# Patient Record
Sex: FEMALE | Race: WHITE | NOT HISPANIC OR LATINO | Employment: OTHER | URBAN - METROPOLITAN AREA
[De-identification: names, ages, dates, MRNs, and addresses within clinical notes are randomized per-mention and may not be internally consistent; named-entity substitution may affect disease eponyms.]

---

## 2017-01-01 ENCOUNTER — APPOINTMENT (OUTPATIENT)
Dept: OCCUPATIONAL THERAPY | Facility: CLINIC | Age: 77
DRG: 205 | End: 2017-01-01
Payer: MEDICARE

## 2017-01-01 ENCOUNTER — APPOINTMENT (OUTPATIENT)
Dept: GENERAL RADIOLOGY | Facility: CLINIC | Age: 77
DRG: 205 | End: 2017-01-01
Attending: NURSE PRACTITIONER
Payer: MEDICARE

## 2017-01-01 ENCOUNTER — ONCOLOGY VISIT (OUTPATIENT)
Dept: ONCOLOGY | Facility: CLINIC | Age: 77
End: 2017-01-01
Attending: NURSE PRACTITIONER
Payer: MEDICARE

## 2017-01-01 ENCOUNTER — TELEPHONE (OUTPATIENT)
Dept: TRANSPLANT | Facility: CLINIC | Age: 77
End: 2017-01-01

## 2017-01-01 ENCOUNTER — ONCOLOGY VISIT (OUTPATIENT)
Dept: ONCOLOGY | Facility: CLINIC | Age: 77
DRG: 205 | End: 2017-01-01
Attending: PHYSICIAN ASSISTANT
Payer: MEDICARE

## 2017-01-01 ENCOUNTER — APPOINTMENT (OUTPATIENT)
Dept: LAB | Facility: CLINIC | Age: 77
End: 2017-01-01
Attending: INTERNAL MEDICINE
Payer: MEDICARE

## 2017-01-01 ENCOUNTER — APPOINTMENT (OUTPATIENT)
Dept: ONCOLOGY | Facility: CLINIC | Age: 77
End: 2017-01-01
Attending: INTERNAL MEDICINE
Payer: MEDICARE

## 2017-01-01 ENCOUNTER — APPOINTMENT (OUTPATIENT)
Dept: GENERAL RADIOLOGY | Facility: CLINIC | Age: 77
DRG: 205 | End: 2017-01-01
Attending: INTERNAL MEDICINE
Payer: MEDICARE

## 2017-01-01 ENCOUNTER — TELEPHONE (OUTPATIENT)
Dept: ONCOLOGY | Facility: CLINIC | Age: 77
End: 2017-01-01

## 2017-01-01 ENCOUNTER — APPOINTMENT (OUTPATIENT)
Dept: LAB | Facility: CLINIC | Age: 77
End: 2017-01-01
Attending: NURSE PRACTITIONER
Payer: MEDICARE

## 2017-01-01 ENCOUNTER — HOSPITAL ENCOUNTER (INPATIENT)
Facility: CLINIC | Age: 77
LOS: 6 days | Discharge: HOME | DRG: 205 | End: 2017-01-27
Attending: EMERGENCY MEDICINE | Admitting: INTERNAL MEDICINE
Payer: MEDICARE

## 2017-01-01 ENCOUNTER — TRANSFERRED RECORDS (OUTPATIENT)
Dept: HEALTH INFORMATION MANAGEMENT | Facility: CLINIC | Age: 77
End: 2017-01-01

## 2017-01-01 ENCOUNTER — APPOINTMENT (OUTPATIENT)
Dept: LAB | Facility: CLINIC | Age: 77
DRG: 205 | End: 2017-01-01
Attending: INTERNAL MEDICINE
Payer: MEDICARE

## 2017-01-01 ENCOUNTER — APPOINTMENT (OUTPATIENT)
Dept: OCCUPATIONAL THERAPY | Facility: CLINIC | Age: 77
DRG: 205 | End: 2017-01-01
Attending: PHYSICIAN ASSISTANT
Payer: MEDICARE

## 2017-01-01 ENCOUNTER — APPOINTMENT (OUTPATIENT)
Dept: PHYSICAL THERAPY | Facility: CLINIC | Age: 77
DRG: 205 | End: 2017-01-01
Attending: PHYSICIAN ASSISTANT
Payer: MEDICARE

## 2017-01-01 ENCOUNTER — INFUSION THERAPY VISIT (OUTPATIENT)
Dept: ONCOLOGY | Facility: CLINIC | Age: 77
DRG: 205 | End: 2017-01-01
Attending: INTERNAL MEDICINE
Payer: MEDICARE

## 2017-01-01 ENCOUNTER — APPOINTMENT (OUTPATIENT)
Dept: PHYSICAL THERAPY | Facility: CLINIC | Age: 77
DRG: 205 | End: 2017-01-01
Payer: MEDICARE

## 2017-01-01 ENCOUNTER — APPOINTMENT (OUTPATIENT)
Dept: CT IMAGING | Facility: CLINIC | Age: 77
DRG: 205 | End: 2017-01-01
Attending: EMERGENCY MEDICINE
Payer: MEDICARE

## 2017-01-01 ENCOUNTER — APPOINTMENT (OUTPATIENT)
Dept: INTERVENTIONAL RADIOLOGY/VASCULAR | Facility: CLINIC | Age: 77
DRG: 205 | End: 2017-01-01
Attending: NURSE PRACTITIONER
Payer: MEDICARE

## 2017-01-01 ENCOUNTER — CARE COORDINATION (OUTPATIENT)
Dept: ONCOLOGY | Facility: CLINIC | Age: 77
End: 2017-01-01

## 2017-01-01 ENCOUNTER — HOSPITAL ENCOUNTER (INPATIENT)
Facility: CLINIC | Age: 77
LOS: 6 days | DRG: 205 | End: 2017-02-19
Attending: EMERGENCY MEDICINE | Admitting: INTERNAL MEDICINE
Payer: MEDICARE

## 2017-01-01 ENCOUNTER — APPOINTMENT (OUTPATIENT)
Dept: ULTRASOUND IMAGING | Facility: CLINIC | Age: 77
DRG: 205 | End: 2017-01-01
Attending: NURSE PRACTITIONER
Payer: MEDICARE

## 2017-01-01 ENCOUNTER — APPOINTMENT (OUTPATIENT)
Dept: GENERAL RADIOLOGY | Facility: CLINIC | Age: 77
DRG: 205 | End: 2017-01-01
Attending: EMERGENCY MEDICINE
Payer: MEDICARE

## 2017-01-01 ENCOUNTER — CARE COORDINATION (OUTPATIENT)
Dept: TRANSPLANT | Facility: CLINIC | Age: 77
End: 2017-01-01

## 2017-01-01 ENCOUNTER — APPOINTMENT (OUTPATIENT)
Dept: OCCUPATIONAL THERAPY | Facility: CLINIC | Age: 77
DRG: 205 | End: 2017-01-01
Attending: NURSE PRACTITIONER
Payer: MEDICARE

## 2017-01-01 ENCOUNTER — APPOINTMENT (OUTPATIENT)
Dept: CARDIOLOGY | Facility: CLINIC | Age: 77
DRG: 205 | End: 2017-01-01
Payer: MEDICARE

## 2017-01-01 ENCOUNTER — APPOINTMENT (OUTPATIENT)
Dept: PHYSICAL THERAPY | Facility: CLINIC | Age: 77
DRG: 205 | End: 2017-01-01
Attending: NURSE PRACTITIONER
Payer: MEDICARE

## 2017-01-01 ENCOUNTER — CARE COORDINATION (OUTPATIENT)
Dept: CARDIOLOGY | Facility: CLINIC | Age: 77
End: 2017-01-01

## 2017-01-01 ENCOUNTER — APPOINTMENT (OUTPATIENT)
Dept: ULTRASOUND IMAGING | Facility: CLINIC | Age: 77
DRG: 205 | End: 2017-01-01
Attending: INTERNAL MEDICINE
Payer: MEDICARE

## 2017-01-01 ENCOUNTER — APPOINTMENT (OUTPATIENT)
Dept: GENERAL RADIOLOGY | Facility: CLINIC | Age: 77
DRG: 205 | End: 2017-01-01
Payer: MEDICARE

## 2017-01-01 VITALS
OXYGEN SATURATION: 94 % | DIASTOLIC BLOOD PRESSURE: 58 MMHG | RESPIRATION RATE: 18 BRPM | SYSTOLIC BLOOD PRESSURE: 115 MMHG | WEIGHT: 113.54 LBS | BODY MASS INDEX: 23.32 KG/M2 | TEMPERATURE: 97.1 F | HEART RATE: 109 BPM

## 2017-01-01 VITALS
RESPIRATION RATE: 18 BRPM | SYSTOLIC BLOOD PRESSURE: 97 MMHG | TEMPERATURE: 98.9 F | HEART RATE: 110 BPM | WEIGHT: 113.9 LBS | DIASTOLIC BLOOD PRESSURE: 44 MMHG | BODY MASS INDEX: 22.96 KG/M2 | OXYGEN SATURATION: 92 % | HEIGHT: 59 IN

## 2017-01-01 VITALS
BODY MASS INDEX: 23.47 KG/M2 | HEIGHT: 59 IN | TEMPERATURE: 98.1 F | DIASTOLIC BLOOD PRESSURE: 67 MMHG | RESPIRATION RATE: 20 BRPM | WEIGHT: 116.4 LBS | HEART RATE: 108 BPM | OXYGEN SATURATION: 91 % | SYSTOLIC BLOOD PRESSURE: 142 MMHG

## 2017-01-01 VITALS
BODY MASS INDEX: 22.81 KG/M2 | OXYGEN SATURATION: 93 % | TEMPERATURE: 98.2 F | HEART RATE: 103 BPM | RESPIRATION RATE: 16 BRPM | SYSTOLIC BLOOD PRESSURE: 103 MMHG | DIASTOLIC BLOOD PRESSURE: 53 MMHG | WEIGHT: 113 LBS

## 2017-01-01 VITALS
TEMPERATURE: 98.1 F | SYSTOLIC BLOOD PRESSURE: 104 MMHG | HEART RATE: 104 BPM | BODY MASS INDEX: 27.56 KG/M2 | HEIGHT: 59 IN | OXYGEN SATURATION: 79 % | RESPIRATION RATE: 16 BRPM | WEIGHT: 136.69 LBS | DIASTOLIC BLOOD PRESSURE: 48 MMHG

## 2017-01-01 VITALS
HEART RATE: 115 BPM | SYSTOLIC BLOOD PRESSURE: 132 MMHG | RESPIRATION RATE: 16 BRPM | DIASTOLIC BLOOD PRESSURE: 62 MMHG | BODY MASS INDEX: 23.91 KG/M2 | WEIGHT: 116.4 LBS | OXYGEN SATURATION: 94 % | TEMPERATURE: 98.4 F

## 2017-01-01 VITALS
HEART RATE: 105 BPM | SYSTOLIC BLOOD PRESSURE: 114 MMHG | WEIGHT: 115 LBS | DIASTOLIC BLOOD PRESSURE: 58 MMHG | TEMPERATURE: 98.1 F | OXYGEN SATURATION: 91 % | BODY MASS INDEX: 23.18 KG/M2 | HEIGHT: 59 IN

## 2017-01-01 VITALS
WEIGHT: 117.5 LBS | OXYGEN SATURATION: 92 % | RESPIRATION RATE: 24 BRPM | BODY MASS INDEX: 24.14 KG/M2 | TEMPERATURE: 98 F | SYSTOLIC BLOOD PRESSURE: 126 MMHG | HEART RATE: 107 BPM | DIASTOLIC BLOOD PRESSURE: 58 MMHG

## 2017-01-01 DIAGNOSIS — R11.10 VOMITING AND DIARRHEA: ICD-10-CM

## 2017-01-01 DIAGNOSIS — L03.119 CELLULITIS OF WRIST: ICD-10-CM

## 2017-01-01 DIAGNOSIS — Z94.2 LUNG REPLACED BY TRANSPLANT (H): ICD-10-CM

## 2017-01-01 DIAGNOSIS — Z94.2 LUNG REPLACED BY TRANSPLANT (H): Primary | ICD-10-CM

## 2017-01-01 DIAGNOSIS — C78.7 SECONDARY MALIGNANT NEOPLASM OF LIVER (H): ICD-10-CM

## 2017-01-01 DIAGNOSIS — L03.113 CELLULITIS OF RIGHT UPPER EXTREMITY: Primary | ICD-10-CM

## 2017-01-01 DIAGNOSIS — T45.1X5A CHEMOTHERAPY-INDUCED NEUTROPENIA (H): Primary | ICD-10-CM

## 2017-01-01 DIAGNOSIS — C78.7 SECONDARY MALIGNANT NEOPLASM OF LIVER (H): Primary | ICD-10-CM

## 2017-01-01 DIAGNOSIS — N18.30 CKD (CHRONIC KIDNEY DISEASE) STAGE 3, GFR 30-59 ML/MIN (H): ICD-10-CM

## 2017-01-01 DIAGNOSIS — R52 UNCONTROLLED PAIN: ICD-10-CM

## 2017-01-01 DIAGNOSIS — J18.9 HCAP (HEALTHCARE-ASSOCIATED PNEUMONIA): ICD-10-CM

## 2017-01-01 DIAGNOSIS — C78.7 METASTATIC COLON CANCER TO LIVER (H): ICD-10-CM

## 2017-01-01 DIAGNOSIS — E87.5 HYPERKALEMIA: ICD-10-CM

## 2017-01-01 DIAGNOSIS — C18.2 CANCER OF RIGHT COLON (H): ICD-10-CM

## 2017-01-01 DIAGNOSIS — B02.8 HERPES ZOSTER WITH OTHER COMPLICATION: ICD-10-CM

## 2017-01-01 DIAGNOSIS — R19.7 VOMITING AND DIARRHEA: ICD-10-CM

## 2017-01-01 DIAGNOSIS — C18.2 CANCER OF RIGHT COLON (H): Primary | ICD-10-CM

## 2017-01-01 DIAGNOSIS — J40 BRONCHITIS: ICD-10-CM

## 2017-01-01 DIAGNOSIS — D64.81 ANTINEOPLASTIC CHEMOTHERAPY INDUCED ANEMIA: Primary | ICD-10-CM

## 2017-01-01 DIAGNOSIS — N18.9 ANEMIA IN CHRONIC RENAL DISEASE: ICD-10-CM

## 2017-01-01 DIAGNOSIS — N18.30 CKD (CHRONIC KIDNEY DISEASE) STAGE 3, GFR 30-59 ML/MIN (H): Primary | ICD-10-CM

## 2017-01-01 DIAGNOSIS — C18.9 METASTATIC COLON CANCER TO LIVER (H): ICD-10-CM

## 2017-01-01 DIAGNOSIS — C18.9 COLON CARCINOMA METASTATIC TO MULTIPLE SITES (H): ICD-10-CM

## 2017-01-01 DIAGNOSIS — Z79.899 ENCOUNTER FOR LONG-TERM (CURRENT) USE OF HIGH-RISK MEDICATION: ICD-10-CM

## 2017-01-01 DIAGNOSIS — D72.829 LEUKOCYTOSIS, UNSPECIFIED TYPE: ICD-10-CM

## 2017-01-01 DIAGNOSIS — Z79.899 ENCOUNTER FOR LONG-TERM (CURRENT) USE OF OTHER MEDICATIONS: ICD-10-CM

## 2017-01-01 DIAGNOSIS — R11.2 NAUSEA WITH VOMITING: ICD-10-CM

## 2017-01-01 DIAGNOSIS — N18.4 CKD (CHRONIC KIDNEY DISEASE) STAGE 4, GFR 15-29 ML/MIN (H): Primary | ICD-10-CM

## 2017-01-01 DIAGNOSIS — I10 ESSENTIAL HYPERTENSION WITH GOAL BLOOD PRESSURE LESS THAN 140/90: Primary | ICD-10-CM

## 2017-01-01 DIAGNOSIS — D70.1 CHEMOTHERAPY-INDUCED NEUTROPENIA (H): Primary | ICD-10-CM

## 2017-01-01 DIAGNOSIS — T45.1X5A ANTINEOPLASTIC CHEMOTHERAPY INDUCED ANEMIA: Primary | ICD-10-CM

## 2017-01-01 DIAGNOSIS — J18.9 PNEUMONIA DUE TO INFECTIOUS ORGANISM, UNSPECIFIED LATERALITY, UNSPECIFIED PART OF LUNG: ICD-10-CM

## 2017-01-01 DIAGNOSIS — D63.1 ANEMIA IN CHRONIC RENAL DISEASE: ICD-10-CM

## 2017-01-01 LAB
1,3 BETA GLUCAN SER-MCNC: NORMAL NG/ML
ABO + RH BLD: NORMAL
ABO + RH BLD: NORMAL
ACANTHOCYTES BLD QL SMEAR: ABNORMAL
ALBUMIN SERPL-MCNC: 2.2 G/DL (ref 3.4–5)
ALBUMIN SERPL-MCNC: 2.2 G/DL (ref 3.4–5)
ALBUMIN SERPL-MCNC: 2.5 G/DL (ref 3.4–5)
ALBUMIN SERPL-MCNC: 2.6 G/DL (ref 3.4–5)
ALBUMIN SERPL-MCNC: 2.7 G/DL (ref 3.4–5)
ALBUMIN SERPL-MCNC: 3 G/DL (ref 3.4–5)
ALBUMIN UR-MCNC: 10 MG/DL
ALBUMIN UR-MCNC: 10 MG/DL
ALBUMIN UR-MCNC: NEGATIVE MG/DL
ALP SERPL-CCNC: 234 U/L (ref 40–150)
ALP SERPL-CCNC: 239 U/L (ref 40–150)
ALP SERPL-CCNC: 314 U/L (ref 40–150)
ALP SERPL-CCNC: 360 U/L (ref 40–150)
ALP SERPL-CCNC: 362 U/L (ref 40–150)
ALP SERPL-CCNC: 373 U/L (ref 40–150)
ALP SERPL-CCNC: 397 U/L (ref 40–150)
ALP SERPL-CCNC: 415 U/L (ref 40–150)
ALP SERPL-CCNC: 435 U/L (ref 40–150)
ALP SERPL-CCNC: 436 U/L (ref 40–150)
ALP SERPL-CCNC: 445 U/L (ref 40–150)
ALP SERPL-CCNC: 492 U/L (ref 40–150)
ALP SERPL-CCNC: 558 U/L (ref 40–150)
ALP SERPL-CCNC: 592 U/L (ref 40–150)
ALP SERPL-CCNC: 595 U/L (ref 40–150)
ALT SERPL W P-5'-P-CCNC: 102 U/L (ref 0–50)
ALT SERPL W P-5'-P-CCNC: 22 U/L (ref 0–50)
ALT SERPL W P-5'-P-CCNC: 24 U/L (ref 0–50)
ALT SERPL W P-5'-P-CCNC: 26 U/L (ref 0–50)
ALT SERPL W P-5'-P-CCNC: 27 U/L (ref 0–50)
ALT SERPL W P-5'-P-CCNC: 28 U/L (ref 0–50)
ALT SERPL W P-5'-P-CCNC: 29 U/L (ref 0–50)
ALT SERPL W P-5'-P-CCNC: 32 U/L (ref 0–50)
ALT SERPL W P-5'-P-CCNC: 36 U/L (ref 0–50)
ALT SERPL W P-5'-P-CCNC: 36 U/L (ref 0–50)
ALT SERPL W P-5'-P-CCNC: 38 U/L (ref 0–50)
ALT SERPL W P-5'-P-CCNC: 81 U/L (ref 0–50)
ALT SERPL W P-5'-P-CCNC: 83 U/L (ref 0–50)
AMORPH CRY #/AREA URNS HPF: ABNORMAL /HPF
AMYLASE FLD-CCNC: 10 U/L
ANION GAP SERPL CALCULATED.3IONS-SCNC: 10 MMOL/L (ref 3–14)
ANION GAP SERPL CALCULATED.3IONS-SCNC: 11 MMOL/L (ref 3–14)
ANION GAP SERPL CALCULATED.3IONS-SCNC: 12 MMOL/L (ref 3–14)
ANION GAP SERPL CALCULATED.3IONS-SCNC: 12 MMOL/L (ref 3–14)
ANION GAP SERPL CALCULATED.3IONS-SCNC: 13 MMOL/L (ref 3–14)
ANION GAP SERPL CALCULATED.3IONS-SCNC: 7 MMOL/L (ref 3–14)
ANION GAP SERPL CALCULATED.3IONS-SCNC: 7 MMOL/L (ref 3–14)
ANION GAP SERPL CALCULATED.3IONS-SCNC: 8 MMOL/L (ref 3–14)
ANION GAP SERPL CALCULATED.3IONS-SCNC: 9 MMOL/L (ref 3–14)
ANISOCYTOSIS BLD QL SMEAR: ABNORMAL
ANISOCYTOSIS BLD QL SMEAR: ABNORMAL
APPEARANCE FLD: NORMAL
APPEARANCE UR: ABNORMAL
APPEARANCE UR: ABNORMAL
APPEARANCE UR: CLEAR
AST SERPL W P-5'-P-CCNC: 15 U/L (ref 0–45)
AST SERPL W P-5'-P-CCNC: 18 U/L (ref 0–45)
AST SERPL W P-5'-P-CCNC: 20 U/L (ref 0–45)
AST SERPL W P-5'-P-CCNC: 20 U/L (ref 0–45)
AST SERPL W P-5'-P-CCNC: 21 U/L (ref 0–45)
AST SERPL W P-5'-P-CCNC: 22 U/L (ref 0–45)
AST SERPL W P-5'-P-CCNC: 224 U/L (ref 0–45)
AST SERPL W P-5'-P-CCNC: 24 U/L (ref 0–45)
AST SERPL W P-5'-P-CCNC: 27 U/L (ref 0–45)
AST SERPL W P-5'-P-CCNC: 294 U/L (ref 0–45)
AST SERPL W P-5'-P-CCNC: 435 U/L (ref 0–45)
AST SERPL W P-5'-P-CCNC: 60 U/L (ref 0–45)
AST SERPL W P-5'-P-CCNC: 64 U/L (ref 0–45)
AST SERPL W P-5'-P-CCNC: 65 U/L (ref 0–45)
AST SERPL W P-5'-P-CCNC: 72 U/L (ref 0–45)
B-D GLUCAN INTERPRETATION (1,3): NORMAL
BACTERIA #/AREA URNS HPF: ABNORMAL /HPF
BACTERIA SPEC CULT: ABNORMAL
BACTERIA SPEC CULT: ABNORMAL
BACTERIA SPEC CULT: NO GROWTH
BASE DEFICIT BLDA-SCNC: 10.1 MMOL/L
BASOPHILS # BLD AUTO: 0 10E9/L (ref 0–0.2)
BASOPHILS NFR BLD AUTO: 0 %
BASOPHILS NFR BLD AUTO: 0.1 %
BILIRUB DIRECT SERPL-MCNC: 0.2 MG/DL (ref 0–0.2)
BILIRUB DIRECT SERPL-MCNC: 0.4 MG/DL (ref 0–0.2)
BILIRUB DIRECT SERPL-MCNC: 0.5 MG/DL (ref 0–0.2)
BILIRUB SERPL-MCNC: 0.2 MG/DL (ref 0.2–1.3)
BILIRUB SERPL-MCNC: 0.4 MG/DL (ref 0.2–1.3)
BILIRUB SERPL-MCNC: 0.5 MG/DL (ref 0.2–1.3)
BILIRUB SERPL-MCNC: 0.6 MG/DL (ref 0.2–1.3)
BILIRUB SERPL-MCNC: 0.7 MG/DL (ref 0.2–1.3)
BILIRUB SERPL-MCNC: 0.8 MG/DL (ref 0.2–1.3)
BILIRUB SERPL-MCNC: 1 MG/DL (ref 0.2–1.3)
BILIRUB UR QL STRIP: NEGATIVE
BLD GP AB SCN SERPL QL: NORMAL
BLD PROD TYP BPU: NORMAL
BLD PROD TYP BPU: NORMAL
BLD UNIT ID BPU: 0
BLOOD BANK CMNT PATIENT-IMP: NORMAL
BLOOD PRODUCT CODE: NORMAL
BPU ID: NORMAL
BUN SERPL-MCNC: 13 MG/DL (ref 7–30)
BUN SERPL-MCNC: 14 MG/DL (ref 7–30)
BUN SERPL-MCNC: 14 MG/DL (ref 7–30)
BUN SERPL-MCNC: 16 MG/DL (ref 7–30)
BUN SERPL-MCNC: 17 MG/DL (ref 7–30)
BUN SERPL-MCNC: 19 MG/DL (ref 7–30)
BUN SERPL-MCNC: 20 MG/DL (ref 7–30)
BUN SERPL-MCNC: 20 MG/DL (ref 7–30)
BUN SERPL-MCNC: 21 MG/DL (ref 7–30)
BUN SERPL-MCNC: 22 MG/DL (ref 7–30)
BUN SERPL-MCNC: 25 MG/DL (ref 7–30)
BUN SERPL-MCNC: 30 MG/DL (ref 7–30)
BUN SERPL-MCNC: 31 MG/DL (ref 7–30)
BUN SERPL-MCNC: 32 MG/DL (ref 7–30)
BUN SERPL-MCNC: 34 MG/DL (ref 7–30)
BUN SERPL-MCNC: 35 MG/DL (ref 7–30)
BUN SERPL-MCNC: 35 MG/DL (ref 7–30)
BUN SERPL-MCNC: 36 MG/DL (ref 7–30)
BUN SERPL-MCNC: 37 MG/DL (ref 7–30)
BUN SERPL-MCNC: 39 MG/DL (ref 7–30)
BUN SERPL-MCNC: 40 MG/DL (ref 7–30)
BUN SERPL-MCNC: 45 MG/DL (ref 7–30)
C DIFF TOX B STL QL: NORMAL
CA-I BLD-SCNC: 4.4 MG/DL (ref 4.4–5.2)
CA-I SERPL ISE-MCNC: 4.2 MG/DL (ref 4.4–5.2)
CALCIUM SERPL-MCNC: 6.6 MG/DL (ref 8.5–10.1)
CALCIUM SERPL-MCNC: 6.8 MG/DL (ref 8.5–10.1)
CALCIUM SERPL-MCNC: 7 MG/DL (ref 8.5–10.1)
CALCIUM SERPL-MCNC: 7.1 MG/DL (ref 8.5–10.1)
CALCIUM SERPL-MCNC: 7.1 MG/DL (ref 8.5–10.1)
CALCIUM SERPL-MCNC: 7.2 MG/DL (ref 8.5–10.1)
CALCIUM SERPL-MCNC: 7.2 MG/DL (ref 8.5–10.1)
CALCIUM SERPL-MCNC: 7.4 MG/DL (ref 8.5–10.1)
CALCIUM SERPL-MCNC: 7.6 MG/DL (ref 8.5–10.1)
CALCIUM SERPL-MCNC: 7.7 MG/DL (ref 8.5–10.1)
CALCIUM SERPL-MCNC: 7.8 MG/DL (ref 8.5–10.1)
CALCIUM SERPL-MCNC: 7.9 MG/DL (ref 8.5–10.1)
CALCIUM SERPL-MCNC: 8 MG/DL (ref 8.5–10.1)
CALCIUM SERPL-MCNC: 8.1 MG/DL (ref 8.5–10.1)
CALCIUM SERPL-MCNC: 8.1 MG/DL (ref 8.5–10.1)
CALCIUM SERPL-MCNC: 8.2 MG/DL (ref 8.5–10.1)
CAMPYLOBACTER GROUP BY NAT: NOT DETECTED
CEA SERPL-MCNC: 34.6 UG/L (ref 0–2.5)
CHLORIDE SERPL-SCNC: 103 MMOL/L (ref 94–109)
CHLORIDE SERPL-SCNC: 104 MMOL/L (ref 94–109)
CHLORIDE SERPL-SCNC: 105 MMOL/L (ref 94–109)
CHLORIDE SERPL-SCNC: 106 MMOL/L (ref 94–109)
CHLORIDE SERPL-SCNC: 107 MMOL/L (ref 94–109)
CHLORIDE SERPL-SCNC: 107 MMOL/L (ref 94–109)
CHLORIDE SERPL-SCNC: 108 MMOL/L (ref 94–109)
CHLORIDE SERPL-SCNC: 109 MMOL/L (ref 94–109)
CHLORIDE SERPL-SCNC: 110 MMOL/L (ref 94–109)
CHLORIDE SERPL-SCNC: 111 MMOL/L (ref 94–109)
CO2 BLDCOV-SCNC: 17 MMOL/L (ref 21–28)
CO2 BLDCOV-SCNC: 18 MMOL/L (ref 21–28)
CO2 SERPL-SCNC: 16 MMOL/L (ref 20–32)
CO2 SERPL-SCNC: 17 MMOL/L (ref 20–32)
CO2 SERPL-SCNC: 18 MMOL/L (ref 20–32)
CO2 SERPL-SCNC: 19 MMOL/L (ref 20–32)
CO2 SERPL-SCNC: 20 MMOL/L (ref 20–32)
CO2 SERPL-SCNC: 21 MMOL/L (ref 20–32)
CO2 SERPL-SCNC: 22 MMOL/L (ref 20–32)
CO2 SERPL-SCNC: 23 MMOL/L (ref 20–32)
COLOR FLD: NORMAL
COLOR UR AUTO: YELLOW
COPATH REPORT: NORMAL
CREAT SERPL-MCNC: 1.36 MG/DL (ref 0.52–1.04)
CREAT SERPL-MCNC: 1.41 MG/DL (ref 0.52–1.04)
CREAT SERPL-MCNC: 1.41 MG/DL (ref 0.52–1.04)
CREAT SERPL-MCNC: 1.46 MG/DL (ref 0.52–1.04)
CREAT SERPL-MCNC: 1.51 MG/DL (ref 0.52–1.04)
CREAT SERPL-MCNC: 1.57 MG/DL (ref 0.52–1.04)
CREAT SERPL-MCNC: 1.63 MG/DL (ref 0.52–1.04)
CREAT SERPL-MCNC: 1.65 MG/DL (ref 0.52–1.04)
CREAT SERPL-MCNC: 1.75 MG/DL (ref 0.52–1.04)
CREAT SERPL-MCNC: 1.78 MG/DL (ref 0.52–1.04)
CREAT SERPL-MCNC: 1.82 MG/DL (ref 0.52–1.04)
CREAT SERPL-MCNC: 1.83 MG/DL (ref 0.52–1.04)
CREAT SERPL-MCNC: 1.87 MG/DL (ref 0.52–1.04)
CREAT SERPL-MCNC: 1.88 MG/DL (ref 0.52–1.04)
CREAT SERPL-MCNC: 1.94 MG/DL (ref 0.52–1.04)
CREAT SERPL-MCNC: 1.97 MG/DL (ref 0.52–1.04)
CREAT SERPL-MCNC: 2.04 MG/DL (ref 0.52–1.04)
CREAT SERPL-MCNC: 2.53 MG/DL (ref 0.52–1.04)
CREAT SERPL-MCNC: 2.98 MG/DL (ref 0.52–1.04)
CREAT SERPL-MCNC: 3.18 MG/DL (ref 0.52–1.04)
CREAT SERPL-MCNC: 3.21 MG/DL (ref 0.52–1.04)
CREAT SERPL-MCNC: 3.66 MG/DL (ref 0.52–1.04)
CREAT UR-MCNC: 91 MG/DL
DACRYOCYTES BLD QL SMEAR: ABNORMAL
DIFFERENTIAL METHOD BLD: ABNORMAL
EBV DNA # SPEC NAA+PROBE: 4253 {COPIES}/ML
EBV DNA SPEC NAA+PROBE-LOG#: 3.6 {LOG_COPIES}/ML
ELLIPTOCYTES BLD QL SMEAR: SLIGHT
ENTERIC PATHOGEN COMMENT: NORMAL
EOSINOPHIL # BLD AUTO: 0 10E9/L (ref 0–0.7)
EOSINOPHIL # BLD AUTO: 0.1 10E9/L (ref 0–0.7)
EOSINOPHIL # BLD AUTO: 0.2 10E9/L (ref 0–0.7)
EOSINOPHIL NFR BLD AUTO: 0 %
EOSINOPHIL NFR BLD AUTO: 0.1 %
EOSINOPHIL NFR BLD AUTO: 0.1 %
EOSINOPHIL NFR BLD AUTO: 0.2 %
EOSINOPHIL NFR BLD AUTO: 1 %
EOSINOPHIL NFR BLD AUTO: 1.3 %
ERYTHROCYTE [DISTWIDTH] IN BLOOD BY AUTOMATED COUNT: 21.8 % (ref 10–15)
ERYTHROCYTE [DISTWIDTH] IN BLOOD BY AUTOMATED COUNT: 21.9 % (ref 10–15)
ERYTHROCYTE [DISTWIDTH] IN BLOOD BY AUTOMATED COUNT: 22 % (ref 10–15)
ERYTHROCYTE [DISTWIDTH] IN BLOOD BY AUTOMATED COUNT: 22.2 % (ref 10–15)
ERYTHROCYTE [DISTWIDTH] IN BLOOD BY AUTOMATED COUNT: 22.4 % (ref 10–15)
ERYTHROCYTE [DISTWIDTH] IN BLOOD BY AUTOMATED COUNT: 22.5 % (ref 10–15)
ERYTHROCYTE [DISTWIDTH] IN BLOOD BY AUTOMATED COUNT: 23.4 % (ref 10–15)
ERYTHROCYTE [DISTWIDTH] IN BLOOD BY AUTOMATED COUNT: 23.8 % (ref 10–15)
ERYTHROCYTE [DISTWIDTH] IN BLOOD BY AUTOMATED COUNT: 23.9 % (ref 10–15)
ERYTHROCYTE [DISTWIDTH] IN BLOOD BY AUTOMATED COUNT: 24.1 % (ref 10–15)
ERYTHROCYTE [DISTWIDTH] IN BLOOD BY AUTOMATED COUNT: 24.4 % (ref 10–15)
ERYTHROCYTE [DISTWIDTH] IN BLOOD BY AUTOMATED COUNT: 24.6 % (ref 10–15)
ERYTHROCYTE [DISTWIDTH] IN BLOOD BY AUTOMATED COUNT: 24.7 % (ref 10–15)
ERYTHROCYTE [DISTWIDTH] IN BLOOD BY AUTOMATED COUNT: 25.1 % (ref 10–15)
ERYTHROCYTE [DISTWIDTH] IN BLOOD BY AUTOMATED COUNT: 25.2 % (ref 10–15)
ERYTHROCYTE [DISTWIDTH] IN BLOOD BY AUTOMATED COUNT: 25.2 % (ref 10–15)
ERYTHROCYTE [DISTWIDTH] IN BLOOD BY AUTOMATED COUNT: 25.5 % (ref 10–15)
ERYTHROCYTE [DISTWIDTH] IN BLOOD BY AUTOMATED COUNT: 25.7 % (ref 10–15)
ERYTHROCYTE [DISTWIDTH] IN BLOOD BY AUTOMATED COUNT: 25.7 % (ref 10–15)
ERYTHROCYTE [DISTWIDTH] IN BLOOD BY AUTOMATED COUNT: 25.8 % (ref 10–15)
FIBRINOGEN PPP-MCNC: 320 MG/DL (ref 200–420)
FIBRINOGEN PPP-MCNC: 344 MG/DL (ref 200–420)
FIBRINOGEN PPP-MCNC: 353 MG/DL (ref 200–420)
FLUAV H1 2009 PAND RNA SPEC QL NAA+PROBE: NEGATIVE
FLUAV H1 2009 PAND RNA SPEC QL NAA+PROBE: NEGATIVE
FLUAV H1 RNA SPEC QL NAA+PROBE: NEGATIVE
FLUAV H1 RNA SPEC QL NAA+PROBE: NEGATIVE
FLUAV H3 RNA SPEC QL NAA+PROBE: NEGATIVE
FLUAV H3 RNA SPEC QL NAA+PROBE: NEGATIVE
FLUAV RNA SPEC QL NAA+PROBE: NEGATIVE
FLUAV RNA SPEC QL NAA+PROBE: NEGATIVE
FLUAV+FLUBV AG SPEC QL: NEGATIVE
FLUAV+FLUBV AG SPEC QL: NORMAL
FLUAV+FLUBV RNA SPEC QL NAA+PROBE: NORMAL
FLUBV RNA SPEC QL NAA+PROBE: NEGATIVE
FLUBV RNA SPEC QL NAA+PROBE: NEGATIVE
FOLATE RBC-MCNC: 551 NG/ML
FRACT EXCRET NA UR+SERPL-RTO: 0.5 %
FUNGUS SPEC CULT: NORMAL
GALACTOMANNAN AG SERPL QL IA: NORMAL
GALACTOMANNAN AG SERPL-ACNC: 0.08
GFR SERPL CREATININE-BSD FRML MDRD: 12 ML/MIN/1.7M2
GFR SERPL CREATININE-BSD FRML MDRD: 14 ML/MIN/1.7M2
GFR SERPL CREATININE-BSD FRML MDRD: 14 ML/MIN/1.7M2
GFR SERPL CREATININE-BSD FRML MDRD: 15 ML/MIN/1.7M2
GFR SERPL CREATININE-BSD FRML MDRD: 18 ML/MIN/1.7M2
GFR SERPL CREATININE-BSD FRML MDRD: 24 ML/MIN/1.7M2
GFR SERPL CREATININE-BSD FRML MDRD: 25 ML/MIN/1.7M2
GFR SERPL CREATININE-BSD FRML MDRD: 25 ML/MIN/1.7M2
GFR SERPL CREATININE-BSD FRML MDRD: 26 ML/MIN/1.7M2
GFR SERPL CREATININE-BSD FRML MDRD: 26 ML/MIN/1.7M2
GFR SERPL CREATININE-BSD FRML MDRD: 27 ML/MIN/1.7M2
GFR SERPL CREATININE-BSD FRML MDRD: 27 ML/MIN/1.7M2
GFR SERPL CREATININE-BSD FRML MDRD: 28 ML/MIN/1.7M2
GFR SERPL CREATININE-BSD FRML MDRD: 28 ML/MIN/1.7M2
GFR SERPL CREATININE-BSD FRML MDRD: 30 ML/MIN/1.7M2
GFR SERPL CREATININE-BSD FRML MDRD: 31 ML/MIN/1.7M2
GFR SERPL CREATININE-BSD FRML MDRD: 32 ML/MIN/1.7M2
GFR SERPL CREATININE-BSD FRML MDRD: 33 ML/MIN/1.7M2
GFR SERPL CREATININE-BSD FRML MDRD: 35 ML/MIN/1.7M2
GFR SERPL CREATININE-BSD FRML MDRD: 36 ML/MIN/1.7M2
GFR SERPL CREATININE-BSD FRML MDRD: 36 ML/MIN/1.7M2
GFR SERPL CREATININE-BSD FRML MDRD: 38 ML/MIN/1.7M2
GGT SERPL-CCNC: 418 U/L (ref 0–40)
GLUCOSE BLD-MCNC: 110 MG/DL (ref 70–99)
GLUCOSE BLDC GLUCOMTR-MCNC: 111 MG/DL (ref 70–99)
GLUCOSE BLDC GLUCOMTR-MCNC: 115 MG/DL (ref 70–99)
GLUCOSE BLDC GLUCOMTR-MCNC: 119 MG/DL (ref 70–99)
GLUCOSE BLDC GLUCOMTR-MCNC: 124 MG/DL (ref 70–99)
GLUCOSE BLDC GLUCOMTR-MCNC: 126 MG/DL (ref 70–99)
GLUCOSE BLDC GLUCOMTR-MCNC: 136 MG/DL (ref 70–99)
GLUCOSE BLDC GLUCOMTR-MCNC: 156 MG/DL (ref 70–99)
GLUCOSE BLDC GLUCOMTR-MCNC: 166 MG/DL (ref 70–99)
GLUCOSE BLDC GLUCOMTR-MCNC: 169 MG/DL (ref 70–99)
GLUCOSE BLDC GLUCOMTR-MCNC: 177 MG/DL (ref 70–99)
GLUCOSE BLDC GLUCOMTR-MCNC: 182 MG/DL (ref 70–99)
GLUCOSE BLDC GLUCOMTR-MCNC: 195 MG/DL (ref 70–99)
GLUCOSE BLDC GLUCOMTR-MCNC: 240 MG/DL (ref 70–99)
GLUCOSE BLDC GLUCOMTR-MCNC: 74 MG/DL (ref 70–99)
GLUCOSE BLDC GLUCOMTR-MCNC: 77 MG/DL (ref 70–99)
GLUCOSE BLDC GLUCOMTR-MCNC: 84 MG/DL (ref 70–99)
GLUCOSE BLDC GLUCOMTR-MCNC: 90 MG/DL (ref 70–99)
GLUCOSE BLDC GLUCOMTR-MCNC: 97 MG/DL (ref 70–99)
GLUCOSE BLDC GLUCOMTR-MCNC: 99 MG/DL (ref 70–99)
GLUCOSE FLD-MCNC: 72 MG/DL
GLUCOSE SERPL-MCNC: 105 MG/DL (ref 70–99)
GLUCOSE SERPL-MCNC: 106 MG/DL (ref 70–99)
GLUCOSE SERPL-MCNC: 109 MG/DL (ref 70–99)
GLUCOSE SERPL-MCNC: 122 MG/DL (ref 70–99)
GLUCOSE SERPL-MCNC: 137 MG/DL (ref 70–99)
GLUCOSE SERPL-MCNC: 143 MG/DL (ref 70–99)
GLUCOSE SERPL-MCNC: 172 MG/DL (ref 70–99)
GLUCOSE SERPL-MCNC: 179 MG/DL (ref 70–99)
GLUCOSE SERPL-MCNC: 202 MG/DL (ref 70–99)
GLUCOSE SERPL-MCNC: 223 MG/DL (ref 70–99)
GLUCOSE SERPL-MCNC: 44 MG/DL (ref 70–99)
GLUCOSE SERPL-MCNC: 56 MG/DL (ref 70–99)
GLUCOSE SERPL-MCNC: 60 MG/DL (ref 70–99)
GLUCOSE SERPL-MCNC: 68 MG/DL (ref 70–99)
GLUCOSE SERPL-MCNC: 74 MG/DL (ref 70–99)
GLUCOSE SERPL-MCNC: 77 MG/DL (ref 70–99)
GLUCOSE SERPL-MCNC: 80 MG/DL (ref 70–99)
GLUCOSE SERPL-MCNC: 84 MG/DL (ref 70–99)
GLUCOSE SERPL-MCNC: 84 MG/DL (ref 70–99)
GLUCOSE SERPL-MCNC: 87 MG/DL (ref 70–99)
GLUCOSE SERPL-MCNC: 87 MG/DL (ref 70–99)
GLUCOSE SERPL-MCNC: 88 MG/DL (ref 70–99)
GLUCOSE UR STRIP-MCNC: NEGATIVE MG/DL
GRAM STN SPEC: ABNORMAL
GRAM STN SPEC: NORMAL
GRAM STN SPEC: NORMAL
GRAN CASTS #/AREA URNS LPF: 2 /LPF
HADV DNA SPEC QL NAA+PROBE: NEGATIVE
HAPTOGLOB SERPL-MCNC: 206 MG/DL (ref 35–175)
HBA1C MFR BLD: 7.8 % (ref 4.3–6)
HCO3 BLD-SCNC: 17 MMOL/L (ref 21–28)
HCT VFR BLD AUTO: 22.2 % (ref 35–47)
HCT VFR BLD AUTO: 23.5 % (ref 35–47)
HCT VFR BLD AUTO: 23.6 % (ref 35–47)
HCT VFR BLD AUTO: 24.3 % (ref 35–47)
HCT VFR BLD AUTO: 24.4 % (ref 35–47)
HCT VFR BLD AUTO: 24.7 % (ref 35–47)
HCT VFR BLD AUTO: 24.8 % (ref 35–47)
HCT VFR BLD AUTO: 24.9 % (ref 35–47)
HCT VFR BLD AUTO: 25.2 % (ref 35–47)
HCT VFR BLD AUTO: 25.3 % (ref 35–47)
HCT VFR BLD AUTO: 25.5 % (ref 35–47)
HCT VFR BLD AUTO: 25.6 % (ref 35–47)
HCT VFR BLD AUTO: 25.8 % (ref 35–47)
HCT VFR BLD AUTO: 25.9 % (ref 35–47)
HCT VFR BLD AUTO: 26.6 % (ref 35–47)
HCT VFR BLD AUTO: 26.6 % (ref 35–47)
HCT VFR BLD AUTO: 27 % (ref 35–47)
HCT VFR BLD AUTO: 27.6 % (ref 35–47)
HCT VFR BLD AUTO: 28 % (ref 35–47)
HCT VFR BLD AUTO: 28 % (ref 35–47)
HCT VFR BLD CALC: 23 %PCV (ref 35–47)
HCT VFR BLD CALC: NORMAL %
HGB BLD CALC-MCNC: 7.8 G/DL (ref 11.7–15.7)
HGB BLD-MCNC: 6.6 G/DL (ref 11.7–15.7)
HGB BLD-MCNC: 6.7 G/DL (ref 11.7–15.7)
HGB BLD-MCNC: 6.8 G/DL (ref 11.7–15.7)
HGB BLD-MCNC: 7 G/DL (ref 11.7–15.7)
HGB BLD-MCNC: 7 G/DL (ref 11.7–15.7)
HGB BLD-MCNC: 7.1 G/DL (ref 11.7–15.7)
HGB BLD-MCNC: 7.2 G/DL (ref 11.7–15.7)
HGB BLD-MCNC: 7.2 G/DL (ref 11.7–15.7)
HGB BLD-MCNC: 7.3 G/DL (ref 11.7–15.7)
HGB BLD-MCNC: 7.3 G/DL (ref 11.7–15.7)
HGB BLD-MCNC: 7.5 G/DL (ref 11.7–15.7)
HGB BLD-MCNC: 7.6 G/DL (ref 11.7–15.7)
HGB BLD-MCNC: 7.9 G/DL (ref 11.7–15.7)
HGB BLD-MCNC: 8.2 G/DL (ref 11.7–15.7)
HGB BLD-MCNC: 8.3 G/DL (ref 11.7–15.7)
HGB BLD-MCNC: 8.4 G/DL (ref 11.7–15.7)
HGB UR QL STRIP: NEGATIVE
HMPV RNA SPEC QL NAA+PROBE: NEGATIVE
HMPV RNA SPEC QL NAA+PROBE: NEGATIVE
HPIV1 RNA SPEC QL NAA+PROBE: NEGATIVE
HPIV1 RNA SPEC QL NAA+PROBE: NEGATIVE
HPIV2 RNA SPEC QL NAA+PROBE: NEGATIVE
HPIV2 RNA SPEC QL NAA+PROBE: NEGATIVE
HPIV3 RNA SPEC QL NAA+PROBE: NEGATIVE
HPIV3 RNA SPEC QL NAA+PROBE: NEGATIVE
HYALINE CASTS #/AREA URNS LPF: 1 /LPF (ref 0–2)
HYALINE CASTS #/AREA URNS LPF: 11 /LPF (ref 0–2)
HYALINE CASTS #/AREA URNS LPF: 18 /LPF (ref 0–2)
IMM GRANULOCYTES # BLD: 0.1 10E9/L (ref 0–0.4)
IMM GRANULOCYTES # BLD: 0.2 10E9/L (ref 0–0.4)
IMM GRANULOCYTES # BLD: 0.2 10E9/L (ref 0–0.4)
IMM GRANULOCYTES # BLD: 0.3 10E9/L (ref 0–0.4)
IMM GRANULOCYTES # BLD: 0.4 10E9/L (ref 0–0.4)
IMM GRANULOCYTES NFR BLD: 0.4 %
IMM GRANULOCYTES NFR BLD: 0.6 %
IMM GRANULOCYTES NFR BLD: 0.7 %
IMM GRANULOCYTES NFR BLD: 0.8 %
IMM GRANULOCYTES NFR BLD: 0.8 %
IMM GRANULOCYTES NFR BLD: 2.2 %
INR PPP: 1.23 (ref 0.86–1.14)
INR PPP: 1.23 (ref 0.86–1.14)
INR PPP: 1.31 (ref 0.86–1.14)
INR PPP: 1.48 (ref 0.86–1.14)
INTERPRETATION ECG - MUSE: NORMAL
IRON SATN MFR SERPL: 35 % (ref 15–46)
IRON SERPL-MCNC: 73 UG/DL (ref 35–180)
KETONES UR STRIP-MCNC: NEGATIVE MG/DL
LACTATE BLD-SCNC: 0.8 MMOL/L (ref 0.7–2.1)
LACTATE BLD-SCNC: 1 MMOL/L (ref 0.7–2.1)
LACTATE BLD-SCNC: 1 MMOL/L (ref 0.7–2.1)
LACTATE BLD-SCNC: 1.1 MMOL/L (ref 0.7–2.1)
LACTATE BLD-SCNC: 1.5 MMOL/L (ref 0.7–2.1)
LACTATE BLD-SCNC: 1.6 MMOL/L (ref 0.7–2.1)
LACTATE BLD-SCNC: 1.8 MMOL/L (ref 0.7–2.1)
LACTATE BLD-SCNC: 1.8 MMOL/L (ref 0.7–2.1)
LACTATE BLD-SCNC: 2.1 MMOL/L (ref 0.7–2.1)
LACTATE BLD-SCNC: 2.2 MMOL/L (ref 0.7–2.1)
LACTATE BLD-SCNC: 2.3 MMOL/L (ref 0.7–2.1)
LACTATE BLD-SCNC: 2.6 MMOL/L (ref 0.7–2.1)
LDH FLD L TO P-CCNC: 366 U/L
LDH SERPL L TO P-CCNC: 2025 U/L (ref 81–234)
LDH SERPL L TO P-CCNC: 2129 U/L (ref 81–234)
LDH SERPL L TO P-CCNC: 234 U/L (ref 81–234)
LEUKOCYTE ESTERASE UR QL STRIP: NEGATIVE
LIPASE SERPL-CCNC: 67 U/L (ref 73–393)
LYMPHOCYTES # BLD AUTO: 0.2 10E9/L (ref 0.8–5.3)
LYMPHOCYTES # BLD AUTO: 0.2 10E9/L (ref 0.8–5.3)
LYMPHOCYTES # BLD AUTO: 0.3 10E9/L (ref 0.8–5.3)
LYMPHOCYTES # BLD AUTO: 0.4 10E9/L (ref 0.8–5.3)
LYMPHOCYTES # BLD AUTO: 0.4 10E9/L (ref 0.8–5.3)
LYMPHOCYTES # BLD AUTO: 0.5 10E9/L (ref 0.8–5.3)
LYMPHOCYTES # BLD AUTO: 0.7 10E9/L (ref 0.8–5.3)
LYMPHOCYTES # BLD AUTO: 0.8 10E9/L (ref 0.8–5.3)
LYMPHOCYTES # BLD AUTO: 0.9 10E9/L (ref 0.8–5.3)
LYMPHOCYTES NFR BLD AUTO: 0.8 %
LYMPHOCYTES NFR BLD AUTO: 0.8 %
LYMPHOCYTES NFR BLD AUTO: 1 %
LYMPHOCYTES NFR BLD AUTO: 1.2 %
LYMPHOCYTES NFR BLD AUTO: 1.2 %
LYMPHOCYTES NFR BLD AUTO: 1.3 %
LYMPHOCYTES NFR BLD AUTO: 1.6 %
LYMPHOCYTES NFR BLD AUTO: 1.9 %
LYMPHOCYTES NFR BLD AUTO: 2.9 %
LYMPHOCYTES NFR BLD AUTO: 3.3 %
LYMPHOCYTES NFR BLD AUTO: 3.4 %
LYMPHOCYTES NFR BLD AUTO: 5.3 %
LYMPHOCYTES NFR BLD AUTO: 5.4 %
LYMPHOCYTES NFR FLD MANUAL: 7 %
Lab: ABNORMAL
Lab: ABNORMAL
Lab: NORMAL
Lab: NORMAL
MAGNESIUM SERPL-MCNC: 1.3 MG/DL (ref 1.6–2.3)
MAGNESIUM SERPL-MCNC: 1.5 MG/DL (ref 1.6–2.3)
MAGNESIUM SERPL-MCNC: 1.6 MG/DL (ref 1.6–2.3)
MAGNESIUM SERPL-MCNC: 1.8 MG/DL (ref 1.6–2.3)
MAGNESIUM SERPL-MCNC: 1.9 MG/DL (ref 1.6–2.3)
MAGNESIUM SERPL-MCNC: 2 MG/DL (ref 1.6–2.3)
MAGNESIUM SERPL-MCNC: 2.1 MG/DL (ref 1.6–2.3)
MAGNESIUM SERPL-MCNC: 2.1 MG/DL (ref 1.6–2.3)
MAGNESIUM SERPL-MCNC: 2.5 MG/DL (ref 1.6–2.3)
MAGNESIUM SERPL-MCNC: 2.6 MG/DL (ref 1.6–2.3)
MCH RBC QN AUTO: 24.4 PG (ref 26.5–33)
MCH RBC QN AUTO: 24.5 PG (ref 26.5–33)
MCH RBC QN AUTO: 24.5 PG (ref 26.5–33)
MCH RBC QN AUTO: 24.6 PG (ref 26.5–33)
MCH RBC QN AUTO: 24.7 PG (ref 26.5–33)
MCH RBC QN AUTO: 24.7 PG (ref 26.5–33)
MCH RBC QN AUTO: 25.1 PG (ref 26.5–33)
MCH RBC QN AUTO: 25.2 PG (ref 26.5–33)
MCH RBC QN AUTO: 25.3 PG (ref 26.5–33)
MCH RBC QN AUTO: 25.3 PG (ref 26.5–33)
MCH RBC QN AUTO: 25.4 PG (ref 26.5–33)
MCH RBC QN AUTO: 25.8 PG (ref 26.5–33)
MCH RBC QN AUTO: 25.9 PG (ref 26.5–33)
MCH RBC QN AUTO: 25.9 PG (ref 26.5–33)
MCH RBC QN AUTO: 26 PG (ref 26.5–33)
MCH RBC QN AUTO: 26 PG (ref 26.5–33)
MCH RBC QN AUTO: 26.1 PG (ref 26.5–33)
MCH RBC QN AUTO: 26.2 PG (ref 26.5–33)
MCH RBC QN AUTO: 26.3 PG (ref 26.5–33)
MCH RBC QN AUTO: 26.4 PG (ref 26.5–33)
MCH RBC QN AUTO: 26.5 PG (ref 26.5–33)
MCH RBC QN AUTO: 27 PG (ref 26.5–33)
MCHC RBC AUTO-ENTMCNC: 28.1 G/DL (ref 31.5–36.5)
MCHC RBC AUTO-ENTMCNC: 28.2 G/DL (ref 31.5–36.5)
MCHC RBC AUTO-ENTMCNC: 28.3 G/DL (ref 31.5–36.5)
MCHC RBC AUTO-ENTMCNC: 28.4 G/DL (ref 31.5–36.5)
MCHC RBC AUTO-ENTMCNC: 28.5 G/DL (ref 31.5–36.5)
MCHC RBC AUTO-ENTMCNC: 28.6 G/DL (ref 31.5–36.5)
MCHC RBC AUTO-ENTMCNC: 28.6 G/DL (ref 31.5–36.5)
MCHC RBC AUTO-ENTMCNC: 28.9 G/DL (ref 31.5–36.5)
MCHC RBC AUTO-ENTMCNC: 28.9 G/DL (ref 31.5–36.5)
MCHC RBC AUTO-ENTMCNC: 29 G/DL (ref 31.5–36.5)
MCHC RBC AUTO-ENTMCNC: 29.1 G/DL (ref 31.5–36.5)
MCHC RBC AUTO-ENTMCNC: 29.2 G/DL (ref 31.5–36.5)
MCHC RBC AUTO-ENTMCNC: 29.3 G/DL (ref 31.5–36.5)
MCHC RBC AUTO-ENTMCNC: 29.4 G/DL (ref 31.5–36.5)
MCHC RBC AUTO-ENTMCNC: 29.6 G/DL (ref 31.5–36.5)
MCHC RBC AUTO-ENTMCNC: 29.7 G/DL (ref 31.5–36.5)
MCHC RBC AUTO-ENTMCNC: 29.9 G/DL (ref 31.5–36.5)
MCHC RBC AUTO-ENTMCNC: 30 G/DL (ref 31.5–36.5)
MCHC RBC AUTO-ENTMCNC: 30.5 G/DL (ref 31.5–36.5)
MCV RBC AUTO: 84 FL (ref 78–100)
MCV RBC AUTO: 85 FL (ref 78–100)
MCV RBC AUTO: 85 FL (ref 78–100)
MCV RBC AUTO: 86 FL (ref 78–100)
MCV RBC AUTO: 86 FL (ref 78–100)
MCV RBC AUTO: 87 FL (ref 78–100)
MCV RBC AUTO: 88 FL (ref 78–100)
MCV RBC AUTO: 89 FL (ref 78–100)
MCV RBC AUTO: 90 FL (ref 78–100)
MCV RBC AUTO: 91 FL (ref 78–100)
METAMYELOCYTES # BLD: 0.2 10E9/L
METAMYELOCYTES NFR BLD MANUAL: 0.9 %
MICRO REPORT STATUS: ABNORMAL
MICRO REPORT STATUS: NORMAL
MICROBIOLOGIST REVIEW: NORMAL
MICROBIOLOGIST REVIEW: NORMAL
MICROCYTES BLD QL SMEAR: PRESENT
MONOCYTES # BLD AUTO: 0.3 10E9/L (ref 0–1.3)
MONOCYTES # BLD AUTO: 0.3 10E9/L (ref 0–1.3)
MONOCYTES # BLD AUTO: 0.5 10E9/L (ref 0–1.3)
MONOCYTES # BLD AUTO: 0.5 10E9/L (ref 0–1.3)
MONOCYTES # BLD AUTO: 0.6 10E9/L (ref 0–1.3)
MONOCYTES # BLD AUTO: 0.6 10E9/L (ref 0–1.3)
MONOCYTES # BLD AUTO: 0.7 10E9/L (ref 0–1.3)
MONOCYTES # BLD AUTO: 0.8 10E9/L (ref 0–1.3)
MONOCYTES # BLD AUTO: 1.6 10E9/L (ref 0–1.3)
MONOCYTES NFR BLD AUTO: 1.7 %
MONOCYTES NFR BLD AUTO: 1.8 %
MONOCYTES NFR BLD AUTO: 1.9 %
MONOCYTES NFR BLD AUTO: 2.2 %
MONOCYTES NFR BLD AUTO: 2.3 %
MONOCYTES NFR BLD AUTO: 3.3 %
MONOCYTES NFR BLD AUTO: 3.9 %
MONOCYTES NFR BLD AUTO: 4 %
MONOCYTES NFR BLD AUTO: 9.1 %
MONOS+MACROS NFR FLD MANUAL: 2 %
MUCOUS THREADS #/AREA URNS LPF: PRESENT /LPF
MYELOCYTES # BLD: 0.2 10E9/L
MYELOCYTES NFR BLD MANUAL: 0.9 %
NEUTROPHILS # BLD AUTO: 13.6 10E9/L (ref 1.6–8.3)
NEUTROPHILS # BLD AUTO: 15 10E9/L (ref 1.6–8.3)
NEUTROPHILS # BLD AUTO: 15.3 10E9/L (ref 1.6–8.3)
NEUTROPHILS # BLD AUTO: 18.6 10E9/L (ref 1.6–8.3)
NEUTROPHILS # BLD AUTO: 24.3 10E9/L (ref 1.6–8.3)
NEUTROPHILS # BLD AUTO: 26 10E9/L (ref 1.6–8.3)
NEUTROPHILS # BLD AUTO: 26.7 10E9/L (ref 1.6–8.3)
NEUTROPHILS # BLD AUTO: 32.6 10E9/L (ref 1.6–8.3)
NEUTROPHILS # BLD AUTO: 33.1 10E9/L (ref 1.6–8.3)
NEUTROPHILS # BLD AUTO: 35.5 10E9/L (ref 1.6–8.3)
NEUTROPHILS # BLD AUTO: 36.7 10E9/L (ref 1.6–8.3)
NEUTROPHILS # BLD AUTO: 36.9 10E9/L (ref 1.6–8.3)
NEUTROPHILS # BLD AUTO: 6 10E9/L (ref 1.6–8.3)
NEUTROPHILS NFR BLD AUTO: 85.5 %
NEUTROPHILS NFR BLD AUTO: 89.3 %
NEUTROPHILS NFR BLD AUTO: 90.5 %
NEUTROPHILS NFR BLD AUTO: 92.8 %
NEUTROPHILS NFR BLD AUTO: 92.9 %
NEUTROPHILS NFR BLD AUTO: 94.8 %
NEUTROPHILS NFR BLD AUTO: 95.5 %
NEUTROPHILS NFR BLD AUTO: 96 %
NEUTROPHILS NFR BLD AUTO: 96.1 %
NEUTROPHILS NFR BLD AUTO: 96.2 %
NEUTROPHILS NFR BLD AUTO: 96.4 %
NEUTS BAND NFR FLD MANUAL: 91 %
NITRATE UR QL: NEGATIVE
NOROVIRUS I AND II BY NAT: NOT DETECTED
NRBC # BLD AUTO: 0 10*3/UL
NRBC # BLD AUTO: 0.1 10*3/UL
NRBC BLD AUTO-RTO: 0 /100
NT-PROBNP SERPL-MCNC: 1988 PG/ML (ref 0–1800)
NUM BPU REQUESTED: 1
O2/TOTAL GAS SETTING VFR VENT: ABNORMAL %
OVALOCYTES BLD QL SMEAR: ABNORMAL
OVALOCYTES BLD QL SMEAR: SLIGHT
PCO2 BLD: 43 MM HG (ref 35–45)
PCO2 BLDV: 36 MM HG (ref 40–50)
PCO2 BLDV: 36 MM HG (ref 40–50)
PH BLD: 7.2 PH (ref 7.35–7.45)
PH BLDV: 7.29 PH (ref 7.32–7.43)
PH BLDV: 7.3 PH (ref 7.32–7.43)
PH FLD: 7.5 PH
PH UR STRIP: 5 PH (ref 5–7)
PH UR STRIP: 5 PH (ref 5–7)
PH UR STRIP: 5.5 PH (ref 5–7)
PHOSPHATE SERPL-MCNC: 1.7 MG/DL (ref 2.5–4.5)
PHOSPHATE SERPL-MCNC: 2.1 MG/DL (ref 2.5–4.5)
PHOSPHATE SERPL-MCNC: 2.3 MG/DL (ref 2.5–4.5)
PHOSPHATE SERPL-MCNC: 2.6 MG/DL (ref 2.5–4.5)
PHOSPHATE SERPL-MCNC: 2.8 MG/DL (ref 2.5–4.5)
PHOSPHATE SERPL-MCNC: 2.9 MG/DL (ref 2.5–4.5)
PHOSPHATE SERPL-MCNC: 3.6 MG/DL (ref 2.5–4.5)
PHOSPHATE SERPL-MCNC: 4 MG/DL (ref 2.5–4.5)
PHOSPHATE SERPL-MCNC: 4 MG/DL (ref 2.5–4.5)
PHOSPHATE SERPL-MCNC: 4.7 MG/DL (ref 2.5–4.5)
PHOSPHATE SERPL-MCNC: 5.5 MG/DL (ref 2.5–4.5)
PHOSPHATE SERPL-MCNC: 6.2 MG/DL (ref 2.5–4.5)
PHOSPHATE SERPL-MCNC: 7.2 MG/DL (ref 2.5–4.5)
PLATELET # BLD AUTO: 148 10E9/L (ref 150–450)
PLATELET # BLD AUTO: 154 10E9/L (ref 150–450)
PLATELET # BLD AUTO: 176 10E9/L (ref 150–450)
PLATELET # BLD AUTO: 189 10E9/L (ref 150–450)
PLATELET # BLD AUTO: 197 10E9/L (ref 150–450)
PLATELET # BLD AUTO: 212 10E9/L (ref 150–450)
PLATELET # BLD AUTO: 215 10E9/L (ref 150–450)
PLATELET # BLD AUTO: 233 10E9/L (ref 150–450)
PLATELET # BLD AUTO: 257 10E9/L (ref 150–450)
PLATELET # BLD AUTO: 261 10E9/L (ref 150–450)
PLATELET # BLD AUTO: 266 10E9/L (ref 150–450)
PLATELET # BLD AUTO: 274 10E9/L (ref 150–450)
PLATELET # BLD AUTO: 276 10E9/L (ref 150–450)
PLATELET # BLD AUTO: 313 10E9/L (ref 150–450)
PLATELET # BLD AUTO: 321 10E9/L (ref 150–450)
PLATELET # BLD AUTO: 332 10E9/L (ref 150–450)
PLATELET # BLD AUTO: 342 10E9/L (ref 150–450)
PLATELET # BLD AUTO: 383 10E9/L (ref 150–450)
PLATELET # BLD AUTO: 410 10E9/L (ref 150–450)
PLATELET # BLD AUTO: 411 10E9/L (ref 150–450)
PLATELET # BLD AUTO: 443 10E9/L (ref 150–450)
PLATELET # BLD AUTO: 548 10E9/L (ref 150–450)
PLATELET # BLD AUTO: 644 10E9/L (ref 150–450)
PLATELET # BLD EST: ABNORMAL 10*3/UL
PO2 BLD: 53 MM HG (ref 80–105)
PO2 BLDV: 30 MM HG (ref 25–47)
PO2 BLDV: 30 MM HG (ref 25–47)
POIKILOCYTOSIS BLD QL SMEAR: ABNORMAL
POIKILOCYTOSIS BLD QL SMEAR: ABNORMAL
POTASSIUM BLD-SCNC: 4.9 MMOL/L (ref 3.4–5.3)
POTASSIUM SERPL-SCNC: 4.9 MMOL/L (ref 3.4–5.3)
POTASSIUM SERPL-SCNC: 5 MMOL/L (ref 3.4–5.3)
POTASSIUM SERPL-SCNC: 5.1 MMOL/L (ref 3.4–5.3)
POTASSIUM SERPL-SCNC: 5.2 MMOL/L (ref 3.4–5.3)
POTASSIUM SERPL-SCNC: 5.2 MMOL/L (ref 3.4–5.3)
POTASSIUM SERPL-SCNC: 5.3 MMOL/L (ref 3.4–5.3)
POTASSIUM SERPL-SCNC: 5.4 MMOL/L (ref 3.4–5.3)
POTASSIUM SERPL-SCNC: 5.4 MMOL/L (ref 3.4–5.3)
POTASSIUM SERPL-SCNC: 5.5 MMOL/L (ref 3.4–5.3)
POTASSIUM SERPL-SCNC: 5.5 MMOL/L (ref 3.4–5.3)
POTASSIUM SERPL-SCNC: 5.6 MMOL/L (ref 3.4–5.3)
POTASSIUM SERPL-SCNC: 5.8 MMOL/L (ref 3.4–5.3)
POTASSIUM SERPL-SCNC: 5.9 MMOL/L (ref 3.4–5.3)
POTASSIUM SERPL-SCNC: 5.9 MMOL/L (ref 3.4–5.3)
POTASSIUM SERPL-SCNC: 6.2 MMOL/L (ref 3.4–5.3)
POTASSIUM SERPL-SCNC: 7.4 MMOL/L (ref 3.4–5.3)
PROCALCITONIN SERPL-MCNC: 0.99 NG/ML
PROCALCITONIN SERPL-MCNC: 1.05 NG/ML
PROMYELOCYTES # BLD MANUAL: 0.2 10E9/L
PROMYELOCYTES NFR BLD MANUAL: 0.9 %
PROT FLD-MCNC: 2 G/DL
PROT FLD-MCNC: NORMAL G/DL
PROT SERPL-MCNC: 5 G/DL (ref 6.8–8.8)
PROT SERPL-MCNC: 5.1 G/DL (ref 6.8–8.8)
PROT SERPL-MCNC: 5.2 G/DL (ref 6.8–8.8)
PROT SERPL-MCNC: 5.3 G/DL (ref 6.8–8.8)
PROT SERPL-MCNC: 5.4 G/DL (ref 6.8–8.8)
PROT SERPL-MCNC: 5.6 G/DL (ref 6.8–8.8)
PROT SERPL-MCNC: 5.8 G/DL (ref 6.8–8.8)
PROT SERPL-MCNC: 5.9 G/DL (ref 6.8–8.8)
PROT SERPL-MCNC: 6 G/DL (ref 6.8–8.8)
RBC # BLD AUTO: 2.62 10E12/L (ref 3.8–5.2)
RBC # BLD AUTO: 2.67 10E12/L (ref 3.8–5.2)
RBC # BLD AUTO: 2.75 10E12/L (ref 3.8–5.2)
RBC # BLD AUTO: 2.77 10E12/L (ref 3.8–5.2)
RBC # BLD AUTO: 2.78 10E12/L (ref 3.8–5.2)
RBC # BLD AUTO: 2.8 10E12/L (ref 3.8–5.2)
RBC # BLD AUTO: 2.81 10E12/L (ref 3.8–5.2)
RBC # BLD AUTO: 2.84 10E12/L (ref 3.8–5.2)
RBC # BLD AUTO: 2.85 10E12/L (ref 3.8–5.2)
RBC # BLD AUTO: 2.87 10E12/L (ref 3.8–5.2)
RBC # BLD AUTO: 2.89 10E12/L (ref 3.8–5.2)
RBC # BLD AUTO: 2.89 10E12/L (ref 3.8–5.2)
RBC # BLD AUTO: 2.92 10E12/L (ref 3.8–5.2)
RBC # BLD AUTO: 2.96 10E12/L (ref 3.8–5.2)
RBC # BLD AUTO: 3.05 10E12/L (ref 3.8–5.2)
RBC # BLD AUTO: 3.06 10E12/L (ref 3.8–5.2)
RBC # BLD AUTO: 3.11 10E12/L (ref 3.8–5.2)
RBC # BLD AUTO: 3.18 10E12/L (ref 3.8–5.2)
RBC # BLD AUTO: 3.21 10E12/L (ref 3.8–5.2)
RBC # BLD AUTO: 3.23 10E12/L (ref 3.8–5.2)
RBC # FLD: NORMAL /UL
RBC #/AREA URNS AUTO: 0 /HPF (ref 0–2)
RBC #/AREA URNS AUTO: 1 /HPF (ref 0–2)
RBC #/AREA URNS AUTO: 3 /HPF (ref 0–2)
RBC INCLUSIONS BLD: ABNORMAL
RETICS # AUTO: 67.3 10E9/L (ref 25–95)
RETICS/RBC NFR AUTO: 2.3 % (ref 0.5–2)
RHINOVIRUS RNA SPEC QL NAA+PROBE: NEGATIVE
RHINOVIRUS RNA SPEC QL NAA+PROBE: NEGATIVE
ROTAVIRUS A BY NAT: NOT DETECTED
RSV RNA SPEC NAA+PROBE: NORMAL
RSV RNA SPEC NAA+PROBE: NORMAL
RSV RNA SPEC QL NAA+PROBE: NEGATIVE
SALMONELLA SPECIES BY NAT: NOT DETECTED
SAO2 % BLDV FROM PO2: 51 %
SAO2 % BLDV FROM PO2: 52 %
SHIGA TOXIN 1 GENE BY NAT: NOT DETECTED
SHIGA TOXIN 2 GENE BY NAT: NOT DETECTED
SHIGELLA SP+EIEC IPAH STL QL NAA+PROBE: NOT DETECTED
SODIUM BLD-SCNC: 136 MMOL/L (ref 133–144)
SODIUM SERPL-SCNC: 133 MMOL/L (ref 133–144)
SODIUM SERPL-SCNC: 133 MMOL/L (ref 133–144)
SODIUM SERPL-SCNC: 134 MMOL/L (ref 133–144)
SODIUM SERPL-SCNC: 135 MMOL/L (ref 133–144)
SODIUM SERPL-SCNC: 136 MMOL/L (ref 133–144)
SODIUM SERPL-SCNC: 137 MMOL/L (ref 133–144)
SODIUM SERPL-SCNC: 138 MMOL/L (ref 133–144)
SODIUM SERPL-SCNC: 139 MMOL/L (ref 133–144)
SODIUM UR-SCNC: 23 MMOL/L
SP GR UR STRIP: 1.01 (ref 1–1.03)
SPECIMEN EXP DATE BLD: NORMAL
SPECIMEN SOURCE FLD: NORMAL
SPECIMEN SOURCE: ABNORMAL
SPECIMEN SOURCE: NORMAL
SQUAMOUS #/AREA URNS AUTO: 1 /HPF (ref 0–1)
TACROLIMUS BLD-MCNC: 11.2 UG/L (ref 5–15)
TACROLIMUS BLD-MCNC: 14.5 UG/L (ref 5–15)
TACROLIMUS BLD-MCNC: 21.1 UG/L (ref 5–15)
TACROLIMUS BLD-MCNC: 21.2 UG/L (ref 5–15)
TACROLIMUS BLD-MCNC: 27 UG/L (ref 5–15)
TACROLIMUS BLD-MCNC: 29 UG/L (ref 5–15)
TACROLIMUS BLD-MCNC: 6.9 UG/L (ref 5–15)
TACROLIMUS BLD-MCNC: 7 UG/L (ref 5–15)
TIBC SERPL-MCNC: 211 UG/DL (ref 240–430)
TME LAST DOSE: ABNORMAL H
TME LAST DOSE: NORMAL H
TRANS CELLS #/AREA URNS HPF: 1 /HPF (ref 0–1)
TRANS CELLS #/AREA URNS HPF: <1 /HPF (ref 0–1)
TRANSFUSION STATUS PATIENT QL: NORMAL
TRANSFUSION STATUS PATIENT QL: NORMAL
TROPONIN I SERPL-MCNC: NORMAL UG/L (ref 0–0.04)
TSH SERPL DL<=0.005 MIU/L-ACNC: 2.25 MU/L (ref 0.4–4)
URN SPEC COLLECT METH UR: ABNORMAL
UROBILINOGEN UR STRIP-MCNC: 0 MG/DL (ref 0–2)
UROBILINOGEN UR STRIP-MCNC: NORMAL MG/DL (ref 0–2)
UROBILINOGEN UR STRIP-MCNC: NORMAL MG/DL (ref 0–2)
VANCOMYCIN SERPL-MCNC: 11.1 MG/L
VANCOMYCIN SERPL-MCNC: 17.6 MG/L
VANCOMYCIN SERPL-MCNC: 22 MG/L
VANCOMYCIN SERPL-MCNC: 22.2 MG/L
VANCOMYCIN SERPL-MCNC: 27.4 MG/L
VIBRIO GROUP BY NAT: NOT DETECTED
VIT B12 SERPL-MCNC: >6000 PG/ML (ref 193–986)
WBC # BLD AUTO: 14.4 10E9/L (ref 4–11)
WBC # BLD AUTO: 14.6 10E9/L (ref 4–11)
WBC # BLD AUTO: 17.1 10E9/L (ref 4–11)
WBC # BLD AUTO: 17.5 10E9/L (ref 4–11)
WBC # BLD AUTO: 17.8 10E9/L (ref 4–11)
WBC # BLD AUTO: 20 10E9/L (ref 4–11)
WBC # BLD AUTO: 22 10E9/L (ref 4–11)
WBC # BLD AUTO: 22.9 10E9/L (ref 4–11)
WBC # BLD AUTO: 24.1 10E9/L (ref 4–11)
WBC # BLD AUTO: 24.5 10E9/L (ref 4–11)
WBC # BLD AUTO: 25.4 10E9/L (ref 4–11)
WBC # BLD AUTO: 27.4 10E9/L (ref 4–11)
WBC # BLD AUTO: 27.8 10E9/L (ref 4–11)
WBC # BLD AUTO: 29.5 10E9/L (ref 4–11)
WBC # BLD AUTO: 33.9 10E9/L (ref 4–11)
WBC # BLD AUTO: 34.4 10E9/L (ref 4–11)
WBC # BLD AUTO: 35.9 10E9/L (ref 4–11)
WBC # BLD AUTO: 36.8 10E9/L (ref 4–11)
WBC # BLD AUTO: 38.2 10E9/L (ref 4–11)
WBC # BLD AUTO: 38.4 10E9/L (ref 4–11)
WBC # BLD AUTO: 38.6 10E9/L (ref 4–11)
WBC # BLD AUTO: 6.7 10E9/L (ref 4–11)
WBC # FLD AUTO: 140 /UL
WBC #/AREA URNS AUTO: 1 /HPF (ref 0–2)
WBC #/AREA URNS AUTO: 3 /HPF (ref 0–2)
WBC #/AREA URNS AUTO: 4 /HPF (ref 0–2)
YERSINIA ENTEROCOLITICA BY NAT: NOT DETECTED

## 2017-01-01 PROCEDURE — 99212 OFFICE O/P EST SF 10 MIN: CPT | Mod: 25

## 2017-01-01 PROCEDURE — 25000125 ZZHC RX 250: Performed by: STUDENT IN AN ORGANIZED HEALTH CARE EDUCATION/TRAINING PROGRAM

## 2017-01-01 PROCEDURE — 25000128 H RX IP 250 OP 636

## 2017-01-01 PROCEDURE — 25000132 ZZH RX MED GY IP 250 OP 250 PS 637: Mod: GY | Performed by: INTERNAL MEDICINE

## 2017-01-01 PROCEDURE — A9270 NON-COVERED ITEM OR SERVICE: HCPCS | Mod: GY | Performed by: INTERNAL MEDICINE

## 2017-01-01 PROCEDURE — 97110 THERAPEUTIC EXERCISES: CPT | Mod: GO

## 2017-01-01 PROCEDURE — 12000001 ZZH R&B MED SURG/OB UMMC

## 2017-01-01 PROCEDURE — 25000125 ZZHC RX 250: Performed by: PHYSICIAN ASSISTANT

## 2017-01-01 PROCEDURE — 40000275 ZZH STATISTIC RCP TIME EA 10 MIN

## 2017-01-01 PROCEDURE — 97110 THERAPEUTIC EXERCISES: CPT | Mod: GP | Performed by: PHYSICAL THERAPIST

## 2017-01-01 PROCEDURE — 83605 ASSAY OF LACTIC ACID: CPT | Performed by: INTERNAL MEDICINE

## 2017-01-01 PROCEDURE — 25000125 ZZHC RX 250: Performed by: INTERNAL MEDICINE

## 2017-01-01 PROCEDURE — 85027 COMPLETE CBC AUTOMATED: CPT | Performed by: MARRIAGE & FAMILY THERAPIST

## 2017-01-01 PROCEDURE — 25000125 ZZHC RX 250

## 2017-01-01 PROCEDURE — 00000102 ZZHCL STATISTIC CYTO WRIGHT STAIN TC: Performed by: NURSE PRACTITIONER

## 2017-01-01 PROCEDURE — 83690 ASSAY OF LIPASE: CPT | Performed by: EMERGENCY MEDICINE

## 2017-01-01 PROCEDURE — 83735 ASSAY OF MAGNESIUM: CPT | Performed by: NURSE PRACTITIONER

## 2017-01-01 PROCEDURE — 85384 FIBRINOGEN ACTIVITY: CPT | Performed by: MARRIAGE & FAMILY THERAPIST

## 2017-01-01 PROCEDURE — 82803 BLOOD GASES ANY COMBINATION: CPT | Performed by: INTERNAL MEDICINE

## 2017-01-01 PROCEDURE — 40000141 ZZH STATISTIC PERIPHERAL IV START W/O US GUIDANCE

## 2017-01-01 PROCEDURE — P9041 ALBUMIN (HUMAN),5%, 50ML: HCPCS | Performed by: INTERNAL MEDICINE

## 2017-01-01 PROCEDURE — 36592 COLLECT BLOOD FROM PICC: CPT | Performed by: PHYSICIAN ASSISTANT

## 2017-01-01 PROCEDURE — 25000132 ZZH RX MED GY IP 250 OP 250 PS 637: Mod: GY | Performed by: NURSE PRACTITIONER

## 2017-01-01 PROCEDURE — 97530 THERAPEUTIC ACTIVITIES: CPT | Mod: GO

## 2017-01-01 PROCEDURE — 85610 PROTHROMBIN TIME: CPT | Performed by: EMERGENCY MEDICINE

## 2017-01-01 PROCEDURE — 80053 COMPREHEN METABOLIC PANEL: CPT | Performed by: NURSE PRACTITIONER

## 2017-01-01 PROCEDURE — 83605 ASSAY OF LACTIC ACID: CPT | Performed by: STUDENT IN AN ORGANIZED HEALTH CARE EDUCATION/TRAINING PROGRAM

## 2017-01-01 PROCEDURE — 84100 ASSAY OF PHOSPHORUS: CPT | Performed by: NURSE PRACTITIONER

## 2017-01-01 PROCEDURE — 25000128 H RX IP 250 OP 636: Mod: ZF | Performed by: INTERNAL MEDICINE

## 2017-01-01 PROCEDURE — 83735 ASSAY OF MAGNESIUM: CPT | Performed by: PHYSICIAN ASSISTANT

## 2017-01-01 PROCEDURE — 80197 ASSAY OF TACROLIMUS: CPT | Performed by: INTERNAL MEDICINE

## 2017-01-01 PROCEDURE — 82607 VITAMIN B-12: CPT | Performed by: PHYSICIAN ASSISTANT

## 2017-01-01 PROCEDURE — 85025 COMPLETE CBC W/AUTO DIFF WBC: CPT | Performed by: INTERNAL MEDICINE

## 2017-01-01 PROCEDURE — 25000132 ZZH RX MED GY IP 250 OP 250 PS 637: Mod: GY | Performed by: PHYSICIAN ASSISTANT

## 2017-01-01 PROCEDURE — 80053 COMPREHEN METABOLIC PANEL: CPT | Performed by: EMERGENCY MEDICINE

## 2017-01-01 PROCEDURE — 25000128 H RX IP 250 OP 636: Performed by: NURSE PRACTITIONER

## 2017-01-01 PROCEDURE — 25000125 ZZHC RX 250: Mod: ZF | Performed by: NURSE PRACTITIONER

## 2017-01-01 PROCEDURE — 99214 OFFICE O/P EST MOD 30 MIN: CPT | Mod: ZP | Performed by: PHYSICIAN ASSISTANT

## 2017-01-01 PROCEDURE — A9270 NON-COVERED ITEM OR SERVICE: HCPCS | Mod: GY | Performed by: STUDENT IN AN ORGANIZED HEALTH CARE EDUCATION/TRAINING PROGRAM

## 2017-01-01 PROCEDURE — 84100 ASSAY OF PHOSPHORUS: CPT | Performed by: INTERNAL MEDICINE

## 2017-01-01 PROCEDURE — A9270 NON-COVERED ITEM OR SERVICE: HCPCS | Mod: GY | Performed by: PHYSICIAN ASSISTANT

## 2017-01-01 PROCEDURE — 97168 OT RE-EVAL EST PLAN CARE: CPT | Mod: GO | Performed by: OCCUPATIONAL THERAPIST

## 2017-01-01 PROCEDURE — 40000133 ZZH STATISTIC OT WARD VISIT

## 2017-01-01 PROCEDURE — 97110 THERAPEUTIC EXERCISES: CPT | Mod: GO | Performed by: OCCUPATIONAL THERAPIST

## 2017-01-01 PROCEDURE — A9270 NON-COVERED ITEM OR SERVICE: HCPCS | Mod: GY | Performed by: NURSE PRACTITIONER

## 2017-01-01 PROCEDURE — 36592 COLLECT BLOOD FROM PICC: CPT | Performed by: NURSE PRACTITIONER

## 2017-01-01 PROCEDURE — 25000131 ZZH RX MED GY IP 250 OP 636 PS 637: Mod: GY | Performed by: INTERNAL MEDICINE

## 2017-01-01 PROCEDURE — 83036 HEMOGLOBIN GLYCOSYLATED A1C: CPT | Performed by: INTERNAL MEDICINE

## 2017-01-01 PROCEDURE — 80053 COMPREHEN METABOLIC PANEL: CPT | Performed by: PHYSICIAN ASSISTANT

## 2017-01-01 PROCEDURE — 87040 BLOOD CULTURE FOR BACTERIA: CPT | Performed by: EMERGENCY MEDICINE

## 2017-01-01 PROCEDURE — 84300 ASSAY OF URINE SODIUM: CPT | Performed by: NURSE PRACTITIONER

## 2017-01-01 PROCEDURE — 85027 COMPLETE CBC AUTOMATED: CPT | Performed by: NURSE PRACTITIONER

## 2017-01-01 PROCEDURE — 40000193 ZZH STATISTIC PT WARD VISIT: Performed by: PHYSICAL THERAPIST

## 2017-01-01 PROCEDURE — 80048 BASIC METABOLIC PNL TOTAL CA: CPT | Performed by: NURSE PRACTITIONER

## 2017-01-01 PROCEDURE — 85027 COMPLETE CBC AUTOMATED: CPT | Performed by: INTERNAL MEDICINE

## 2017-01-01 PROCEDURE — 25000128 H RX IP 250 OP 636: Mod: ZF | Performed by: NURSE PRACTITIONER

## 2017-01-01 PROCEDURE — 87106 FUNGI IDENTIFICATION YEAST: CPT | Performed by: INTERNAL MEDICINE

## 2017-01-01 PROCEDURE — 36592 COLLECT BLOOD FROM PICC: CPT | Performed by: INTERNAL MEDICINE

## 2017-01-01 PROCEDURE — 25000125 ZZHC RX 250: Performed by: NURSE PRACTITIONER

## 2017-01-01 PROCEDURE — 25000131 ZZH RX MED GY IP 250 OP 636 PS 637: Mod: GY | Performed by: NURSE PRACTITIONER

## 2017-01-01 PROCEDURE — 25000128 H RX IP 250 OP 636: Performed by: INTERNAL MEDICINE

## 2017-01-01 PROCEDURE — 12000008 ZZH R&B INTERMEDIATE UMMC

## 2017-01-01 PROCEDURE — 87305 ASPERGILLUS AG IA: CPT | Performed by: NURSE PRACTITIONER

## 2017-01-01 PROCEDURE — 99232 SBSQ HOSP IP/OBS MODERATE 35: CPT | Mod: 25 | Performed by: INTERNAL MEDICINE

## 2017-01-01 PROCEDURE — 89051 BODY FLUID CELL COUNT: CPT | Performed by: NURSE PRACTITIONER

## 2017-01-01 PROCEDURE — 97162 PT EVAL MOD COMPLEX 30 MIN: CPT | Mod: GP | Performed by: PHYSICAL THERAPIST

## 2017-01-01 PROCEDURE — 40000809 ZZH STATISTIC NO DOCUMENTATION TO SUPPORT CHARGE

## 2017-01-01 PROCEDURE — 87070 CULTURE OTHR SPECIMN AEROBIC: CPT | Performed by: NURSE PRACTITIONER

## 2017-01-01 PROCEDURE — 25800025 ZZH RX 258: Performed by: NURSE PRACTITIONER

## 2017-01-01 PROCEDURE — 00000146 ZZHCL STATISTIC GLUCOSE BY METER IP

## 2017-01-01 PROCEDURE — 96365 THER/PROPH/DIAG IV INF INIT: CPT | Performed by: EMERGENCY MEDICINE

## 2017-01-01 PROCEDURE — 93005 ELECTROCARDIOGRAM TRACING: CPT

## 2017-01-01 PROCEDURE — 97116 GAIT TRAINING THERAPY: CPT | Mod: GP

## 2017-01-01 PROCEDURE — 25000128 H RX IP 250 OP 636: Performed by: EMERGENCY MEDICINE

## 2017-01-01 PROCEDURE — 25000131 ZZH RX MED GY IP 250 OP 636 PS 637: Mod: GY | Performed by: MARRIAGE & FAMILY THERAPIST

## 2017-01-01 PROCEDURE — 76700 US EXAM ABDOM COMPLETE: CPT

## 2017-01-01 PROCEDURE — 85025 COMPLETE CBC W/AUTO DIFF WBC: CPT | Performed by: PHYSICIAN ASSISTANT

## 2017-01-01 PROCEDURE — 83880 ASSAY OF NATRIURETIC PEPTIDE: CPT | Performed by: PHYSICIAN ASSISTANT

## 2017-01-01 PROCEDURE — 87205 SMEAR GRAM STAIN: CPT | Performed by: NURSE PRACTITIONER

## 2017-01-01 PROCEDURE — 94640 AIRWAY INHALATION TREATMENT: CPT

## 2017-01-01 PROCEDURE — 88305 TISSUE EXAM BY PATHOLOGIST: CPT | Performed by: NURSE PRACTITIONER

## 2017-01-01 PROCEDURE — 80202 ASSAY OF VANCOMYCIN: CPT | Performed by: INTERNAL MEDICINE

## 2017-01-01 PROCEDURE — 93306 TTE W/DOPPLER COMPLETE: CPT

## 2017-01-01 PROCEDURE — 25800025 ZZH RX 258: Performed by: INTERNAL MEDICINE

## 2017-01-01 PROCEDURE — 87804 INFLUENZA ASSAY W/OPTIC: CPT | Performed by: EMERGENCY MEDICINE

## 2017-01-01 PROCEDURE — 71020 XR CHEST 2 VW: CPT

## 2017-01-01 PROCEDURE — 97530 THERAPEUTIC ACTIVITIES: CPT | Mod: GP | Performed by: PHYSICAL THERAPIST

## 2017-01-01 PROCEDURE — 97166 OT EVAL MOD COMPLEX 45 MIN: CPT | Mod: GO | Performed by: OCCUPATIONAL THERAPIST

## 2017-01-01 PROCEDURE — 83986 ASSAY PH BODY FLUID NOS: CPT | Performed by: NURSE PRACTITIONER

## 2017-01-01 PROCEDURE — 83540 ASSAY OF IRON: CPT | Performed by: MARRIAGE & FAMILY THERAPIST

## 2017-01-01 PROCEDURE — 40000193 ZZH STATISTIC PT WARD VISIT

## 2017-01-01 PROCEDURE — 87040 BLOOD CULTURE FOR BACTERIA: CPT | Performed by: NURSE PRACTITIONER

## 2017-01-01 PROCEDURE — 96361 HYDRATE IV INFUSION ADD-ON: CPT

## 2017-01-01 PROCEDURE — A9270 NON-COVERED ITEM OR SERVICE: HCPCS | Mod: GY | Performed by: MARRIAGE & FAMILY THERAPIST

## 2017-01-01 PROCEDURE — 87106 FUNGI IDENTIFICATION YEAST: CPT | Performed by: NURSE PRACTITIONER

## 2017-01-01 PROCEDURE — 93971 EXTREMITY STUDY: CPT | Mod: RT

## 2017-01-01 PROCEDURE — 40000133 ZZH STATISTIC OT WARD VISIT: Performed by: OCCUPATIONAL THERAPIST

## 2017-01-01 PROCEDURE — 99285 EMERGENCY DEPT VISIT HI MDM: CPT | Mod: 25 | Performed by: EMERGENCY MEDICINE

## 2017-01-01 PROCEDURE — 82803 BLOOD GASES ANY COMBINATION: CPT

## 2017-01-01 PROCEDURE — 86850 RBC ANTIBODY SCREEN: CPT | Performed by: MARRIAGE & FAMILY THERAPIST

## 2017-01-01 PROCEDURE — 99222 1ST HOSP IP/OBS MODERATE 55: CPT | Mod: 25 | Performed by: INTERNAL MEDICINE

## 2017-01-01 PROCEDURE — 80048 BASIC METABOLIC PNL TOTAL CA: CPT | Performed by: INTERNAL MEDICINE

## 2017-01-01 PROCEDURE — 81001 URINALYSIS AUTO W/SCOPE: CPT | Performed by: EMERGENCY MEDICINE

## 2017-01-01 PROCEDURE — 40000497 ZZHCL STATISTIC SODIUM ED POCT

## 2017-01-01 PROCEDURE — 27210732 ZZH ACCESSORY CR1

## 2017-01-01 PROCEDURE — 96368 THER/DIAG CONCURRENT INF: CPT | Performed by: EMERGENCY MEDICINE

## 2017-01-01 PROCEDURE — 40000501 ZZHCL STATISTIC HEMATOCRIT ED POCT

## 2017-01-01 PROCEDURE — 85610 PROTHROMBIN TIME: CPT | Performed by: MARRIAGE & FAMILY THERAPIST

## 2017-01-01 PROCEDURE — 97530 THERAPEUTIC ACTIVITIES: CPT | Mod: GO | Performed by: OCCUPATIONAL THERAPIST

## 2017-01-01 PROCEDURE — 99212 OFFICE O/P EST SF 10 MIN: CPT | Mod: ZF

## 2017-01-01 PROCEDURE — 82570 ASSAY OF URINE CREATININE: CPT | Performed by: NURSE PRACTITIONER

## 2017-01-01 PROCEDURE — 84443 ASSAY THYROID STIM HORMONE: CPT | Performed by: PHYSICIAN ASSISTANT

## 2017-01-01 PROCEDURE — 84100 ASSAY OF PHOSPHORUS: CPT | Performed by: PHYSICIAN ASSISTANT

## 2017-01-01 PROCEDURE — 94640 AIRWAY INHALATION TREATMENT: CPT | Mod: 76

## 2017-01-01 PROCEDURE — 85025 COMPLETE CBC W/AUTO DIFF WBC: CPT | Performed by: NURSE PRACTITIONER

## 2017-01-01 PROCEDURE — 82330 ASSAY OF CALCIUM: CPT

## 2017-01-01 PROCEDURE — 71010 XR CHEST PORT 1 VW: CPT

## 2017-01-01 PROCEDURE — 82962 GLUCOSE BLOOD TEST: CPT

## 2017-01-01 PROCEDURE — 85025 COMPLETE CBC W/AUTO DIFF WBC: CPT | Performed by: EMERGENCY MEDICINE

## 2017-01-01 PROCEDURE — 80076 HEPATIC FUNCTION PANEL: CPT | Performed by: NURSE PRACTITIONER

## 2017-01-01 PROCEDURE — 87449 NOS EACH ORGANISM AG IA: CPT | Performed by: PHYSICIAN ASSISTANT

## 2017-01-01 PROCEDURE — 87633 RESP VIRUS 12-25 TARGETS: CPT | Performed by: NURSE PRACTITIONER

## 2017-01-01 PROCEDURE — 81001 URINALYSIS AUTO W/SCOPE: CPT | Performed by: NURSE PRACTITIONER

## 2017-01-01 PROCEDURE — 36592 COLLECT BLOOD FROM PICC: CPT | Performed by: MARRIAGE & FAMILY THERAPIST

## 2017-01-01 PROCEDURE — 83615 LACTATE (LD) (LDH) ENZYME: CPT | Performed by: NURSE PRACTITIONER

## 2017-01-01 PROCEDURE — 97140 MANUAL THERAPY 1/> REGIONS: CPT | Mod: GO | Performed by: OCCUPATIONAL THERAPIST

## 2017-01-01 PROCEDURE — 82977 ASSAY OF GGT: CPT | Performed by: NURSE PRACTITIONER

## 2017-01-01 PROCEDURE — 84155 ASSAY OF PROTEIN SERUM: CPT | Performed by: NURSE PRACTITIONER

## 2017-01-01 PROCEDURE — 83540 ASSAY OF IRON: CPT | Performed by: PHYSICIAN ASSISTANT

## 2017-01-01 PROCEDURE — 84145 PROCALCITONIN (PCT): CPT | Performed by: NURSE PRACTITIONER

## 2017-01-01 PROCEDURE — 87631 RESP VIRUS 3-5 TARGETS: CPT | Performed by: NURSE PRACTITIONER

## 2017-01-01 PROCEDURE — 93306 TTE W/DOPPLER COMPLETE: CPT | Mod: 26 | Performed by: INTERNAL MEDICINE

## 2017-01-01 PROCEDURE — 20000004 ZZH R&B ICU UMMC

## 2017-01-01 PROCEDURE — 97161 PT EVAL LOW COMPLEX 20 MIN: CPT | Mod: GP

## 2017-01-01 PROCEDURE — 97116 GAIT TRAINING THERAPY: CPT | Mod: GP | Performed by: PHYSICAL THERAPIST

## 2017-01-01 PROCEDURE — 96372 THER/PROPH/DIAG INJ SC/IM: CPT | Mod: ZF

## 2017-01-01 PROCEDURE — 97535 SELF CARE MNGMENT TRAINING: CPT | Mod: GO

## 2017-01-01 PROCEDURE — 25000132 ZZH RX MED GY IP 250 OP 250 PS 637: Mod: GY | Performed by: STUDENT IN AN ORGANIZED HEALTH CARE EDUCATION/TRAINING PROGRAM

## 2017-01-01 PROCEDURE — 63400005 ZZH RX 634: Mod: EA

## 2017-01-01 PROCEDURE — 27210995 ZZH RX 272: Performed by: RADIOLOGY

## 2017-01-01 PROCEDURE — 85045 AUTOMATED RETICULOCYTE COUNT: CPT | Performed by: PHYSICIAN ASSISTANT

## 2017-01-01 PROCEDURE — 88112 CYTOPATH CELL ENHANCE TECH: CPT | Performed by: NURSE PRACTITIONER

## 2017-01-01 PROCEDURE — 93010 ELECTROCARDIOGRAM REPORT: CPT | Mod: 76 | Performed by: INTERNAL MEDICINE

## 2017-01-01 PROCEDURE — 99215 OFFICE O/P EST HI 40 MIN: CPT | Mod: ZP | Performed by: NURSE PRACTITIONER

## 2017-01-01 PROCEDURE — 87493 C DIFF AMPLIFIED PROBE: CPT | Performed by: INTERNAL MEDICINE

## 2017-01-01 PROCEDURE — 99214 OFFICE O/P EST MOD 30 MIN: CPT | Mod: ZP | Performed by: NURSE PRACTITIONER

## 2017-01-01 PROCEDURE — 97530 THERAPEUTIC ACTIVITIES: CPT | Mod: GP

## 2017-01-01 PROCEDURE — 40000498 ZZHCL STATISTIC POTASSIUM ED POCT

## 2017-01-01 PROCEDURE — 87070 CULTURE OTHR SPECIMN AEROBIC: CPT | Performed by: INTERNAL MEDICINE

## 2017-01-01 PROCEDURE — 82378 CARCINOEMBRYONIC ANTIGEN: CPT | Performed by: INTERNAL MEDICINE

## 2017-01-01 PROCEDURE — P9041 ALBUMIN (HUMAN),5%, 50ML: HCPCS | Performed by: NURSE PRACTITIONER

## 2017-01-01 PROCEDURE — 40000502 ZZHCL STATISTIC GLUCOSE ED POCT

## 2017-01-01 PROCEDURE — 99238 HOSP IP/OBS DSCHRG MGMT 30/<: CPT | Performed by: INTERNAL MEDICINE

## 2017-01-01 PROCEDURE — 82330 ASSAY OF CALCIUM: CPT | Performed by: PHYSICIAN ASSISTANT

## 2017-01-01 PROCEDURE — 25000125 ZZHC RX 250: Performed by: EMERGENCY MEDICINE

## 2017-01-01 PROCEDURE — 40000611 ZZHCL STATISTIC MORPHOLOGY W/INTERP HEMEPATH TC 85060: Performed by: PHYSICIAN ASSISTANT

## 2017-01-01 PROCEDURE — 83615 LACTATE (LD) (LDH) ENZYME: CPT | Performed by: PHYSICIAN ASSISTANT

## 2017-01-01 PROCEDURE — 71250 CT THORAX DX C-: CPT

## 2017-01-01 PROCEDURE — 25000132 ZZH RX MED GY IP 250 OP 250 PS 637: Mod: GY | Performed by: MARRIAGE & FAMILY THERAPIST

## 2017-01-01 PROCEDURE — 87493 C DIFF AMPLIFIED PROBE: CPT | Performed by: EMERGENCY MEDICINE

## 2017-01-01 PROCEDURE — 93010 ELECTROCARDIOGRAM REPORT: CPT | Mod: Z6 | Performed by: EMERGENCY MEDICINE

## 2017-01-01 PROCEDURE — C1769 GUIDE WIRE: HCPCS

## 2017-01-01 PROCEDURE — 87633 RESP VIRUS 12-25 TARGETS: CPT | Performed by: EMERGENCY MEDICINE

## 2017-01-01 PROCEDURE — 99285 EMERGENCY DEPT VISIT HI MDM: CPT | Mod: 25

## 2017-01-01 PROCEDURE — 97110 THERAPEUTIC EXERCISES: CPT | Mod: GP

## 2017-01-01 PROCEDURE — 99211 OFF/OP EST MAY X REQ PHY/QHP: CPT

## 2017-01-01 PROCEDURE — 25000128 H RX IP 250 OP 636: Performed by: PHYSICIAN ASSISTANT

## 2017-01-01 PROCEDURE — 83605 ASSAY OF LACTIC ACID: CPT

## 2017-01-01 PROCEDURE — 85384 FIBRINOGEN ACTIVITY: CPT | Performed by: PHYSICIAN ASSISTANT

## 2017-01-01 PROCEDURE — 87506 IADNA-DNA/RNA PROBE TQ 6-11: CPT | Performed by: EMERGENCY MEDICINE

## 2017-01-01 PROCEDURE — 87799 DETECT AGENT NOS DNA QUANT: CPT | Performed by: PHYSICIAN ASSISTANT

## 2017-01-01 PROCEDURE — 83550 IRON BINDING TEST: CPT | Performed by: PHYSICIAN ASSISTANT

## 2017-01-01 PROCEDURE — 82945 GLUCOSE OTHER FLUID: CPT | Performed by: NURSE PRACTITIONER

## 2017-01-01 PROCEDURE — 87075 CULTR BACTERIA EXCEPT BLOOD: CPT | Performed by: NURSE PRACTITIONER

## 2017-01-01 PROCEDURE — 96372 THER/PROPH/DIAG INJ SC/IM: CPT

## 2017-01-01 PROCEDURE — 99232 SBSQ HOSP IP/OBS MODERATE 35: CPT | Performed by: INTERNAL MEDICINE

## 2017-01-01 PROCEDURE — 36591 DRAW BLOOD OFF VENOUS DEVICE: CPT

## 2017-01-01 PROCEDURE — P9040 RBC LEUKOREDUCED IRRADIATED: HCPCS | Performed by: MARRIAGE & FAMILY THERAPIST

## 2017-01-01 PROCEDURE — 0W9930Z DRAINAGE OF RIGHT PLEURAL CAVITY WITH DRAINAGE DEVICE, PERCUTANEOUS APPROACH: ICD-10-PCS | Performed by: RADIOLOGY

## 2017-01-01 PROCEDURE — 36592 COLLECT BLOOD FROM PICC: CPT | Performed by: STUDENT IN AN ORGANIZED HEALTH CARE EDUCATION/TRAINING PROGRAM

## 2017-01-01 PROCEDURE — 97165 OT EVAL LOW COMPLEX 30 MIN: CPT | Mod: GO

## 2017-01-01 PROCEDURE — 00000155 ZZHCL STATISTIC H-CELL BLOCK W/STAIN: Performed by: NURSE PRACTITIONER

## 2017-01-01 PROCEDURE — 82747 ASSAY OF FOLIC ACID RBC: CPT | Performed by: MARRIAGE & FAMILY THERAPIST

## 2017-01-01 PROCEDURE — 80202 ASSAY OF VANCOMYCIN: CPT | Performed by: NURSE PRACTITIONER

## 2017-01-01 PROCEDURE — 96366 THER/PROPH/DIAG IV INF ADDON: CPT | Performed by: EMERGENCY MEDICINE

## 2017-01-01 PROCEDURE — 96360 HYDRATION IV INFUSION INIT: CPT

## 2017-01-01 PROCEDURE — 82150 ASSAY OF AMYLASE: CPT | Performed by: NURSE PRACTITIONER

## 2017-01-01 PROCEDURE — 83735 ASSAY OF MAGNESIUM: CPT | Performed by: INTERNAL MEDICINE

## 2017-01-01 PROCEDURE — 84484 ASSAY OF TROPONIN QUANT: CPT | Performed by: EMERGENCY MEDICINE

## 2017-01-01 PROCEDURE — 87205 SMEAR GRAM STAIN: CPT | Performed by: INTERNAL MEDICINE

## 2017-01-01 PROCEDURE — 27210903 ZZH KIT CR5

## 2017-01-01 PROCEDURE — 85049 AUTOMATED PLATELET COUNT: CPT | Performed by: NURSE PRACTITIONER

## 2017-01-01 PROCEDURE — 83010 ASSAY OF HAPTOGLOBIN QUANT: CPT | Performed by: PHYSICIAN ASSISTANT

## 2017-01-01 PROCEDURE — 80197 ASSAY OF TACROLIMUS: CPT | Performed by: MARRIAGE & FAMILY THERAPIST

## 2017-01-01 PROCEDURE — 99285 EMERGENCY DEPT VISIT HI MDM: CPT | Performed by: EMERGENCY MEDICINE

## 2017-01-01 PROCEDURE — 86923 COMPATIBILITY TEST ELECTRIC: CPT | Performed by: MARRIAGE & FAMILY THERAPIST

## 2017-01-01 PROCEDURE — 86901 BLOOD TYPING SEROLOGIC RH(D): CPT | Performed by: MARRIAGE & FAMILY THERAPIST

## 2017-01-01 PROCEDURE — 84157 ASSAY OF PROTEIN OTHER: CPT | Performed by: NURSE PRACTITIONER

## 2017-01-01 PROCEDURE — C1729 CATH, DRAINAGE: HCPCS

## 2017-01-01 PROCEDURE — 97161 PT EVAL LOW COMPLEX 20 MIN: CPT | Mod: GP | Performed by: PHYSICAL THERAPIST

## 2017-01-01 PROCEDURE — 94799 UNLISTED PULMONARY SVC/PX: CPT

## 2017-01-01 PROCEDURE — 80197 ASSAY OF TACROLIMUS: CPT | Performed by: NURSE PRACTITIONER

## 2017-01-01 PROCEDURE — 32557 INSERT CATH PLEURA W/ IMAGE: CPT | Mod: RT

## 2017-01-01 PROCEDURE — 86900 BLOOD TYPING SEROLOGIC ABO: CPT | Performed by: MARRIAGE & FAMILY THERAPIST

## 2017-01-01 PROCEDURE — 85610 PROTHROMBIN TIME: CPT | Performed by: PHYSICIAN ASSISTANT

## 2017-01-01 PROCEDURE — 99212 OFFICE O/P EST SF 10 MIN: CPT

## 2017-01-01 PROCEDURE — 80053 COMPREHEN METABOLIC PANEL: CPT | Performed by: INTERNAL MEDICINE

## 2017-01-01 PROCEDURE — 96374 THER/PROPH/DIAG INJ IV PUSH: CPT

## 2017-01-01 PROCEDURE — 25800025 ZZH RX 258

## 2017-01-01 PROCEDURE — 84132 ASSAY OF SERUM POTASSIUM: CPT | Performed by: INTERNAL MEDICINE

## 2017-01-01 PROCEDURE — 36600 WITHDRAWAL OF ARTERIAL BLOOD: CPT

## 2017-01-01 PROCEDURE — 74176 CT ABD & PELVIS W/O CONTRAST: CPT

## 2017-01-01 PROCEDURE — 87102 FUNGUS ISOLATION CULTURE: CPT | Performed by: NURSE PRACTITIONER

## 2017-01-01 PROCEDURE — 93005 ELECTROCARDIOGRAM TRACING: CPT | Performed by: EMERGENCY MEDICINE

## 2017-01-01 PROCEDURE — 87493 C DIFF AMPLIFIED PROBE: CPT | Performed by: NURSE PRACTITIONER

## 2017-01-01 PROCEDURE — 83605 ASSAY OF LACTIC ACID: CPT | Performed by: EMERGENCY MEDICINE

## 2017-01-01 PROCEDURE — 82248 BILIRUBIN DIRECT: CPT | Performed by: INTERNAL MEDICINE

## 2017-01-01 RX ORDER — FUROSEMIDE 10 MG/ML
40 INJECTION INTRAMUSCULAR; INTRAVENOUS ONCE
Status: COMPLETED | OUTPATIENT
Start: 2017-01-01 | End: 2017-01-01

## 2017-01-01 RX ORDER — ALBUMIN, HUMAN INJ 5% 5 %
500 SOLUTION INTRAVENOUS ONCE
Status: COMPLETED | OUTPATIENT
Start: 2017-01-01 | End: 2017-01-01

## 2017-01-01 RX ORDER — HEPARIN SODIUM,PORCINE 10 UNIT/ML
5-10 VIAL (ML) INTRAVENOUS
Status: DISCONTINUED | OUTPATIENT
Start: 2017-01-01 | End: 2017-01-01 | Stop reason: HOSPADM

## 2017-01-01 RX ORDER — DOXAZOSIN 2 MG/1
4 TABLET ORAL AT BEDTIME
Qty: 60 TABLET | Refills: 11 | Status: ON HOLD
Start: 2017-01-01 | End: 2017-01-01

## 2017-01-01 RX ORDER — SODIUM BICARBONATE 650 MG/1
1950 TABLET ORAL 2 TIMES DAILY
Status: DISCONTINUED | OUTPATIENT
Start: 2017-01-01 | End: 2017-01-01

## 2017-01-01 RX ORDER — AMLODIPINE BESYLATE 5 MG/1
5 TABLET ORAL AT BEDTIME
Status: DISCONTINUED | OUTPATIENT
Start: 2017-01-01 | End: 2017-01-01

## 2017-01-01 RX ORDER — SODIUM CHLORIDE, SODIUM LACTATE, POTASSIUM CHLORIDE, CALCIUM CHLORIDE 600; 310; 30; 20 MG/100ML; MG/100ML; MG/100ML; MG/100ML
INJECTION, SOLUTION INTRAVENOUS
Status: COMPLETED
Start: 2017-01-01 | End: 2017-01-01

## 2017-01-01 RX ORDER — LIDOCAINE 40 MG/G
CREAM TOPICAL
Status: DISCONTINUED | OUTPATIENT
Start: 2017-01-01 | End: 2017-01-01 | Stop reason: HOSPADM

## 2017-01-01 RX ORDER — LIDOCAINE 50 MG/G
1 PATCH TOPICAL EVERY 24 HOURS
Status: DISCONTINUED | OUTPATIENT
Start: 2017-01-01 | End: 2017-01-01

## 2017-01-01 RX ORDER — ATORVASTATIN CALCIUM 10 MG/1
20 TABLET, FILM COATED ORAL AT BEDTIME
Status: DISCONTINUED | OUTPATIENT
Start: 2017-01-01 | End: 2017-01-01 | Stop reason: HOSPADM

## 2017-01-01 RX ORDER — SODIUM BICARBONATE 650 MG/1
1300 TABLET ORAL 2 TIMES DAILY
Status: DISCONTINUED | OUTPATIENT
Start: 2017-01-01 | End: 2017-01-01 | Stop reason: HOSPADM

## 2017-01-01 RX ORDER — TACROLIMUS 1 MG/1
2 CAPSULE ORAL
Status: DISCONTINUED | OUTPATIENT
Start: 2017-01-01 | End: 2017-01-01

## 2017-01-01 RX ORDER — LEVOFLOXACIN 750 MG/1
750 TABLET, FILM COATED ORAL
Qty: 2 TABLET | Refills: 0 | Status: SHIPPED | OUTPATIENT
Start: 2017-01-01 | End: 2017-01-01

## 2017-01-01 RX ORDER — CALCIUM CARBONATE 500 MG/1
500 TABLET, CHEWABLE ORAL 3 TIMES DAILY PRN
Status: DISCONTINUED | OUTPATIENT
Start: 2017-01-01 | End: 2017-01-01 | Stop reason: HOSPADM

## 2017-01-01 RX ORDER — DEXTROSE MONOHYDRATE 25 G/50ML
50 INJECTION, SOLUTION INTRAVENOUS ONCE
Status: COMPLETED | OUTPATIENT
Start: 2017-01-01 | End: 2017-01-01

## 2017-01-01 RX ORDER — METHYLPREDNISOLONE SODIUM SUCCINATE 40 MG/ML
10 INJECTION, POWDER, LYOPHILIZED, FOR SOLUTION INTRAMUSCULAR; INTRAVENOUS EVERY 24 HOURS
Status: DISCONTINUED | OUTPATIENT
Start: 2017-01-01 | End: 2017-02-20 | Stop reason: HOSPADM

## 2017-01-01 RX ORDER — POLYETHYLENE GLYCOL 3350 17 G/17G
17 POWDER, FOR SOLUTION ORAL DAILY PRN
Status: DISCONTINUED | OUTPATIENT
Start: 2017-01-01 | End: 2017-01-01

## 2017-01-01 RX ORDER — PROCHLORPERAZINE MALEATE 5 MG
5 TABLET ORAL EVERY 6 HOURS PRN
Status: DISCONTINUED | OUTPATIENT
Start: 2017-01-01 | End: 2017-01-01

## 2017-01-01 RX ORDER — LEVOFLOXACIN 5 MG/ML
750 INJECTION, SOLUTION INTRAVENOUS
Status: DISCONTINUED | OUTPATIENT
Start: 2017-01-01 | End: 2017-01-01

## 2017-01-01 RX ORDER — NICOTINE POLACRILEX 4 MG
15-30 LOZENGE BUCCAL
Status: DISCONTINUED | OUTPATIENT
Start: 2017-01-01 | End: 2017-01-01 | Stop reason: HOSPADM

## 2017-01-01 RX ORDER — VALACYCLOVIR HYDROCHLORIDE 1 G/1
1000 TABLET, FILM COATED ORAL DAILY
Qty: 10 TABLET | Refills: 0 | Status: ON HOLD | OUTPATIENT
Start: 2017-01-01 | End: 2017-01-01

## 2017-01-01 RX ORDER — ONDANSETRON 2 MG/ML
4 INJECTION INTRAMUSCULAR; INTRAVENOUS EVERY 6 HOURS
Status: DISCONTINUED | OUTPATIENT
Start: 2017-01-01 | End: 2017-01-01

## 2017-01-01 RX ORDER — ALBUTEROL SULFATE 90 UG/1
2 AEROSOL, METERED RESPIRATORY (INHALATION) EVERY 6 HOURS PRN
Status: DISCONTINUED | OUTPATIENT
Start: 2017-01-01 | End: 2017-02-20 | Stop reason: HOSPADM

## 2017-01-01 RX ORDER — ASPIRIN 81 MG/1
81 TABLET ORAL DAILY
Status: DISCONTINUED | OUTPATIENT
Start: 2017-01-01 | End: 2017-01-01

## 2017-01-01 RX ORDER — ONDANSETRON 8 MG/1
8 TABLET, ORALLY DISINTEGRATING ORAL EVERY 8 HOURS PRN
Status: DISCONTINUED | OUTPATIENT
Start: 2017-01-01 | End: 2017-01-01

## 2017-01-01 RX ORDER — SENNOSIDES 8.6 MG
2 TABLET ORAL 2 TIMES DAILY
Status: DISCONTINUED | OUTPATIENT
Start: 2017-01-01 | End: 2017-01-01

## 2017-01-01 RX ORDER — DAPSONE 25 MG/1
50 TABLET ORAL
Status: DISCONTINUED | OUTPATIENT
Start: 2017-01-01 | End: 2017-01-01 | Stop reason: HOSPADM

## 2017-01-01 RX ORDER — AMLODIPINE BESYLATE 10 MG/1
10 TABLET ORAL AT BEDTIME
Status: DISCONTINUED | OUTPATIENT
Start: 2017-01-01 | End: 2017-01-01

## 2017-01-01 RX ORDER — AMOXICILLIN 500 MG/1
500 TABLET, FILM COATED ORAL 2 TIMES DAILY
Qty: 20 TABLET | Refills: 0 | Status: CANCELLED | OUTPATIENT
Start: 2017-01-01

## 2017-01-01 RX ORDER — VANCOMYCIN HYDROCHLORIDE 1 G/200ML
1000 INJECTION, SOLUTION INTRAVENOUS ONCE
Status: COMPLETED | OUTPATIENT
Start: 2017-01-01 | End: 2017-01-01

## 2017-01-01 RX ORDER — LIDOCAINE 50 MG/G
1 PATCH TOPICAL EVERY 24 HOURS
Status: DISCONTINUED | OUTPATIENT
Start: 2017-01-01 | End: 2017-01-01 | Stop reason: HOSPADM

## 2017-01-01 RX ORDER — HEPARIN SODIUM (PORCINE) LOCK FLUSH IV SOLN 100 UNIT/ML 100 UNIT/ML
5 SOLUTION INTRAVENOUS EVERY 8 HOURS
Status: DISCONTINUED | OUTPATIENT
Start: 2017-01-01 | End: 2017-01-01 | Stop reason: HOSPADM

## 2017-01-01 RX ORDER — VANCOMYCIN HYDROCHLORIDE 1 G/200ML
19 INJECTION, SOLUTION INTRAVENOUS ONCE
Status: COMPLETED | OUTPATIENT
Start: 2017-01-01 | End: 2017-01-01

## 2017-01-01 RX ORDER — PROCHLORPERAZINE MALEATE 5 MG
5 TABLET ORAL EVERY 6 HOURS PRN
Status: CANCELLED | OUTPATIENT
Start: 2017-01-01

## 2017-01-01 RX ORDER — MULTIPLE VITAMINS W/ MINERALS TAB 9MG-400MCG
1 TAB ORAL DAILY
Status: DISCONTINUED | OUTPATIENT
Start: 2017-01-01 | End: 2017-01-01 | Stop reason: HOSPADM

## 2017-01-01 RX ORDER — IPRATROPIUM BROMIDE AND ALBUTEROL SULFATE 2.5; .5 MG/3ML; MG/3ML
3 SOLUTION RESPIRATORY (INHALATION)
Status: DISCONTINUED | OUTPATIENT
Start: 2017-01-01 | End: 2017-01-01

## 2017-01-01 RX ORDER — ALBUMIN, HUMAN INJ 5% 5 %
25 SOLUTION INTRAVENOUS ONCE
Status: COMPLETED | OUTPATIENT
Start: 2017-01-01 | End: 2017-01-01

## 2017-01-01 RX ORDER — HEPARIN SODIUM (PORCINE) LOCK FLUSH IV SOLN 100 UNIT/ML 100 UNIT/ML
5 SOLUTION INTRAVENOUS
Status: DISCONTINUED | OUTPATIENT
Start: 2017-01-01 | End: 2017-01-01 | Stop reason: HOSPADM

## 2017-01-01 RX ORDER — HEPARIN SODIUM (PORCINE) LOCK FLUSH IV SOLN 100 UNIT/ML 100 UNIT/ML
5 SOLUTION INTRAVENOUS ONCE
Status: COMPLETED | OUTPATIENT
Start: 2017-01-01 | End: 2017-01-01

## 2017-01-01 RX ORDER — PIPERACILLIN SODIUM, TAZOBACTAM SODIUM 3; .375 G/15ML; G/15ML
3.38 INJECTION, POWDER, LYOPHILIZED, FOR SOLUTION INTRAVENOUS EVERY 6 HOURS
Status: DISCONTINUED | OUTPATIENT
Start: 2017-01-01 | End: 2017-01-01

## 2017-01-01 RX ORDER — POTASSIUM CHLORIDE 1.5 G/1.58G
20-40 POWDER, FOR SOLUTION ORAL
Status: DISCONTINUED | OUTPATIENT
Start: 2017-01-01 | End: 2017-01-01 | Stop reason: HOSPADM

## 2017-01-01 RX ORDER — TACROLIMUS 0.5 MG/1
3.5 CAPSULE ORAL 2 TIMES DAILY
Qty: 140 CAPSULE | Refills: 2 | Status: ON HOLD | OUTPATIENT
Start: 2017-01-01 | End: 2017-01-01

## 2017-01-01 RX ORDER — NYSTATIN 100000/ML
500000 SUSPENSION, ORAL (FINAL DOSE FORM) ORAL 4 TIMES DAILY
Status: DISCONTINUED | OUTPATIENT
Start: 2017-01-01 | End: 2017-01-01 | Stop reason: HOSPADM

## 2017-01-01 RX ORDER — IPRATROPIUM BROMIDE AND ALBUTEROL SULFATE 2.5; .5 MG/3ML; MG/3ML
3 SOLUTION RESPIRATORY (INHALATION) 4 TIMES DAILY PRN
Status: DISCONTINUED | OUTPATIENT
Start: 2017-01-01 | End: 2017-01-01 | Stop reason: HOSPADM

## 2017-01-01 RX ORDER — PREDNISONE 5 MG/1
5 TABLET ORAL EVERY MORNING
Status: DISCONTINUED | OUTPATIENT
Start: 2017-01-01 | End: 2017-01-01

## 2017-01-01 RX ORDER — FUROSEMIDE 10 MG/ML
20 INJECTION INTRAMUSCULAR; INTRAVENOUS ONCE
Status: DISCONTINUED | OUTPATIENT
Start: 2017-01-01 | End: 2017-01-01

## 2017-01-01 RX ORDER — CALCIUM CARBONATE 500 MG/1
500 TABLET, CHEWABLE ORAL ONCE
Status: COMPLETED | OUTPATIENT
Start: 2017-01-01 | End: 2017-01-01

## 2017-01-01 RX ORDER — LEVOTHYROXINE SODIUM 50 UG/1
50 TABLET ORAL DAILY
Status: DISCONTINUED | OUTPATIENT
Start: 2017-01-01 | End: 2017-01-01 | Stop reason: HOSPADM

## 2017-01-01 RX ORDER — HEPARIN SODIUM 5000 [USP'U]/.5ML
5000 INJECTION, SOLUTION INTRAVENOUS; SUBCUTANEOUS EVERY 12 HOURS
Status: DISCONTINUED | OUTPATIENT
Start: 2017-01-01 | End: 2017-01-01

## 2017-01-01 RX ORDER — CALCIUM GLUCONATE 94 MG/ML
2 INJECTION, SOLUTION INTRAVENOUS ONCE
Status: DISCONTINUED | OUTPATIENT
Start: 2017-01-01 | End: 2017-01-01

## 2017-01-01 RX ORDER — ZINC SULFATE 50(220)MG
220 CAPSULE ORAL DAILY
Status: DISCONTINUED | OUTPATIENT
Start: 2017-01-01 | End: 2017-01-01 | Stop reason: HOSPADM

## 2017-01-01 RX ORDER — BUTALBITAL, ACETAMINOPHEN AND CAFFEINE 50; 325; 40 MG/1; MG/1; MG/1
1 TABLET ORAL EVERY 4 HOURS PRN
Status: DISCONTINUED | OUTPATIENT
Start: 2017-01-01 | End: 2017-01-01

## 2017-01-01 RX ORDER — PIPERACILLIN SODIUM, TAZOBACTAM SODIUM 2; .25 G/10ML; G/10ML
2.25 INJECTION, POWDER, LYOPHILIZED, FOR SOLUTION INTRAVENOUS EVERY 6 HOURS
Status: DISCONTINUED | OUTPATIENT
Start: 2017-01-01 | End: 2017-01-01

## 2017-01-01 RX ORDER — HYDROMORPHONE HYDROCHLORIDE 2 MG/1
2-4 TABLET ORAL
Status: DISCONTINUED | OUTPATIENT
Start: 2017-01-01 | End: 2017-01-01 | Stop reason: HOSPADM

## 2017-01-01 RX ORDER — PROMETHAZINE HYDROCHLORIDE 25 MG/ML
12.5-25 INJECTION, SOLUTION INTRAMUSCULAR; INTRAVENOUS EVERY 6 HOURS PRN
Status: DISCONTINUED | OUTPATIENT
Start: 2017-01-01 | End: 2017-02-20 | Stop reason: HOSPADM

## 2017-01-01 RX ORDER — ATORVASTATIN CALCIUM 20 MG/1
20 TABLET, FILM COATED ORAL DAILY
Status: DISCONTINUED | OUTPATIENT
Start: 2017-01-01 | End: 2017-01-01

## 2017-01-01 RX ORDER — BUTALBITAL, ACETAMINOPHEN AND CAFFEINE 50; 325; 40 MG/1; MG/1; MG/1
1 TABLET ORAL EVERY 4 HOURS PRN
Status: DISCONTINUED | OUTPATIENT
Start: 2017-01-01 | End: 2017-01-01 | Stop reason: HOSPADM

## 2017-01-01 RX ORDER — VALACYCLOVIR HYDROCHLORIDE 500 MG/1
1000 TABLET, FILM COATED ORAL DAILY
Status: DISCONTINUED | OUTPATIENT
Start: 2017-01-01 | End: 2017-01-01

## 2017-01-01 RX ORDER — MULTIVIT WITH MINERALS/LUTEIN
500 TABLET ORAL DAILY
Status: DISCONTINUED | OUTPATIENT
Start: 2017-01-01 | End: 2017-01-01

## 2017-01-01 RX ORDER — TACROLIMUS 1 MG/1
4 CAPSULE ORAL EVERY MORNING
Status: DISCONTINUED | OUTPATIENT
Start: 2017-01-01 | End: 2017-01-01

## 2017-01-01 RX ORDER — SODIUM CHLORIDE 9 MG/ML
1000 INJECTION, SOLUTION INTRAVENOUS CONTINUOUS
Status: DISCONTINUED | OUTPATIENT
Start: 2017-01-01 | End: 2017-01-01

## 2017-01-01 RX ORDER — PROCHLORPERAZINE MALEATE 5 MG
5-10 TABLET ORAL EVERY 6 HOURS PRN
Status: DISCONTINUED | OUTPATIENT
Start: 2017-01-01 | End: 2017-01-01 | Stop reason: HOSPADM

## 2017-01-01 RX ORDER — NALOXONE HYDROCHLORIDE 0.4 MG/ML
.1-.4 INJECTION, SOLUTION INTRAMUSCULAR; INTRAVENOUS; SUBCUTANEOUS
Status: DISCONTINUED | OUTPATIENT
Start: 2017-01-01 | End: 2017-02-20 | Stop reason: HOSPADM

## 2017-01-01 RX ORDER — LINEZOLID 600 MG/1
600 TABLET, FILM COATED ORAL 2 TIMES DAILY
Qty: 20 TABLET | Refills: 0 | Status: ON HOLD | OUTPATIENT
Start: 2017-01-01 | End: 2017-01-01

## 2017-01-01 RX ORDER — CARBOXYMETHYLCELLULOSE SODIUM 5 MG/ML
1 SOLUTION/ DROPS OPHTHALMIC 3 TIMES DAILY PRN
Status: DISCONTINUED | OUTPATIENT
Start: 2017-01-01 | End: 2017-01-01 | Stop reason: HOSPADM

## 2017-01-01 RX ORDER — ONDANSETRON 2 MG/ML
8 INJECTION INTRAMUSCULAR; INTRAVENOUS EVERY 8 HOURS PRN
Status: DISCONTINUED | OUTPATIENT
Start: 2017-01-01 | End: 2017-01-01

## 2017-01-01 RX ORDER — HEPARIN SODIUM,PORCINE 10 UNIT/ML
5-10 VIAL (ML) INTRAVENOUS EVERY 24 HOURS
Status: DISCONTINUED | OUTPATIENT
Start: 2017-01-01 | End: 2017-01-01 | Stop reason: HOSPADM

## 2017-01-01 RX ORDER — FAMOTIDINE 10 MG
10 TABLET ORAL ONCE
Status: DISCONTINUED | OUTPATIENT
Start: 2017-01-01 | End: 2017-01-01

## 2017-01-01 RX ORDER — DAPSONE 25 MG/1
50 TABLET ORAL
Status: DISCONTINUED | OUTPATIENT
Start: 2017-01-01 | End: 2017-01-01

## 2017-01-01 RX ORDER — LINEZOLID 600 MG/1
600 TABLET, FILM COATED ORAL 2 TIMES DAILY
Status: DISCONTINUED | OUTPATIENT
Start: 2017-01-01 | End: 2017-01-01

## 2017-01-01 RX ORDER — ONDANSETRON 2 MG/ML
4 INJECTION INTRAMUSCULAR; INTRAVENOUS EVERY 6 HOURS PRN
Status: DISCONTINUED | OUTPATIENT
Start: 2017-01-01 | End: 2017-01-01

## 2017-01-01 RX ORDER — PROCHLORPERAZINE MALEATE 5 MG
5 TABLET ORAL EVERY 6 HOURS PRN
Status: DISCONTINUED | OUTPATIENT
Start: 2017-01-01 | End: 2017-02-20 | Stop reason: HOSPADM

## 2017-01-01 RX ORDER — FENTANYL CITRATE 50 UG/ML
100-200 INJECTION, SOLUTION INTRAMUSCULAR; INTRAVENOUS
Status: DISCONTINUED | OUTPATIENT
Start: 2017-01-01 | End: 2017-02-20 | Stop reason: HOSPADM

## 2017-01-01 RX ORDER — MULTIPLE VITAMINS W/ MINERALS TAB 9MG-400MCG
1 TAB ORAL DAILY
Status: DISCONTINUED | OUTPATIENT
Start: 2017-01-01 | End: 2017-01-01

## 2017-01-01 RX ORDER — AMLODIPINE BESYLATE 10 MG/1
10 TABLET ORAL AT BEDTIME
Status: DISCONTINUED | OUTPATIENT
Start: 2017-01-01 | End: 2017-01-01 | Stop reason: HOSPADM

## 2017-01-01 RX ORDER — HEPARIN SODIUM (PORCINE) LOCK FLUSH IV SOLN 100 UNIT/ML 100 UNIT/ML
500 SOLUTION INTRAVENOUS ONCE
Status: COMPLETED | OUTPATIENT
Start: 2017-01-01 | End: 2017-01-01

## 2017-01-01 RX ORDER — MONTELUKAST SODIUM 10 MG/1
10 TABLET ORAL EVERY EVENING
Status: DISCONTINUED | OUTPATIENT
Start: 2017-01-01 | End: 2017-01-01 | Stop reason: HOSPADM

## 2017-01-01 RX ORDER — AZITHROMYCIN 250 MG/1
125 TABLET, FILM COATED ORAL DAILY
Qty: 15 TABLET | Refills: 11 | Status: ON HOLD | COMMUNITY
Start: 2017-01-01 | End: 2017-01-01

## 2017-01-01 RX ORDER — FUROSEMIDE 10 MG/ML
20 INJECTION INTRAMUSCULAR; INTRAVENOUS
Status: COMPLETED | OUTPATIENT
Start: 2017-01-01 | End: 2017-01-01

## 2017-01-01 RX ORDER — ZINC SULFATE 50(220)MG
220 CAPSULE ORAL DAILY
Status: DISCONTINUED | OUTPATIENT
Start: 2017-01-01 | End: 2017-01-01

## 2017-01-01 RX ORDER — PIPERACILLIN SODIUM, TAZOBACTAM SODIUM 3; .375 G/15ML; G/15ML
3.38 INJECTION, POWDER, LYOPHILIZED, FOR SOLUTION INTRAVENOUS ONCE
Status: COMPLETED | OUTPATIENT
Start: 2017-01-01 | End: 2017-01-01

## 2017-01-01 RX ORDER — ATROPINE SULFATE 10 MG/ML
1-2 SOLUTION/ DROPS OPHTHALMIC
Status: DISCONTINUED | OUTPATIENT
Start: 2017-01-01 | End: 2017-02-20 | Stop reason: HOSPADM

## 2017-01-01 RX ORDER — SODIUM CHLORIDE 9 MG/ML
INJECTION, SOLUTION INTRAVENOUS CONTINUOUS
Status: DISCONTINUED | OUTPATIENT
Start: 2017-01-01 | End: 2017-01-01

## 2017-01-01 RX ORDER — HYDROMORPHONE HYDROCHLORIDE 2 MG/1
2 TABLET ORAL EVERY 6 HOURS PRN
Status: DISCONTINUED | OUTPATIENT
Start: 2017-01-01 | End: 2017-02-20 | Stop reason: HOSPADM

## 2017-01-01 RX ORDER — ASCORBIC ACID 500 MG
500 TABLET ORAL DAILY
Status: DISCONTINUED | OUTPATIENT
Start: 2017-01-01 | End: 2017-01-01 | Stop reason: HOSPADM

## 2017-01-01 RX ORDER — PREDNISONE 2.5 MG/1
2.5 TABLET ORAL EVERY MORNING
Status: DISCONTINUED | OUTPATIENT
Start: 2017-01-01 | End: 2017-01-01

## 2017-01-01 RX ORDER — ONDANSETRON 8 MG/1
8 TABLET, ORALLY DISINTEGRATING ORAL
Status: DISCONTINUED | OUTPATIENT
Start: 2017-01-01 | End: 2017-01-01 | Stop reason: HOSPADM

## 2017-01-01 RX ORDER — LOPERAMIDE HCL 2 MG
2 CAPSULE ORAL 3 TIMES DAILY PRN
Status: DISCONTINUED | OUTPATIENT
Start: 2017-01-01 | End: 2017-02-20 | Stop reason: HOSPADM

## 2017-01-01 RX ORDER — SODIUM BICARBONATE 650 MG/1
1300 TABLET ORAL 2 TIMES DAILY
Status: DISCONTINUED | OUTPATIENT
Start: 2017-01-01 | End: 2017-01-01

## 2017-01-01 RX ORDER — PROCHLORPERAZINE 25 MG
12.5 SUPPOSITORY, RECTAL RECTAL EVERY 12 HOURS PRN
Status: DISCONTINUED | OUTPATIENT
Start: 2017-01-01 | End: 2017-02-20 | Stop reason: HOSPADM

## 2017-01-01 RX ORDER — FUROSEMIDE 10 MG/ML
40 INJECTION INTRAMUSCULAR; INTRAVENOUS EVERY 8 HOURS
Status: DISCONTINUED | OUTPATIENT
Start: 2017-01-01 | End: 2017-01-01

## 2017-01-01 RX ORDER — POTASSIUM CHLORIDE 7.45 MG/ML
10 INJECTION INTRAVENOUS
Status: DISCONTINUED | OUTPATIENT
Start: 2017-01-01 | End: 2017-01-01 | Stop reason: HOSPADM

## 2017-01-01 RX ORDER — SODIUM CHLORIDE 9 MG/ML
INJECTION, SOLUTION INTRAVENOUS CONTINUOUS
Status: DISCONTINUED | OUTPATIENT
Start: 2017-01-01 | End: 2017-02-20 | Stop reason: HOSPADM

## 2017-01-01 RX ORDER — DEXTROSE MONOHYDRATE 25 G/50ML
25-50 INJECTION, SOLUTION INTRAVENOUS
Status: DISCONTINUED | OUTPATIENT
Start: 2017-01-01 | End: 2017-01-01 | Stop reason: HOSPADM

## 2017-01-01 RX ORDER — FUROSEMIDE 10 MG/ML
20 INJECTION INTRAMUSCULAR; INTRAVENOUS ONCE
Status: COMPLETED | OUTPATIENT
Start: 2017-01-01 | End: 2017-01-01

## 2017-01-01 RX ORDER — SODIUM CHLORIDE 9 MG/ML
INJECTION, SOLUTION INTRAVENOUS
Status: COMPLETED
Start: 2017-01-01 | End: 2017-01-01

## 2017-01-01 RX ORDER — HYDROMORPHONE HYDROCHLORIDE 2 MG/1
2 TABLET ORAL EVERY 6 HOURS PRN
Status: DISCONTINUED | OUTPATIENT
Start: 2017-01-01 | End: 2017-01-01

## 2017-01-01 RX ORDER — LOPERAMIDE HCL 2 MG
2 CAPSULE ORAL 4 TIMES DAILY PRN
Status: DISCONTINUED | OUTPATIENT
Start: 2017-01-01 | End: 2017-01-01 | Stop reason: HOSPADM

## 2017-01-01 RX ORDER — OLANZAPINE 5 MG/1
5 TABLET ORAL
Status: DISCONTINUED | OUTPATIENT
Start: 2017-01-01 | End: 2017-01-01 | Stop reason: HOSPADM

## 2017-01-01 RX ORDER — HEPARIN SODIUM 5000 [USP'U]/.5ML
5000 INJECTION, SOLUTION INTRAVENOUS; SUBCUTANEOUS EVERY 8 HOURS
Status: DISCONTINUED | OUTPATIENT
Start: 2017-01-01 | End: 2017-01-01 | Stop reason: HOSPADM

## 2017-01-01 RX ORDER — HEPARIN SODIUM,PORCINE 10 UNIT/ML
5 VIAL (ML) INTRAVENOUS
Status: DISCONTINUED | OUTPATIENT
Start: 2017-01-01 | End: 2017-01-01 | Stop reason: CLARIF

## 2017-01-01 RX ORDER — MINERAL OIL/HYDROPHIL PETROLAT
OINTMENT (GRAM) TOPICAL EVERY 8 HOURS PRN
Status: DISCONTINUED | OUTPATIENT
Start: 2017-01-01 | End: 2017-02-20 | Stop reason: HOSPADM

## 2017-01-01 RX ORDER — ONDANSETRON 4 MG/1
4 TABLET, ORALLY DISINTEGRATING ORAL EVERY 6 HOURS
Status: DISCONTINUED | OUTPATIENT
Start: 2017-01-01 | End: 2017-01-01

## 2017-01-01 RX ORDER — HYOSCYAMINE SULFATE 0.125 MG
0.125-0.25 TABLET ORAL EVERY 4 HOURS PRN
Qty: 10 TABLET | Refills: 3 | Status: SHIPPED | OUTPATIENT
Start: 2017-01-01

## 2017-01-01 RX ORDER — TACROLIMUS 0.5 MG/1
0.5 CAPSULE ORAL EVERY EVENING
Qty: 30 CAPSULE | Refills: 11 | Status: ON HOLD | OUTPATIENT
Start: 2017-01-01 | End: 2017-01-01

## 2017-01-01 RX ORDER — PREDNISONE 2.5 MG/1
2.5 TABLET ORAL EVERY EVENING
Status: DISCONTINUED | OUTPATIENT
Start: 2017-01-01 | End: 2017-01-01

## 2017-01-01 RX ORDER — ASPIRIN 81 MG/1
81 TABLET ORAL DAILY
Status: DISCONTINUED | OUTPATIENT
Start: 2017-01-01 | End: 2017-01-01 | Stop reason: HOSPADM

## 2017-01-01 RX ORDER — ACETAMINOPHEN 325 MG/1
650 TABLET ORAL EVERY 4 HOURS PRN
Status: DISCONTINUED | OUTPATIENT
Start: 2017-01-01 | End: 2017-01-01 | Stop reason: HOSPADM

## 2017-01-01 RX ORDER — DOXYCYCLINE HYCLATE 100 MG
100 TABLET ORAL 2 TIMES DAILY
Qty: 20 TABLET | Refills: 0 | Status: CANCELLED | OUTPATIENT
Start: 2017-01-01

## 2017-01-01 RX ORDER — LEVOFLOXACIN 5 MG/ML
500 INJECTION, SOLUTION INTRAVENOUS ONCE
Status: COMPLETED | OUTPATIENT
Start: 2017-01-01 | End: 2017-01-01

## 2017-01-01 RX ORDER — CEFTAZIDIME 1 G/1
1 INJECTION, POWDER, FOR SOLUTION INTRAMUSCULAR; INTRAVENOUS EVERY 24 HOURS
Status: DISCONTINUED | OUTPATIENT
Start: 2017-01-01 | End: 2017-01-01

## 2017-01-01 RX ORDER — DOXAZOSIN 2 MG/1
4 TABLET ORAL AT BEDTIME
Status: DISCONTINUED | OUTPATIENT
Start: 2017-01-01 | End: 2017-01-01 | Stop reason: HOSPADM

## 2017-01-01 RX ORDER — ATORVASTATIN CALCIUM 20 MG/1
20 TABLET, FILM COATED ORAL AT BEDTIME
Status: DISCONTINUED | OUTPATIENT
Start: 2017-01-01 | End: 2017-01-01

## 2017-01-01 RX ORDER — AZITHROMYCIN 250 MG/1
TABLET, FILM COATED ORAL
Qty: 6 TABLET | Refills: 0 | Status: SHIPPED | OUTPATIENT
Start: 2017-01-01 | End: 2017-01-01

## 2017-01-01 RX ORDER — MAGNESIUM SULFATE HEPTAHYDRATE 40 MG/ML
4 INJECTION, SOLUTION INTRAVENOUS EVERY 4 HOURS PRN
Status: DISCONTINUED | OUTPATIENT
Start: 2017-01-01 | End: 2017-01-01

## 2017-01-01 RX ORDER — LEVOTHYROXINE SODIUM 50 UG/1
50 TABLET ORAL
Status: DISCONTINUED | OUTPATIENT
Start: 2017-01-01 | End: 2017-01-01

## 2017-01-01 RX ORDER — GLYCOPYRROLATE 1 MG/1
2-4 TABLET ORAL EVERY 4 HOURS PRN
Status: DISCONTINUED | OUTPATIENT
Start: 2017-01-01 | End: 2017-02-20 | Stop reason: HOSPADM

## 2017-01-01 RX ORDER — PIPERACILLIN SODIUM, TAZOBACTAM SODIUM 3; .375 G/15ML; G/15ML
3.38 INJECTION, POWDER, LYOPHILIZED, FOR SOLUTION INTRAVENOUS EVERY 8 HOURS SCHEDULED
Status: DISCONTINUED | OUTPATIENT
Start: 2017-01-01 | End: 2017-01-01

## 2017-01-01 RX ORDER — NICOTINE POLACRILEX 4 MG
15-30 LOZENGE BUCCAL
Status: DISCONTINUED | OUTPATIENT
Start: 2017-01-01 | End: 2017-02-20 | Stop reason: HOSPADM

## 2017-01-01 RX ORDER — AZITHROMYCIN 250 MG/1
125 TABLET, FILM COATED ORAL DAILY
Status: DISCONTINUED | OUTPATIENT
Start: 2017-01-01 | End: 2017-01-01

## 2017-01-01 RX ORDER — DEXTROSE MONOHYDRATE 25 G/50ML
25-50 INJECTION, SOLUTION INTRAVENOUS
Status: DISCONTINUED | OUTPATIENT
Start: 2017-01-01 | End: 2017-02-20 | Stop reason: HOSPADM

## 2017-01-01 RX ORDER — NALOXONE HYDROCHLORIDE 0.4 MG/ML
.1-.4 INJECTION, SOLUTION INTRAMUSCULAR; INTRAVENOUS; SUBCUTANEOUS
Status: DISCONTINUED | OUTPATIENT
Start: 2017-01-01 | End: 2017-01-01 | Stop reason: HOSPADM

## 2017-01-01 RX ORDER — ONDANSETRON 4 MG/1
4 TABLET, FILM COATED ORAL EVERY 6 HOURS PRN
Status: DISCONTINUED | OUTPATIENT
Start: 2017-01-01 | End: 2017-01-01

## 2017-01-01 RX ORDER — DOXAZOSIN 4 MG/1
4 TABLET ORAL AT BEDTIME
Status: DISCONTINUED | OUTPATIENT
Start: 2017-01-01 | End: 2017-01-01

## 2017-01-01 RX ORDER — HYDROMORPHONE HYDROCHLORIDE 2 MG/1
2-4 TABLET ORAL
Status: DISCONTINUED | OUTPATIENT
Start: 2017-01-01 | End: 2017-01-01

## 2017-01-01 RX ORDER — FUROSEMIDE 10 MG/ML
20 INJECTION INTRAMUSCULAR; INTRAVENOUS EVERY 8 HOURS
Status: DISCONTINUED | OUTPATIENT
Start: 2017-01-01 | End: 2017-01-01

## 2017-01-01 RX ORDER — LANOLIN ALCOHOL/MO/W.PET/CERES
1000 CREAM (GRAM) TOPICAL
Status: DISCONTINUED | OUTPATIENT
Start: 2017-01-01 | End: 2017-01-01

## 2017-01-01 RX ORDER — MONTELUKAST SODIUM 10 MG/1
10 TABLET ORAL EVERY EVENING
Status: DISCONTINUED | OUTPATIENT
Start: 2017-01-01 | End: 2017-01-01

## 2017-01-01 RX ORDER — FENTANYL CITRATE 50 UG/ML
50-100 INJECTION, SOLUTION INTRAMUSCULAR; INTRAVENOUS
Status: DISCONTINUED | OUTPATIENT
Start: 2017-01-01 | End: 2017-01-01

## 2017-01-01 RX ORDER — MAGNESIUM SULFATE HEPTAHYDRATE 40 MG/ML
4 INJECTION, SOLUTION INTRAVENOUS EVERY 4 HOURS PRN
Status: DISCONTINUED | OUTPATIENT
Start: 2017-01-01 | End: 2017-01-01 | Stop reason: HOSPADM

## 2017-01-01 RX ORDER — ONDANSETRON 8 MG/1
8 TABLET, FILM COATED ORAL EVERY 8 HOURS PRN
Status: DISCONTINUED | OUTPATIENT
Start: 2017-01-01 | End: 2017-01-01

## 2017-01-01 RX ORDER — POTASSIUM CHLORIDE 750 MG/1
20-40 TABLET, EXTENDED RELEASE ORAL
Status: DISCONTINUED | OUTPATIENT
Start: 2017-01-01 | End: 2017-01-01 | Stop reason: HOSPADM

## 2017-01-01 RX ORDER — POTASSIUM CHLORIDE 29.8 MG/ML
20 INJECTION INTRAVENOUS
Status: DISCONTINUED | OUTPATIENT
Start: 2017-01-01 | End: 2017-01-01 | Stop reason: HOSPADM

## 2017-01-01 RX ORDER — HYDROMORPHONE HYDROCHLORIDE 2 MG/1
2-4 TABLET ORAL
Qty: 100 TABLET | Refills: 0 | Status: ON HOLD | OUTPATIENT
Start: 2017-01-01 | End: 2017-01-01

## 2017-01-01 RX ORDER — AZITHROMYCIN 250 MG/1
125 TABLET, FILM COATED ORAL DAILY
Qty: 15 TABLET | Refills: 11 | COMMUNITY
Start: 2017-01-01 | End: 2017-01-01

## 2017-01-01 RX ORDER — PREDNISONE 5 MG/1
5 TABLET ORAL EVERY MORNING
Status: DISCONTINUED | OUTPATIENT
Start: 2017-01-01 | End: 2017-01-01 | Stop reason: HOSPADM

## 2017-01-01 RX ORDER — TACROLIMUS 1 MG/1
CAPSULE ORAL
Qty: 210 CAPSULE | Refills: 11 | Status: ON HOLD | OUTPATIENT
Start: 2017-01-01 | End: 2017-01-01

## 2017-01-01 RX ORDER — LANOLIN ALCOHOL/MO/W.PET/CERES
1000 CREAM (GRAM) TOPICAL
Status: DISCONTINUED | OUTPATIENT
Start: 2017-01-01 | End: 2017-01-01 | Stop reason: HOSPADM

## 2017-01-01 RX ORDER — DRONABINOL 5 MG/1
5 CAPSULE ORAL
Status: DISCONTINUED | OUTPATIENT
Start: 2017-01-01 | End: 2017-01-01

## 2017-01-01 RX ORDER — PREDNISONE 2.5 MG/1
2.5 TABLET ORAL EVERY EVENING
Status: DISCONTINUED | OUTPATIENT
Start: 2017-01-01 | End: 2017-01-01 | Stop reason: HOSPADM

## 2017-01-01 RX ORDER — CALCIUM CARBONATE 500 MG/1
500 TABLET, CHEWABLE ORAL DAILY PRN
Status: DISCONTINUED | OUTPATIENT
Start: 2017-01-01 | End: 2017-02-20 | Stop reason: HOSPADM

## 2017-01-01 RX ORDER — ONDANSETRON 4 MG/1
4 TABLET, ORALLY DISINTEGRATING ORAL EVERY 6 HOURS PRN
Status: DISCONTINUED | OUTPATIENT
Start: 2017-01-01 | End: 2017-01-01

## 2017-01-01 RX ORDER — LEVOFLOXACIN 750 MG/1
750 TABLET, FILM COATED ORAL
Status: DISCONTINUED | OUTPATIENT
Start: 2017-01-01 | End: 2017-01-01 | Stop reason: HOSPADM

## 2017-01-01 RX ORDER — ALBUTEROL SULFATE 90 UG/1
2 AEROSOL, METERED RESPIRATORY (INHALATION) EVERY 4 HOURS PRN
Status: DISCONTINUED | OUTPATIENT
Start: 2017-01-01 | End: 2017-01-01 | Stop reason: HOSPADM

## 2017-01-01 RX ORDER — ALBUTEROL SULFATE 90 UG/1
2 AEROSOL, METERED RESPIRATORY (INHALATION) EVERY 6 HOURS PRN
Status: DISCONTINUED | OUTPATIENT
Start: 2017-01-01 | End: 2017-01-01

## 2017-01-01 RX ORDER — GLYCOPYRROLATE 0.2 MG/ML
.2-.4 INJECTION, SOLUTION INTRAMUSCULAR; INTRAVENOUS EVERY 4 HOURS PRN
Status: DISCONTINUED | OUTPATIENT
Start: 2017-01-01 | End: 2017-02-20 | Stop reason: HOSPADM

## 2017-01-01 RX ORDER — ONDANSETRON 4 MG/1
4 TABLET, ORALLY DISINTEGRATING ORAL
Status: DISCONTINUED | OUTPATIENT
Start: 2017-01-01 | End: 2017-01-01

## 2017-01-01 RX ORDER — SODIUM POLYSTYRENE SULFONATE 15 G/60ML
30 SUSPENSION ORAL; RECTAL ONCE
Status: COMPLETED | OUTPATIENT
Start: 2017-01-01 | End: 2017-01-01

## 2017-01-01 RX ORDER — ONDANSETRON 2 MG/ML
4 INJECTION INTRAMUSCULAR; INTRAVENOUS
Status: COMPLETED | OUTPATIENT
Start: 2017-01-01 | End: 2017-01-01

## 2017-01-01 RX ORDER — LIDOCAINE 50 MG/G
1-3 PATCH TOPICAL
Status: DISCONTINUED | OUTPATIENT
Start: 2017-01-01 | End: 2017-01-01

## 2017-01-01 RX ORDER — MAGNESIUM OXIDE 400 MG/1
400 TABLET ORAL AT BEDTIME
Status: DISCONTINUED | OUTPATIENT
Start: 2017-01-01 | End: 2017-01-01 | Stop reason: HOSPADM

## 2017-01-01 RX ADMIN — ONDANSETRON 4 MG: 2 INJECTION INTRAMUSCULAR; INTRAVENOUS at 06:38

## 2017-01-01 RX ADMIN — DEXTROSE MONOHYDRATE 50 ML: 500 INJECTION PARENTERAL at 06:36

## 2017-01-01 RX ADMIN — PEGFILGRASTIM 6 MG: 6 INJECTION SUBCUTANEOUS at 09:30

## 2017-01-01 RX ADMIN — SODIUM CHLORIDE, PRESERVATIVE FREE 5 ML: 5 INJECTION INTRAVENOUS at 12:00

## 2017-01-01 RX ADMIN — SODIUM CHLORIDE: 9 INJECTION, SOLUTION INTRAVENOUS at 00:15

## 2017-01-01 RX ADMIN — PIPERACILLIN AND TAZOBACTAM 3.38 G: 3; .375 INJECTION, POWDER, FOR SOLUTION INTRAVENOUS at 17:07

## 2017-01-01 RX ADMIN — AMLODIPINE BESYLATE 10 MG: 10 TABLET ORAL at 22:17

## 2017-01-01 RX ADMIN — IPRATROPIUM BROMIDE AND ALBUTEROL SULFATE 3 ML: .5; 3 SOLUTION RESPIRATORY (INHALATION) at 11:45

## 2017-01-01 RX ADMIN — TACROLIMUS 3.5 MG: 0.5 CAPSULE ORAL at 20:27

## 2017-01-01 RX ADMIN — OMEPRAZOLE 40 MG: 20 CAPSULE, DELAYED RELEASE ORAL at 09:46

## 2017-01-01 RX ADMIN — MULTIPLE VITAMINS W/ MINERALS TAB 1 TABLET: TAB at 12:19

## 2017-01-01 RX ADMIN — SODIUM CHLORIDE, PRESERVATIVE FREE 5 ML: 5 INJECTION INTRAVENOUS at 15:54

## 2017-01-01 RX ADMIN — ONDANSETRON 8 MG: 8 TABLET, ORALLY DISINTEGRATING ORAL at 18:41

## 2017-01-01 RX ADMIN — SODIUM CHLORIDE, PRESERVATIVE FREE 5 ML: 5 INJECTION INTRAVENOUS at 15:27

## 2017-01-01 RX ADMIN — HYDROMORPHONE HYDROCHLORIDE 2 MG: 2 TABLET ORAL at 07:50

## 2017-01-01 RX ADMIN — HEPARIN SODIUM 5000 UNITS: 5000 INJECTION, SOLUTION INTRAVENOUS; SUBCUTANEOUS at 21:09

## 2017-01-01 RX ADMIN — SODIUM BICARBONATE 650 MG TABLET 1300 MG: at 08:14

## 2017-01-01 RX ADMIN — ONDANSETRON 8 MG: 8 TABLET, ORALLY DISINTEGRATING ORAL at 05:44

## 2017-01-01 RX ADMIN — HEPARIN SODIUM 5000 UNITS: 5000 INJECTION, SOLUTION INTRAVENOUS; SUBCUTANEOUS at 20:36

## 2017-01-01 RX ADMIN — PIPERACILLIN AND TAZOBACTAM 2.25 G: 2; .25 INJECTION, POWDER, LYOPHILIZED, FOR SOLUTION INTRAVENOUS; PARENTERAL at 18:29

## 2017-01-01 RX ADMIN — MONTELUKAST 10 MG: 10 TABLET, FILM COATED ORAL at 20:04

## 2017-01-01 RX ADMIN — ATROPINE SULFATE 2 DROP: 10 SOLUTION/ DROPS OPHTHALMIC at 08:07

## 2017-01-01 RX ADMIN — DRONABINOL 5 MG: 5 CAPSULE ORAL at 09:53

## 2017-01-01 RX ADMIN — ZINC SULFATE CAP 220 MG (50 MG ELEMENTAL ZN) 220 MG: 220 (50 ZN) CAP at 09:35

## 2017-01-01 RX ADMIN — FENTANYL CITRATE 100 MCG: 50 INJECTION, SOLUTION INTRAMUSCULAR; INTRAVENOUS at 15:05

## 2017-01-01 RX ADMIN — LIDOCAINE 1 PATCH: 50 PATCH TOPICAL at 08:14

## 2017-01-01 RX ADMIN — SODIUM PHOSPHATE, MONOBASIC, MONOHYDRATE AND SODIUM PHOSPHATE, DIBASIC, ANHYDROUS 20 MMOL: 276; 142 INJECTION, SOLUTION INTRAVENOUS at 15:40

## 2017-01-01 RX ADMIN — DOXAZOSIN MESYLATE 4 MG: 2 TABLET ORAL at 22:37

## 2017-01-01 RX ADMIN — LINEZOLID 600 MG: 600 TABLET, FILM COATED ORAL at 20:37

## 2017-01-01 RX ADMIN — ONDANSETRON 8 MG: 8 TABLET, ORALLY DISINTEGRATING ORAL at 09:08

## 2017-01-01 RX ADMIN — SODIUM BICARBONATE 650 MG TABLET 1300 MG: at 18:41

## 2017-01-01 RX ADMIN — TACROLIMUS 2 MG: 1 CAPSULE ORAL at 08:14

## 2017-01-01 RX ADMIN — ASPIRIN 81 MG: 81 TABLET, COATED ORAL at 09:46

## 2017-01-01 RX ADMIN — HEPARIN SODIUM 5000 UNITS: 5000 INJECTION, SOLUTION INTRAVENOUS; SUBCUTANEOUS at 20:28

## 2017-01-01 RX ADMIN — HEPARIN SODIUM 5000 UNITS: 5000 INJECTION, SOLUTION INTRAVENOUS; SUBCUTANEOUS at 09:42

## 2017-01-01 RX ADMIN — PREDNISONE 5 MG: 5 TABLET ORAL at 08:29

## 2017-01-01 RX ADMIN — Medication 1 TABLET: at 09:47

## 2017-01-01 RX ADMIN — MONTELUKAST 10 MG: 10 TABLET, FILM COATED ORAL at 20:37

## 2017-01-01 RX ADMIN — SODIUM CHLORIDE, PRESERVATIVE FREE 5 ML: 5 INJECTION INTRAVENOUS at 05:50

## 2017-01-01 RX ADMIN — DAPSONE 50 MG: 25 TABLET ORAL at 08:23

## 2017-01-01 RX ADMIN — CALCIUM CARBONATE (ANTACID) CHEW TAB 500 MG 500 MG: 500 CHEW TAB at 22:40

## 2017-01-01 RX ADMIN — ATORVASTATIN CALCIUM 20 MG: 20 TABLET, FILM COATED ORAL at 21:40

## 2017-01-01 RX ADMIN — MULTIPLE VITAMINS W/ MINERALS TAB 1 TABLET: TAB at 12:42

## 2017-01-01 RX ADMIN — HEPARIN SODIUM 5000 UNITS: 5000 INJECTION, SOLUTION INTRAVENOUS; SUBCUTANEOUS at 15:52

## 2017-01-01 RX ADMIN — DEXTROSE MONOHYDRATE 25 ML: 500 INJECTION PARENTERAL at 21:33

## 2017-01-01 RX ADMIN — ALBUMIN HUMAN 500 ML: 0.05 INJECTION, SOLUTION INTRAVENOUS at 23:59

## 2017-01-01 RX ADMIN — NYSTATIN 500000 UNITS: 100000 SUSPENSION ORAL at 19:23

## 2017-01-01 RX ADMIN — PREDNISONE 5 MG: 5 TABLET ORAL at 08:23

## 2017-01-01 RX ADMIN — FENTANYL CITRATE 100 MCG: 50 INJECTION, SOLUTION INTRAMUSCULAR; INTRAVENOUS at 20:35

## 2017-01-01 RX ADMIN — HYDROMORPHONE HYDROCHLORIDE 2 MG: 2 TABLET ORAL at 15:03

## 2017-01-01 RX ADMIN — PIPERACILLIN SODIUM AND TAZOBACTAM SODIUM 3.38 G: 3; .375 INJECTION, POWDER, LYOPHILIZED, FOR SOLUTION INTRAVENOUS at 20:43

## 2017-01-01 RX ADMIN — MONTELUKAST 10 MG: 10 TABLET, FILM COATED ORAL at 19:30

## 2017-01-01 RX ADMIN — ZINC SULFATE CAP 220 MG (50 MG ELEMENTAL ZN) 220 MG: 220 (50 ZN) CAP at 12:59

## 2017-01-01 RX ADMIN — LEVOTHYROXINE SODIUM 50 MCG: 50 TABLET ORAL at 07:53

## 2017-01-01 RX ADMIN — FENTANYL CITRATE 100 MCG: 50 INJECTION, SOLUTION INTRAMUSCULAR; INTRAVENOUS at 19:34

## 2017-01-01 RX ADMIN — NYSTATIN 500000 UNITS: 100000 SUSPENSION ORAL at 16:56

## 2017-01-01 RX ADMIN — FENTANYL CITRATE 100 MCG: 50 INJECTION, SOLUTION INTRAMUSCULAR; INTRAVENOUS at 11:34

## 2017-01-01 RX ADMIN — NYSTATIN 500000 UNITS: 100000 SUSPENSION ORAL at 08:34

## 2017-01-01 RX ADMIN — PREDNISONE 2.5 MG: 2.5 TABLET ORAL at 19:30

## 2017-01-01 RX ADMIN — TACROLIMUS 3.5 MG: 0.5 CAPSULE ORAL at 19:22

## 2017-01-01 RX ADMIN — MULTIPLE VITAMINS W/ MINERALS TAB 1 TABLET: TAB at 09:34

## 2017-01-01 RX ADMIN — SODIUM BICARBONATE 650 MG TABLET 1300 MG: at 20:34

## 2017-01-01 RX ADMIN — FLUTICASONE FUROATE AND VILANTEROL TRIFENATATE 1 PUFF: 200; 25 POWDER RESPIRATORY (INHALATION) at 07:58

## 2017-01-01 RX ADMIN — PREDNISONE 5 MG: 5 TABLET ORAL at 09:09

## 2017-01-01 RX ADMIN — HYDROMORPHONE HYDROCHLORIDE 2 MG: 2 TABLET ORAL at 08:21

## 2017-01-01 RX ADMIN — PIPERACILLIN AND TAZOBACTAM 3.38 G: 3; .375 INJECTION, POWDER, FOR SOLUTION INTRAVENOUS at 04:29

## 2017-01-01 RX ADMIN — DEXTROSE MONOHYDRATE 1000 MG: 50 INJECTION, SOLUTION INTRAVENOUS at 11:31

## 2017-01-01 RX ADMIN — SODIUM CHLORIDE, PRESERVATIVE FREE 5 ML: 5 INJECTION INTRAVENOUS at 15:12

## 2017-01-01 RX ADMIN — SODIUM BICARBONATE 650 MG TABLET 1300 MG: at 09:48

## 2017-01-01 RX ADMIN — CYANOCOBALAMIN TAB 1000 MCG 1000 MCG: 1000 TAB at 17:45

## 2017-01-01 RX ADMIN — PIPERACILLIN SODIUM AND TAZOBACTAM SODIUM 3.38 G: 3; .375 INJECTION, POWDER, LYOPHILIZED, FOR SOLUTION INTRAVENOUS at 01:08

## 2017-01-01 RX ADMIN — OYSTER SHELL CALCIUM WITH VITAMIN D 1 TABLET: 500; 200 TABLET, FILM COATED ORAL at 11:59

## 2017-01-01 RX ADMIN — SODIUM CHLORIDE: 9 INJECTION, SOLUTION INTRAVENOUS at 05:33

## 2017-01-01 RX ADMIN — VANCOMYCIN HYDROCHLORIDE 1000 MG: 1 INJECTION, SOLUTION INTRAVENOUS at 12:38

## 2017-01-01 RX ADMIN — TACROLIMUS 2 MG: 1 CAPSULE ORAL at 07:53

## 2017-01-01 RX ADMIN — TACROLIMUS 3.5 MG: 1 CAPSULE ORAL at 20:04

## 2017-01-01 RX ADMIN — OMEPRAZOLE 40 MG: 20 CAPSULE, DELAYED RELEASE ORAL at 08:14

## 2017-01-01 RX ADMIN — SODIUM CHLORIDE, PRESERVATIVE FREE 5 ML: 5 INJECTION INTRAVENOUS at 18:03

## 2017-01-01 RX ADMIN — ATORVASTATIN CALCIUM 20 MG: 20 TABLET, FILM COATED ORAL at 21:24

## 2017-01-01 RX ADMIN — PREDNISONE 5 MG: 5 TABLET ORAL at 09:47

## 2017-01-01 RX ADMIN — MONTELUKAST SODIUM 10 MG: 10 TABLET, FILM COATED ORAL at 19:48

## 2017-01-01 RX ADMIN — CALCIUM CARBONATE (ANTACID) CHEW TAB 500 MG 500 MG: 500 CHEW TAB at 15:39

## 2017-01-01 RX ADMIN — Medication 1 TABLET: at 07:49

## 2017-01-01 RX ADMIN — Medication 125 MG: at 07:50

## 2017-01-01 RX ADMIN — SODIUM CHLORIDE, PRESERVATIVE FREE 5 ML: 5 INJECTION INTRAVENOUS at 12:29

## 2017-01-01 RX ADMIN — LEVOTHYROXINE SODIUM 50 MCG: 50 TABLET ORAL at 09:46

## 2017-01-01 RX ADMIN — MULTIPLE VITAMINS W/ MINERALS TAB 1 TABLET: TAB at 13:31

## 2017-01-01 RX ADMIN — HEPARIN SODIUM 5000 UNITS: 5000 INJECTION, SOLUTION INTRAVENOUS; SUBCUTANEOUS at 15:47

## 2017-01-01 RX ADMIN — SODIUM BICARBONATE 650 MG TABLET 1300 MG: at 09:09

## 2017-01-01 RX ADMIN — IPRATROPIUM BROMIDE AND ALBUTEROL SULFATE 3 ML: .5; 3 SOLUTION RESPIRATORY (INHALATION) at 16:18

## 2017-01-01 RX ADMIN — HYDROMORPHONE HYDROCHLORIDE 2 MG: 2 TABLET ORAL at 05:50

## 2017-01-01 RX ADMIN — ONDANSETRON 4 MG: 4 TABLET, ORALLY DISINTEGRATING ORAL at 11:01

## 2017-01-01 RX ADMIN — MULTIPLE VITAMINS W/ MINERALS TAB 1 TABLET: TAB at 13:00

## 2017-01-01 RX ADMIN — HEPARIN SODIUM 5000 UNITS: 5000 INJECTION, SOLUTION INTRAVENOUS; SUBCUTANEOUS at 21:45

## 2017-01-01 RX ADMIN — FENTANYL CITRATE 100 MCG: 50 INJECTION, SOLUTION INTRAMUSCULAR; INTRAVENOUS at 01:00

## 2017-01-01 RX ADMIN — TACROLIMUS 3.5 MG: 0.5 CAPSULE ORAL at 20:36

## 2017-01-01 RX ADMIN — SODIUM CHLORIDE, PRESERVATIVE FREE 5 ML: 5 INJECTION INTRAVENOUS at 05:47

## 2017-01-01 RX ADMIN — DOXAZOSIN MESYLATE 4 MG: 4 TABLET ORAL at 22:20

## 2017-01-01 RX ADMIN — MONTELUKAST SODIUM 10 MG: 10 TABLET, FILM COATED ORAL at 20:31

## 2017-01-01 RX ADMIN — TACROLIMUS 1.5 MG: 0.5 CAPSULE ORAL at 18:30

## 2017-01-01 RX ADMIN — AMLODIPINE BESYLATE 10 MG: 10 TABLET ORAL at 22:23

## 2017-01-01 RX ADMIN — SODIUM CHLORIDE 1000 ML: 9 INJECTION, SOLUTION INTRAVENOUS at 14:05

## 2017-01-01 RX ADMIN — MAGNESIUM SULFATE IN WATER 4 G: 40 INJECTION, SOLUTION INTRAVENOUS at 08:55

## 2017-01-01 RX ADMIN — INSULIN GLARGINE 4 UNITS: 100 INJECTION, SOLUTION SUBCUTANEOUS at 22:40

## 2017-01-01 RX ADMIN — SODIUM PHOSPHATE, MONOBASIC, MONOHYDRATE AND SODIUM PHOSPHATE, DIBASIC, ANHYDROUS 15 MMOL: 276; 142 INJECTION, SOLUTION INTRAVENOUS at 10:58

## 2017-01-01 RX ADMIN — LIDOCAINE 1 PATCH: 50 PATCH CUTANEOUS at 08:29

## 2017-01-01 RX ADMIN — INSULIN GLARGINE 4 UNITS: 100 INJECTION, SOLUTION SUBCUTANEOUS at 22:24

## 2017-01-01 RX ADMIN — AMLODIPINE BESYLATE 10 MG: 10 TABLET ORAL at 22:37

## 2017-01-01 RX ADMIN — FENTANYL CITRATE 100 MCG: 50 INJECTION, SOLUTION INTRAMUSCULAR; INTRAVENOUS at 23:53

## 2017-01-01 RX ADMIN — ASPIRIN 81 MG: 81 TABLET, COATED ORAL at 09:10

## 2017-01-01 RX ADMIN — ONDANSETRON 8 MG: 8 TABLET, ORALLY DISINTEGRATING ORAL at 11:59

## 2017-01-01 RX ADMIN — ALBUMIN (HUMAN) 25 G: 12.5 SOLUTION INTRAVENOUS at 14:29

## 2017-01-01 RX ADMIN — ATORVASTATIN CALCIUM 20 MG: 10 TABLET, FILM COATED ORAL at 22:03

## 2017-01-01 RX ADMIN — SODIUM BICARBONATE 650 MG TABLET 1300 MG: at 20:04

## 2017-01-01 RX ADMIN — SODIUM CHLORIDE: 9 INJECTION, SOLUTION INTRAVENOUS at 20:44

## 2017-01-01 RX ADMIN — SODIUM BICARBONATE 650 MG TABLET 1300 MG: at 08:33

## 2017-01-01 RX ADMIN — DRONABINOL 5 MG: 5 CAPSULE ORAL at 09:34

## 2017-01-01 RX ADMIN — LEVOFLOXACIN 750 MG: 750 TABLET, FILM COATED ORAL at 11:00

## 2017-01-01 RX ADMIN — OMEPRAZOLE 40 MG: 20 CAPSULE, DELAYED RELEASE ORAL at 07:00

## 2017-01-01 RX ADMIN — MULTIPLE VITAMINS W/ MINERALS TAB 1 TABLET: TAB at 11:59

## 2017-01-01 RX ADMIN — MONTELUKAST SODIUM 10 MG: 10 TABLET, FILM COATED ORAL at 21:58

## 2017-01-01 RX ADMIN — LEVOTHYROXINE SODIUM 50 MCG: 50 TABLET ORAL at 08:28

## 2017-01-01 RX ADMIN — ALBUTEROL SULFATE 2 PUFF: 90 AEROSOL, METERED RESPIRATORY (INHALATION) at 20:23

## 2017-01-01 RX ADMIN — TACROLIMUS 4 MG: 1 CAPSULE ORAL at 08:11

## 2017-01-01 RX ADMIN — SODIUM CHLORIDE: 9 INJECTION, SOLUTION INTRAVENOUS at 04:28

## 2017-01-01 RX ADMIN — ALBUTEROL SULFATE 2 PUFF: 90 AEROSOL, METERED RESPIRATORY (INHALATION) at 07:58

## 2017-01-01 RX ADMIN — PREDNISONE 5 MG: 5 TABLET ORAL at 08:15

## 2017-01-01 RX ADMIN — HYDROMORPHONE HYDROCHLORIDE 2 MG: 2 TABLET ORAL at 14:29

## 2017-01-01 RX ADMIN — PREDNISONE 5 MG: 5 TABLET ORAL at 21:41

## 2017-01-01 RX ADMIN — AMLODIPINE BESYLATE 10 MG: 10 TABLET ORAL at 22:03

## 2017-01-01 RX ADMIN — ATORVASTATIN CALCIUM 20 MG: 10 TABLET, FILM COATED ORAL at 22:23

## 2017-01-01 RX ADMIN — SODIUM CHLORIDE: 9 INJECTION, SOLUTION INTRAVENOUS at 10:52

## 2017-01-01 RX ADMIN — LOPERAMIDE HYDROCHLORIDE 2 MG: 2 CAPSULE ORAL at 18:18

## 2017-01-01 RX ADMIN — Medication 125 MG: at 21:42

## 2017-01-01 RX ADMIN — TACROLIMUS 4 MG: 1 CAPSULE ORAL at 08:23

## 2017-01-01 RX ADMIN — OYSTER SHELL CALCIUM WITH VITAMIN D 1 TABLET: 500; 200 TABLET, FILM COATED ORAL at 17:46

## 2017-01-01 RX ADMIN — ATORVASTATIN CALCIUM 20 MG: 20 TABLET, FILM COATED ORAL at 22:38

## 2017-01-01 RX ADMIN — PREDNISONE 2.5 MG: 2.5 TABLET ORAL at 20:05

## 2017-01-01 RX ADMIN — MAGNESIUM SULFATE IN WATER 4 G: 40 INJECTION, SOLUTION INTRAVENOUS at 12:19

## 2017-01-01 RX ADMIN — DOXAZOSIN MESYLATE 4 MG: 2 TABLET ORAL at 22:23

## 2017-01-01 RX ADMIN — IPRATROPIUM BROMIDE AND ALBUTEROL SULFATE 3 ML: .5; 3 SOLUTION RESPIRATORY (INHALATION) at 21:22

## 2017-01-01 RX ADMIN — CARBOXYMETHYLCELLULOSE SODIUM 1 DROP: 5 SOLUTION/ DROPS OPHTHALMIC at 01:51

## 2017-01-01 RX ADMIN — OXYCODONE HYDROCHLORIDE AND ACETAMINOPHEN 500 MG: 500 TABLET ORAL at 11:59

## 2017-01-01 RX ADMIN — ASPIRIN 81 MG: 81 TABLET, COATED ORAL at 08:15

## 2017-01-01 RX ADMIN — HEPARIN SODIUM 5000 UNITS: 5000 INJECTION, SOLUTION INTRAVENOUS; SUBCUTANEOUS at 09:53

## 2017-01-01 RX ADMIN — CYANOCOBALAMIN TAB 1000 MCG 1000 MCG: 1000 TAB at 07:49

## 2017-01-01 RX ADMIN — FENTANYL CITRATE 100 MCG: 50 INJECTION, SOLUTION INTRAMUSCULAR; INTRAVENOUS at 02:07

## 2017-01-01 RX ADMIN — LINEZOLID 600 MG: 600 TABLET, FILM COATED ORAL at 09:09

## 2017-01-01 RX ADMIN — HEPARIN SODIUM 5000 UNITS: 5000 INJECTION, SOLUTION INTRAVENOUS; SUBCUTANEOUS at 15:56

## 2017-01-01 RX ADMIN — DRONABINOL 5 MG: 5 CAPSULE ORAL at 15:32

## 2017-01-01 RX ADMIN — VALACYCLOVIR HYDROCHLORIDE 1000 MG: 500 TABLET, FILM COATED ORAL at 08:11

## 2017-01-01 RX ADMIN — FLUTICASONE FUROATE AND VILANTEROL TRIFENATATE 1 PUFF: 200; 25 POWDER RESPIRATORY (INHALATION) at 09:38

## 2017-01-01 RX ADMIN — PIPERACILLIN AND TAZOBACTAM 2.25 G: 2; .25 INJECTION, POWDER, LYOPHILIZED, FOR SOLUTION INTRAVENOUS; PARENTERAL at 08:11

## 2017-01-01 RX ADMIN — LIDOCAINE HYDROCHLORIDE 10 ML: 10 INJECTION, SOLUTION EPIDURAL; INFILTRATION; INTRACAUDAL; PERINEURAL at 17:28

## 2017-01-01 RX ADMIN — TACROLIMUS 3.5 MG: 1 CAPSULE ORAL at 20:37

## 2017-01-01 RX ADMIN — IPRATROPIUM BROMIDE AND ALBUTEROL SULFATE 3 ML: .5; 3 SOLUTION RESPIRATORY (INHALATION) at 20:22

## 2017-01-01 RX ADMIN — DOXAZOSIN MESYLATE 4 MG: 2 TABLET ORAL at 22:05

## 2017-01-01 RX ADMIN — CALCIUM CARBONATE (ANTACID) CHEW TAB 500 MG 500 MG: 500 CHEW TAB at 22:09

## 2017-01-01 RX ADMIN — PIPERACILLIN AND TAZOBACTAM 2.25 G: 2; .25 INJECTION, POWDER, LYOPHILIZED, FOR SOLUTION INTRAVENOUS; PARENTERAL at 20:44

## 2017-01-01 RX ADMIN — SODIUM CHLORIDE, POTASSIUM CHLORIDE, SODIUM LACTATE AND CALCIUM CHLORIDE 250 ML: 600; 310; 30; 20 INJECTION, SOLUTION INTRAVENOUS at 00:14

## 2017-01-01 RX ADMIN — AMLODIPINE BESYLATE 10 MG: 10 TABLET ORAL at 21:51

## 2017-01-01 RX ADMIN — OYSTER SHELL CALCIUM WITH VITAMIN D 1 TABLET: 500; 200 TABLET, FILM COATED ORAL at 18:41

## 2017-01-01 RX ADMIN — HYDROMORPHONE HYDROCHLORIDE 1 MG: 2 TABLET ORAL at 15:35

## 2017-01-01 RX ADMIN — LOPERAMIDE HYDROCHLORIDE 2 MG: 2 CAPSULE ORAL at 14:27

## 2017-01-01 RX ADMIN — PIPERACILLIN AND TAZOBACTAM 2.25 G: 2; .25 INJECTION, POWDER, LYOPHILIZED, FOR SOLUTION INTRAVENOUS; PARENTERAL at 11:44

## 2017-01-01 RX ADMIN — SODIUM BICARBONATE 650 MG TABLET 1950 MG: at 21:09

## 2017-01-01 RX ADMIN — PROCHLORPERAZINE EDISYLATE 5 MG: 5 INJECTION INTRAMUSCULAR; INTRAVENOUS at 09:48

## 2017-01-01 RX ADMIN — VALACYCLOVIR HYDROCHLORIDE 1000 MG: 500 TABLET, FILM COATED ORAL at 09:10

## 2017-01-01 RX ADMIN — MULTIPLE VITAMINS W/ MINERALS TAB 1 TABLET: TAB at 09:47

## 2017-01-01 RX ADMIN — FUROSEMIDE 40 MG: 10 INJECTION, SOLUTION INTRAVENOUS at 22:54

## 2017-01-01 RX ADMIN — SODIUM CHLORIDE, PRESERVATIVE FREE 5 ML: 5 INJECTION INTRAVENOUS at 21:13

## 2017-01-01 RX ADMIN — MONTELUKAST SODIUM 10 MG: 10 TABLET, FILM COATED ORAL at 20:27

## 2017-01-01 RX ADMIN — ASPIRIN 81 MG: 81 TABLET, COATED ORAL at 09:34

## 2017-01-01 RX ADMIN — ZINC SULFATE CAP 220 MG (50 MG ELEMENTAL ZN) 220 MG: 220 (50 ZN) CAP at 12:42

## 2017-01-01 RX ADMIN — SODIUM BICARBONATE 650 MG TABLET 1300 MG: at 18:31

## 2017-01-01 RX ADMIN — ONDANSETRON 8 MG: 8 TABLET, ORALLY DISINTEGRATING ORAL at 11:08

## 2017-01-01 RX ADMIN — INSULIN GLARGINE 4 UNITS: 100 INJECTION, SOLUTION SUBCUTANEOUS at 22:55

## 2017-01-01 RX ADMIN — PREDNISONE 2.5 MG: 2.5 TABLET ORAL at 19:23

## 2017-01-01 RX ADMIN — PREDNISONE 5 MG: 5 TABLET ORAL at 07:58

## 2017-01-01 RX ADMIN — ONDANSETRON 8 MG: 8 TABLET, ORALLY DISINTEGRATING ORAL at 11:57

## 2017-01-01 RX ADMIN — Medication 125 MG: at 09:34

## 2017-01-01 RX ADMIN — TACROLIMUS 3.5 MG: 0.5 CAPSULE ORAL at 08:33

## 2017-01-01 RX ADMIN — SODIUM BICARBONATE 650 MG TABLET 1300 MG: at 19:48

## 2017-01-01 RX ADMIN — OMEPRAZOLE 40 MG: 20 CAPSULE, DELAYED RELEASE ORAL at 08:29

## 2017-01-01 RX ADMIN — SODIUM CHLORIDE 500 ML: 9 INJECTION, SOLUTION INTRAVENOUS at 12:44

## 2017-01-01 RX ADMIN — LEVOTHYROXINE SODIUM 50 MCG: 50 TABLET ORAL at 08:33

## 2017-01-01 RX ADMIN — LEVOTHYROXINE SODIUM 50 MCG: 50 TABLET ORAL at 08:14

## 2017-01-01 RX ADMIN — LIDOCAINE 1 PATCH: 50 PATCH TOPICAL at 09:41

## 2017-01-01 RX ADMIN — OMEPRAZOLE 40 MG: 20 CAPSULE, DELAYED RELEASE ORAL at 09:35

## 2017-01-01 RX ADMIN — FUROSEMIDE 20 MG: 10 INJECTION, SOLUTION INTRAVENOUS at 21:08

## 2017-01-01 RX ADMIN — SODIUM POLYSTYRENE SULFONATE 30 G: 15 SUSPENSION ORAL; RECTAL at 03:55

## 2017-01-01 RX ADMIN — ASPIRIN 81 MG: 81 TABLET, COATED ORAL at 07:51

## 2017-01-01 RX ADMIN — PREDNISONE 5 MG: 5 TABLET ORAL at 08:11

## 2017-01-01 RX ADMIN — ASPIRIN 81 MG: 81 TABLET, COATED ORAL at 07:00

## 2017-01-01 RX ADMIN — VALACYCLOVIR HYDROCHLORIDE 1000 MG: 500 TABLET, FILM COATED ORAL at 08:23

## 2017-01-01 RX ADMIN — LINEZOLID 600 MG: 600 TABLET, FILM COATED ORAL at 20:09

## 2017-01-01 RX ADMIN — DRONABINOL 5 MG: 5 CAPSULE ORAL at 17:08

## 2017-01-01 RX ADMIN — ALBUTEROL SULFATE 2 PUFF: 90 AEROSOL, METERED RESPIRATORY (INHALATION) at 12:00

## 2017-01-01 RX ADMIN — LEVOTHYROXINE SODIUM 50 MCG: 50 TABLET ORAL at 07:00

## 2017-01-01 RX ADMIN — HEPARIN SODIUM 5000 UNITS: 5000 INJECTION, SOLUTION INTRAVENOUS; SUBCUTANEOUS at 00:16

## 2017-01-01 RX ADMIN — SODIUM CHLORIDE: 900 INJECTION, SOLUTION INTRAVENOUS at 10:52

## 2017-01-01 RX ADMIN — HEPARIN SODIUM 5000 UNITS: 5000 INJECTION, SOLUTION INTRAVENOUS; SUBCUTANEOUS at 09:11

## 2017-01-01 RX ADMIN — PIPERACILLIN SODIUM,TAZOBACTAM SODIUM 3.38 G: 3; .375 INJECTION, POWDER, FOR SOLUTION INTRAVENOUS at 14:46

## 2017-01-01 RX ADMIN — PIPERACILLIN AND TAZOBACTAM 2.25 G: 2; .25 INJECTION, POWDER, LYOPHILIZED, FOR SOLUTION INTRAVENOUS; PARENTERAL at 02:52

## 2017-01-01 RX ADMIN — OYSTER SHELL CALCIUM WITH VITAMIN D 1 TABLET: 500; 200 TABLET, FILM COATED ORAL at 18:14

## 2017-01-01 RX ADMIN — MAGNESIUM OXIDE TAB 400 MG (241.3 MG ELEMENTAL MG) 400 MG: 400 (241.3 MG) TAB at 22:05

## 2017-01-01 RX ADMIN — PIPERACILLIN AND TAZOBACTAM 3.38 G: 3; .375 INJECTION, POWDER, FOR SOLUTION INTRAVENOUS at 22:39

## 2017-01-01 RX ADMIN — FUROSEMIDE 20 MG: 10 INJECTION, SOLUTION INTRAVENOUS at 10:08

## 2017-01-01 RX ADMIN — HEPARIN SODIUM 5000 UNITS: 5000 INJECTION, SOLUTION INTRAVENOUS; SUBCUTANEOUS at 00:15

## 2017-01-01 RX ADMIN — FENTANYL CITRATE 50 MCG: 50 INJECTION, SOLUTION INTRAMUSCULAR; INTRAVENOUS at 06:38

## 2017-01-01 RX ADMIN — HYDROMORPHONE HYDROCHLORIDE 2 MG: 2 TABLET ORAL at 09:48

## 2017-01-01 RX ADMIN — SODIUM BICARBONATE 650 MG TABLET 1300 MG: at 09:34

## 2017-01-01 RX ADMIN — HEPARIN SODIUM 5000 UNITS: 5000 INJECTION, SOLUTION INTRAVENOUS; SUBCUTANEOUS at 08:29

## 2017-01-01 RX ADMIN — IPRATROPIUM BROMIDE AND ALBUTEROL SULFATE 3 ML: .5; 3 SOLUTION RESPIRATORY (INHALATION) at 16:29

## 2017-01-01 RX ADMIN — PREDNISONE 2.5 MG: 2.5 TABLET ORAL at 20:35

## 2017-01-01 RX ADMIN — PREDNISONE 2.5 MG: 2.5 TABLET ORAL at 20:31

## 2017-01-01 RX ADMIN — SODIUM CHLORIDE, PRESERVATIVE FREE 500 UNITS: 5 INJECTION INTRAVENOUS at 18:46

## 2017-01-01 RX ADMIN — LINEZOLID 600 MG: 600 TABLET, FILM COATED ORAL at 20:04

## 2017-01-01 RX ADMIN — MICAFUNGIN SODIUM 100 MG: 10 INJECTION, POWDER, LYOPHILIZED, FOR SOLUTION INTRAVENOUS at 13:22

## 2017-01-01 RX ADMIN — TACROLIMUS 3.5 MG: 0.5 CAPSULE ORAL at 17:32

## 2017-01-01 RX ADMIN — ALBUMIN (HUMAN) 25 G: 12.5 SOLUTION INTRAVENOUS at 14:51

## 2017-01-01 RX ADMIN — ONDANSETRON 4 MG: 4 TABLET, ORALLY DISINTEGRATING ORAL at 12:38

## 2017-01-01 RX ADMIN — ONDANSETRON 8 MG: 8 TABLET, ORALLY DISINTEGRATING ORAL at 12:04

## 2017-01-01 RX ADMIN — LIDOCAINE 1 PATCH: 50 PATCH TOPICAL at 08:17

## 2017-01-01 RX ADMIN — HEPARIN SODIUM 5000 UNITS: 5000 INJECTION, SOLUTION INTRAVENOUS; SUBCUTANEOUS at 01:08

## 2017-01-01 RX ADMIN — SODIUM CHLORIDE, PRESERVATIVE FREE 5 ML: 5 INJECTION INTRAVENOUS at 16:56

## 2017-01-01 RX ADMIN — HEPARIN SODIUM 5000 UNITS: 5000 INJECTION, SOLUTION INTRAVENOUS; SUBCUTANEOUS at 23:52

## 2017-01-01 RX ADMIN — TACROLIMUS 3.5 MG: 0.5 CAPSULE ORAL at 20:09

## 2017-01-01 RX ADMIN — DAPSONE 50 MG: 25 TABLET ORAL at 08:33

## 2017-01-01 RX ADMIN — LIDOCAINE 1 PATCH: 50 PATCH TOPICAL at 08:02

## 2017-01-01 RX ADMIN — OXYCODONE HYDROCHLORIDE AND ACETAMINOPHEN 500 MG: 500 TABLET ORAL at 07:51

## 2017-01-01 RX ADMIN — TACROLIMUS 3.5 MG: 0.5 CAPSULE ORAL at 09:47

## 2017-01-01 RX ADMIN — NYSTATIN 500000 UNITS: 100000 SUSPENSION ORAL at 13:49

## 2017-01-01 RX ADMIN — HEPARIN SODIUM 5000 UNITS: 5000 INJECTION, SOLUTION INTRAVENOUS; SUBCUTANEOUS at 07:53

## 2017-01-01 RX ADMIN — DAPSONE 50 MG: 25 TABLET ORAL at 08:00

## 2017-01-01 RX ADMIN — NYSTATIN 500000 UNITS: 100000 SUSPENSION ORAL at 19:30

## 2017-01-01 RX ADMIN — ONDANSETRON 4 MG: 4 TABLET, ORALLY DISINTEGRATING ORAL at 08:14

## 2017-01-01 RX ADMIN — VALACYCLOVIR HYDROCHLORIDE 1000 MG: 500 TABLET, FILM COATED ORAL at 08:15

## 2017-01-01 RX ADMIN — PIPERACILLIN AND TAZOBACTAM 3.38 G: 3; .375 INJECTION, POWDER, FOR SOLUTION INTRAVENOUS at 09:40

## 2017-01-01 RX ADMIN — INSULIN GLARGINE 4 UNITS: 100 INJECTION, SOLUTION SUBCUTANEOUS at 22:19

## 2017-01-01 RX ADMIN — HYDROMORPHONE HYDROCHLORIDE 2 MG: 2 TABLET ORAL at 08:11

## 2017-01-01 RX ADMIN — IPRATROPIUM BROMIDE AND ALBUTEROL SULFATE 3 ML: .5; 3 SOLUTION RESPIRATORY (INHALATION) at 09:00

## 2017-01-01 RX ADMIN — HYDROMORPHONE HYDROCHLORIDE 2 MG: 2 TABLET ORAL at 05:35

## 2017-01-01 RX ADMIN — SODIUM CHLORIDE 500 ML: 9 INJECTION, SOLUTION INTRAVENOUS at 20:09

## 2017-01-01 RX ADMIN — PROCHLORPERAZINE MALEATE 10 MG: 5 TABLET, FILM COATED ORAL at 20:23

## 2017-01-01 RX ADMIN — FLUTICASONE FUROATE AND VILANTEROL TRIFENATATE 1 PUFF: 200; 25 POWDER RESPIRATORY (INHALATION) at 20:06

## 2017-01-01 RX ADMIN — METHYLPREDNISOLONE SODIUM SUCCINATE 10 MG: 40 INJECTION, POWDER, LYOPHILIZED, FOR SOLUTION INTRAMUSCULAR; INTRAVENOUS at 09:54

## 2017-01-01 RX ADMIN — ONDANSETRON 4 MG: 4 TABLET, ORALLY DISINTEGRATING ORAL at 18:30

## 2017-01-01 RX ADMIN — HYDROMORPHONE HYDROCHLORIDE 2 MG: 2 TABLET ORAL at 14:11

## 2017-01-01 RX ADMIN — SODIUM CHLORIDE, PRESERVATIVE FREE 5 ML: 5 INJECTION INTRAVENOUS at 13:07

## 2017-01-01 RX ADMIN — FENTANYL CITRATE 100 MCG/HR: 50 INJECTION, SOLUTION INTRAMUSCULAR; INTRAVENOUS at 16:04

## 2017-01-01 RX ADMIN — SODIUM PHOSPHATE, MONOBASIC, MONOHYDRATE AND SODIUM PHOSPHATE, DIBASIC, ANHYDROUS 15 MMOL: 276; 142 INJECTION, SOLUTION INTRAVENOUS at 09:42

## 2017-01-01 RX ADMIN — MULTIPLE VITAMINS W/ MINERALS TAB 1 TABLET: TAB at 13:06

## 2017-01-01 RX ADMIN — SODIUM BICARBONATE 650 MG TABLET 1300 MG: at 17:46

## 2017-01-01 RX ADMIN — ZINC SULFATE CAP 220 MG (50 MG ELEMENTAL ZN) 220 MG: 220 (50 ZN) CAP at 11:59

## 2017-01-01 RX ADMIN — ASPIRIN 81 MG: 81 TABLET, COATED ORAL at 08:33

## 2017-01-01 RX ADMIN — ALBUTEROL SULFATE 2 PUFF: 90 AEROSOL, METERED RESPIRATORY (INHALATION) at 14:11

## 2017-01-01 RX ADMIN — GLYCOPYRROLATE 0.4 MG: 0.2 INJECTION, SOLUTION INTRAMUSCULAR; INTRAVENOUS at 05:59

## 2017-01-01 RX ADMIN — OYSTER SHELL CALCIUM WITH VITAMIN D 1 TABLET: 500; 200 TABLET, FILM COATED ORAL at 12:19

## 2017-01-01 RX ADMIN — FENTANYL CITRATE 50 MCG: 50 INJECTION, SOLUTION INTRAMUSCULAR; INTRAVENOUS at 12:00

## 2017-01-01 RX ADMIN — PREDNISONE 2.5 MG: 2.5 TABLET ORAL at 19:48

## 2017-01-01 RX ADMIN — OYSTER SHELL CALCIUM WITH VITAMIN D 1 TABLET: 500; 200 TABLET, FILM COATED ORAL at 18:31

## 2017-01-01 RX ADMIN — FENTANYL CITRATE 100 MCG: 50 INJECTION, SOLUTION INTRAMUSCULAR; INTRAVENOUS at 20:06

## 2017-01-01 RX ADMIN — LINEZOLID 600 MG: 600 TABLET, FILM COATED ORAL at 20:35

## 2017-01-01 RX ADMIN — TACROLIMUS 3.5 MG: 0.5 CAPSULE ORAL at 09:33

## 2017-01-01 RX ADMIN — ALBUTEROL SULFATE 2 PUFF: 90 INHALANT RESPIRATORY (INHALATION) at 20:42

## 2017-01-01 RX ADMIN — DOXAZOSIN MESYLATE 4 MG: 2 TABLET ORAL at 21:51

## 2017-01-01 RX ADMIN — FENTANYL CITRATE 100 MCG: 50 INJECTION, SOLUTION INTRAMUSCULAR; INTRAVENOUS at 12:51

## 2017-01-01 RX ADMIN — INSULIN ASPART 1 UNITS: 100 INJECTION, SOLUTION INTRAVENOUS; SUBCUTANEOUS at 18:14

## 2017-01-01 RX ADMIN — CALCIUM GLUCONATE 2 G: 94 INJECTION, SOLUTION INTRAVENOUS at 14:50

## 2017-01-01 RX ADMIN — DEXTROSE MONOHYDRATE 1000 MG: 50 INJECTION, SOLUTION INTRAVENOUS at 18:02

## 2017-01-01 RX ADMIN — ATORVASTATIN CALCIUM 20 MG: 10 TABLET, FILM COATED ORAL at 22:04

## 2017-01-01 RX ADMIN — ATORVASTATIN CALCIUM 20 MG: 10 TABLET, FILM COATED ORAL at 22:16

## 2017-01-01 RX ADMIN — LEVOTHYROXINE SODIUM 50 MCG: 50 TABLET ORAL at 09:34

## 2017-01-01 RX ADMIN — CALCIUM CARBONATE (ANTACID) CHEW TAB 500 MG 500 MG: 500 CHEW TAB at 23:15

## 2017-01-01 RX ADMIN — DEXTROSE MONOHYDRATE 50 ML: 500 INJECTION PARENTERAL at 04:12

## 2017-01-01 RX ADMIN — NYSTATIN 500000 UNITS: 100000 SUSPENSION ORAL at 12:55

## 2017-01-01 RX ADMIN — SODIUM BICARBONATE 650 MG TABLET 1300 MG: at 21:39

## 2017-01-01 RX ADMIN — SODIUM BICARBONATE 650 MG TABLET 1300 MG: at 07:49

## 2017-01-01 RX ADMIN — FLUTICASONE FUROATE AND VILANTEROL TRIFENATATE 1 PUFF: 200; 25 POWDER RESPIRATORY (INHALATION) at 09:42

## 2017-01-01 RX ADMIN — SODIUM BICARBONATE 650 MG TABLET 1300 MG: at 18:19

## 2017-01-01 RX ADMIN — PREDNISONE 5 MG: 5 TABLET ORAL at 08:33

## 2017-01-01 RX ADMIN — OXYCODONE HYDROCHLORIDE AND ACETAMINOPHEN 500 MG: 500 TABLET ORAL at 12:19

## 2017-01-01 RX ADMIN — FENTANYL CITRATE 100 MCG: 50 INJECTION, SOLUTION INTRAMUSCULAR; INTRAVENOUS at 22:25

## 2017-01-01 RX ADMIN — NYSTATIN 500000 UNITS: 100000 SUSPENSION ORAL at 11:59

## 2017-01-01 RX ADMIN — Medication 1 TABLET: at 17:32

## 2017-01-01 RX ADMIN — ONDANSETRON 4 MG: 2 INJECTION INTRAMUSCULAR; INTRAVENOUS at 02:07

## 2017-01-01 RX ADMIN — SODIUM CHLORIDE, PRESERVATIVE FREE 5 ML: 5 INJECTION INTRAVENOUS at 08:55

## 2017-01-01 RX ADMIN — LINEZOLID 600 MG: 600 TABLET, FILM COATED ORAL at 08:14

## 2017-01-01 RX ADMIN — SODIUM BICARBONATE 650 MG TABLET 1300 MG: at 08:11

## 2017-01-01 RX ADMIN — PIPERACILLIN AND TAZOBACTAM 2.25 G: 2; .25 INJECTION, POWDER, LYOPHILIZED, FOR SOLUTION INTRAVENOUS; PARENTERAL at 05:18

## 2017-01-01 RX ADMIN — CALCIUM GLUCONATE 1 G: 94 INJECTION, SOLUTION INTRAVENOUS at 03:52

## 2017-01-01 RX ADMIN — LIDOCAINE 1 PATCH: 50 PATCH CUTANEOUS at 09:51

## 2017-01-01 RX ADMIN — INSULIN GLARGINE 4 UNITS: 100 INJECTION, SOLUTION SUBCUTANEOUS at 21:11

## 2017-01-01 RX ADMIN — HEPARIN SODIUM 5000 UNITS: 5000 INJECTION, SOLUTION INTRAVENOUS; SUBCUTANEOUS at 08:26

## 2017-01-01 RX ADMIN — TACROLIMUS 3.5 MG: 0.5 CAPSULE ORAL at 19:30

## 2017-01-01 RX ADMIN — SODIUM CHLORIDE, PRESERVATIVE FREE 5 ML: 5 INJECTION INTRAVENOUS at 16:29

## 2017-01-01 RX ADMIN — FUROSEMIDE 20 MG: 10 INJECTION, SOLUTION INTRAVENOUS at 14:41

## 2017-01-01 RX ADMIN — NYSTATIN 500000 UNITS: 100000 SUSPENSION ORAL at 15:56

## 2017-01-01 RX ADMIN — OXYCODONE HYDROCHLORIDE AND ACETAMINOPHEN 500 MG: 500 TABLET ORAL at 13:00

## 2017-01-01 RX ADMIN — OYSTER SHELL CALCIUM WITH VITAMIN D 1 TABLET: 500; 200 TABLET, FILM COATED ORAL at 12:55

## 2017-01-01 RX ADMIN — FUROSEMIDE 20 MG: 10 INJECTION, SOLUTION INTRAVENOUS at 16:58

## 2017-01-01 RX ADMIN — ZINC SULFATE CAP 220 MG (50 MG ELEMENTAL ZN) 220 MG: 220 (50 ZN) CAP at 12:19

## 2017-01-01 RX ADMIN — FENTANYL CITRATE 100 MCG: 50 INJECTION, SOLUTION INTRAMUSCULAR; INTRAVENOUS at 13:56

## 2017-01-01 RX ADMIN — HYDROMORPHONE HYDROCHLORIDE 4 MG: 2 TABLET ORAL at 22:21

## 2017-01-01 RX ADMIN — CALCIUM CARBONATE (ANTACID) CHEW TAB 500 MG 500 MG: 500 CHEW TAB at 00:32

## 2017-01-01 RX ADMIN — MONTELUKAST 10 MG: 10 TABLET, FILM COATED ORAL at 20:09

## 2017-01-01 RX ADMIN — SODIUM CHLORIDE, PRESERVATIVE FREE 5 ML: 5 INJECTION INTRAVENOUS at 06:09

## 2017-01-01 RX ADMIN — SODIUM CHLORIDE: 9 INJECTION, SOLUTION INTRAVENOUS at 09:32

## 2017-01-01 RX ADMIN — HEPARIN SODIUM 5000 UNITS: 5000 INJECTION, SOLUTION INTRAVENOUS; SUBCUTANEOUS at 15:39

## 2017-01-01 RX ADMIN — ZINC SULFATE CAP 220 MG (50 MG ELEMENTAL ZN) 220 MG: 220 (50 ZN) CAP at 09:47

## 2017-01-01 RX ADMIN — SODIUM CHLORIDE 1000 ML: 9 INJECTION, SOLUTION INTRAVENOUS at 17:09

## 2017-01-01 RX ADMIN — SODIUM CHLORIDE, PRESERVATIVE FREE 5 ML: 5 INJECTION INTRAVENOUS at 19:12

## 2017-01-01 RX ADMIN — NYSTATIN 500000 UNITS: 100000 SUSPENSION ORAL at 15:52

## 2017-01-01 RX ADMIN — ALBUTEROL SULFATE 2 PUFF: 90 INHALANT RESPIRATORY (INHALATION) at 23:59

## 2017-01-01 RX ADMIN — CALCIUM CARBONATE (ANTACID) CHEW TAB 500 MG 500 MG: 500 CHEW TAB at 15:02

## 2017-01-01 RX ADMIN — ATORVASTATIN CALCIUM 20 MG: 20 TABLET, FILM COATED ORAL at 22:46

## 2017-01-01 RX ADMIN — OYSTER SHELL CALCIUM WITH VITAMIN D 1 TABLET: 500; 200 TABLET, FILM COATED ORAL at 13:06

## 2017-01-01 RX ADMIN — ONDANSETRON 4 MG: 4 TABLET, ORALLY DISINTEGRATING ORAL at 17:08

## 2017-01-01 RX ADMIN — MAGNESIUM OXIDE TAB 400 MG (241.3 MG ELEMENTAL MG) 400 MG: 400 (241.3 MG) TAB at 22:17

## 2017-01-01 RX ADMIN — HYDROMORPHONE HYDROCHLORIDE 4 MG: 2 TABLET ORAL at 21:51

## 2017-01-01 RX ADMIN — HEPARIN SODIUM 5000 UNITS: 5000 INJECTION, SOLUTION INTRAVENOUS; SUBCUTANEOUS at 08:11

## 2017-01-01 RX ADMIN — SODIUM CHLORIDE: 9 INJECTION, SOLUTION INTRAVENOUS at 00:43

## 2017-01-01 RX ADMIN — FENTANYL CITRATE 100 MCG: 50 INJECTION, SOLUTION INTRAMUSCULAR; INTRAVENOUS at 21:31

## 2017-01-01 RX ADMIN — PIPERACILLIN SODIUM AND TAZOBACTAM SODIUM 3.38 G: 3; .375 INJECTION, POWDER, LYOPHILIZED, FOR SOLUTION INTRAVENOUS at 08:22

## 2017-01-01 RX ADMIN — OXYCODONE HYDROCHLORIDE AND ACETAMINOPHEN 500 MG: 500 TABLET ORAL at 13:06

## 2017-01-01 RX ADMIN — ONDANSETRON 4 MG: 2 INJECTION INTRAMUSCULAR; INTRAVENOUS at 08:01

## 2017-01-01 RX ADMIN — CALCIUM GLUCONATE 1 G: 94 INJECTION, SOLUTION INTRAVENOUS at 09:59

## 2017-01-01 RX ADMIN — FUROSEMIDE 20 MG: 10 INJECTION, SOLUTION INTRAVENOUS at 18:30

## 2017-01-01 RX ADMIN — TACROLIMUS 1.5 MG: 0.5 CAPSULE ORAL at 17:52

## 2017-01-01 RX ADMIN — ONDANSETRON 8 MG: 8 TABLET, ORALLY DISINTEGRATING ORAL at 13:00

## 2017-01-01 RX ADMIN — SODIUM BICARBONATE 650 MG TABLET 1300 MG: at 08:29

## 2017-01-01 RX ADMIN — LIDOCAINE 1 PATCH: 50 PATCH CUTANEOUS at 08:15

## 2017-01-01 RX ADMIN — ZINC SULFATE CAP 220 MG (50 MG ELEMENTAL ZN) 220 MG: 220 (50 ZN) CAP at 13:30

## 2017-01-01 RX ADMIN — OXYCODONE HYDROCHLORIDE AND ACETAMINOPHEN 500 MG: 500 TABLET ORAL at 12:43

## 2017-01-01 RX ADMIN — FENTANYL CITRATE 100 MCG/HR: 50 INJECTION, SOLUTION INTRAMUSCULAR; INTRAVENOUS at 02:45

## 2017-01-01 RX ADMIN — ASPIRIN 81 MG: 81 TABLET, COATED ORAL at 08:28

## 2017-01-01 RX ADMIN — OMEPRAZOLE 40 MG: 20 CAPSULE, DELAYED RELEASE ORAL at 09:09

## 2017-01-01 RX ADMIN — FUROSEMIDE 20 MG: 10 INJECTION, SOLUTION INTRAVENOUS at 23:54

## 2017-01-01 RX ADMIN — ONDANSETRON 8 MG: 8 TABLET, ORALLY DISINTEGRATING ORAL at 17:24

## 2017-01-01 RX ADMIN — DOXAZOSIN MESYLATE 4 MG: 4 TABLET ORAL at 21:25

## 2017-01-01 RX ADMIN — SODIUM BICARBONATE 650 MG TABLET 1300 MG: at 08:23

## 2017-01-01 RX ADMIN — PROCHLORPERAZINE MALEATE 10 MG: 5 TABLET, FILM COATED ORAL at 15:50

## 2017-01-01 RX ADMIN — HEPARIN SODIUM 5000 UNITS: 5000 INJECTION, SOLUTION INTRAVENOUS; SUBCUTANEOUS at 08:16

## 2017-01-01 RX ADMIN — FENTANYL CITRATE 50 MCG: 50 INJECTION, SOLUTION INTRAMUSCULAR; INTRAVENOUS at 08:36

## 2017-01-01 RX ADMIN — ONDANSETRON 8 MG: 2 INJECTION INTRAMUSCULAR; INTRAVENOUS at 17:08

## 2017-01-01 RX ADMIN — ONDANSETRON 8 MG: 8 TABLET, ORALLY DISINTEGRATING ORAL at 17:46

## 2017-01-01 RX ADMIN — PHYTONADIONE 5 MG: 10 INJECTION, EMULSION INTRAMUSCULAR; INTRAVENOUS; SUBCUTANEOUS at 14:03

## 2017-01-01 RX ADMIN — DOXAZOSIN MESYLATE 4 MG: 2 TABLET ORAL at 22:17

## 2017-01-01 RX ADMIN — DRONABINOL 5 MG: 5 CAPSULE ORAL at 08:14

## 2017-01-01 RX ADMIN — IPRATROPIUM BROMIDE AND ALBUTEROL SULFATE 3 ML: .5; 3 SOLUTION RESPIRATORY (INHALATION) at 12:38

## 2017-01-01 RX ADMIN — LINEZOLID 600 MG: 600 TABLET, FILM COATED ORAL at 08:23

## 2017-01-01 RX ADMIN — PIPERACILLIN AND TAZOBACTAM 3.38 G: 3; .375 INJECTION, POWDER, FOR SOLUTION INTRAVENOUS at 21:25

## 2017-01-01 RX ADMIN — FENTANYL CITRATE 100 MCG: 50 INJECTION, SOLUTION INTRAMUSCULAR; INTRAVENOUS at 10:44

## 2017-01-01 RX ADMIN — AMLODIPINE BESYLATE 10 MG: 10 TABLET ORAL at 21:24

## 2017-01-01 RX ADMIN — LINEZOLID 600 MG: 600 TABLET, FILM COATED ORAL at 08:11

## 2017-01-01 RX ADMIN — PIPERACILLIN AND TAZOBACTAM 3.38 G: 3; .375 INJECTION, POWDER, FOR SOLUTION INTRAVENOUS at 15:32

## 2017-01-01 RX ADMIN — VANCOMYCIN HYDROCHLORIDE 1000 MG: 1 INJECTION, SOLUTION INTRAVENOUS at 12:51

## 2017-01-01 RX ADMIN — DEXTROSE MONOHYDRATE 1000 MG: 50 INJECTION, SOLUTION INTRAVENOUS at 18:40

## 2017-01-01 RX ADMIN — MAGNESIUM OXIDE TAB 400 MG (241.3 MG ELEMENTAL MG) 400 MG: 400 (241.3 MG) TAB at 22:37

## 2017-01-01 RX ADMIN — PIPERACILLIN SODIUM AND TAZOBACTAM SODIUM 3.38 G: 3; .375 INJECTION, POWDER, LYOPHILIZED, FOR SOLUTION INTRAVENOUS at 17:28

## 2017-01-01 RX ADMIN — ALBUTEROL SULFATE 2 PUFF: 90 INHALANT RESPIRATORY (INHALATION) at 00:16

## 2017-01-01 RX ADMIN — ONDANSETRON 4 MG: 2 INJECTION INTRAMUSCULAR; INTRAVENOUS at 11:04

## 2017-01-01 RX ADMIN — MAGNESIUM OXIDE TAB 400 MG (241.3 MG ELEMENTAL MG) 400 MG: 400 (241.3 MG) TAB at 22:23

## 2017-01-01 RX ADMIN — HEPARIN SODIUM 5000 UNITS: 5000 INJECTION, SOLUTION INTRAVENOUS; SUBCUTANEOUS at 08:14

## 2017-01-01 RX ADMIN — MONTELUKAST SODIUM 10 MG: 10 TABLET, FILM COATED ORAL at 20:35

## 2017-01-01 RX ADMIN — HYDROMORPHONE HYDROCHLORIDE 2 MG: 2 TABLET ORAL at 18:42

## 2017-01-01 RX ADMIN — DAPSONE 50 MG: 25 TABLET ORAL at 09:46

## 2017-01-01 RX ADMIN — Medication 1 TABLET: at 17:08

## 2017-01-01 RX ADMIN — HEPARIN SODIUM 5000 UNITS: 5000 INJECTION, SOLUTION INTRAVENOUS; SUBCUTANEOUS at 01:10

## 2017-01-01 RX ADMIN — PREDNISONE 2.5 MG: 2.5 TABLET ORAL at 21:40

## 2017-01-01 RX ADMIN — OYSTER SHELL CALCIUM WITH VITAMIN D 1 TABLET: 500; 200 TABLET, FILM COATED ORAL at 13:31

## 2017-01-01 RX ADMIN — SODIUM CHLORIDE 1000 ML: 9 INJECTION, SOLUTION INTRAVENOUS at 14:34

## 2017-01-01 RX ADMIN — PIPERACILLIN SODIUM AND TAZOBACTAM SODIUM 3.38 G: 3; .375 INJECTION, POWDER, LYOPHILIZED, FOR SOLUTION INTRAVENOUS at 02:05

## 2017-01-01 RX ADMIN — HEPARIN SODIUM 5000 UNITS: 5000 INJECTION, SOLUTION INTRAVENOUS; SUBCUTANEOUS at 19:48

## 2017-01-01 RX ADMIN — LIDOCAINE 1 PATCH: 50 PATCH TOPICAL at 20:31

## 2017-01-01 RX ADMIN — VANCOMYCIN HYDROCHLORIDE 1250 MG: 10 INJECTION, POWDER, LYOPHILIZED, FOR SOLUTION INTRAVENOUS at 15:58

## 2017-01-01 RX ADMIN — MAGNESIUM OXIDE TAB 400 MG (241.3 MG ELEMENTAL MG) 400 MG: 400 (241.3 MG) TAB at 21:50

## 2017-01-01 RX ADMIN — DAPSONE 50 MG: 25 TABLET ORAL at 08:14

## 2017-01-01 RX ADMIN — CALCIUM GLUCONATE 2 G: 94 INJECTION, SOLUTION INTRAVENOUS at 09:11

## 2017-01-01 RX ADMIN — FENTANYL CITRATE 100 MCG/HR: 50 INJECTION, SOLUTION INTRAMUSCULAR; INTRAVENOUS at 16:27

## 2017-01-01 RX ADMIN — TACROLIMUS 3.5 MG: 0.5 CAPSULE ORAL at 08:13

## 2017-01-01 RX ADMIN — ONDANSETRON 8 MG: 8 TABLET, ORALLY DISINTEGRATING ORAL at 06:15

## 2017-01-01 RX ADMIN — LIDOCAINE 1 PATCH: 50 PATCH CUTANEOUS at 08:24

## 2017-01-01 RX ADMIN — PROCHLORPERAZINE MALEATE 10 MG: 5 TABLET, FILM COATED ORAL at 08:42

## 2017-01-01 RX ADMIN — HEPARIN SODIUM 5000 UNITS: 5000 INJECTION, SOLUTION INTRAVENOUS; SUBCUTANEOUS at 17:13

## 2017-01-01 RX ADMIN — FUROSEMIDE 40 MG: 10 INJECTION, SOLUTION INTRAVENOUS at 11:57

## 2017-01-01 RX ADMIN — ATORVASTATIN CALCIUM 20 MG: 10 TABLET, FILM COATED ORAL at 22:37

## 2017-01-01 RX ADMIN — PIPERACILLIN SODIUM AND TAZOBACTAM SODIUM 3.38 G: 3; .375 INJECTION, POWDER, LYOPHILIZED, FOR SOLUTION INTRAVENOUS at 14:25

## 2017-01-01 RX ADMIN — DRONABINOL 5 MG: 5 CAPSULE ORAL at 07:49

## 2017-01-01 RX ADMIN — PROCHLORPERAZINE EDISYLATE 5 MG: 5 INJECTION INTRAMUSCULAR; INTRAVENOUS at 09:03

## 2017-01-01 RX ADMIN — FUROSEMIDE 20 MG: 10 INJECTION, SOLUTION INTRAVENOUS at 15:54

## 2017-01-01 RX ADMIN — ONDANSETRON 8 MG: 8 TABLET, ORALLY DISINTEGRATING ORAL at 16:58

## 2017-01-01 RX ADMIN — HYDROMORPHONE HYDROCHLORIDE 2 MG: 2 TABLET ORAL at 01:14

## 2017-01-01 RX ADMIN — OYSTER SHELL CALCIUM WITH VITAMIN D 1 TABLET: 500; 200 TABLET, FILM COATED ORAL at 18:19

## 2017-01-01 RX ADMIN — PIPERACILLIN AND TAZOBACTAM 2.25 G: 2; .25 INJECTION, POWDER, LYOPHILIZED, FOR SOLUTION INTRAVENOUS; PARENTERAL at 21:59

## 2017-01-01 RX ADMIN — PANTOPRAZOLE SODIUM 40 MG: 40 INJECTION, POWDER, FOR SOLUTION INTRAVENOUS at 08:03

## 2017-01-01 RX ADMIN — FENTANYL CITRATE 50 MCG: 50 INJECTION, SOLUTION INTRAMUSCULAR; INTRAVENOUS at 18:04

## 2017-01-01 RX ADMIN — OMEPRAZOLE 40 MG: 20 CAPSULE, DELAYED RELEASE ORAL at 07:51

## 2017-01-01 RX ADMIN — SODIUM BICARBONATE 650 MG TABLET 1300 MG: at 20:37

## 2017-01-01 RX ADMIN — Medication 125 MG: at 20:26

## 2017-01-01 RX ADMIN — LEVOFLOXACIN 500 MG: 5 INJECTION, SOLUTION INTRAVENOUS at 18:33

## 2017-01-01 RX ADMIN — OXYCODONE HYDROCHLORIDE AND ACETAMINOPHEN 500 MG: 500 TABLET ORAL at 13:31

## 2017-01-01 RX ADMIN — MAGNESIUM OXIDE TAB 400 MG (241.3 MG ELEMENTAL MG) 400 MG: 400 (241.3 MG) TAB at 22:03

## 2017-01-01 RX ADMIN — ASPIRIN 81 MG: 81 TABLET, COATED ORAL at 08:10

## 2017-01-01 RX ADMIN — CALCIUM CARBONATE (ANTACID) CHEW TAB 500 MG 500 MG: 500 CHEW TAB at 19:21

## 2017-01-01 RX ADMIN — ZINC SULFATE CAP 220 MG (50 MG ELEMENTAL ZN) 220 MG: 220 (50 ZN) CAP at 13:07

## 2017-01-01 RX ADMIN — FENTANYL CITRATE 50 MCG: 50 INJECTION, SOLUTION INTRAMUSCULAR; INTRAVENOUS at 07:59

## 2017-01-01 RX ADMIN — FUROSEMIDE 40 MG: 10 INJECTION, SOLUTION INTRAVENOUS at 06:38

## 2017-01-01 RX ADMIN — ONDANSETRON 4 MG: 2 INJECTION INTRAMUSCULAR; INTRAVENOUS at 21:31

## 2017-01-01 RX ADMIN — OMEPRAZOLE 40 MG: 20 CAPSULE, DELAYED RELEASE ORAL at 08:11

## 2017-01-01 RX ADMIN — PIPERACILLIN AND TAZOBACTAM 3.38 G: 3; .375 INJECTION, POWDER, FOR SOLUTION INTRAVENOUS at 04:27

## 2017-01-01 RX ADMIN — SODIUM BICARBONATE 650 MG TABLET 1300 MG: at 20:27

## 2017-01-01 RX ADMIN — ONDANSETRON 8 MG: 8 TABLET, ORALLY DISINTEGRATING ORAL at 06:36

## 2017-01-01 RX ADMIN — PREDNISONE 2.5 MG: 2.5 TABLET ORAL at 20:09

## 2017-01-01 RX ADMIN — LEVOTHYROXINE SODIUM 50 MCG: 50 TABLET ORAL at 09:09

## 2017-01-01 RX ADMIN — CEFTAZIDIME 1 G: 1 INJECTION, POWDER, FOR SOLUTION INTRAMUSCULAR; INTRAVENOUS at 13:22

## 2017-01-01 RX ADMIN — LEVOFLOXACIN 750 MG: 750 TABLET, FILM COATED ORAL at 12:42

## 2017-01-01 RX ADMIN — DRONABINOL 5 MG: 5 CAPSULE ORAL at 15:05

## 2017-01-01 RX ADMIN — METHYLPREDNISOLONE SODIUM SUCCINATE 10 MG: 40 INJECTION, POWDER, LYOPHILIZED, FOR SOLUTION INTRAMUSCULAR; INTRAVENOUS at 08:08

## 2017-01-01 RX ADMIN — MULTIPLE VITAMINS W/ MINERALS TAB 1 TABLET: TAB at 07:53

## 2017-01-01 RX ADMIN — AMLODIPINE BESYLATE 10 MG: 10 TABLET ORAL at 22:05

## 2017-01-01 RX ADMIN — IPRATROPIUM BROMIDE AND ALBUTEROL SULFATE 3 ML: .5; 3 SOLUTION RESPIRATORY (INHALATION) at 07:52

## 2017-01-01 RX ADMIN — LIDOCAINE 1 PATCH: 50 PATCH CUTANEOUS at 09:13

## 2017-01-01 RX ADMIN — LINEZOLID 600 MG: 600 TABLET, FILM COATED ORAL at 19:23

## 2017-01-01 RX ADMIN — SODIUM CHLORIDE 1000 ML: 9 INJECTION, SOLUTION INTRAVENOUS at 16:50

## 2017-01-01 RX ADMIN — ONDANSETRON 4 MG: 4 TABLET, ORALLY DISINTEGRATING ORAL at 07:49

## 2017-01-01 RX ADMIN — ZINC SULFATE CAP 220 MG (50 MG ELEMENTAL ZN) 220 MG: 220 (50 ZN) CAP at 07:51

## 2017-01-01 RX ADMIN — OXYCODONE HYDROCHLORIDE AND ACETAMINOPHEN 500 MG: 500 TABLET ORAL at 09:41

## 2017-01-01 RX ADMIN — PREDNISONE 5 MG: 5 TABLET ORAL at 09:33

## 2017-01-01 RX ADMIN — LINEZOLID 600 MG: 600 TABLET, FILM COATED ORAL at 08:29

## 2017-01-01 RX ADMIN — OYSTER SHELL CALCIUM WITH VITAMIN D 1 TABLET: 500; 200 TABLET, FILM COATED ORAL at 13:00

## 2017-01-01 RX ADMIN — PANTOPRAZOLE SODIUM 40 MG: 40 INJECTION, POWDER, FOR SOLUTION INTRAVENOUS at 11:34

## 2017-01-01 RX ADMIN — HYDROMORPHONE HYDROCHLORIDE 2 MG: 2 TABLET ORAL at 11:47

## 2017-01-01 RX ADMIN — Medication 125 MG: at 09:48

## 2017-01-01 RX ADMIN — ONDANSETRON 4 MG: 4 TABLET, ORALLY DISINTEGRATING ORAL at 06:16

## 2017-01-01 RX ADMIN — CALCIUM CARBONATE (ANTACID) CHEW TAB 500 MG 500 MG: 500 CHEW TAB at 13:49

## 2017-01-01 RX ADMIN — HYDROMORPHONE HYDROCHLORIDE 2 MG: 2 TABLET ORAL at 20:28

## 2017-01-01 RX ADMIN — HYDROMORPHONE HYDROCHLORIDE 4 MG: 2 TABLET ORAL at 02:50

## 2017-01-01 RX ADMIN — OMEPRAZOLE 40 MG: 20 CAPSULE, DELAYED RELEASE ORAL at 08:33

## 2017-01-01 RX ADMIN — TACROLIMUS 3.5 MG: 0.5 CAPSULE ORAL at 08:28

## 2017-01-01 RX ADMIN — SODIUM CHLORIDE, PRESERVATIVE FREE 5 ML: 5 INJECTION INTRAVENOUS at 05:44

## 2017-01-01 RX ADMIN — MONTELUKAST 10 MG: 10 TABLET, FILM COATED ORAL at 19:23

## 2017-01-01 RX ADMIN — HEPARIN SODIUM 5000 UNITS: 5000 INJECTION, SOLUTION INTRAVENOUS; SUBCUTANEOUS at 01:22

## 2017-01-01 RX ADMIN — FUROSEMIDE 20 MG: 10 INJECTION, SOLUTION INTRAVENOUS at 06:44

## 2017-01-01 RX ADMIN — FENTANYL CITRATE 50 MCG: 50 INJECTION, SOLUTION INTRAMUSCULAR; INTRAVENOUS at 15:05

## 2017-01-01 RX ADMIN — TACROLIMUS 3.5 MG: 0.5 CAPSULE ORAL at 09:09

## 2017-01-01 RX ADMIN — PIPERACILLIN SODIUM AND TAZOBACTAM SODIUM 3.38 G: 3; .375 INJECTION, POWDER, LYOPHILIZED, FOR SOLUTION INTRAVENOUS at 08:20

## 2017-01-01 RX ADMIN — DEXTROSE MONOHYDRATE 25 ML: 500 INJECTION PARENTERAL at 08:00

## 2017-01-01 RX ADMIN — PREDNISONE 2.5 MG: 2.5 TABLET ORAL at 20:37

## 2017-01-01 RX ADMIN — LEVOTHYROXINE SODIUM 50 MCG: 50 TABLET ORAL at 08:11

## 2017-01-01 RX ADMIN — SODIUM CHLORIDE: 9 INJECTION, SOLUTION INTRAVENOUS at 17:08

## 2017-01-01 RX ADMIN — DOXAZOSIN MESYLATE 4 MG: 2 TABLET ORAL at 22:03

## 2017-01-01 RX ADMIN — CARBOXYMETHYLCELLULOSE SODIUM 1 DROP: 5 SOLUTION/ DROPS OPHTHALMIC at 15:39

## 2017-01-01 RX ADMIN — IPRATROPIUM BROMIDE AND ALBUTEROL SULFATE 3 ML: .5; 3 SOLUTION RESPIRATORY (INHALATION) at 17:09

## 2017-01-01 RX ADMIN — LEVOFLOXACIN 750 MG: 750 TABLET, FILM COATED ORAL at 13:06

## 2017-01-01 RX ADMIN — FLUTICASONE FUROATE AND VILANTEROL TRIFENATATE 1 PUFF: 200; 25 POWDER RESPIRATORY (INHALATION) at 08:14

## 2017-01-01 RX ADMIN — ATORVASTATIN CALCIUM 20 MG: 10 TABLET, FILM COATED ORAL at 21:51

## 2017-01-01 RX ADMIN — AMLODIPINE BESYLATE 10 MG: 10 TABLET ORAL at 22:19

## 2017-01-01 RX ADMIN — DEXTROSE MONOHYDRATE 1000 MG: 50 INJECTION, SOLUTION INTRAVENOUS at 08:03

## 2017-01-01 ASSESSMENT — PAIN DESCRIPTION - DESCRIPTORS
DESCRIPTORS: ACHING
DESCRIPTORS: ACHING;BURNING
DESCRIPTORS: ACHING
DESCRIPTORS: ACHING
DESCRIPTORS: ACHING;BURNING
DESCRIPTORS: ACHING
DESCRIPTORS: ACHING
DESCRIPTORS: ACHING;BURNING
DESCRIPTORS: ACHING

## 2017-01-01 ASSESSMENT — PAIN SCALES - GENERAL
PAINLEVEL: MODERATE PAIN (4)
PAINLEVEL: NO PAIN (0)
PAINLEVEL: EXTREME PAIN (8)
PAINLEVEL: WORST PAIN (10)

## 2017-01-01 ASSESSMENT — ENCOUNTER SYMPTOMS
FEVER: 0
CONSTIPATION: 0
VOMITING: 1
DYSURIA: 0
BRUISES/BLEEDS EASILY: 0
NECK PAIN: 0
NUMBNESS: 0
SORE THROAT: 0
HEMATURIA: 0
FATIGUE: 1
LIGHT-HEADEDNESS: 0
COLOR CHANGE: 0
NAUSEA: 1
SHORTNESS OF BREATH: 0
BACK PAIN: 0
VOICE CHANGE: 0
EYE PAIN: 0
SHORTNESS OF BREATH: 1
COUGH: 1
VOMITING: 1
DIZZINESS: 0
FREQUENCY: 0
DYSPHORIC MOOD: 0
POLYDIPSIA: 0
MUSCULOSKELETAL NEGATIVE: 1
NAUSEA: 1
DIAPHORESIS: 0
ABDOMINAL PAIN: 1
TROUBLE SWALLOWING: 0
BLOOD IN STOOL: 0
PALPITATIONS: 0
COUGH: 1
WEAKNESS: 0
CHILLS: 0
DIARRHEA: 1
ABDOMINAL PAIN: 0
HEADACHES: 0
ADENOPATHY: 0
NERVOUS/ANXIOUS: 0

## 2017-01-01 ASSESSMENT — ACTIVITIES OF DAILY LIVING (ADL): PREVIOUS_RESPONSIBILITIES: MEAL PREP;HOUSEKEEPING;LAUNDRY;MEDICATION MANAGEMENT;FINANCES;DRIVING

## 2017-01-06 NOTE — TELEPHONE ENCOUNTER
Melissa calls with symptoms of chills, sore throat, Temp of 99.6, fatigue wanting us to call for a zpak.  She is currently on chemotherapy.    Because of multiple symptoms, I asked Melissa to call her PCP office to see if someone could see her today in clinic.  She will call and see if this is possible.

## 2017-01-10 NOTE — NURSING NOTE
"Melissa Collazo is a 76 year old female who presents for:  Chief Complaint   Patient presents with     Oncology Clinic Visit     Return for Colon Ca         Initial Vitals:  /58 mmHg  Pulse 109  Temp(Src) 97.1  F (36.2  C) (Oral)  Resp 18  Wt 51.5 kg (113 lb 8.6 oz)  SpO2 94% Estimated body mass index is 23.32 kg/(m^2) as calculated from the following:    Height as of 12/19/16: 1.486 m (4' 10.5\").    Weight as of this encounter: 51.5 kg (113 lb 8.6 oz).. There is no height on file to calculate BSA. BP completed using cuff size: regular  Moderate Pain (4) No LMP recorded. Patient is postmenopausal. Allergies and medications reviewed.     Medications: Medication refills not needed today.  Pharmacy name entered into EPIC:    [facility] - [place], [place] - [address] [place], [place] [id number] [id number] - [place], [place] - [address] [facility] - [place], [place] - [address] [place] [address] [phone]    Comments:     7  minutes for nursing intake (face to face time)   Susan Daniel MA          "

## 2017-01-10 NOTE — PROGRESS NOTES
Melissa Collazo is a 76 year old woman with metastatic colon cancer. She is here for evaluation after initiation of Lonsurf on 12/26/16.    Oncology HPI:  She has a history of right lung transplantation for chronic obstructive pulmonary disease, chronic kidney disease, osteoporosis, hypertension, and complex lung infections, including aspergillus and ZOILA. She has had prior treatment with Xeloda/Oxaliplatin, irinotecan and erbitux and avastin. She recently was found to have progressed on Irinotecan/Ertibux. She was initiated on Lonsurf on 12/26/16.    Interval history:  Melissa has noted very little side effect outside of fatigue with the Lonsurf. No N/V. No change to bowel movements. Eating and drinking OK. Is feeling ill in the past couple of days with an upper respiratory infection. Her granddaughter had similar illness. It started with a sore throat and has progressed with sinus drainage, chest congestion and shortenss of breath. No chest pain. Cough is productive of thin phlegm. Was seen by PCP last week and had a throat swab that was negative for strep.    Current Outpatient Prescriptions   Medication Sig Dispense Refill     Blood Glucose Calibration (CRIS DOC CONTROL) NORMAL SOLN daily as needed       Trifluridine-Tipiracil (LONSURF) 15-6.14 MG tablet Take 3 tablets by mouth 2 times daily Take within 1 hour after morning and evening meals on Days 1 thru 5 and 8 thru 12 of each 28 day cycle 60 tablet 0     lidocaine (LIDODERM) 5 % Patch Place 1 patch onto the skin every 24 hours 30 patch 3     tacrolimus (PROGRAF - GENERIC EQUIVALENT) 1 MG capsule Total dose of 3.5 mg twice daily 180 capsule 11     tacrolimus (PROGRAF - GENERIC EQUIVALENT) 0.5 MG capsule Take 1 capsule (0.5 mg) by mouth 2 times daily Total dose of 3.5 mg twice daily 60 capsule 11     iohexol (OMNIPAQUE) 140 MG/ML SOLN solution Mix entire bottle (50ml) of contast with 600ml (20 ounces) of water and drink half 2 hrs prior to CT scan and half 1 hr prior to  "scan 1 vial 0     lidocaine-prilocaine (EMLA) cream APPLY TO PORT SITE ABOUT 45 TO 60 MINUTES PRIOR TO ACCESSING AS NEEDED FOR MODERATE PAIN 30 g 3     order for DME Equipment being ordered:   Hernia Belt     Nu form, BG- Cool comfort- medium,  4\"  Belt ring  3 1/4  Catalog # :  [id number] 1 each 11     ondansetron (ZOFRAN) 8 MG tablet TAKE ONE TABLET BY MOUTH THREE TIMES A DAY BEFORE MEALS 90 tablet 3     dapsone 25 MG tablet TAKE TWO TABLETS BY MOUTH THREE TIMES WEEKLY (MONDAY/WEDNESDAY/FRIDAY) 24 tablet 11     predniSONE (DELTASONE) 5 MG tablet TAKE ONE TABLET BY MOUTH EVERY MORNING 30 tablet 11     zinc sulfate (ZINCATE) 220 MG capsule TAKE ONE CAPSULE BY MOUTH EVERY DAY 30 capsule 11     predniSONE (DELTASONE) 2.5 MG tablet TAKE ONE TABLET BY MOUTH EVERY EVENING 30 tablet 11     sodium bicarbonate 650 MG tablet Take 2 tablets (1,300 mg) by mouth 2 times daily 540 tablet 3     insulin glargine (LANTUS SOLOSTAR) 100 UNIT/ML PEN Inject 4 Units Subcutaneous At Bedtime       OLANZapine (ZYPREXA) 5 MG tablet Take 1 tablet (5 mg) by mouth nightly as needed for nausea 30 tablet 2     order for DME Equipment being ordered: TENS 1 Units 0     order for DME Equipment being ordered: TENS 1 Units 0     albuterol (PROAIR HFA, PROVENTIL HFA, VENTOLIN HFA) 108 (90 BASE) MCG/ACT inhaler Inhale 2 puffs into the lungs every 6 hours as needed for shortness of breath / dyspnea or wheezing 3 Inhaler 3     montelukast (SINGULAIR) 10 MG tablet Take 1 tablet (10 mg) by mouth every evening 90 tablet 3     fluticasone-salmeterol (ADVAIR) 500-50 MCG/DOSE diskus inhaler Inhale 1 puff into the lungs 2 times daily 3 Inhaler 3     doxazosin (CARDURA) 2 MG tablet Take 2 tablets (4 mg) by mouth At Bedtime 60 tablet 6     azithromycin (ZITHROMAX) 250 MG tablet TAKE ONE-HALF TABLET BY MOUTH EVERY DAY 15 tablet 11     omeprazole (PRILOSEC) 40 MG capsule Take 1 capsule (40 mg) by mouth daily 90 capsule 3     amLODIPine (NORVASC) 5 MG tablet Take " 2 tablets (10 mg) by mouth At Bedtime 180 tablet 3     HYDROmorphone (DILAUDID) 2 MG tablet Take 1-2 tablets (2-4 mg) by mouth every 3 hours as needed for moderate to severe pain 100 tablet 0     atorvastatin (LIPITOR) 20 MG tablet Take 1 tablet (20 mg) by mouth daily 30 tablet      blood glucose monitoring (NO BRAND SPECIFIED) test strip TEST 4 TIMES DAILY.       insulin aspart (NOVOLOG PEN) 100 UNIT/ML soln 4 units three times daily plus correction:140-214 give 1 unit; 215-289 give 2 units; 290-365 give 3 units and call MD   NOVOLOG       insulin pen needle 32G X 4 MM USE AS DIRECTED FOR ADMINISTERING INSULIN AT HOME.       aspirin EC 81 MG tablet Take 81 mg by mouth daily        cyanocobalamin (VITAMIN B-12 ER) 1000 MCG TBCR Take 1,000 mcg by mouth every 7 days        levothyroxine (SYNTHROID, LEVOTHROID) 50 MCG tablet Take 50 mcg by mouth daily        prochlorperazine (COMPAZINE) 5 MG tablet Take 1 tablet (5 mg) by mouth every 6 hours as needed for vomiting 120 tablet 1     order for DME Lightweight Wheelchair with leg rests. 1 Units 0     butalbital-acetaminophen-caffeine (FIORICET, ESGIC) -40 MG per tablet Take 1 tablet by mouth every 4 hours as needed for headaches       order for DME Please dispense 1 hair prosthesis. 1 Units 0     Magnesium Oxide 250 MG TABS Take 2 tablets (500 mg) by mouth At Bedtime 180 tablet 3     loperamide (IMODIUM) 2 MG capsule Take 1 capsule (2 mg) by mouth 3 times daily as needed for other (greater than 1200mL ileostomy output in a 24 hour period.  please call colon and rectal surgery clinic to notify if patient is requiring Imodium.) 20 capsule      ORDER FOR DME Equipment being ordered: ileostomy supplies 1 Month 11     ascorbic acid 500 MG TABS Take 1 tablet (500 mg) by mouth daily 30 tablet 1     Calcium Carbonate-Vitamin D (CALCIUM 500 + D PO) Take 1 tablet by mouth 2 times daily        Multiple Vitamin (MULTI-VITAMIN) per tablet Take 1 tablet by mouth daily.       Exam:  alert, appears fatigued. Blood pressure 115/58, pulse 109, temperature 97.1  F (36.2  C), temperature source Oral, resp. rate 18, weight 51.5 kg (113 lb 8.6 oz), SpO2 94 %, not currently breastfeeding.  Oropharynx is moist, no focal lesion. Bilateral ear exam show scarred TM bilaterally. No effusion or drainage. Neck supple and without adenopathy. Lungs: crackles RUL, clear RLL, decreased left diminished    Labs:Results for DOUG DE LOS SANTOS (MRN [id number]) as of 1/10/2017 12:34   Ref. Range 1/10/2017 09:44   Sodium Latest Ref Range: 133-144 mmol/L 138   Potassium Latest Ref Range: 3.4-5.3 mmol/L 4.9   Chloride Latest Ref Range:  mmol/L 108   Carbon Dioxide Latest Ref Range: 20-32 mmol/L 23   Urea Nitrogen Latest Ref Range: 7-30 mg/dL 30   Creatinine Latest Ref Range: 0.52-1.04 mg/dL 1.65 (H)   GFR Estimate Latest Ref Range: >60 mL/min/1.7m2 30 (L)   GFR Estimate If Black Latest Ref Range: >60 mL/min/1.7m2 37 (L)   Calcium Latest Ref Range: 8.5-10.1 mg/dL 7.6 (L)   Anion Gap Latest Ref Range: 3-14 mmol/L 7   Albumin Latest Ref Range: 3.4-5.0 g/dL 3.0 (L)   Protein Total Latest Ref Range: 6.8-8.8 g/dL 5.9 (L)   Bilirubin Total Latest Ref Range: 0.2-1.3 mg/dL 0.4   Alkaline Phosphatase Latest Ref Range:  U/L 234 (H)   ALT Latest Ref Range: 0-50 U/L 28   AST Latest Ref Range: 0-45 U/L 18   Glucose Latest Ref Range: 70-99 mg/dL 106 (H)   WBC Latest Ref Range: 4.0-11.0 10e9/L 6.7   Hemoglobin Latest Ref Range: 11.7-15.7 g/dL 6.6 (LL)   Hematocrit Latest Ref Range: 35.0-47.0 % 22.2 (L)   Platelet Count Latest Ref Range: 150-450 10e9/L 212   RBC Count Latest Ref Range: 3.8-5.2 10e12/L 2.62 (L)   MCV Latest Ref Range:  fl 85   MCH Latest Ref Range: 26.5-33.0 pg 25.2 (L)   MCHC Latest Ref Range: 31.5-36.5 g/dL 29.7 (L)   RDW Latest Ref Range: 10.0-15.0 % 21.9 (H)   Diff Method Unknown Automated Method   % Neutrophils Latest Units: % 90.5   % Lymphocytes Latest Units: % 3.4   % Monocytes Latest Units: % 4.0    % Eosinophils Latest Units: % 1.3   % Basophils Latest Units: % 0.1   % Immature Granulocytes Latest Units: % 0.7   Nucleated RBCs Latest Ref Range: 0 /100 0   Absolute Neutrophil Latest Ref Range: 1.6-8.3 10e9/L 6.0   Absolute Lymphocytes Latest Ref Range: 0.8-5.3 10e9/L 0.2 (L)   Absolute Monocytes Latest Ref Range: 0.0-1.3 10e9/L 0.3   Absolute Eosinophils Latest Ref Range: 0.0-0.7 10e9/L 0.1   Absolute Basophils Latest Ref Range: 0.0-0.2 10e9/L 0.0   Abs Immature Granulocytes Latest Ref Range: 0-0.4 10e9/L 0.1   Absolute Nucleated RBC Unknown 0.0   INFLUENZA A AND B AND RSV PCR Unknown Rpt       Impression/plan:   1. Metastatic colorectal cancer, currently on Lonsurf, cycle 1  -Overall, has minimal side effects of the drug. Today's labs are pending. I will call her with results. We anticipate neutropenia/anemia with this treatment.   -She will receive neulasta today   -Will follow-up with me prior to cycle 2 with labs    2. Bronchitis: likely viral, but with immunosuppression and anticipated neutropenia, will start Z kristal today.  -call if worsening shortness of breath, fevers/chills.  -Influenza/ RSV swab sent today     3. Anemia: called [facility] after the labs resulted. Has chronic anemia, likely related to chronic disease, renal failure, effect of chemotherapy. Had been on aranesp through November, but then hasn't received doses. Is a [employer] and preferred to avoid transfusions. Will explore resuming Aranesp through her local clinic in [place] and recheck labs in a couple days.    4. Osteoporosis/chronic compression fractures-On Calcium plus Vit D. Was initiated on denosumab on 9/22/16. Due in March 2017.    5. Lung transplant: follows with Dr. Clay    6. History of SCC of the skin. No new skin lesions.

## 2017-01-10 NOTE — Clinical Note
1/10/2017       RE: Melissa Collazo  [address] [place] [address]     Dear Colleague,    Thank you for referring your patient, Melissa Collazo, to the [facility]. Please see a copy of my visit note below.    Melissa Collazo is a 76 year old woman with metastatic colon cancer. She is here for evaluation after initiation of Lonsurf on 12/26/16.    Oncology HPI:  She has a history of right lung transplantation for chronic obstructive pulmonary disease, chronic kidney disease, osteoporosis, hypertension, and complex lung infections, including aspergillus and ZOILA. She has had prior treatment with Xeloda/Oxaliplatin, irinotecan and erbitux and avastin. She recently was found to have progressed on Irinotecan/Ertibux. She was initiated on Lonsurf on 12/26/16.    Interval history:  Melissa has noted very little side effect outside of fatigue with the Lonsurf. No N/V. No change to bowel movements. Eating and drinking OK. Is feeling ill in the past couple of days with an upper respiratory infection. Her granddaughter had similar illness. It started with a sore throat and has progressed with sinus drainage, chest congestion and shortenss of breath. No chest pain. Cough is productive of thin phlegm. Was seen by PCP last week and had a throat swab that was negative for strep.    Current Outpatient Prescriptions   Medication Sig Dispense Refill     Blood Glucose Calibration (CRIS DOC CONTROL) NORMAL SOLN daily as needed       Trifluridine-Tipiracil (LONSURF) 15-6.14 MG tablet Take 3 tablets by mouth 2 times daily Take within 1 hour after morning and evening meals on Days 1 thru 5 and 8 thru 12 of each 28 day cycle 60 tablet 0     lidocaine (LIDODERM) 5 % Patch Place 1 patch onto the skin every 24 hours 30 patch 3     tacrolimus (PROGRAF - GENERIC EQUIVALENT) 1 MG capsule Total dose of 3.5 mg twice daily 180 capsule 11     tacrolimus (PROGRAF - GENERIC EQUIVALENT) 0.5 MG capsule Take 1 capsule (0.5 mg) by mouth 2  "times daily Total dose of 3.5 mg twice daily 60 capsule 11     iohexol (OMNIPAQUE) 140 MG/ML SOLN solution Mix entire bottle (50ml) of contast with 600ml (20 ounces) of water and drink half 2 hrs prior to CT scan and half 1 hr prior to scan 1 vial 0     lidocaine-prilocaine (EMLA) cream APPLY TO PORT SITE ABOUT 45 TO 60 MINUTES PRIOR TO ACCESSING AS NEEDED FOR MODERATE PAIN 30 g 3     order for DME Equipment being ordered:   Hernia Belt     Nu form, BG- Cool comfort- medium,  4\"  Belt ring  3 1/4  Catalog # :  [id number] 1 each 11     ondansetron (ZOFRAN) 8 MG tablet TAKE ONE TABLET BY MOUTH THREE TIMES A DAY BEFORE MEALS 90 tablet 3     dapsone 25 MG tablet TAKE TWO TABLETS BY MOUTH THREE TIMES WEEKLY (MONDAY/WEDNESDAY/FRIDAY) 24 tablet 11     predniSONE (DELTASONE) 5 MG tablet TAKE ONE TABLET BY MOUTH EVERY MORNING 30 tablet 11     zinc sulfate (ZINCATE) 220 MG capsule TAKE ONE CAPSULE BY MOUTH EVERY DAY 30 capsule 11     predniSONE (DELTASONE) 2.5 MG tablet TAKE ONE TABLET BY MOUTH EVERY EVENING 30 tablet 11     sodium bicarbonate 650 MG tablet Take 2 tablets (1,300 mg) by mouth 2 times daily 540 tablet 3     insulin glargine (LANTUS SOLOSTAR) 100 UNIT/ML PEN Inject 4 Units Subcutaneous At Bedtime       OLANZapine (ZYPREXA) 5 MG tablet Take 1 tablet (5 mg) by mouth nightly as needed for nausea 30 tablet 2     order for DME Equipment being ordered: TENS 1 Units 0     order for DME Equipment being ordered: TENS 1 Units 0     albuterol (PROAIR HFA, PROVENTIL HFA, VENTOLIN HFA) 108 (90 BASE) MCG/ACT inhaler Inhale 2 puffs into the lungs every 6 hours as needed for shortness of breath / dyspnea or wheezing 3 Inhaler 3     montelukast (SINGULAIR) 10 MG tablet Take 1 tablet (10 mg) by mouth every evening 90 tablet 3     fluticasone-salmeterol (ADVAIR) 500-50 MCG/DOSE diskus inhaler Inhale 1 puff into the lungs 2 times daily 3 Inhaler 3     doxazosin (CARDURA) 2 MG tablet Take 2 tablets (4 mg) by mouth At Bedtime 60 " tablet 6     azithromycin (ZITHROMAX) 250 MG tablet TAKE ONE-HALF TABLET BY MOUTH EVERY DAY 15 tablet 11     omeprazole (PRILOSEC) 40 MG capsule Take 1 capsule (40 mg) by mouth daily 90 capsule 3     amLODIPine (NORVASC) 5 MG tablet Take 2 tablets (10 mg) by mouth At Bedtime 180 tablet 3     HYDROmorphone (DILAUDID) 2 MG tablet Take 1-2 tablets (2-4 mg) by mouth every 3 hours as needed for moderate to severe pain 100 tablet 0     atorvastatin (LIPITOR) 20 MG tablet Take 1 tablet (20 mg) by mouth daily 30 tablet      blood glucose monitoring (NO BRAND SPECIFIED) test strip TEST 4 TIMES DAILY.       insulin aspart (NOVOLOG PEN) 100 UNIT/ML soln 4 units three times daily plus correction:140-214 give 1 unit; 215-289 give 2 units; 290-365 give 3 units and call MD   NOVOLOG       insulin pen needle 32G X 4 MM USE AS DIRECTED FOR ADMINISTERING INSULIN AT HOME.       aspirin EC 81 MG tablet Take 81 mg by mouth daily        cyanocobalamin (VITAMIN B-12 ER) 1000 MCG TBCR Take 1,000 mcg by mouth every 7 days        levothyroxine (SYNTHROID, LEVOTHROID) 50 MCG tablet Take 50 mcg by mouth daily        prochlorperazine (COMPAZINE) 5 MG tablet Take 1 tablet (5 mg) by mouth every 6 hours as needed for vomiting 120 tablet 1     order for DME Lightweight Wheelchair with leg rests. 1 Units 0     butalbital-acetaminophen-caffeine (FIORICET, ESGIC) -40 MG per tablet Take 1 tablet by mouth every 4 hours as needed for headaches       order for DME Please dispense 1 hair prosthesis. 1 Units 0     Magnesium Oxide 250 MG TABS Take 2 tablets (500 mg) by mouth At Bedtime 180 tablet 3     loperamide (IMODIUM) 2 MG capsule Take 1 capsule (2 mg) by mouth 3 times daily as needed for other (greater than 1200mL ileostomy output in a 24 hour period.  please call colon and rectal surgery clinic to notify if patient is requiring Imodium.) 20 capsule      ORDER FOR DME Equipment being ordered: ileostomy supplies 1 Month 11     ascorbic acid 500 MG  TABS Take 1 tablet (500 mg) by mouth daily 30 tablet 1     Calcium Carbonate-Vitamin D (CALCIUM 500 + D PO) Take 1 tablet by mouth 2 times daily        Multiple Vitamin (MULTI-VITAMIN) per tablet Take 1 tablet by mouth daily.       Exam: alert, appears fatigued. Blood pressure 115/58, pulse 109, temperature 97.1  F (36.2  C), temperature source Oral, resp. rate 18, weight 51.5 kg (113 lb 8.6 oz), SpO2 94 %, not currently breastfeeding.  Oropharynx is moist, no focal lesion. Bilateral ear exam show scarred TM bilaterally. No effusion or drainage. Neck supple and without adenopathy. Lungs: crackles RUL, clear RLL, decreased left diminished    Labs:Results for DOUG DE LOS SANTOS (MRN [id number]) as of 1/10/2017 12:34   Ref. Range 1/10/2017 09:44   Sodium Latest Ref Range: 133-144 mmol/L 138   Potassium Latest Ref Range: 3.4-5.3 mmol/L 4.9   Chloride Latest Ref Range:  mmol/L 108   Carbon Dioxide Latest Ref Range: 20-32 mmol/L 23   Urea Nitrogen Latest Ref Range: 7-30 mg/dL 30   Creatinine Latest Ref Range: 0.52-1.04 mg/dL 1.65 (H)   GFR Estimate Latest Ref Range: >60 mL/min/1.7m2 30 (L)   GFR Estimate If Black Latest Ref Range: >60 mL/min/1.7m2 37 (L)   Calcium Latest Ref Range: 8.5-10.1 mg/dL 7.6 (L)   Anion Gap Latest Ref Range: 3-14 mmol/L 7   Albumin Latest Ref Range: 3.4-5.0 g/dL 3.0 (L)   Protein Total Latest Ref Range: 6.8-8.8 g/dL 5.9 (L)   Bilirubin Total Latest Ref Range: 0.2-1.3 mg/dL 0.4   Alkaline Phosphatase Latest Ref Range:  U/L 234 (H)   ALT Latest Ref Range: 0-50 U/L 28   AST Latest Ref Range: 0-45 U/L 18   Glucose Latest Ref Range: 70-99 mg/dL 106 (H)   WBC Latest Ref Range: 4.0-11.0 10e9/L 6.7   Hemoglobin Latest Ref Range: 11.7-15.7 g/dL 6.6 (LL)   Hematocrit Latest Ref Range: 35.0-47.0 % 22.2 (L)   Platelet Count Latest Ref Range: 150-450 10e9/L 212   RBC Count Latest Ref Range: 3.8-5.2 10e12/L 2.62 (L)   MCV Latest Ref Range:  fl 85   MCH Latest Ref Range: 26.5-33.0 pg 25.2 (L)    MCHC Latest Ref Range: 31.5-36.5 g/dL 29.7 (L)   RDW Latest Ref Range: 10.0-15.0 % 21.9 (H)   Diff Method Unknown Automated Method   % Neutrophils Latest Units: % 90.5   % Lymphocytes Latest Units: % 3.4   % Monocytes Latest Units: % 4.0   % Eosinophils Latest Units: % 1.3   % Basophils Latest Units: % 0.1   % Immature Granulocytes Latest Units: % 0.7   Nucleated RBCs Latest Ref Range: 0 /100 0   Absolute Neutrophil Latest Ref Range: 1.6-8.3 10e9/L 6.0   Absolute Lymphocytes Latest Ref Range: 0.8-5.3 10e9/L 0.2 (L)   Absolute Monocytes Latest Ref Range: 0.0-1.3 10e9/L 0.3   Absolute Eosinophils Latest Ref Range: 0.0-0.7 10e9/L 0.1   Absolute Basophils Latest Ref Range: 0.0-0.2 10e9/L 0.0   Abs Immature Granulocytes Latest Ref Range: 0-0.4 10e9/L 0.1   Absolute Nucleated RBC Unknown 0.0   INFLUENZA A AND B AND RSV PCR Unknown Rpt       Impression/plan:   1. Metastatic colorectal cancer, currently on Lonsurf, cycle 1  -Overall, has minimal side effects of the drug. Today's labs are pending. I will call her with results. We anticipate neutropenia/anemia with this treatment.   -She will receive neulasta today   -Will follow-up with me prior to cycle 2 with labs    2. Bronchitis: likely viral, but with immunosuppression and anticipated neutropenia, will start Z kristal today.  -call if worsening shortness of breath, fevers/chills.  -Influenza/ RSV swab sent today     3. Anemia: called [facility] after the labs resulted. Has chronic anemia, likely related to chronic disease, renal failure, effect of chemotherapy. Had been on aranesp through November, but then hasn't received doses. Is a [employer] and preferred to avoid transfusions. Will explore resuming Aranesp through her local clinic in [place] and recheck labs in a couple days.    4. Osteoporosis/chronic compression fractures-On Calcium plus Vit D. Was initiated on denosumab on 9/22/16. Due in March 2017.    5. Lung transplant: follows with Dr. Clay    6. History of  SCC of the skin. No new skin lesions.       Again, thank you for allowing me to participate in the care of your patient.      Sincerely,    ACACIA Williamson CNP

## 2017-01-10 NOTE — NURSING NOTE
Labs drawn today IN CLINIC VIA VENIPUNCTURE, see flow sheet.  Lashonda Blackwell, CMA - 1/10/2017 9:52 AM

## 2017-01-10 NOTE — MR AVS SNAPSHOT
After Visit Summary   1/10/2017    Melissa Collazo    MRN: [id number]           Patient Information     Date Of Birth          1940        Visit Information        Provider Department      1/10/2017 8:00 AM Rand Man APRN CNP [facility]        Today's Diagnoses     Chemotherapy-induced neutropenia (H)    -  1     Cancer of right colon (H)         Bronchitis            Follow-ups after your visit        Your next 10 appointments already scheduled     Jan 12, 2017  8:00 AM   LAB with [facility] ([facility])    [address] [facility] [address] [phone]           Patient must bring picture ID.  Patient should be prepared to give a urine specimen  Please do not eat 10-12 hours before your appointment if you are coming in fasting for labs on lipids, cholesterol, or glucose (sugar).  Pregnant women should follow their Care Team instructions. Water with medications is okay. Do not drink coffee or other fluids.   If you have concerns about taking  your medications, please ask at office or if scheduling via [employer], send a message by clicking on Secure Messaging, Message Your Care Team.            Jan 24, 2017  7:30 AM   [facility] Lab Draw with  [email or web address] LAB DRAW   [facility] ([facility])    [address] [facility]  2nd [facility] [address] [phone]            Jan 24, 2017  8:00 AM   (Arrive by 7:45 AM)   Return Visit with ACACIA Reid CNP [facility] ([facility])    [address]  2nd Floor  [facility] [address] [phone]            Feb 23, 2017  3:15 PM   Lab with  LAB   [facility] ([facility])    [address]  1st Floor  [facility] [address]   [phone]            Feb 23, 2017  4:00 PM   (Arrive by 3:30 PM)   Return Visit with Tavares Gomez MD     [facility] ([facility])    [address] [place] [id number] [phone]            Mar 20, 2017 10:00 AM   Lab with  LAB   [facility] ([facility])    [address] [place] [address] [phone]            Mar 20, 2017 10:15 AM   (Arrive by 10:00 AM)   XR CHEST 2 VIEWS with UCXR1   [facility] Xray ([facility])    [address] [place] [id number] [phone]           Please bring a list of your current medicines to your exam. (Include vitamins, minerals and over-thecounter medicines.) Leave your valuables at home.  Tell your doctor if there is a chance you may be pregnant.  You do not need to do anything special for this exam.            Mar 20, 2017 10:30 AM   PFT VISIT with  PFL B   [facility] Pulmonary Function Testing ([facility])    [address] [place] [id number] [phone]            Mar 20, 2017 11:20 AM   (Arrive by 11:05 AM)   Return Lung Transplant with Marco Clay MD   [facility] ([facility])    [address] [place] [id number] [phone]              Who to contact     If you have questions or need follow up information about today's clinic visit or your schedule please contact [facility] directly at [phone].  Normal or non-critical lab and imaging results will be communicated to you by MyChart, letter or phone within 4 business days after the clinic has received the results. If you do not hear from us within 7 days, please contact the clinic through MyChart or phone. If you have a critical or abnormal lab result, we will notify you by phone as soon as possible.  Submit refill requests through [employer] or call your pharmacy and they will forward  "the refill request to us. Please allow 3 business days for your refill to be completed.          Additional Information About Your Visit        [facility] Information     Get[employer] lets you send messages to your doctor, view your test results, renew your prescriptions, schedule appointments and more. To sign up, go to [email or web address] . Click on \"Log in\" on the left side of the screen, which will take you to the Welcome page. Then click on \"Sign up Now\" on the right side of the page.     You will be asked to enter the access code listed below, as well as some personal information. Please follow the directions to create your username and password.     Your access code is: [email or web address]  Expires: 3/8/2017  4:28 PM     Your access code will  in 90 days. If you need help or a new code, please call your [facility] clinic or [phone].        Care EveryWhere ID     This is your Care EveryWhere ID. This could be used by other organizations to access your [facility] medical records  [id number]        Your Vitals Were     Pulse Temperature Respirations Pulse Oximetry          109 97.1  F (36.2  C) (Oral) 18 94%         Blood Pressure from Last 3 Encounters:   01/10/17 115/58   16 99/60   12/15/16 108/72    Weight from Last 3 Encounters:   01/10/17 51.5 kg (113 lb 8.6 oz)   16 50.576 kg (111 lb 8 oz)   12/15/16 51.26 kg (113 lb 0.1 oz)              We Performed the Following     CBC with platelets differential     Comprehensive metabolic panel     Influenza A and B and RSV PCR          Today's Medication Changes          These changes are accurate as of: 1/10/17  9:38 AM.  If you have any questions, ask your nurse or doctor.               These medicines have changed or have updated prescriptions.        Dose/Directions    * azithromycin 250 MG tablet   Commonly known as:  ZITHROMAX   This may have changed:  Another medication with the same name was added. Make sure you understand how and when to take " each.   Used for:  Lung replaced by transplant (H)   Changed by:  Marco Clay MD        TAKE ONE-HALF TABLET BY MOUTH EVERY DAY   Quantity:  15 tablet   Refills:  11       * azithromycin 250 MG tablet   Commonly known as:  ZITHROMAX   This may have changed:  You were already taking a medication with the same name, and this prescription was added. Make sure you understand how and when to take each.   Used for:  Bronchitis   Changed by:  Rand Man, ACACIA CNP        Take 2 tablets po on day 1 and 1 tablet daily on days 2-5   Quantity:  6 tablet   Refills:  0       * Notice:  This list has 2 medication(s) that are the same as other medications prescribed for you. Read the directions carefully, and ask your doctor or other care provider to review them with you.         Where to get your medicines      These medications were sent to [id number] - [place], [place] - [address]  [address], [facility] [address]     Phone:  [phone]    - azithromycin 250 MG tablet             Primary Care Provider Office Phone # Fax #    Rosette RIVERA Riley [phone]       [facility] [address] [facility] [address]        Thank you!     Thank you for choosing [facility]  for your care. Our goal is always to provide you with excellent care. Hearing back from our patients is one way we can continue to improve our services. Please take a few minutes to complete the written survey that you may receive in the mail after your visit with us. Thank you!             Your Updated Medication List - Protect others around you: Learn how to safely use, store and throw away your medicines at www.disposemymeds.org.          This list is accurate as of: 1/10/17  9:38 AM.  Always use your most recent med list.                   Brand Name Dispense Instructions for use    albuterol 108 (90 BASE) MCG/ACT Inhaler    PROAIR HFA/PROVENTIL HFA/VENTOLIN HFA    3  Inhaler    Inhale 2 puffs into the lungs every 6 hours as needed for shortness of breath / dyspnea or wheezing       amLODIPine 5 MG tablet    NORVASC    180 tablet    Take 2 tablets (10 mg) by mouth At Bedtime       ascorbic acid 500 MG Tabs     30 tablet    Take 1 tablet (500 mg) by mouth daily       aspirin EC 81 MG EC tablet      Take 81 mg by mouth daily       atorvastatin 20 MG tablet    LIPITOR    30 tablet    Take 1 tablet (20 mg) by mouth daily       * azithromycin 250 MG tablet    ZITHROMAX    15 tablet    TAKE ONE-HALF TABLET BY MOUTH EVERY DAY       * azithromycin 250 MG tablet    ZITHROMAX    6 tablet    Take 2 tablets po on day 1 and 1 tablet daily on days 2-5       blood glucose monitoring test strip    no brand specified     TEST 4 TIMES DAILY.       butalbital-acetaminophen-caffeine -40 MG per tablet    FIORICET/ESGIC     Take 1 tablet by mouth every 4 hours as needed for headaches       CALCIUM 500 + D PO      Take 1 tablet by mouth 2 times daily       cyanocobalamin 1000 MCG Tbcr    VITAMIN B-12 ER     Take 1,000 mcg by mouth every 7 days       dapsone 25 MG tablet     24 tablet    TAKE TWO TABLETS BY MOUTH THREE TIMES WEEKLY (MONDAY/WEDNESDAY/FRIDAY)       doxazosin 2 MG tablet    CARDURA    60 tablet    Take 2 tablets (4 mg) by mouth At Bedtime       fluticasone-salmeterol 500-50 MCG/DOSE diskus inhaler    ADVAIR    3 Inhaler    Inhale 1 puff into the lungs 2 times daily       HYDROmorphone 2 MG tablet    DILAUDID    100 tablet    Take 1-2 tablets (2-4 mg) by mouth every 3 hours as needed for moderate to severe pain       insulin aspart 100 UNIT/ML injection    NovoLOG PEN     4 units three times daily plus correction:140-214 give 1 unit; 215-289 give 2 units; 290-365 give 3 units and call MD   NOVOLOG       insulin pen needle 32G X 4 MM      USE AS DIRECTED FOR ADMINISTERING INSULIN AT HOME.       iohexol 140 MG/ML Soln solution    OMNIPAQUE    1 vial    Mix entire bottle (50ml) of  "contast with 600ml (20 ounces) of water and drink half 2 hrs prior to CT scan and half 1 hr prior to scan       LANTUS SOLOSTAR 100 UNIT/ML injection   Generic drug:  insulin glargine      Inject 4 Units Subcutaneous At Bedtime       levothyroxine 50 MCG tablet    SYNTHROID/LEVOTHROID     Take 50 mcg by mouth daily       lidocaine 5 % Patch    LIDODERM    30 patch    Place 1 patch onto the skin every 24 hours       lidocaine-prilocaine cream    EMLA    30 g    APPLY TO PORT SITE ABOUT 45 TO 60 MINUTES PRIOR TO ACCESSING AS NEEDED FOR MODERATE PAIN       loperamide 2 MG capsule    IMODIUM    20 capsule    Take 1 capsule (2 mg) by mouth 3 times daily as needed for other (greater than 1200mL ileostomy output in a 24 hour period.  please call colon and rectal surgery clinic to notify if patient is requiring Imodium.)       Magnesium Oxide 250 MG Tabs     180 tablet    Take 2 tablets (500 mg) by mouth At Bedtime       montelukast 10 MG tablet    SINGULAIR    90 tablet    Take 1 tablet (10 mg) by mouth every evening       Multi-vitamin Tabs tablet   Generic drug:  multivitamin, therapeutic with minerals      Take 1 tablet by mouth daily.       omeprazole 40 MG capsule    priLOSEC    90 capsule    Take 1 capsule (40 mg) by mouth daily       ondansetron 8 MG tablet    ZOFRAN    90 tablet    TAKE ONE TABLET BY MOUTH THREE TIMES A DAY BEFORE MEALS       * order for DME     1 Month    Equipment being ordered: ileostomy supplies       * order for DME     1 Units    Please dispense 1 hair prosthesis.       * order for DME     1 Units    Lightweight Wheelchair with leg rests.       * order for DME     1 Units    Equipment being ordered: TENS       * order for DME     1 Units    Equipment being ordered: TENS       * order for DME     1 each    Equipment being ordered:  Hernia Belt   Nu form, BG- Cool comfort- medium,  4\"  Belt ring  3 1/4  Catalog # :  [id number]       * predniSONE 5 MG tablet    DELTASONE    30 tablet    TAKE " ONE TABLET BY MOUTH EVERY MORNING       * predniSONE 2.5 MG tablet    DELTASONE    30 tablet    TAKE ONE TABLET BY MOUTH EVERY EVENING       prochlorperazine 5 MG tablet    COMPAZINE    120 tablet    Take 1 tablet (5 mg) by mouth every 6 hours as needed for vomiting       sodium bicarbonate 650 MG tablet     540 tablet    Take 2 tablets (1,300 mg) by mouth 2 times daily       * tacrolimus 1 MG capsule    PROGRAF - GENERIC EQUIVALENT    180 capsule    Total dose of 3.5 mg twice daily       * tacrolimus 0.5 MG capsule    PROGRAF - GENERIC EQUIVALENT    60 capsule    Take 1 capsule (0.5 mg) by mouth 2 times daily Total dose of 3.5 mg twice daily       CRIS DOC CONTROL NORMAL Soln      daily as needed       Trifluridine-Tipiracil 15-6.14 MG tablet    LONSURF    60 tablet    Take 3 tablets by mouth 2 times daily Take within 1 hour after morning and evening meals on Days 1 thru 5 and 8 thru 12 of each 28 day cycle       zinc sulfate 220 MG capsule    ZINCATE    30 capsule    TAKE ONE CAPSULE BY MOUTH EVERY DAY       zyPREXA 5 MG tablet   Generic drug:  OLANZapine     30 tablet    Take 1 tablet (5 mg) by mouth nightly as needed for nausea       * Notice:  This list has 12 medication(s) that are the same as other medications prescribed for you. Read the directions carefully, and ask your doctor or other care provider to review them with you.

## 2017-01-11 NOTE — PROGRESS NOTES
[facility] to [facility] Melissa for getting Aranesp injections there.  They will need orders and they will complete a PA and order the drug so she should be set up for her Feb injections.   Called Melissa to let her know.  She is also wanting to see Rand on 1/20 to get her lonsurf and start on Monday 1/23 as it is easier to keep track.  Let her know I would contact Rand and get back to her.  Orders and labs have been faxed ot [facility] at [phone].    Melissa will be coming here tomorrow to get her labs and aranesp injection.  She has been made aware of the plan and verbalizes understanding.

## 2017-01-12 NOTE — PROGRESS NOTES
Melissa came in today for her aranesp injection.  Labs were within the parameters, hgb 7.0.  Injection of aranesp 500mcg/ml was given without complications to the left lower quadrant of her abdomen.  She tolerated the procedure well.  She was informed that her next injection was due on Feb 2.

## 2017-01-12 NOTE — NURSING NOTE
Chief Complaint   Patient presents with     Port Draw     Port in right chest wall accessed with 3/4 in gripper needle.  Labs drawn and port flushed with NS and heparin prior to de-accessing   Carline Nguyen RN

## 2017-01-12 NOTE — PROGRESS NOTES
Tacrolimus level 7 at 12 hours, on 1/12/17  Goal 8-10.   Current dose 3.5 mg in AM, 3.5 mg in PM    Dose changed to  4 mg in AM, 3.5 mg in PM   Recheck level in 7-10 days    Discussed with Melissa

## 2017-01-16 NOTE — TELEPHONE ENCOUNTER
Pt called today reporting erbitux facial rash has returned. It had been almost gone (just looked like rosacea) but came back in the past 3 days. Many red, raised, painful BB sized, some are scabbed over. On her nose, chin, eyelids, in her eyebrows.  Pt has not had erbitux since 12/15 but states rash is very similar. She is about to switch to lonsurf.  She also reports her right  hand is swollen and her right pinky finger is very sore. Both her wrists are sore. She sent pics which were reviewed by Dr Schmid.  Pt received aranesp on 1/12. Per Dr schmid we will move her 1/20 follow up appt up to tomorrow for evaluation with Rand. Pt is aware and happy with this plan.

## 2017-01-17 NOTE — Clinical Note
1/17/2017       RE: Melissa Collazo  [address] [place] [address]     Dear Colleague,    Thank you for referring your patient, Melissa Collazo, to the [facility]. Please see a copy of my visit note below.    Chief Complaint   Patient presents with     Port Draw     Labs drawn by RN from port. VS taken.      FELTON MAYS RN      Melissa Collazo is seen for evaluation of rash and hand pain/redness. She has metastatic colon cancer, currently on treatment with Lonsurf.    Oncology HPI:  She has a history of right lung transplantation for chronic obstructive pulmonary disease, chronic kidney disease, osteoporosis, hypertension, and complex lung infections, including aspergillus and ZOILA. She has had prior treatment with Xeloda/Oxaliplatin, irinotecan and erbitux and avastin. She recently was found to have progressed on Irinotecan/Ertibux. She was initiated on Lonsurf on 12/26/16.    Interval history: Over the past few days, Melissa has noted a painful, raised rash on the face. She thought it might be the rash she had with Erbitux, but it didn't get better with that treatment. She has also noted painful swelling and redness on the right hand and left wrist. She is feeling tired. The hand pain is very fatiguing. She is using dilaudid for that. No mouth sores. No eye pain or vision changes. No rashes elsewhere, but the facial rash extends now to the right jaw.      Exam: alert, appears fatigued, chronically ill. Blood pressure 132/62, pulse 115, temperature 98.4  F (36.9  C), resp. rate 16, weight 52.8 kg (116 lb 6.5 oz), SpO2 94 %, not currently breastfeeding.  Oropharynx moist, small white (0.5 cm lesion noted on hard palate). Lungs: Crackles in the left lung. Heart:RRR. Abdomen: soft, nontender. Skin: erythematous, scaling, tender lesions on the face with a cluster around the left eye and right jaw. Right LE shows edema, erythema and tenderness from the mid-forearm to the fingers. L wrist with  erythema and induration      Labs:  Results for DOUG DE LOS SANTOS (MRN [id number]) as of 1/17/2017 17:11   Ref. Range 1/17/2017 15:21   Sodium Latest Ref Range: 133-144 mmol/L 134   Potassium Latest Ref Range: 3.4-5.3 mmol/L 5.2   Chloride Latest Ref Range:  mmol/L 104   Carbon Dioxide Latest Ref Range: 20-32 mmol/L 22   Urea Nitrogen Latest Ref Range: 7-30 mg/dL 19   Creatinine Latest Ref Range: 0.52-1.04 mg/dL 1.78 (H)   GFR Estimate Latest Ref Range: >60 mL/min/1.7m2 28 (L)   GFR Estimate If Black Latest Ref Range: >60 mL/min/1.7m2 33 (L)   Calcium Latest Ref Range: 8.5-10.1 mg/dL 7.7 (L)   Anion Gap Latest Ref Range: 3-14 mmol/L 8   Glucose Latest Ref Range: 70-99 mg/dL 223 (H)   WBC Latest Ref Range: 4.0-11.0 10e9/L 27.4 (H)   Hemoglobin Latest Ref Range: 11.7-15.7 g/dL 7.5 (L)   Hematocrit Latest Ref Range: 35.0-47.0 % 25.9 (L)   Platelet Count Latest Ref Range: 150-450 10e9/L 342   RBC Count Latest Ref Range: 3.8-5.2 10e12/L 2.84 (L)   MCV Latest Ref Range:  fl 91   MCH Latest Ref Range: 26.5-33.0 pg 26.4 (L)   MCHC Latest Ref Range: 31.5-36.5 g/dL 29.0 (L)   RDW Latest Ref Range: 10.0-15.0 % 25.5 (H)   Diff Method Unknown Manual Differential   % Neutrophils Latest Units: % 94.8   % Lymphocytes Latest Units: % 0.8   % Monocytes Latest Units: % 1.7   % Eosinophils Latest Units: % 0.0   % Basophils Latest Units: % 0.0   % Metamyelocytes Latest Units: % 0.9   % Myelocytes Latest Units: % 0.9   % Promyelocytes Latest Units: % 0.9   Absolute Neutrophil Latest Ref Range: 1.6-8.3 10e9/L 26.0 (H)   Absolute Lymphocytes Latest Ref Range: 0.8-5.3 10e9/L 0.2 (L)   Absolute Monocytes Latest Ref Range: 0.0-1.3 10e9/L 0.5   Absolute Eosinophils Latest Ref Range: 0.0-0.7 10e9/L 0.0   Absolute Basophils Latest Ref Range: 0.0-0.2 10e9/L 0.0   Absolute Metamyelocytes Latest Ref Range: 0 10e9/L 0.2 (H)   Absolute Myelocytes Latest Ref Range: 0 10e9/L 0.2 (H)   Absolute Promyelocytes Latest Ref Range: 0 10e9/L 0.2 (H)    Anisocytosis Unknown Marked   Poikilocytosis Unknown Marked   RBC Fragments Unknown Moderate   Acanthocytes Unknown Moderate   Ovalocytes Unknown Moderate   Elliptocytes Unknown Slight     Impression/plan:    1. Right hand/left wrist cellulitis:   -She is immune compromised given her transplant history and recent neutropenia. At present her ANC is adequate to treat as an outpatient. Has leukocytosis, likely related to infection, but also could be effect of Neulasta, given on 1/10/17. However, recommend that if redness/pain/swelling worsen, we would admit for IV antibiotics.  -Was poorly tolerant of prior treatment with augmentin and doxycycline ( N/V and dehydration). Will initiate linezolid 600 mg bid and return to clinic to 2 days for reassessment.    2. Facial rash, possible zoster. Will initiate Valtrex 1 gm daily (dose adjusted for Cr Cl of 22). Re-evaluate in 2 days.    3. Pain: secondary to cellulitis/zoster. Renewed script of hydromorphone today.     4 Metastatic colon cancer. Due for Lonsurf next week.Will need to defer. Will make a decision on when she may be able to start after re-evaluating rash/cellulitis.    5. Chronic anemia: worsened while on Lonsurf. Getting Aranesp every 3 weeks at her local clinic. Discussed transfusion needs. She would like to avoid transfusions for An[employer] reasons, but would consider it in an emergency. Hgb is improving, so will hold on that.    6. Osteoporosis/chronic compression fractures-On Calcium plus Vit D. Was initiated on denosumab on 9/22/16. Due in March 2017.    7. Lung transplant: follows with Dr. Clay       Again, thank you for allowing me to participate in the care of your patient.      Sincerely,    ACACIA Williamson CNP

## 2017-01-17 NOTE — NURSING NOTE
"Melissa Collazo is a 76 year old female who presents for:  Chief Complaint   Patient presents with     Port Draw     Labs drawn by RN from port. VS taken.      Oncology Clinic Visit     colon ca        Initial Vitals:  /62 mmHg  Pulse 115  Temp(Src) 98.4  F (36.9  C)  Resp 16  Wt 52.8 kg (116 lb 6.5 oz)  SpO2 94% Estimated body mass index is 23.91 kg/(m^2) as calculated from the following:    Height as of 12/19/16: 1.486 m (4' 10.5\").    Weight as of this encounter: 52.8 kg (116 lb 6.5 oz).. There is no height on file to calculate BSA. BP completed using cuff size: NA (Not Taken)  Worst Pain (10) No LMP recorded. Patient is postmenopausal. Allergies and medications reviewed.     Medications: Medication refills not needed today.  Pharmacy name entered into EPIC:    [facility] - [place], [place] - [address] [place], [place] [address] [id number] [place], [place] - [address] [facility] - [place], [place] - [address] [place] [address] [phone]    Comments: pain with blisters on face, both hands and rates it at a 10. Need refills for hyderomorphone     7 minutes for nursing intake (face to face time)   Neetu Smith CMA          "

## 2017-01-17 NOTE — PROGRESS NOTES
Doug De Los Santos is seen for evaluation of rash and hand pain/redness. She has metastatic colon cancer, currently on treatment with Lonsurf.    Oncology HPI:  She has a history of right lung transplantation for chronic obstructive pulmonary disease, chronic kidney disease, osteoporosis, hypertension, and complex lung infections, including aspergillus and ZOILA. She has had prior treatment with Xeloda/Oxaliplatin, irinotecan and erbitux and avastin. She recently was found to have progressed on Irinotecan/Ertibux. She was initiated on Lonsurf on 12/26/16.    Interval history: Over the past few days, Doug has noted a painful, raised rash on the face. She thought it might be the rash she had with Erbitux, but it didn't get better with that treatment. She has also noted painful swelling and redness on the right hand and left wrist. She is feeling tired. The hand pain is very fatiguing. She is using dilaudid for that. No mouth sores. No eye pain or vision changes. No rashes elsewhere, but the facial rash extends now to the right jaw.      Exam: alert, appears fatigued, chronically ill. Blood pressure 132/62, pulse 115, temperature 98.4  F (36.9  C), resp. rate 16, weight 52.8 kg (116 lb 6.5 oz), SpO2 94 %, not currently breastfeeding.  Oropharynx moist, small white (0.5 cm lesion noted on hard palate). Lungs: Crackles in the left lung. Heart:RRR. Abdomen: soft, nontender. Skin: erythematous, scaling, tender lesions on the face with a cluster around the left eye and right jaw. Right LE shows edema, erythema and tenderness from the mid-forearm to the fingers. L wrist with erythema and induration      Labs:  Results for DOUG DE LOS SANTOS (MRN [id number]) as of 1/17/2017 17:11   Ref. Range 1/17/2017 15:21   Sodium Latest Ref Range: 133-144 mmol/L 134   Potassium Latest Ref Range: 3.4-5.3 mmol/L 5.2   Chloride Latest Ref Range:  mmol/L 104   Carbon Dioxide Latest Ref Range: 20-32 mmol/L 22   Urea Nitrogen Latest Ref Range: 7-30  mg/dL 19   Creatinine Latest Ref Range: 0.52-1.04 mg/dL 1.78 (H)   GFR Estimate Latest Ref Range: >60 mL/min/1.7m2 28 (L)   GFR Estimate If Black Latest Ref Range: >60 mL/min/1.7m2 33 (L)   Calcium Latest Ref Range: 8.5-10.1 mg/dL 7.7 (L)   Anion Gap Latest Ref Range: 3-14 mmol/L 8   Glucose Latest Ref Range: 70-99 mg/dL 223 (H)   WBC Latest Ref Range: 4.0-11.0 10e9/L 27.4 (H)   Hemoglobin Latest Ref Range: 11.7-15.7 g/dL 7.5 (L)   Hematocrit Latest Ref Range: 35.0-47.0 % 25.9 (L)   Platelet Count Latest Ref Range: 150-450 10e9/L 342   RBC Count Latest Ref Range: 3.8-5.2 10e12/L 2.84 (L)   MCV Latest Ref Range:  fl 91   MCH Latest Ref Range: 26.5-33.0 pg 26.4 (L)   MCHC Latest Ref Range: 31.5-36.5 g/dL 29.0 (L)   RDW Latest Ref Range: 10.0-15.0 % 25.5 (H)   Diff Method Unknown Manual Differential   % Neutrophils Latest Units: % 94.8   % Lymphocytes Latest Units: % 0.8   % Monocytes Latest Units: % 1.7   % Eosinophils Latest Units: % 0.0   % Basophils Latest Units: % 0.0   % Metamyelocytes Latest Units: % 0.9   % Myelocytes Latest Units: % 0.9   % Promyelocytes Latest Units: % 0.9   Absolute Neutrophil Latest Ref Range: 1.6-8.3 10e9/L 26.0 (H)   Absolute Lymphocytes Latest Ref Range: 0.8-5.3 10e9/L 0.2 (L)   Absolute Monocytes Latest Ref Range: 0.0-1.3 10e9/L 0.5   Absolute Eosinophils Latest Ref Range: 0.0-0.7 10e9/L 0.0   Absolute Basophils Latest Ref Range: 0.0-0.2 10e9/L 0.0   Absolute Metamyelocytes Latest Ref Range: 0 10e9/L 0.2 (H)   Absolute Myelocytes Latest Ref Range: 0 10e9/L 0.2 (H)   Absolute Promyelocytes Latest Ref Range: 0 10e9/L 0.2 (H)   Anisocytosis Unknown Marked   Poikilocytosis Unknown Marked   RBC Fragments Unknown Moderate   Acanthocytes Unknown Moderate   Ovalocytes Unknown Moderate   Elliptocytes Unknown Slight     Impression/plan:    1. Right hand/left wrist cellulitis:   -She is immune compromised given her transplant history and recent neutropenia. At present her ANC is adequate to  treat as an outpatient. Has leukocytosis, likely related to infection, but also could be effect of Neulasta, given on 1/10/17. However, recommend that if redness/pain/swelling worsen, we would admit for IV antibiotics.  -Was poorly tolerant of prior treatment with augmentin and doxycycline ( N/V and dehydration). Will initiate linezolid 600 mg bid and return to clinic to 2 days for reassessment.    2. Facial rash, possible zoster. Will initiate Valtrex 1 gm daily (dose adjusted for Cr Cl of 22). Re-evaluate in 2 days.    3. Pain: secondary to cellulitis/zoster. Renewed script of hydromorphone today.     4 Metastatic colon cancer. Due for Lonsurf next week.Will need to defer. Will make a decision on when she may be able to start after re-evaluating rash/cellulitis.    5. Chronic anemia: worsened while on Lonsurf. Getting Aranesp every 3 weeks at her local clinic. Discussed transfusion needs. She would like to avoid transfusions for Zoroastrianism reasons, but would consider it in an emergency. Hgb is improving, so will hold on that.    6. Osteoporosis/chronic compression fractures-On Calcium plus Vit D. Was initiated on denosumab on 9/22/16. Due in March 2017.    7. Lung transplant: follows with Dr. Clay

## 2017-01-17 NOTE — PROGRESS NOTES
Chief Complaint   Patient presents with     Port Draw     Labs drawn by RN from port. VS taken.      FELTON MAYS RN

## 2017-01-17 NOTE — MR AVS SNAPSHOT
After Visit Summary   1/17/2017    Melissa Collazo    MRN: [id number]           Patient Information     Date Of Birth          1940        Visit Information        Provider Department      1/17/2017 3:40 PM Rand Man APRN CNP [facility]        Today's Diagnoses     Cellulitis of right upper extremity    -  1     Cancer of right colon (H)         Cellulitis of wrist         Herpes zoster with other complication         Metastatic colon cancer to liver (H)         Uncontrolled pain            Follow-ups after your visit        Your next 10 appointments already scheduled     Jan 20, 2017  3:45 PM   [facility] Draw with  [facility]   [facility] ([place])    [address] [facility]  2nd Floor  [facility] [address]   [phone]            Jan 20, 2017  4:20 PM   (Arrive by 4:05 PM)   Return Visit with ACACIA Reid CNP   [facility] ([place])    [address] [place] [address]  [address] [facility] [address]   [phone]            Feb 23, 2017  3:15 PM   Lab with PAULETTE LAB   [facility] ([place])    [address] [place] [address]  [address] [facility] [address]   [phone]            Feb 23, 2017  4:00 PM   (Arrive by 3:30 PM)   Return Visit with Tavares Gomez MD   [facility] ([place])    [address] [place] [address]  3rd Floor  [facility] [address]   [phone]            Mar 20, 2017 10:00 AM   Lab with UC LAB   [facility] ([place])    [address] [place] [address]  [address] [facility] [address]   [phone]            Mar 20, 2017 10:15 AM   (Arrive by 10:00 AM)   XR CHEST 2 VIEWS with UCXR1   [facility] ([place])    [address] [place] [address]  [address] [facility] [address]   [phone]           Please bring a list of your  "current medicines to your exam. (Include vitamins, minerals and over-thecounter medicines.) Leave your valuables at home.  Tell your doctor if there is a chance you may be pregnant.  You do not need to do anything special for this exam.            Mar 20, 2017 10:30 AM   PFT VISIT with PAULETTE PFL B   [facility] Pulmonary Function Testing ([facility])    [address] [address] [phone]            Mar 20, 2017 11:20 AM   (Arrive by 11:05 AM)   Return Lung Transplant with Marco Clay MD   [facility] ([facility])    [address] [place] [address] [phone]              Who to contact     If you have questions or need follow up information about today's clinic visit or your schedule please contact [facility] directly at [phone].  Normal or non-critical lab and imaging results will be communicated to you by [employer], letter or phone within 4 business days after the clinic has received the results. If you do not hear from us within 7 days, please contact the clinic through [employer] or phone. If you have a critical or abnormal lab result, we will notify you by phone as soon as possible.  Submit refill requests through [employer] or call your pharmacy and they will forward the refill request to us. Please allow 3 business days for your refill to be completed.          Additional Information About Your Visit        [employer] Information     [employer] lets you send messages to your doctor, view your test results, renew your prescriptions, schedule appointments and more. To sign up, go to [email or web address] . Click on \"Log in\" on the left side of the screen, which will take you to the Welcome page. Then click on \"Sign up Now\" on the right side of the page.     You will be asked to enter the access code listed below, as well as some personal " information. Please follow the directions to create your username and password.     Your access code is: [email or web address]  Expires: 3/8/2017  4:28 PM     Your access code will  in 90 days. If you need help or a new code, please call your [place] clinic or [phone].        Care EveryWhere ID     This is your Care EveryWhere ID. This could be used by other organizations to access your [facility] medical records  [id number]        Your Vitals Were     Pulse Temperature Respirations Pulse Oximetry          115 98.4  F (36.9  C) 16 94%         Blood Pressure from Last 3 Encounters:   17 132/62   01/10/17 115/58   16 99/60    Weight from Last 3 Encounters:   17 52.8 kg (116 lb 6.5 oz)   01/10/17 51.5 kg (113 lb 8.6 oz)   16 50.576 kg (111 lb 8 oz)              We Performed the Following     *CBC with platelets differential     Basic metabolic panel          Today's Medication Changes          These changes are accurate as of: 17  4:24 PM.  If you have any questions, ask your nurse or doctor.               Start taking these medicines.        Dose/Directions    linezolid 600 MG tablet   Commonly known as:  ZYVOX   Used for:  Cellulitis of right upper extremity, Cellulitis of wrist   Started by:  Rand Man APRN CNP        Dose:  600 mg   Take 1 tablet (600 mg) by mouth 2 times daily   Quantity:  20 tablet   Refills:  0       valACYclovir 1000 mg tablet   Commonly known as:  VALTREX   Used for:  Herpes zoster with other complication   Started by:  Rand Man APRN CNP        Dose:  1000 mg   Take 1 tablet (1,000 mg) by mouth daily   Quantity:  10 tablet   Refills:  0            Where to get your medicines      These medications were sent to [place], [place] - [address] [facility] [address] [place] [address] [facility] [address]    Hours:  TRANSPLANT PHONE NUMBER [phone] Phone:  [phone]    - linezolid 600 MG tablet  -  valACYclovir 1000 mg tablet      Some of these will need a paper prescription and others can be bought over the counter.  Ask your nurse if you have questions.     Bring a paper prescription for each of these medications    - HYDROmorphone 2 MG tablet             Primary Care Provider Office Phone # Fax #    Rosette Lin [phone]       [facility] [address] [place] [address]        Thank you!     Thank you for choosing [facility]  for your care. Our goal is always to provide you with excellent care. Hearing back from our patients is one way we can continue to improve our services. Please take a few minutes to complete the written survey that you may receive in the mail after your visit with us. Thank you!             Your Updated Medication List - Protect others around you: Learn how to safely use, store and throw away your medicines at www.disposemymeds.org.          This list is accurate as of: 1/17/17  4:24 PM.  Always use your most recent med list.                   Brand Name Dispense Instructions for use    albuterol 108 (90 BASE) MCG/ACT Inhaler    PROAIR HFA/PROVENTIL HFA/VENTOLIN HFA    3 Inhaler    Inhale 2 puffs into the lungs every 6 hours as needed for shortness of breath / dyspnea or wheezing       amLODIPine 5 MG tablet    NORVASC    180 tablet    Take 2 tablets (10 mg) by mouth At Bedtime       ascorbic acid 500 MG Tabs     30 tablet    Take 1 tablet (500 mg) by mouth daily       aspirin EC 81 MG EC tablet      Take 81 mg by mouth daily       atorvastatin 20 MG tablet    LIPITOR    30 tablet    Take 1 tablet (20 mg) by mouth daily       azithromycin 250 MG tablet    ZITHROMAX    15 tablet    TAKE ONE-HALF TABLET BY MOUTH EVERY DAY       blood glucose monitoring test strip    no brand specified     TEST 4 TIMES DAILY.       butalbital-acetaminophen-caffeine -40 MG per tablet    FIORICET/ESGIC     Take 1 tablet by mouth every 4 hours as  needed for headaches       CALCIUM 500 + D PO      Take 1 tablet by mouth 2 times daily       cyanocobalamin 1000 MCG Tbcr    VITAMIN B-12 ER     Take 1,000 mcg by mouth every 7 days       dapsone 25 MG tablet     24 tablet    TAKE TWO TABLETS BY MOUTH THREE TIMES WEEKLY (MONDAY/WEDNESDAY/FRIDAY)       doxazosin 2 MG tablet    CARDURA    60 tablet    Take 2 tablets (4 mg) by mouth At Bedtime       fluticasone-salmeterol 500-50 MCG/DOSE diskus inhaler    ADVAIR    3 Inhaler    Inhale 1 puff into the lungs 2 times daily       HYDROmorphone 2 MG tablet    DILAUDID    100 tablet    Take 1-2 tablets (2-4 mg) by mouth every 3 hours as needed for moderate to severe pain       insulin aspart 100 UNIT/ML injection    NovoLOG PEN     4 units three times daily plus correction:140-214 give 1 unit; 215-289 give 2 units; 290-365 give 3 units and call MD   NOVOLOG       insulin pen needle 32G X 4 MM      USE AS DIRECTED FOR ADMINISTERING INSULIN AT HOME.       LANTUS SOLOSTAR 100 UNIT/ML injection   Generic drug:  insulin glargine      Inject 4 Units Subcutaneous At Bedtime       levothyroxine 50 MCG tablet    SYNTHROID/LEVOTHROID     Take 50 mcg by mouth daily       lidocaine 5 % Patch    LIDODERM    30 patch    Place 1 patch onto the skin every 24 hours       lidocaine-prilocaine cream    EMLA    30 g    APPLY TO PORT SITE ABOUT 45 TO 60 MINUTES PRIOR TO ACCESSING AS NEEDED FOR MODERATE PAIN       linezolid 600 MG tablet    ZYVOX    20 tablet    Take 1 tablet (600 mg) by mouth 2 times daily       loperamide 2 MG capsule    IMODIUM    20 capsule    Take 1 capsule (2 mg) by mouth 3 times daily as needed for other (greater than 1200mL ileostomy output in a 24 hour period.  please call colon and rectal surgery clinic to notify if patient is requiring Imodium.)       Magnesium Oxide 250 MG Tabs     180 tablet    Take 2 tablets (500 mg) by mouth At Bedtime       montelukast 10 MG tablet    SINGULAIR    90 tablet    Take 1 tablet (10  "mg) by mouth every evening       Multi-vitamin Tabs tablet   Generic drug:  multivitamin, therapeutic with minerals      Take 1 tablet by mouth daily.       omeprazole 40 MG capsule    priLOSEC    90 capsule    Take 1 capsule (40 mg) by mouth daily       ondansetron 8 MG tablet    ZOFRAN    90 tablet    TAKE ONE TABLET BY MOUTH THREE TIMES A DAY BEFORE MEALS       * order for DME     1 Month    Equipment being ordered: ileostomy supplies       * order for DME     1 Units    Please dispense 1 hair prosthesis.       * order for DME     1 Units    Lightweight Wheelchair with leg rests.       * order for DME     1 each    Equipment being ordered:  Hernia Belt   Nu form, BG- Cool comfort- medium,  4\"  Belt ring  3 1/4  Catalog # :  [id number]       * predniSONE 5 MG tablet    DELTASONE    30 tablet    TAKE ONE TABLET BY MOUTH EVERY MORNING       * predniSONE 2.5 MG tablet    DELTASONE    30 tablet    TAKE ONE TABLET BY MOUTH EVERY EVENING       sodium bicarbonate 650 MG tablet     540 tablet    Take 2 tablets (1,300 mg) by mouth 2 times daily       * tacrolimus 1 MG capsule    PROGRAF - GENERIC EQUIVALENT    210 capsule    Take four 1 MG cap in the AM and three 1 MG in the PM .  Total dose of 4 mg in the AM and 3.5 mg in the PM       * tacrolimus 0.5 MG capsule    PROGRAF - GENERIC EQUIVALENT    30 capsule    Take 1 capsule (0.5 mg) by mouth every evening Total dose of 4 mg in the AM and 3.5 mg in the PM       CRIS DOC CONTROL NORMAL Soln      daily as needed       Trifluridine-Tipiracil 15-6.14 MG tablet    LONSURF    60 tablet    Take 3 tablets by mouth 2 times daily Take within 1 hour after morning and evening meals on Days 1 thru 5 and 8 thru 12 of each 28 day cycle       valACYclovir 1000 mg tablet    VALTREX    10 tablet    Take 1 tablet (1,000 mg) by mouth daily       zinc sulfate 220 MG capsule    ZINCATE    30 capsule    TAKE ONE CAPSULE BY MOUTH EVERY DAY       zyPREXA 5 MG tablet   Generic drug:  OLANZapine "     30 tablet    Take 1 tablet (5 mg) by mouth nightly as needed for nausea       * Notice:  This list has 8 medication(s) that are the same as other medications prescribed for you. Read the directions carefully, and ask your doctor or other care provider to review them with you.

## 2017-01-20 NOTE — MR AVS SNAPSHOT
After Visit Summary   1/20/2017    Melissa Collazo    MRN: [id number]           Patient Information     Date Of Birth          1940        Visit Information        Provider Department      1/20/2017 5:00 PM PAULETTE 14 ATC; [facility] ONCOLOGY INFUSION [facility]        Today's Diagnoses     Cancer of right colon (H)    -  1     Nausea with vomiting           Care Instructions    Contact Numbers    [facility] Main Line: [phone]  [facility] Triage:  [phone]    Call triage with chills and/or temperature greater than or equal to 100.5, uncontrolled nausea/vomiting, diarrhea, constipation, dizziness, shortness of breath, chest pain, bleeding, unexplained bruising, or any new/concerning symptoms, questions/concerns.     If you are having any concerning symptoms or wish to speak to a provider before your next infusion visit, please call your care coordinator or triage to notify them so we can adequately serve you.       After Hours: [phone]    If after hours, weekends, or holidays, call main hospital  and ask for Oncology doctor on call.             January 2017 Sunday Monday Tuesday Wednesday Thursday Friday Saturday   1     2     3     4     5     6     7       8     9     10     [facility] RETURN    8:00 AM   (50 min.)   Rand Man APRN CNP M [facility] 11     12     [facility] LAB DRAW    8:00 AM   (15 min.)    MAS[facility] LAB DRAW   [facility] Lab Draw     UMP RETURN WITH ROOM    8:30 AM   (30 min.)    ONCOLOGY RNCC   [facility] 13     14       15     16     17     [facility] LAB DRAW    3:15 PM   (15 min.)    [facility] LAB DRAW   [facility] Mason[facility] Lab Draw     UMP RETURN    3:40 PM   (50 min.)   Rand Man APRN CNP M [facility] 18     19     20     [facility] LAB DRAW    3:45 PM   (15 min.)    [facility] LAB DRAW   [facility] Lab Draw     UMP RETURN    4:20 PM   (50 min.)   Rand Man APRN CNP M  [facility]     UMP ONC INFUSION 120    5:00 PM   (120 min.)   UC ONCOLOGY INFUSION   M [facility] 21       22     UMP ONC INFUSION 120    9:30 AM   (120 min.)    ONCOLOGY INFUSION   M [facility] 23     [facility] LAB DRAW   11:00 AM   (15 min.)    MASONIC LAB DRAW   M [facility] Lab Draw     [facility] ONC INFUSION 120   11:30 AM   (120 min.)    ONCOLOGY INFUSION   M [facility]     UMP RETURN   11:30 AM   (50 min.)   Rand Man APRN CNP   [facility] 24     25     26     27     28       29     30     31 February 2017 Sunday Monday Tuesday Wednesday Thursday Friday Saturday                  1     2     3     4       5     6     7     8     9     10     11       12     13     14     15     16     17     18       19     20     21     22     23     LAB WITH  CLINIC    3:15 PM   (15 min.)    LAB   [facility] Lab     UMP RETURN    4:00 PM   (30 min.)   Tavares Gomez MD   [facility] Nephrology 24     25       26     27     28                                     Recent Results (from the past 24 hour(s))   Basic metabolic panel    Collection Time: 01/20/17  3:56 PM   Result Value Ref Range    Sodium 135 133 - 144 mmol/L    Potassium 5.4 (H) 3.4 - 5.3 mmol/L    Chloride 104 94 - 109 mmol/L    Carbon Dioxide 21 20 - 32 mmol/L    Anion Gap 10 3 - 14 mmol/L    Glucose 179 (H) 70 - 99 mg/dL    Urea Nitrogen 21 7 - 30 mg/dL    Creatinine 1.87 (H) 0.52 - 1.04 mg/dL    GFR Estimate 26 (L) >60 mL/min/1.7m2    GFR Estimate If Black 32 (L) >60 mL/min/1.7m2    Calcium 7.2 (L) 8.5 - 10.1 mg/dL   CBC with platelets    Collection Time: 01/20/17  3:56 PM   Result Value Ref Range    WBC 35.9 (H) 4.0 - 11.0 10e9/L    RBC Count 2.85 (L) 3.8 - 5.2 10e12/L    Hemoglobin 7.5 (L) 11.7 - 15.7 g/dL    Hematocrit 25.6 (L) 35.0 - 47.0 %    MCV 90 78 - 100 fl    MCH 26.3 (L) 26.5 - 33.0 pg    MCHC 29.3 (L)  31.5 - 36.5 g/dL    RDW 25.2 (H) 10.0 - 15.0 %    Platelet Count 321 150 - 450 10e9/L                     Follow-ups after your visit        Your next 10 appointments already scheduled     Jan 22, 2017  9:30 AM   Infusion 120 with UC ONCOLOGY INFUSION, UC 11 ATC   [facility] ([facility])    [address]  2nd Floor  [facility] [address]   [phone]            Jan 23, 2017 11:00 AM   [facility] Lab Draw with UC MASONIC LAB DRAW   [facility] Lab Draw ([facility])    [address]  2nd Floor  [facility] [address]   [phone]            Jan 23, 2017 11:30 AM   (Arrive by 11:15 AM)   Return Visit with ACACIA Reid CNP   [facility] ([facility])    [address]  2nd Floor  [facility] [address]   [phone]            Jan 23, 2017 11:30 AM   Infusion 120 with UC ONCOLOGY INFUSION, UC 11 ATC   [facility] ([facility])    [address]  2nd Floor  [facility] [address]   [phone]            Feb 23, 2017  3:15 PM   Lab with UC LAB   [facility] ([facility])    [address] [place] [address]  [address]st [facility] [address]   [phone]            Feb 23, 2017  4:00 PM   (Arrive by 3:30 PM)   Return Visit with Tavares Gomez MD   [facility]phrology ([facility])    [address] [place] [address]  3rd Floor  [facility] [address]   [phone]            Mar 20, 2017 10:00 AM   Lab with UC LAB   [facility] ([facility])    [address] [place] [address]  1st Floor  [facility] [address]   [phone]            Mar 20, 2017 10:15 AM   (Arrive by 10:00 AM)   XR CHEST 2 VIEWS with UCXR1   [facility] ([facility])    [address]  1st Floor  [facility] [address]   [phone]         "   Please bring a list of your current medicines to your exam. (Include vitamins, minerals and over-thecounter medicines.) Leave your valuables at home.  Tell your doctor if there is a chance you may be pregnant.  You do not need to do anything special for this exam.              Who to contact     If you have questions or need follow up information about today's clinic visit or your schedule please contact [facility] directly at [phone].  Normal or non-critical lab and imaging results will be communicated to you by [facility], letter or phone within 4 business days after the clinic has received the results. If you do not hear from us within 7 days, please contact the clinic through [facility] or phone. If you have a critical or abnormal lab result, we will notify you by phone as soon as possible.  Submit refill requests through [employer] or call your pharmacy and they will forward the refill request to us. Please allow 3 business days for your refill to be completed.          Additional Information About Your Visit        [employer] Information     [employer] lets you send messages to your doctor, view your test results, renew your prescriptions, schedule appointments and more. To sign up, go to [email or web address] . Click on \"Log in\" on the left side of the screen, which will take you to the Welcome page. Then click on \"Sign up Now\" on the right side of the page.     You will be asked to enter the access code listed below, as well as some personal information. Please follow the directions to create your username and password.     Your access code is: [email or web address]  Expires: 3/8/2017  4:28 PM     Your access code will  in 90 days. If you need help or a new code, please call your [place] clinic or [phone].        Care EveryWhere ID     This is your Care EveryWhere ID. This could be used by other organizations to access your [place] medical records  [id number]         Blood Pressure from " Last 3 Encounters:   01/20/17 126/58   01/17/17 132/62   01/10/17 115/58    Weight from Last 3 Encounters:   01/20/17 53.298 kg (117 lb 8 oz)   01/17/17 52.8 kg (116 lb 6.5 oz)   01/10/17 51.5 kg (113 lb 8.6 oz)              Today, you had the following     No orders found for display       Primary Care Provider Office Phone # Fax #    Rosette Lin [phone]       [facility] [address] [place] [id number]        Thank you!     Thank you for choosing [facility]  for your care. Our goal is always to provide you with excellent care. Hearing back from our patients is one way we can continue to improve our services. Please take a few minutes to complete the written survey that you may receive in the mail after your visit with us. Thank you!             Your Updated Medication List - Protect others around you: Learn how to safely use, store and throw away your medicines at www.disposemymeds.org.          This list is accurate as of: 1/20/17  5:20 PM.  Always use your most recent med list.                   Brand Name Dispense Instructions for use    albuterol 108 (90 BASE) MCG/ACT Inhaler    PROAIR HFA/PROVENTIL HFA/VENTOLIN HFA    3 Inhaler    Inhale 2 puffs into the lungs every 6 hours as needed for shortness of breath / dyspnea or wheezing       amLODIPine 5 MG tablet    NORVASC    180 tablet    Take 2 tablets (10 mg) by mouth At Bedtime       ascorbic acid 500 MG Tabs     30 tablet    Take 1 tablet (500 mg) by mouth daily       aspirin EC 81 MG EC tablet      Take 81 mg by mouth daily       atorvastatin 20 MG tablet    LIPITOR    30 tablet    Take 1 tablet (20 mg) by mouth daily       azithromycin 250 MG tablet    ZITHROMAX    15 tablet    TAKE ONE-HALF TABLET BY MOUTH EVERY DAY       blood glucose monitoring test strip    no brand specified     TEST 4 TIMES DAILY.       butalbital-acetaminophen-caffeine -40 MG per tablet    FIORICET/ESGIC     Take 1 tablet by  mouth every 4 hours as needed for headaches       CALCIUM 500 + D PO      Take 1 tablet by mouth 2 times daily       cyanocobalamin 1000 MCG Tbcr    VITAMIN B-12 ER     Take 1,000 mcg by mouth every 7 days       dapsone 25 MG tablet     24 tablet    TAKE TWO TABLETS BY MOUTH THREE TIMES WEEKLY (MONDAY/WEDNESDAY/FRIDAY)       doxazosin 2 MG tablet    CARDURA    60 tablet    Take 2 tablets (4 mg) by mouth At Bedtime       fluticasone-salmeterol 500-50 MCG/DOSE diskus inhaler    ADVAIR    3 Inhaler    Inhale 1 puff into the lungs 2 times daily       HYDROmorphone 2 MG tablet    DILAUDID    100 tablet    Take 1-2 tablets (2-4 mg) by mouth every 3 hours as needed for moderate to severe pain       insulin aspart 100 UNIT/ML injection    NovoLOG PEN     4 units three times daily plus correction:140-214 give 1 unit; 215-289 give 2 units; 290-365 give 3 units and call MD   NOVOLOG       insulin pen needle 32G X 4 MM      USE AS DIRECTED FOR ADMINISTERING INSULIN AT HOME.       LANTUS SOLOSTAR 100 UNIT/ML injection   Generic drug:  insulin glargine      Inject 4 Units Subcutaneous At Bedtime       levothyroxine 50 MCG tablet    SYNTHROID/LEVOTHROID     Take 50 mcg by mouth daily       lidocaine 5 % Patch    LIDODERM    30 patch    Place 1 patch onto the skin every 24 hours       lidocaine-prilocaine cream    EMLA    30 g    APPLY TO PORT SITE ABOUT 45 TO 60 MINUTES PRIOR TO ACCESSING AS NEEDED FOR MODERATE PAIN       linezolid 600 MG tablet    ZYVOX    20 tablet    Take 1 tablet (600 mg) by mouth 2 times daily       loperamide 2 MG capsule    IMODIUM    20 capsule    Take 1 capsule (2 mg) by mouth 3 times daily as needed for other (greater than 1200mL ileostomy output in a 24 hour period.  please call colon and rectal surgery clinic to notify if patient is requiring Imodium.)       Magnesium Oxide 250 MG Tabs     180 tablet    Take 2 tablets (500 mg) by mouth At Bedtime       montelukast 10 MG tablet    SINGULAIR    90 tablet  "   Take 1 tablet (10 mg) by mouth every evening       Multi-vitamin Tabs tablet   Generic drug:  multivitamin, therapeutic with minerals      Take 1 tablet by mouth daily.       omeprazole 40 MG capsule    priLOSEC    90 capsule    Take 1 capsule (40 mg) by mouth daily       ondansetron 8 MG tablet    ZOFRAN    90 tablet    TAKE ONE TABLET BY MOUTH THREE TIMES A DAY BEFORE MEALS       * order for DME     1 Month    Equipment being ordered: ileostomy supplies       * order for DME     1 Units    Please dispense 1 hair prosthesis.       * order for DME     1 Units    Lightweight Wheelchair with leg rests.       * order for DME     1 each    Equipment being ordered:  Hernia Belt   Nu form, BG- Cool comfort- medium,  4\"  Belt ring  3 1/4  Catalog # :  [id number]       * predniSONE 5 MG tablet    DELTASONE    30 tablet    TAKE ONE TABLET BY MOUTH EVERY MORNING       * predniSONE 2.5 MG tablet    DELTASONE    30 tablet    TAKE ONE TABLET BY MOUTH EVERY EVENING       sodium bicarbonate 650 MG tablet     540 tablet    Take 2 tablets (1,300 mg) by mouth 2 times daily       * tacrolimus 1 MG capsule    PROGRAF - GENERIC EQUIVALENT    210 capsule    Take four 1 MG cap in the AM and three 1 MG in the PM .  Total dose of 4 mg in the AM and 3.5 mg in the PM       * tacrolimus 0.5 MG capsule    PROGRAF - GENERIC EQUIVALENT    30 capsule    Take 1 capsule (0.5 mg) by mouth every evening Total dose of 4 mg in the AM and 3.5 mg in the PM       CRIS DOC CONTROL NORMAL Soln      daily as needed       Trifluridine-Tipiracil 15-6.14 MG tablet    LONSURF    60 tablet    Take 3 tablets by mouth 2 times daily Take within 1 hour after morning and evening meals on Days 1 thru 5 and 8 thru 12 of each 28 day cycle       valACYclovir 1000 mg tablet    VALTREX    10 tablet    Take 1 tablet (1,000 mg) by mouth daily       zinc sulfate 220 MG capsule    ZINCATE    30 capsule    TAKE ONE CAPSULE BY MOUTH EVERY DAY       zyPREXA 5 MG tablet "   Generic drug:  OLANZapine     30 tablet    Take 1 tablet (5 mg) by mouth nightly as needed for nausea       * Notice:  This list has 8 medication(s) that are the same as other medications prescribed for you. Read the directions carefully, and ask your doctor or other care provider to review them with you.

## 2017-01-20 NOTE — PROGRESS NOTES
Infusion Nursing Note:  Melissa Collazo presents today for IV Fluids.    Patient seen by provider today: Yes: Rand Man NP   present during visit today: Not Applicable.    Note: N/A.    Intravenous Access:  Implanted Port.    Treatment Conditions:  Not Applicable.      Post Infusion Assessment:  Patient tolerated infusion without incident.  Blood return noted pre and post infusion.  Site patent and intact, free from redness, edema or discomfort.  No evidence of extravasations.  Access discontinued per protocol.    Discharge Plan:   Patient declined prescription refills.  Discharge instructions reviewed with: Patient and Family.  Patient and/or family verbalized understanding of discharge instructions and all questions answered.  Copy of AVS reviewed with patient and/or family.  Patient will return 1/22/17 for next appointment.  Patient discharged in stable condition accompanied by: self and daughter.  Departure Mode: Ambulatory.  Face to Face time: 3 mintues.    Britni Ortega RN

## 2017-01-20 NOTE — Clinical Note
1/20/2017       RE: Melissa Collazo  [address] [place] [address]     Dear Colleague,    Thank you for referring your patient, Melissa Collazo, to the [facility]. Please see a copy of my visit note below.    Melissa Collazo is seen in short term follow-up of cellulitis and zoster.  She has metastatic colon cancer, currently on treatment with Lonsurf.    Oncology HPI:  She has a history of right lung transplantation for chronic obstructive pulmonary disease, chronic kidney disease, osteoporosis, hypertension, and complex lung infections, including aspergillus and ZOILA. She has had prior treatment with Xeloda/Oxaliplatin, irinotecan and erbitux and avastin. She recently was found to have progressed on Irinotecan/Ertibux. She was initiated on Lonsurf on 12/26/16  Neulasta given on 1/10/17.    Interval history: Melissa is starting to feel better. Right hand/forearm pain is lessening. The skin is less tender to touch. Swelling is also decreasing. Left wrist is about the same. Rash on the face is drying up and flattening. No new rashes. Is tolerating the antibiotic/antiviral medication well. No nausea/vomiting. Did schedule herself for IVF at [place] yesterday and felt better with that. No fevers/chills. Had bronchitis about 2 weeks ago that is getting better. Still has a cough, but that is looser. No worsening shortness of breath. No change to ostomy output. Is urinating normally. No bone aches/pains. 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       Exam: alert, appears chronically ill. Blood pressure 126/58, pulse 107, temperature 98  F (36.7  C), temperature source Oral, resp. rate 24, weight 53.298 kg (117 lb 8 oz), SpO2 92 %, not currently breastfeeding.  Oropharynx moist, no focal lesion. No icterus. Neck supple. No adenopathy. Lungs: occasional crackle right lung, fine crackles and diminished lung sounds in the left lung. Heart: RRR, no murmur or rub. Abdomen: soft,  nontender, watery stool noted in the ileostomy. Extremities: trace to 1+ BLE ankle edema, R>L. Neuro: walks with a cane. Has a slight tremor.     Skin: decreased induration and pain in the right forearm and hand. There is still edema, but a little less. Left wrist erythema is stable, less tender. Facial rash is less. No new blisters. See pictures below:      Lab:   Results for DOUG DE LOS SANTOS (MRN [id number]) as of 1/20/2017 17:10   Ref. Range 1/20/2017 15:56   Sodium Latest Ref Range: 133-144 mmol/L 135   Potassium Latest Ref Range: 3.4-5.3 mmol/L 5.4 (H)   Chloride Latest Ref Range:  mmol/L 104   Carbon Dioxide Latest Ref Range: 20-32 mmol/L 21   Urea Nitrogen Latest Ref Range: 7-30 mg/dL 21   Creatinine Latest Ref Range: 0.52-1.04 mg/dL 1.87 (H)   GFR Estimate Latest Ref Range: >60 mL/min/1.7m2 26 (L)   GFR Estimate If Black Latest Ref Range: >60 mL/min/1.7m2 32 (L)   Calcium Latest Ref Range: 8.5-10.1 mg/dL 7.2 (L)   Anion Gap Latest Ref Range: 3-14 mmol/L 10   Glucose Latest Ref Range: 70-99 mg/dL 179 (H)   WBC Latest Ref Range: 4.0-11.0 10e9/L 35.9 (H)   Hemoglobin Latest Ref Range: 11.7-15.7 g/dL 7.5 (L)   Hematocrit Latest Ref Range: 35.0-47.0 % 25.6 (L)   Platelet Count Latest Ref Range: 150-450 10e9/L 321   RBC Count Latest Ref Range: 3.8-5.2 10e12/L 2.85 (L)   MCV Latest Ref Range:  fl 90   MCH Latest Ref Range: 26.5-33.0 pg 26.3 (L)   MCHC Latest Ref Range: 31.5-36.5 g/dL 29.3 (L)   RDW Latest Ref Range: 10.0-15.0 % 25.2 (H)     Impression/plan:    1. Right hand/left wrist cellulitis:   -Initiated on linezolid 600 mg bid on 1/17/17, tenderness and erythema is improving. . Leukocytosis is worsening, but clincally, she appears to be improving. She had neulasta 10 days ago, so could still be seeing a prolonged effect of that. There is prolonged duration of effect with renal impairment.  -Will continue linezolid for now and see her again on 1/23 to continue evaluation.     2. Zoster: Improving on  Valtrex. Dose adjusted for renal function to 1 gm daily. Initiated on 1/17/17 and will complete a 10 day course.     3. Pain: secondary to cellulitis/zoster. Overall, is improving in both areas.  Is using aspercream on the hand with some improvement. Also using hydromorphone with good conrol.     4 Metastatic colon cancer. Due for Lonsurf next week.Will need to defer. Will make a decision on when she may be able to start on 1/23/17    5. Chronic anemia: worsened while on Lonsurf. Getting Aranesp every 3 weeks at her local clinic. Stable today.  Previously discussed transfusion needs. She would like to avoid transfusions for [employer] reasons, but would consider it in an emergency.     6. Osteoporosis/chronic compression fractures-On Calcium plus Vit D. Was initiated on denosumab on 9/22/16. Due in March 2017.    7. Lung transplant: follows with Dr. Clay    8. CKD- worsening slightly. Has mild, asymptomatic hyperkalemia today. Discussed management. Given improving clinical picture, will treat with outpatient IVF  (1 liter) today and on Sunday. Return on Monday for lab recheck and clinic evaluation.    Again, thank you for allowing me to participate in the care of your patient.      Sincerely,    ACACIA Williamson CNP

## 2017-01-20 NOTE — PATIENT INSTRUCTIONS
Contact Numbers    [facility] Main Line: [phone]  [facility] Triage:  [phone]    Call triage with chills and/or temperature greater than or equal to 100.5, uncontrolled nausea/vomiting, diarrhea, constipation, dizziness, shortness of breath, chest pain, bleeding, unexplained bruising, or any new/concerning symptoms, questions/concerns.     If you are having any concerning symptoms or wish to speak to a provider before your next infusion visit, please call your care coordinator or triage to notify them so we can adequately serve you.       After Hours: [phone]    If after hours, weekends, or holidays, call main hospital  and ask for Oncology doctor on call.             January 2017 Sunday Monday Tuesday Wednesday Thursday Friday Saturday   1     2     3     4     5     6     7       8     9     10     [facility] RETURN    8:00 AM   (50 min.)   Rand Man APRN CNP   [facility] 11     12     [facility]    8:00 AM   (15 min.)    [facility]   [facility] Draw     [facility] RETURN WITH ROOM    8:30 AM   (30 min.)    ONCOLOGY RNCC   [facility] 13     14       15     16     17     [facility] D[facility]    3:15 PM   (15 min.)    [facility] DRAW   [facility] Draw     [facility] RETURN    3:40 PM   (50 min.)   Rand Man APRN CNP   [facility] 18     19     20     [facility] DRAW    3:45 PM   (15 min.)    [facility] DRAW   [facility] Draw     [facility] RETURN    4:20 PM   (50 min.)   Rand Man APRN CNP   [facility]     [facility] ONC INFUSION 120    5:00 PM   (120 min.)    ONCOLOGY INFUSION   [facility] 21       22     [facility] ONC INFUSION 120    9:30 AM   (120 min.)    ONCOLOGY INFUSION   [facility] 23     [facility] DRAW   11:00 AM   (15 min.)    [facility] DRAW   [facility] Draw     [facility] ONC INFUSION 120   11:30 AM   (120 min.)     ONCOLOGY INFUSION   [facility]     [facility] RETURN   11:30 AM   (50 min.)   Rand Man, APRN CNP   [facility] 24     25     26     27     28       29     30     31 February 2017 Sunday Monday Tuesday Wednesday Thursday Friday Saturday                  1     2     3     4       5     6     7     8     9     10     11       12     13     14     15     16     17     18       19     20     21     22     23     LAB [facility]    3:15 PM   (15 min.)   [facility]   [facility]     [facility] RETURN    4:00 PM   (30 min.)   Tavares Gomez MD   [facility] 24     25       26     27     28                                     Recent Results (from the past 24 hour(s))   Basic metabolic panel    Collection Time: 01/20/17  3:56 PM   Result Value Ref Range    Sodium 135 133 - 144 mmol/L    Potassium 5.4 (H) 3.4 - 5.3 mmol/L    Chloride 104 94 - 109 mmol/L    Carbon Dioxide 21 20 - 32 mmol/L    Anion Gap 10 3 - 14 mmol/L    Glucose 179 (H) 70 - 99 mg/dL    Urea Nitrogen 21 7 - 30 mg/dL    Creatinine 1.87 (H) 0.52 - 1.04 mg/dL    GFR Estimate 26 (L) >60 mL/min/1.7m2    GFR Estimate If Black 32 (L) >60 mL/min/1.7m2    Calcium 7.2 (L) 8.5 - 10.1 mg/dL   CBC with platelets    Collection Time: 01/20/17  3:56 PM   Result Value Ref Range    WBC 35.9 (H) 4.0 - 11.0 10e9/L    RBC Count 2.85 (L) 3.8 - 5.2 10e12/L    Hemoglobin 7.5 (L) 11.7 - 15.7 g/dL    Hematocrit 25.6 (L) 35.0 - 47.0 %    MCV 90 78 - 100 fl    MCH 26.3 (L) 26.5 - 33.0 pg    MCHC 29.3 (L) 31.5 - 36.5 g/dL    RDW 25.2 (H) 10.0 - 15.0 %    Platelet Count 321 150 - 450 10e9/L

## 2017-01-20 NOTE — NURSING NOTE
Chief Complaint   Patient presents with     Port Flush     Pt with hx of colon ca labs collected via portacath.

## 2017-01-20 NOTE — NURSING NOTE
"Melissa Collazo is a 76 year old female who presents for:  Chief Complaint   Patient presents with     Port Flush     Pt with hx of colon ca labs collected via portacath.      Oncology Clinic Visit     colon cancer follow up        Initial Vitals:  /58 mmHg  Pulse 107  Temp(Src) 98  F (36.7  C) (Oral)  Resp 24  Wt 53.298 kg (117 lb 8 oz)  SpO2 92% Estimated body mass index is 24.14 kg/(m^2) as calculated from the following:    Height as of 12/19/16: 1.486 m (4' 10.5\").    Weight as of this encounter: 53.298 kg (117 lb 8 oz).. There is no height on file to calculate BSA. BP completed using cuff size: NA (Not Taken)  Data Unavailable No LMP recorded. Patient is postmenopausal. Allergies and medications reviewed.     Medications: Medication refills not needed today.  Pharmacy name entered into EPIC:    [facility] - [place], [place] - [address] [place], [place] [id number] - [place], [place] - [address] [facility] - [place], [place] - [address] [place] [address] [phone]    Comments: vitals done in lab. Per patient, med list is current.    7 minutes for nursing intake (face to face time)   Julia Loo CMA          "

## 2017-01-20 NOTE — PROGRESS NOTES
Melissa Collazo is seen in short term follow-up of cellulitis and zoster.  She has metastatic colon cancer, currently on treatment with Lonsurf.    Oncology HPI:  She has a history of right lung transplantation for chronic obstructive pulmonary disease, chronic kidney disease, osteoporosis, hypertension, and complex lung infections, including aspergillus and ZOILA. She has had prior treatment with Xeloda/Oxaliplatin, irinotecan and erbitux and avastin. She recently was found to have progressed on Irinotecan/Ertibux. She was initiated on Lonsurf on 12/26/16  Neulasta given on 1/10/17.    Interval history: Melissa is starting to feel better. Right hand/forearm pain is lessening. The skin is less tender to touch. Swelling is also decreasing. Left wrist is about the same. Rash on the face is drying up and flattening. No new rashes. Is tolerating the antibiotic/antiviral medication well. No nausea/vomiting. Did schedule herself for IVF at [facility] yesterday and felt better with that. No fevers/chills. Had bronchitis about 2 weeks ago that is getting better. Still has a cough, but that is looser. No worsening shortness of breath. No change to ostomy output. Is urinating normally. No bone aches/pains. 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       Exam: alert, appears chronically ill. Blood pressure 126/58, pulse 107, temperature 98  F (36.7  C), temperature source Oral, resp. rate 24, weight 53.298 kg (117 lb 8 oz), SpO2 92 %, not currently breastfeeding.  Oropharynx moist, no focal lesion. No icterus. Neck supple. No adenopathy. Lungs: occasional crackle right lung, fine crackles and diminished lung sounds in the left lung. Heart: RRR, no murmur or rub. Abdomen: soft, nontender, watery stool noted in the ileostomy. Extremities: trace to 1+ BLE ankle edema, R>L. Neuro: walks with a cane. Has a slight tremor.     Skin: decreased induration and pain in the right forearm and hand. There is still edema,  but a little less. Left wrist erythema is stable, less tender. Facial rash is less. No new blisters. See pictures below:      Lab:   Results for DOUG DE LOS SANTOS (MRN [id number]) as of 1/20/2017 17:10   Ref. Range 1/20/2017 15:56   Sodium Latest Ref Range: 133-144 mmol/L 135   Potassium Latest Ref Range: 3.4-5.3 mmol/L 5.4 (H)   Chloride Latest Ref Range:  mmol/L 104   Carbon Dioxide Latest Ref Range: 20-32 mmol/L 21   Urea Nitrogen Latest Ref Range: 7-30 mg/dL 21   Creatinine Latest Ref Range: 0.52-1.04 mg/dL 1.87 (H)   GFR Estimate Latest Ref Range: >60 mL/min/1.7m2 26 (L)   GFR Estimate If Black Latest Ref Range: >60 mL/min/1.7m2 32 (L)   Calcium Latest Ref Range: 8.5-10.1 mg/dL 7.2 (L)   Anion Gap Latest Ref Range: 3-14 mmol/L 10   Glucose Latest Ref Range: 70-99 mg/dL 179 (H)   WBC Latest Ref Range: 4.0-11.0 10e9/L 35.9 (H)   Hemoglobin Latest Ref Range: 11.7-15.7 g/dL 7.5 (L)   Hematocrit Latest Ref Range: 35.0-47.0 % 25.6 (L)   Platelet Count Latest Ref Range: 150-450 10e9/L 321   RBC Count Latest Ref Range: 3.8-5.2 10e12/L 2.85 (L)   MCV Latest Ref Range:  fl 90   MCH Latest Ref Range: 26.5-33.0 pg 26.3 (L)   MCHC Latest Ref Range: 31.5-36.5 g/dL 29.3 (L)   RDW Latest Ref Range: 10.0-15.0 % 25.2 (H)     Impression/plan:    1. Right hand/left wrist cellulitis:   -Initiated on linezolid 600 mg bid on 1/17/17, tenderness and erythema is improving. . Leukocytosis is worsening, but clincally, she appears to be improving. She had neulasta 10 days ago, so could still be seeing a prolonged effect of that. There is prolonged duration of effect with renal impairment.  -Will continue linezolid for now and see her again on 1/23 to continue evaluation.     2. Zoster: Improving on Valtrex. Dose adjusted for renal function to 1 gm daily. Initiated on 1/17/17 and will complete a 10 day course.     3. Pain: secondary to cellulitis/zoster. Overall, is improving in both areas.  Is using aspercream on the hand with  some improvement. Also using hydromorphone with good conrol.     4 Metastatic colon cancer. Due for Lonsurf next week.Will need to defer. Will make a decision on when she may be able to start on 1/23/17    5. Chronic anemia: worsened while on Lonsurf. Getting Aranesp every 3 weeks at her local clinic. Stable today.  Previously discussed transfusion needs. She would like to avoid transfusions for Denominational reasons, but would consider it in an emergency.     6. Osteoporosis/chronic compression fractures-On Calcium plus Vit D. Was initiated on denosumab on 9/22/16. Due in March 2017.    7. Lung transplant: follows with Dr. Clay    8. CKD- worsening slightly. Has mild, asymptomatic hyperkalemia today. Discussed management. Given improving clinical picture, will treat with outpatient IVF  (1 liter) today and on Sunday. Return on Monday for lab recheck and clinic evaluation.

## 2017-01-20 NOTE — PROGRESS NOTES
1/20/2017 [facility] JULIA Man NP/Hanny Mar, RN  -please give NS 1000ml IV today  -patient should return for 1 liter NS IV this weekend, no labs  -Patient should return Monday 1/23 for cbc,cmp, 1 liter IV fluids, and visit with Rand

## 2017-01-21 PROBLEM — J18.9 HCAP (HEALTHCARE-ASSOCIATED PNEUMONIA): Status: ACTIVE | Noted: 2017-01-01

## 2017-01-21 NOTE — IP AVS SNAPSHOT
MRN:[id number]                      After Visit Summary   1/21/2017    Melissa Collazo    MRN: [id number]           Thank you!     Thank you for choosing [facility] for your care. Our goal is always to provide you with excellent care. Hearing back from our patients is one way we can continue to improve our services. Please take a few minutes to complete the written survey that you may receive in the mail after you visit with us. Thank you!        Patient Information     Date Of Birth          1940        About your hospital stay     You were admitted on:  January 21, 2017 You last received care in the:  [address] [facility] [place]    You were discharged on:  January 27, 2017        Reason for your hospital stay       Fatigue, nausea/vomiting, shortness of breath.                  Who to Call     For medical emergencies, please call 911.  For non-urgent questions about your medical care, please call your primary care provider or clinic, [phone]          Attending Provider     Provider    Leonela Haile MD Fujioka, Naomi, MD       Primary Care Provider Office Phone # Fax #    Rosette Lin [phone]       [place] [address]         When to contact your care team       Notify your Transplant Coordinator for:  Oral Temperature greater then 100.5  Chest pain  Change in sputum color or amount  Increased shortness of breath or coughing  Nausea and or vomiting  Diarrhea or constipation  Exercise intolerance    Notify your Transplant Coordinator: Marbella Sauceda  at [phone] Monday through Friday  with questions or any new symptoms.    For assistance after hours, weekends and holidays call the Lung  Transplant Coordinator on call at [phone] and  ask the hospital  to page [phone].            When to contact your care team       Call the [facility] 24-hour triage line at [phone] or [phone] for temp >100.4,  uncontrolled nausea/vomiting/diarrhea/constipation, unrelieved pain, bleeding not relieved with pressure, dizziness, chest pain, shortness of breath, loss of consciousness, and any new or concerning symptoms.     Call [phone] and ask for the care coordinator that works with your oncologist if you have questions about scans, appointments, hospital follow-up, or other concerns.                  After Care Instructions     Activity       Your activity upon discharge: Activity as tolerated with 24/7 assistance. No driving or strenuous activity while taking narcotics, if having headaches/dizziness/vision changes, or if feeling generally weak or unwell.            Diet       Follow this diet upon discharge: Regular diet as tolerated. Be sure to drink plenty of non-caffeinated beverages. Supplement your diet with high-calorie snacks or nutritional supplements (e.g. Boost, Ensure) if needed to ensure adequate calorie intake. Notify your primary provider if you have a poor appetite, are not eating well, and/or have been losing weight unintentionally.            Monitor and record       Monitor your blood glucose twice daily, once in the morning on waking up and once in the early evening. Bring these records to your next clinic appointment.                  Follow-up Appointments     Follow Up and recommended labs and tests       Have Tacrolimus drug level drawn on 2-02-17 when you have appointment with Rand Man CNP            Follow Up and recommended labs and tests       Labs and appointments in clinic next week as listed below.                  Your next 10 appointments already scheduled     Feb 02, 2017  8:45 AM   Yusef Lab Draw with  YUSEF LAB DRAW   [facility] Lab Draw ([facility] [place])    [address] [address] [phone]            Feb 02, 2017  9:30 AM   (Arrive by 9:15 AM)   Return Visit with ACACIA Reid CNP [facility] ([facility])    [address] [place] [address]  2nd [facility] [address]   [phone]            Feb 13, 2017  5:30 PM   PFT VISIT with  PFL VIVIANA   [facility] Pulmonary Function Testing ([facility])    [address] [place] [address]   [phone]            Feb 13, 2017  6:20 PM   (Arrive by 6:05 PM)   Return Lung Transplant with Marco Clay MD   [facility] ([facility])    [address] [place] [address]   [phone]            Feb 23, 2017  3:15 PM   Lab with PAULETTE LAB   [facility] Lab ([facility])    [address] [place] [address]   [phone]            Feb 23, 2017  4:00 PM   (Arrive by 3:30 PM)   Return Visit with Tavares Gomez MD   [facility] Nephrology ([facility])    [address] [place] [address]   [phone]            Mar 20, 2017 10:00 AM   Lab with PAULETTE LAB   [facility] Lab ([facility])    [address] [place] [address]   [phone]            Mar 20, 2017 10:15 AM   (Arrive by 10:00 AM)   XR CHEST 2 VIEWS with UCXR1   [facility] Imaging Center Xray ([facility])    [address] [place] [address]   [phone]           Please bring a list of your current medicines to your exam. (Include vitamins, minerals and over-thecounter medicines.) Leave your valuables at home.  Tell your doctor if there is a chance you may be pregnant.  You do not need to do anything special for this exam.            Mar 20, 2017 10:30 AM   PFT VISIT with PAULETTE PFL SHARMIN   [facility] Pulmonary Function Testing ([facility])    [address] [place] [address]   [phone]            Mar 20,  2017 11:20 AM   (Arrive by 11:05 AM)   Return Lung Transplant with Marco Clay MD   [facility] ([facility])    [address] [facility]  3rd [facility] [address] [phone]              Additional Services     MED THERAPY MANAGE REFERRAL       Your provider has referred you to: **[place] Medication Therapy Management Scheduling (numerous locations) [phone]   [email or web address]  UMP: [facility] [phone]   [email or web address]    Reason for Referral: recent hospitalization    The [place] Medication Therapy Management department will contact you to schedule an appointment.  You may also schedule the appointment by calling [phone].  For [place]   [facility] patients, please call [phone] to confirm/schedule your appointment on the next business day.    This service is designed to help you get the most from your medications.  A specially trained Pharmacist will work closely with you and your providers to solve any questions, concerns, issues or problems related to your medications.    Please bring all of your prescription and non-prescription medications (such as vitamins, over-the-counter medications, and herbals) or a detailed medication list to your appointment.    If you have a glucose meter or other home monitoring information, please also bring this to your appointment (i.e. blood glucose log, blood pressure log, pain log, etc.).            Physical Therapy Referral       *This therapy referral will be filtered to a centralized scheduling office at [facility] and the patient will receive a call to schedule an appointment at a [place] [facility] location most convenient for them. *     [facility] provides Physical Therapy evaluation and treatment and many specialty  services across the [facility] system.  If requesting a specialty program, please choose from the list below.    If you have not heard from the scheduling office within 2 business days, please call [phone] for all locations, with the exception of [place], please call [phone].  Treatment: Evaluation & Treatment  Special Instructions/Modalities: none  Special Programs: Cancer Rehabilitation (PT and OT Evaluate & Treat)    Please be aware that coverage of these services is subject to the terms and limitations of your health insurance plan.  Call member services at your health plan with any benefit or coverage questions.      **Note to Provider:  If you are referring outside of [facility] for the therapy appointment, please list the name of the location in the  special instructions  above, print the referral and give to the patient to schedule the appointment.                  Future tests that were ordered for you     Tacrolimus level           Basic metabolic panel           CBC with platelets       Last Lab Result: HEMOGLOBIN (g/dL)       Date                     Value                 01/26/2017               8.2*             ----------            Magnesium           Phosphorus           XR Chest 2 Views                 Further instructions from your care team       Lung Transplant discharge instructions:  1. Continue taking Prograf 3.5mg twice daily and prednisone 5mg every morning and 2.5mg every evening.     2. Your next Prograf level will be checked on Thursday, February 2nd when you see Dr. Man in the Oncology Clinic.    3. Your next Pulmonary Clinic appointment is scheduled with Dr. Arzola on Monday, February 13th. You will also have PFTs and labs that day.     4. Please resume your azithromycin 250mg tablet once daily after you complete your course of Levaquin (current antibiotic for pneumonia).        Pending Results     No orders found from 1/20/2017 to 1/22/2017.            Statement of Approval      "Ordered          17 1421  I have reviewed and agree with all the recommendations and orders detailed in this document.   EFFECTIVE NOW     Approved and electronically signed by:  Lenora Cruz APRN CNP             Admission Information        Provider Department Dept Phone    2017 Cher Mirza MD [facility] [phone]      Your Vitals Were     Blood Pressure Pulse Temperature    142/67 mmHg 108 98.1  F (36.7  C) (Oral)    Respirations Height Weight    20 1.499 m (4' 11\") 52.799 kg (116 lb 6.4 oz)    BMI (Body Mass Index) Pulse Oximetry       23.50 kg/m2 91%       MyChart Information     [facility] lets you send messages to your doctor, view your test results, renew your prescriptions, schedule appointments and more. To sign up, go to [email or web address] . Click on \"Log in\" on the left side of the screen, which will take you to the Welcome page. Then click on \"Sign up Now\" on the right side of the page.     You will be asked to enter the access code listed below, as well as some personal information. Please follow the directions to create your username and password.     Your access code is: [email or web address]  Expires: 3/8/2017  4:28 PM     Your access code will  in 90 days. If you need help or a new code, please call your [facility] clinic or [phone].        Care EveryWhere ID     This is your Care EveryWhere ID. This could be used by other organizations to access your [facility] medical records  [id number]           Review of your medicines      START taking        Dose / Directions    levofloxacin 750 MG tablet   Commonly known as:  LEVAQUIN   Indication:  Healthcare-Associated Pneumonia   Used for:  Pneumonia due to infectious organism, unspecified laterality, unspecified part of lung        Dose:  750 mg   Start taking on:  2017   Take 1 tablet (750 mg) by mouth every 48 hours for 4 days   Quantity:  2 tablet   Refills:  0         CONTINUE these medicines which may have CHANGED, or have " new prescriptions. If we are uncertain of the size of tablets/capsules you have at home, strength may be listed as something that might have changed.        Dose / Directions    azithromycin 250 MG tablet   Commonly known as:  ZITHROMAX   This may have changed:  See the new instructions.   Used for:  Lung replaced by transplant (H)        Dose:  125 mg   Take 0.5 tablets (125 mg) by mouth daily . HOLD this medication until you are done with Levaquin, then you can resume this.   Quantity:  15 tablet   Refills:  11       tacrolimus 0.5 MG capsule   Commonly known as:  PROGRAF - GENERIC EQUIVALENT   This may have changed:    - how much to take  - when to take this  - additional instructions   Used for:  Lung replaced by transplant (H)        Dose:  3.5 mg   Take 7 capsules (3.5 mg) by mouth 2 times daily   Quantity:  140 capsule   Refills:  2         CONTINUE these medicines which have NOT CHANGED        Dose / Directions    albuterol 108 (90 BASE) MCG/ACT Inhaler   Commonly known as:  PROAIR HFA/PROVENTIL HFA/VENTOLIN HFA   Used for:  Lung replaced by transplant (H)        Dose:  2 puff   Inhale 2 puffs into the lungs every 6 hours as needed for shortness of breath / dyspnea or wheezing   Quantity:  3 Inhaler   Refills:  3       amLODIPine 5 MG tablet   Commonly known as:  NORVASC   Used for:  Lung replaced by transplant (H), Hypertension        Dose:  10 mg   Take 2 tablets (10 mg) by mouth At Bedtime   Quantity:  180 tablet   Refills:  3       ascorbic acid 500 MG Tabs   Used for:  Other specified prophylactic or treatment measure        Dose:  500 mg   Take 1 tablet (500 mg) by mouth daily   Quantity:  30 tablet   Refills:  1       aspirin EC 81 MG EC tablet        Dose:  81 mg   Take 81 mg by mouth daily   Refills:  0       atorvastatin 20 MG tablet   Commonly known as:  LIPITOR        Dose:  20 mg   Take 1 tablet (20 mg) by mouth daily   Quantity:  30 tablet   Refills:  0       blood glucose monitoring test strip    Commonly known as:  no brand specified        TEST 4 TIMES DAILY.   Refills:  0       butalbital-acetaminophen-caffeine -40 MG per tablet   Commonly known as:  FIORICET/ESGIC   Used for:  Lung transplanted (H)        Dose:  1 tablet   Take 1 tablet by mouth every 4 hours as needed for headaches   Refills:  0       CALCIUM 500 + D PO        Dose:  1 tablet   Take 1 tablet by mouth 2 times daily   Refills:  0       COMPAZINE PO        Dose:  5 mg   Take 5 mg by mouth every 6 hours as needed for nausea   Refills:  0       cyanocobalamin 1000 MCG Tbcr   Commonly known as:  VITAMIN B-12 ER        Dose:  1000 mcg   Take 1,000 mcg by mouth every 7 days   Refills:  0       dapsone 25 MG tablet   Used for:  Lung replaced by transplant (H), Encounter for long-term (current) use of medications        TAKE TWO TABLETS BY MOUTH THREE TIMES WEEKLY (MONDAY/WEDNESDAY/FRIDAY)   Quantity:  24 tablet   Refills:  11       doxazosin 2 MG tablet   Commonly known as:  CARDURA   Used for:  Essential hypertension with goal blood pressure less than 140/90        Dose:  4 mg   Take 2 tablets (4 mg) by mouth At Bedtime   Quantity:  60 tablet   Refills:  6       fluticasone-salmeterol 500-50 MCG/DOSE diskus inhaler   Commonly known as:  ADVAIR   Used for:  Rejection of lung transplant (H)        Dose:  1 puff   Inhale 1 puff into the lungs 2 times daily   Quantity:  3 Inhaler   Refills:  3       HYDROmorphone 2 MG tablet   Commonly known as:  DILAUDID   Used for:  Metastatic colon cancer to liver (H), Uncontrolled pain, Cellulitis of right upper extremity, Cellulitis of wrist, Herpes zoster with other complication        Dose:  2-4 mg   Take 1-2 tablets (2-4 mg) by mouth every 3 hours as needed for moderate to severe pain   Quantity:  100 tablet   Refills:  0       insulin pen needle 32G X 4 MM        USE AS DIRECTED FOR ADMINISTERING INSULIN AT HOME.   Refills:  0       levothyroxine 50 MCG tablet   Commonly known as:   SYNTHROID/LEVOTHROID        Dose:  50 mcg   Take 50 mcg by mouth daily   Refills:  0       lidocaine 5 % Patch   Commonly known as:  LIDODERM   Used for:  Compression fracture of L1 lumbar vertebra (H), Wedge compression fracture of T9 vertebra (H)        Dose:  1 patch   Place 1 patch onto the skin every 24 hours   Quantity:  30 patch   Refills:  3       lidocaine-prilocaine cream   Commonly known as:  EMLA   Used for:  Colon cancer metastasized to liver (H), Secondary malignant neoplasm of liver (H), Port-a-cath in place        APPLY TO PORT SITE ABOUT 45 TO 60 MINUTES PRIOR TO ACCESSING AS NEEDED FOR MODERATE PAIN   Quantity:  30 g   Refills:  3       loperamide 2 MG capsule   Commonly known as:  IMODIUM   Used for:  Ileostomy status (H), High output ileostomy (H)        Dose:  2 mg   Take 1 capsule (2 mg) by mouth 3 times daily as needed for other (greater than 1200mL ileostomy output in a 24 hour period.  please call colon and rectal surgery clinic to notify if patient is requiring Imodium.)   Quantity:  20 capsule   Refills:  0       Magnesium Oxide 250 MG Tabs   Used for:  Encounter for long-term (current) use of other medications, Lung replaced by transplant (H)        Dose:  500 mg   Take 2 tablets (500 mg) by mouth At Bedtime   Quantity:  180 tablet   Refills:  3       montelukast 10 MG tablet   Commonly known as:  SINGULAIR   Used for:  Rejection of lung transplant (H)        Dose:  10 mg   Take 1 tablet (10 mg) by mouth every evening   Quantity:  90 tablet   Refills:  3       Multi-vitamin Tabs tablet   Generic drug:  multivitamin, therapeutic with minerals        Dose:  1 tablet   Take 1 tablet by mouth daily.   Refills:  0       omeprazole 40 MG capsule   Commonly known as:  priLOSEC   Used for:  Lung replaced by transplant (H), GERD (gastroesophageal reflux disease)        Dose:  40 mg   Take 1 capsule (40 mg) by mouth daily   Quantity:  90 capsule   Refills:  3       ondansetron 8 MG tablet  "  Commonly known as:  ZOFRAN   Used for:  Primary malignant neoplasm of colon (H)        TAKE ONE TABLET BY MOUTH THREE TIMES A DAY BEFORE MEALS   Quantity:  90 tablet   Refills:  3       * order for DME   Used for:  S/P ileostomy (H)        Equipment being ordered: ileostomy supplies   Quantity:  1 Month   Refills:  11       * order for DME   Used for:  Cancer of right colon (H)        Please dispense 1 hair prosthesis.   Quantity:  1 Units   Refills:  0       * order for DME   Used for:  Spinal stenosis        Lightweight Wheelchair with leg rests.   Quantity:  1 Units   Refills:  0       * order for DME   Used for:  Parastomal hernia without obstruction or gangrene, Intestinal stoma prolapse (H)        Equipment being ordered:  Hernia Belt   Nu form, BG- Cool comfort- medium,  4\"  Belt ring  3 1/4  Catalog # :  [id number]   Quantity:  1 each   Refills:  11       * predniSONE 5 MG tablet   Commonly known as:  DELTASONE   Used for:  Lung replaced by transplant (H), Encounter for long-term (current) use of medications        TAKE ONE TABLET BY MOUTH EVERY MORNING   Quantity:  30 tablet   Refills:  11       * predniSONE 2.5 MG tablet   Commonly known as:  DELTASONE   Used for:  Lung replaced by transplant (H), Encounter for long-term (current) use of medications        TAKE ONE TABLET BY MOUTH EVERY EVENING   Quantity:  30 tablet   Refills:  11       sodium bicarbonate 650 MG tablet   Used for:  CKD (chronic kidney disease) stage 3, GFR 30-59 ml/min        Dose:  1300 mg   Take 2 tablets (1,300 mg) by mouth 2 times daily   Quantity:  540 tablet   Refills:  3       CRIS DOC CONTROL NORMAL Soln        daily as needed   Refills:  0       zinc sulfate 220 MG capsule   Commonly known as:  ZINCATE   Used for:  Lung replaced by transplant (H), Encounter for long-term (current) use of medications        TAKE ONE CAPSULE BY MOUTH EVERY DAY   Quantity:  30 capsule   Refills:  11       zyPREXA 5 MG tablet   Used for:  " Metastatic colon cancer to liver (H), Uncontrolled pain, Nausea, Dry heaves   Generic drug:  OLANZapine        Dose:  5 mg   Take 1 tablet (5 mg) by mouth nightly as needed for nausea   Quantity:  30 tablet   Refills:  2       * Notice:  This list has 6 medication(s) that are the same as other medications prescribed for you. Read the directions carefully, and ask your doctor or other care provider to review them with you.      STOP taking     insulin aspart 100 UNIT/ML injection   Commonly known as:  NovoLOG PEN           LANTUS SOLOSTAR 100 UNIT/ML injection   Generic drug:  insulin glargine           linezolid 600 MG tablet   Commonly known as:  ZYVOX           Trifluridine-Tipiracil 15-6.14 MG tablet   Commonly known as:  LONSURF           valACYclovir 1000 mg tablet   Commonly known as:  VALTREX                Where to get your medicines      These medications were sent to [facility] [place], [place] - [address] [address]     Phone:  [phone]    - levofloxacin 750 MG tablet  - tacrolimus 0.5 MG capsule             Protect others around you: Learn how to safely use, store and throw away your medicines at www.disposemymeds.org.             Medication List: This is a list of all your medications and when to take them. Check marks below indicate your daily home schedule. Keep this list as a reference.      Medications           Morning Afternoon Evening Bedtime As Needed    albuterol 108 (90 BASE) MCG/ACT Inhaler   Commonly known as:  PROAIR HFA/PROVENTIL HFA/VENTOLIN HFA   Inhale 2 puffs into the lungs every 6 hours as needed for shortness of breath / dyspnea or wheezing   Last time this was given:  2 puffs on 1/25/2017 12:16 AM                                   amLODIPine 5 MG tablet   Commonly known as:  NORVASC   Take 2 tablets (10 mg) by mouth At Bedtime   Last time this was given:  10 mg on 1/26/2017 10:37 PM                                    ascorbic acid 500 MG Tabs   Take 1 tablet (500 mg) by mouth daily   Last time this was given:  500 mg on 1/27/2017 11:59 AM                                   aspirin EC 81 MG EC tablet   Take 81 mg by mouth daily   Last time this was given:  81 mg on 1/27/2017  8:33 AM                                   atorvastatin 20 MG tablet   Commonly known as:  LIPITOR   Take 1 tablet (20 mg) by mouth daily   Last time this was given:  20 mg on 1/26/2017 10:37 PM                                   azithromycin 250 MG tablet   Commonly known as:  ZITHROMAX   Take 0.5 tablets (125 mg) by mouth daily . HOLD this medication until you are done with Levaquin, then you can resume this.                                blood glucose monitoring test strip   Commonly known as:  no brand specified   TEST 4 TIMES DAILY.                                butalbital-acetaminophen-caffeine -40 MG per tablet   Commonly known as:  FIORICET/ESGIC   Take 1 tablet by mouth every 4 hours as needed for headaches                                   CALCIUM 500 + D PO   Take 1 tablet by mouth 2 times daily                                      COMPAZINE PO   Take 5 mg by mouth every 6 hours as needed for nausea   Last time this was given:  10 mg on 1/26/2017  8:42 AM                                   cyanocobalamin 1000 MCG Tbcr   Commonly known as:  VITAMIN B-12 ER   Take 1,000 mcg by mouth every 7 days                                   dapsone 25 MG tablet   TAKE TWO TABLETS BY MOUTH THREE TIMES WEEKLY (MONDAY/WEDNESDAY/FRIDAY)   Last time this was given:  50 mg on 1/27/2017  8:33 AM            Monday, Wednesday and Friday                       doxazosin 2 MG tablet   Commonly known as:  CARDURA   Take 2 tablets (4 mg) by mouth At Bedtime   Last time this was given:  4 mg on 1/26/2017 10:37 PM                                   fluticasone-salmeterol 500-50 MCG/DOSE diskus inhaler   Commonly known as:  ADVAIR   Inhale 1 puff into the lungs 2 times daily                                       HYDROmorphone 2 MG tablet   Commonly known as:  DILAUDID   Take 1-2 tablets (2-4 mg) by mouth every 3 hours as needed for moderate to severe pain   Last time this was given:  2 mg on 1/22/2017  8:28 PM                                   insulin pen needle 32G X 4 MM   USE AS DIRECTED FOR ADMINISTERING INSULIN AT HOME.                                levofloxacin 750 MG tablet   Commonly known as:  LEVAQUIN   Take 1 tablet (750 mg) by mouth every 48 hours for 4 days   Start taking on:  1/29/2017   Last time this was given:  750 mg on 1/27/2017 11:00 AM            EVERY OTHER DAY                       levothyroxine 50 MCG tablet   Commonly known as:  SYNTHROID/LEVOTHROID   Take 50 mcg by mouth daily   Last time this was given:  50 mcg on 1/27/2017  8:33 AM                                   lidocaine 5 % Patch   Commonly known as:  LIDODERM   Place 1 patch onto the skin every 24 hours   Last time this was given:  1 patch on 1/27/2017  9:18 AM            Please remove 12 hours after placed to avoid toxicity                       lidocaine-prilocaine cream   Commonly known as:  EMLA   APPLY TO PORT SITE ABOUT 45 TO 60 MINUTES PRIOR TO ACCESSING AS NEEDED FOR MODERATE PAIN                                loperamide 2 MG capsule   Commonly known as:  IMODIUM   Take 1 capsule (2 mg) by mouth 3 times daily as needed for other (greater than 1200mL ileostomy output in a 24 hour period.  please call colon and rectal surgery clinic to notify if patient is requiring Imodium.)   Last time this was given:  2 mg on 1/22/2017  6:18 PM                                   Magnesium Oxide 250 MG Tabs   Take 2 tablets (500 mg) by mouth At Bedtime   Last time this was given:  400 mg on 1/26/2017 10:37 PM                                   montelukast 10 MG tablet   Commonly known as:  SINGULAIR   Take 1 tablet (10 mg) by mouth every evening   Last time this was given:  10 mg on 1/26/2017  7:30 PM              "                      Multi-vitamin Tabs tablet   Take 1 tablet by mouth daily.   Generic drug:  multivitamin, therapeutic with minerals                                   omeprazole 40 MG capsule   Commonly known as:  priLOSEC   Take 1 capsule (40 mg) by mouth daily   Last time this was given:  40 mg on 1/27/2017  8:33 AM                                   ondansetron 8 MG tablet   Commonly known as:  ZOFRAN   TAKE ONE TABLET BY MOUTH THREE TIMES A DAY BEFORE MEALS                                         * order for DME   Equipment being ordered: ileostomy supplies                                * order for DME   Please dispense 1 hair prosthesis.                                * order for DME   Lightweight Wheelchair with leg rests.                                * order for DME   Equipment being ordered:  Hernia Belt   Nu form, BG- Cool comfort- medium,  4\"  Belt ring  3 1/4  Catalog # :  [id number]                                * predniSONE 5 MG tablet   Commonly known as:  DELTASONE   TAKE ONE TABLET BY MOUTH EVERY MORNING   Last time this was given:  5 mg on 1/27/2017  8:33 AM                                   * predniSONE 2.5 MG tablet   Commonly known as:  DELTASONE   TAKE ONE TABLET BY MOUTH EVERY EVENING   Last time this was given:  5 mg on 1/27/2017  8:33 AM                                   sodium bicarbonate 650 MG tablet   Take 2 tablets (1,300 mg) by mouth 2 times daily   Last time this was given:  1,300 mg on 1/27/2017  8:33 AM                                      tacrolimus 0.5 MG capsule   Commonly known as:  PROGRAF - GENERIC EQUIVALENT   Take 7 capsules (3.5 mg) by mouth 2 times daily   Last time this was given:  3.5 mg on 1/27/2017  8:33 AM                                      CRIS DOC CONTROL NORMAL Soln   daily as needed                                zinc sulfate 220 MG capsule   Commonly known as:  ZINCATE   TAKE ONE CAPSULE BY MOUTH EVERY DAY   Last time this was given:  220 mg on 1/27/2017 " 11:59 AM                                   zyPREXA 5 MG tablet   Take 1 tablet (5 mg) by mouth nightly as needed for nausea   Generic drug:  OLANZapine                                   * Notice:  This list has 6 medication(s) that are the same as other medications prescribed for you. Read the directions carefully, and ask your doctor or other care provider to review them with you.

## 2017-01-21 NOTE — ED NOTES
Pt is currently undergoing treatment for colon cancer (mets to liver). Pt had to stop treatment due to bilateral cellulitis in hands/lower arms. Pt is currently on antibiotics to tx that. Pt went into the chemo infusion clinic yesterday for a revisit and fluids. Pt had labs drawn and was told her potassium is high. Pt also told her oncologist that she has been nauseated and vomiting and she was told if she feels worse she should go to the ER.

## 2017-01-21 NOTE — LETTER
[address] [facility]  [address] [address]  Phone: [phone]      January 23, 2017      Melissa Collazo  [facility] [address]    Dear Melissa,    We have been trying to reach you to introduce you to [facility] Care Coordination program.  The goal of care coordination is to help you manage your health and improve access to the [facility] system in the most efficient manner.  The Care Coordinator is a nurse who understands the healthcare system and will assist you in improving your access to care.     As your Physician and Care Coordinator we partner to help you achieve your health care goals.     We will continue to reach out; however, if you are able to call your Care Coordinator at ***, that would be appreciated.  We at [facility] are focused on providing you with the highest-quality healthcare experience possible.      It is a pleasure to partner with you as we work towards achieving your optimal state of wellness.        Sincerely,        Allison Azevedo         BessieRosette  [facility] [address] [place] [address] / [place] GARZA [id number]

## 2017-01-21 NOTE — ED PROVIDER NOTES
"  History     Chief Complaint   Patient presents with     Abnormal Labs     Nausea & Vomiting     HPI  Melissa Collazo is a 76 year old female with a history of asthma, COPD s/p right lung transplant (2009), Aspergillus, MAC, diabetes, appendectomy, and metastatic colorectal cancer currently on chemotherapy s/p ileostomy and right colectomy who presents for evaluation of nausea, vomiting, and abnormal laboratory results. Patient reports she was seen and evaluated in clinic yesterday where she had laboratory studies performed that reveals elevated potassium of 5.4, elevated creatinine of 1.87 and a GFR of 26, so she was treated with IV fluids and instructed to present here to the emergency department if her symptoms worsened or she developed nausea/vomiting. She was also scheduled to receive IV fluids tomorrow and Monday.     She presents today because this morning she woke up feeling \"unwell\" with nausea. She was able to take her morning medications and eat some yogurt, but shortly afterwards did have an episode of non-bloody vomiting. She did not see her pills come up in pill form, but is unsure if they actually stayed down or not. She denies fever. She reports she has been having diarrhea, worse over the past one week, since being on antibiotics for her bilateral upper extremity cellulitis. She is Valtrex and linezolid. No recent falls or trauma. No chest pain or shortness of breath, but does state she has a cough. No issues with her port. Her blood sugar was 126 this morning at home.     No other symptoms or complaints at this time. Please see ROS for further details.    I have reviewed the Medications, Allergies, Past Medical and Surgical History, and Social History in the [facility] system.  Past Medical History   Diagnosis Date     Asthma      Hypertension      COPD (chronic obstructive pulmonary disease) (H)      Thyroid disease      Lung replaced by transplant (H) 2/19/2009     RSLT     Aspergillus (H)      in " sputum prior to lung transplant, marked sun sensitivity to voraconazole     Breast nodule      Benign     MAC (mycobacterium avium-intracellulare complex)      by bronch 2/2009 treated with levo and azithro     Hypogammaglobulinaemia      Back pain      S/P radiofreq ablation     Hyperlipidaemia      Osteoporosis, unspecified      Encounter for long-term (current) use of other medications      CKD (chronic kidney disease) stage 3, GFR 30-59 ml/min      PONV (postoperative nausea and vomiting)      Shortness of breath      Arthritis      Cancer of right colon (H) 10/7/2014     Anemia      History of blood transfusion      Diabetes (H)      Other chronic pain      Cancer of liver (H)      Squamous cell carcinoma (H)      Skin disease        Past Surgical History   Procedure Laterality Date     Transplant       Right lung tx 2/2009 for        Appendectomy       Hernia repair       Colonoscopy  3/25/2011     COMBINED COLONOSCOPY, REMOVE TUMOR/POLYP/LESION BY SNARE performed by LEIDY DEAL at  GI     Tonsillectomy       Mycobacterium a       Colonoscopy with co2 insufflation N/A 10/7/2014     Procedure: COLONOSCOPY WITH CO2 INSUFFLATION;  Surgeon: Zeinab Anthony MD;  Location: [facility] OR     Laparoscopic ileostomy N/A 10/17/2014     Procedure: LAPAROSCOPIC ILEOSTOMY;  Surgeon: Zeinab Anthony MD;  Location: [facility] OR     Ablate liver tumor N/A 3/19/2015     Procedure: ABLATE LIVER TUMOR;  Surgeon: Casper Poe MD;  Location: [facility] OR     Colectomy right N/A 3/19/2015     Procedure: COLECTOMY RIGHT;  Surgeon: Zeinab Anthony MD;  Location: [facility] OR     Takedown ileostomy N/A 3/19/2015     Procedure: TAKEDOWN ILEOSTOMY;  Surgeon: Zeinab Anthony MD;  Location: [facility] OR     Laparotomy exploratory N/A 3/29/2015     Procedure: LAPAROTOMY EXPLORATORY;  Surgeon: Zeinab Anthony MD;  Location: [facility]     Ileostomy N/A 3/29/2015     Procedure: ILEOSTOMY;  Surgeon: Zeinab Anthony MD;  Location: [facility] OR        Family History   Problem Relation Age of Onset     Other - See Comments Mother      Cancer, possibly lymphoma     Alcohol/Drug Father      Alcoholism     Respiratory Brother      Emphysema     Respiratory Maternal Aunt      Emphysema     CANCER Maternal Uncle      Lung Cancer     Melanoma No family hx of      Skin Cancer No family hx of        Social History   Substance Use Topics     Smoking status: Former Smoker -- 1.00 packs/day for 30 years     Types: Cigarettes     Quit date: 01/01/1993     Smokeless tobacco: Former User     Alcohol Use: No     Current Facility-Administered Medications   Medication     0.9% sodium chloride BOLUS    Followed by     0.9% sodium chloride infusion     Current Outpatient Prescriptions   Medication     valACYclovir (VALTREX) 1000 mg tablet     HYDROmorphone (DILAUDID) 2 MG tablet     linezolid (ZYVOX) 600 MG tablet     tacrolimus (PROGRAF - GENERIC EQUIVALENT) 1 MG capsule     tacrolimus (PROGRAF - GENERIC EQUIVALENT) 0.5 MG capsule     Blood Glucose Calibration (CRIS DOC CONTROL) NORMAL SOLN     Trifluridine-Tipiracil (LONSURF) 15-6.14 MG tablet     lidocaine (LIDODERM) 5 % Patch     lidocaine-prilocaine (EMLA) cream     order for DME     ondansetron (ZOFRAN) 8 MG tablet     dapsone 25 MG tablet     predniSONE (DELTASONE) 5 MG tablet     zinc sulfate (ZINCATE) 220 MG capsule     predniSONE (DELTASONE) 2.5 MG tablet     sodium bicarbonate 650 MG tablet     insulin glargine (LANTUS SOLOSTAR) 100 UNIT/ML PEN     OLANZapine (ZYPREXA) 5 MG tablet     albuterol (PROAIR HFA, PROVENTIL HFA, VENTOLIN HFA) 108 (90 BASE) MCG/ACT inhaler     montelukast (SINGULAIR) 10 MG tablet     fluticasone-salmeterol (ADVAIR) 500-50 MCG/DOSE diskus inhaler     doxazosin (CARDURA) 2 MG tablet     azithromycin (ZITHROMAX) 250 MG tablet     omeprazole (PRILOSEC) 40 MG capsule     amLODIPine (NORVASC) 5 MG tablet     atorvastatin (LIPITOR) 20 MG tablet     blood glucose monitoring (NO BRAND SPECIFIED) test  strip     insulin aspart (NOVOLOG PEN) 100 UNIT/ML soln     insulin pen needle 32G X 4 MM     aspirin EC 81 MG tablet     cyanocobalamin (VITAMIN B-12 ER) 1000 MCG TBCR     levothyroxine (SYNTHROID, LEVOTHROID) 50 MCG tablet     order for DME     butalbital-acetaminophen-caffeine (FIORICET, ESGIC) -40 MG per tablet     order for DME     Magnesium Oxide 250 MG TABS     loperamide (IMODIUM) 2 MG capsule     ORDER FOR DME     ascorbic acid 500 MG TABS     Calcium Carbonate-Vitamin D (CALCIUM 500 + D PO)     Multiple Vitamin (MULTI-VITAMIN) per tablet        Allergies   Allergen Reactions     Morphine Sulfate Dermatitis     Codeine Sulfate Dermatitis     Ativan [Lorazepam] Other (See Comments)     Altered mentation, hallucinations  During hospitalization for influenza in Jan 2015 - ativan caused extreme lethargy and mild confusion.  These symptoms resolved when it was stopped.    Pt's family reported a similar response when she had used it at home for nausea.        Colesevelam Nausea     no appetite, dry heaves     Sulfa Drugs Hives     Adhesive Tape Rash     ACTUALLY,  Just fragile skin.  With tear with EKG lead.       Review of Systems   Constitutional: Negative for fever, chills and diaphoresis.   HENT: Negative for ear pain, sore throat, tinnitus, trouble swallowing and voice change.    Eyes: Negative for pain and visual disturbance.   Respiratory: Positive for cough. Negative for shortness of breath.    Cardiovascular: Negative for chest pain, palpitations and leg swelling.   Gastrointestinal: Positive for nausea, vomiting (non-bloody) and diarrhea. Negative for abdominal pain, constipation and blood in stool.   Endocrine: Negative for polydipsia and polyuria.   Genitourinary: Negative for dysuria, urgency, frequency and hematuria.   Musculoskeletal: Negative for back pain and neck pain.   Skin: Negative for color change and rash.   Allergic/Immunologic: Negative for immunocompromised state.   Neurological:  Negative for dizziness, weakness, light-headedness, numbness and headaches.   Hematological: Negative for adenopathy. Does not bruise/bleed easily.   Psychiatric/Behavioral: Negative for dysphoric mood. The patient is not nervous/anxious.        Physical Exam      Physical Exam  CONSTITUTIONAL: Well-developed and well-nourished. Awake and alert. Non-toxic appearance. No acute distress.   HENT:   - Head: Normocephalic and atraumatic.   - Ears: Hearing and external ear grossly normal.   - Nose: Nose normal. No rhinorrhea. No epistaxis.   - Mouth/Throat: Lips mildly dry, no trismus.   EYES: Conjunctivae and lids are normal. No scleral icterus.   NECK: Normal range of motion and phonation normal. Neck supple. No JVD present. No tracheal deviation, no stridor. No edema or erythema noted.  CARDIOVASCULAR: Normal rate, regular rhythm and no appreciable abnormal heart sounds.  PULMONARY/CHEST: RR may be slightly increased, but does not appear dyspneic in anyway. No accessory muscle usage or stridor. No respiratory distress. SpO2 normal on RA. Somewhat increased expiratory phase bilaterally with better air movement on the right than the left. Occasional/sporadic crackle/spastic sounding breath sound (one or two places on each lung). No mikayla rhonchi or rales. No mikayla wheeze.    ABDOMEN: Soft, non-distended. Mild diffuse tenderness to palpation throughout but w/o focality to symptoms. No peritoneal findings/no rigidity, rebound or guarding. Ostomy in right mid/low abdomen, stoma pink and healthy looking. About 6 inches of stoma/intestine out (baseline per pt). Green liquid stool. No blood.   MUSCULOSKELETAL: Extremities warm and seemingly well perfused. No edema or calf tenderness.  NEUROLOGIC: Awake, alert. Not disoriented. She displays no atrophy and no tremor.  Normal tone. No seizure activity. Coordination normal. GCS 15  SKIN: Skin is warm and dry. No rash noted. No diaphoresis. No pallor.   PSYCHIATRIC: Normal mood and  affect. Speech and behavior normal. Thought processes linear. Cognition and memory are normal.     ED Course     Procedures       11:16 AM  The patient was seen and examined by Dr. Haile in Room 12.          EKG Interpretation:      Interpreted by Leonela Haile MD  Time reviewed: 11:00 AM  Symptoms at time of EKG: abnormal labs   Rhythm: normal sinus   Rate: 95 bpm  Axis: Normal  Ectopy: none  Conduction: normal  ST Segments/ T Waves: No ST-T wave changes  Q Waves: none  Comparison to prior on Aug. 2016 & Sep. 2015: now NSR from sinus tachycardia; lead III appears similar to Sep. 2015; no other significant changes  Clinical Impression: Sinus, no significant changes from previous             Labs Ordered and Resulted from Time of ED Arrival Up to the Time of Departure from the ED   COMPREHENSIVE METABOLIC PANEL - Abnormal; Notable for the following:     Carbon Dioxide 19 (*)     Glucose 109 (*)     Creatinine 1.83 (*)     GFR Estimate 27 (*)     GFR Estimate If Black 32 (*)     Calcium 7.1 (*)     Albumin 2.7 (*)     Protein Total 5.6 (*)     Alkaline Phosphatase 373 (*)     All other components within normal limits   CBC WITH PLATELETS DIFFERENTIAL - Abnormal; Notable for the following:     WBC 33.9 (*)     RBC Count 2.81 (*)     Hemoglobin 7.1 (*)     Hematocrit 24.9 (*)     MCH 25.3 (*)     MCHC 28.5 (*)     RDW 25.1 (*)     Absolute Neutrophil 32.6 (*)     Absolute Lymphocytes 0.3 (*)     All other components within normal limits   INR - Abnormal; Notable for the following:     INR 1.31 (*)     All other components within normal limits   ROUTINE UA WITH MICROSCOPIC REFLEX TO CULTURE - Abnormal; Notable for the following:     Protein Albumin Urine 10 (*)     Mucous Urine Present (*)     All other components within normal limits   ISTAT  GASES LACTATE BELEN POCT - Abnormal; Notable for the following:     Ph Venous 7.29 (*)     PCO2 Venous 36 (*)     Bicarbonate Venous 17 (*)     All other components within normal  limits   ISTAT GASES ELEC ICA GLUC BELEN POCT - Abnormal; Notable for the following:     Ph Venous 7.30 (*)     PCO2 Venous 36 (*)     Bicarbonate Venous 18 (*)     Glucose 110 (*)     Hemoglobin 7.8 (*)     Hematocrit - POCT 23 (*)     All other components within normal limits   ISTAT CG8 GAS ELEC ICA GLUC BELEN NURSE POCT   ISTAT CG4 GASES LACTATE BELEN NURSING POCT   BLOOD CULTURE   ENTERIC BACTERIA AND VIRUS PANEL BY MARCELO STOOL   CLOSTRIDIUM DIFFICILE TOXIN B   BLOOD CULTURE       Assessments & Plan (with Medical Decision Making)   HISTORY TAKING INFORMATION:   - History, ROS, SH, PMH and Physical exam performed by myself in the presence of our medical scribe. No interpretive services were needed.   - PMH: Old chart from [facility] reviewed and revealed multiple medical conditions (see EMR for full listing), but notable for colon cancer with secondary liver cancer, peristomal hernia, hypothyroidism, DM, HTN, HLP, previous GIB, CKD, COPD, L1 compression fracture, chemotherapy-induced nausea, vomiting and neutropenia, hyperkalemia, MAC infxn, arthritis, et al.   - PSH: Lung transplant (2009), appendectomy, hernia repair, tonsillectomy liver tumor ablation, right colectomy, ileostomy  - SH: Former smoker, no EtOH or drugs  - FH: None new reported    Most recent ED Visit Review:  - 8/21/16: Metastatic colon cancer; admitted    EMR Review:  - Last Echo (6/6/13): EF 65%  - Labs from 1/20:  K 5.4, Hgb 7.5, Cr 1.87  ________________________________________________________________________    IMPRESSION: 76-year-old female, with multiple medical conditions (see EMR for full listing), but notable for previous lung tx, colon cancer with liver metastases, peristomal hernia, hypothyroidism, DM, HTN, HLP, previous GIB, CKD, COPD, L1 compression fracture, chemotherapy-induced nausea, vomiting and neutropenia, MAC infxn, arthritis, et al. who was previously undergoing cancer treatments but unfortunately had to stop those because she  developed extremity cellulitis and is on antibiotics for such, presenting today for evaluation after she was told her potassium testing yesterday with high and that she has continued nausea and vomiting and diarrhea, as described for the above in HPI/ROS.     Clinically, patient appears nontoxic but fatigued.  Does appear to have improving appearing cellulitis/induration/swelling of the both distal upper extremities (R>L). Afebrile, normotensive, normal HR. Otherwise on examination, her abdomen is soft though somewhat diffusely uncomfortable to palpation.  She has a large amount of green liquid stool in her colostomy that she reports is new since she started on antibiotics for cellulitis.  The stoma that is out appears pink and healthy and is out to a normal degree her report.  Somewhat prolonged expiratory phase bilaterally with breath sounds being slightly louder on the right than the left with an occasional crackle type sound sporadically on both sides without mikayla rhonchi, wheezes or significant rails.    Differential diagnosis for this patient is broad and includes but is not limited to electrolyte abnormality/metabolic derangement from dehydration, diarrhea, etc.  Occult infection such as infectious diarrhea, C. diff, occult pneumonia, bacteremia, UTI etc. medication adverse reactions, et al.     PLAN: Laboratory studies including cultures, urine studies, stool studies, CXR, non-con CT A/P    RESULTS:  - POC Labs/VBG: Metabolic acidosis with grossly normal electrolytes and Hgb near previous, lactate normal  - Labs: WBC 33.9. (has been trending up, though today slightly lower than yesterday) Creatinine and potassium slightly improved from yesterday but is still worse then earlier in January.  Protein levels L, alk phos slightly increased from previous.  Other LFTs/bilirubin grossly WNL. Hgb near previous. INR 1.3  - Urine: No apparent UTI, no RBCs.   - Imaging: Images and written preliminary reports reviewed  by myself and revealed:  --- CXR: 1. 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 2. Minimal patchy opacities in the right lung transplant, new compared to the prior, may represent mild edema or early infection in the acute  Setting.  --- CT A/P: 1. Findings consistent with known metastatic disease to the liver, slightly progressed since prior.  2. Stable postsurgical changes of right hemicolectomy with ileostomy and colostomy. No dilated loops of bowel.  3. Small quantity of ascites, most prominent just inferior to the liver in the right upper quadrant. This is likely reactive to the liver disease.    INTERVENTIONS:   - zofran, IVF    RE-EVALUATION:  - The patient's symptoms were somewhat improved during ED stay. Continues to appear nontoxic, NAD, but does appear to still feel somewhat fatigued/generally unwell.    DISCUSSIONS:  - w/ Oncology: They will admit for further evaluation/management  - w/ Pharmacy: Will review case and make Abx recommendations given symptoms may be occuring despite of or because of previous/other current Abx, as well as renal fx, etc.    DISPOSITION/PLANNING:  - FINAL IMPRESSION: Pneumonia, Diarrhea with significant leukocytosis, N/V, anemia (similar to previous), ROGERIO, metabolic acidosis  - DISPOSITION: Admit to Oncology for further evaluation/management  --- Pending: Flu/respiratory swabs, stool studies/C.diff testing      ______________________________________________________________________________  - I discussed the care of the patient with Oncology, ED Pharmacist  - I have reviewed the nursing notes.  - I have reviewed the findings, plan with the patient and her loved ones. They expressed understanding and agreement with this plan. All questions answered to the best of our ability at this time.     ADDENDUM:  - [place] back from Pharmacy immediately after patient starting transport to floor, they would recommend Zosyn  (2.25) and Levofloxacin in addition to the linezolid she was on at home. Contacted the Onc team again and they will be coordinating those cares.     New Prescriptions    No medications on file       Final diagnoses:   None   IKriss, am serving as a trained medical scribe to document services personally performed by Leonela Haile MD, based on the provider's statements to me.   Leonela BAUM MD, was physically present and have reviewed and verified the accuracy of this note documented by Kriss Shane.      1/21/2017   [facility], [place], EMERGENCY DEPARTMENT      Leonela Haile MD  01/21/17 [id number]

## 2017-01-21 NOTE — H&P
[facility] History and Physical    Melissa Collazo MRN# [id number]   Age: 76 year old YOB: 1940     Date of Admission:  1/21/2017    Home clinic: [facility]  Primary care provider: Rosette Lin          Assessment and Plan:   Pt is a 77 yo F with a PMH of metastatic colon ca (liver mets) s/p ileocolic resection and ostomy, on chem with irinotecan/Avastin, COPD s/p R lung transplant in 2009, DM2, HTN, CKD who presents fatigue, nausea, vomiting and CXR with possible PNA     1- Possible HACP: did have cough that worsening, WBC elevated could be infection with her feeling fatigue CXR with new opacity concern abdout HCAP,   - will send for sputum Cx  - Bcx, RVP  - may consider CT  - start on zosyn and levaquin and c/w linezolid for cellulitis   2- cellulitis of the rt. Arm: started few days ago and treated with linezolid at the clinic , seems improve as per the patient  - will c/w current Abx   - will send for US of the rt arm   3- Increased ostomy output. Pt with slight increase ostomy output from baseline over last few days.    - c diff negative today   - stool culture  - IVF   - N/V treated symptomatically   4- HTN: c/w current medication     5- COPD s/p R lung transplant in 2009. CT on 8/19 with no acute changes.    -Cont prednisone (5 mg qam and 2.5 mg qhs)  -cont tacrolimus, check level in am  -Cont advair, albuterol, azithromycin, dapsone  6- Hx of colon cancer mets to the liver: currently the chemo on hold , F/U by the Onc     7- DM2  -cont lantus 4u qhs  -SSI    8- Hypothyroidism  -Cont pta synthroid  9- CKD; Cr seems elevated likely d/o dehydration   - IVF for now   - monitor lytes and replace as needed   10 Chronic anemia: d/t her chronic condition and chemo. Will monitor transfused if <7   FEN  - regular lytes  - IVF  - monitor lytes   DVT PPX heparin   Code status Full code        Chief Complaint:   Fatigue , N/V and cough     History is obtained from the  patient and  medical record          History of Present Illness:   Pt is a 75 yo F with a PMH of metastatic colon ca (liver mets) s/p ileocolic resection and ostomy, on chem with irinotecan/Avastin, COPD s/p R lung transplant in 2009, DM2, HTN, CKD presented to the ED today for worsening n/V and fatigue, she was seen at the clinic for the last few days she also was treated for cellulitis in her rt. Arm started aboiut 4-5 days. Today she was vomiting and felt her symptoms is worse at the ED she also had CXR concern for possible PNA, she did have cough without production and feels the cough is worse, denies any fever, no chest pain or SOB, her ostomy out put also increased recently morethan her baseline. Admitted for further treatment and evaluation , VS art the ED was stable.            Past Medical History   Diagnosis Date     Asthma      Hypertension      COPD (chronic obstructive pulmonary disease) (H)      Thyroid disease      Lung replaced by transplant (H) 2/19/2009     RSLT     Aspergillus (H)      in sputum prior to lung transplant, marked sun sensitivity to voraconazole     Breast nodule      Benign     MAC (mycobacterium avium-intracellulare complex)      by bronch 2/2009 treated with levo and azithro     Hypogammaglobulinaemia      Back pain      S/P radiofreq ablation     Hyperlipidaemia      Osteoporosis, unspecified      Encounter for long-term (current) use of other medications      CKD (chronic kidney disease) stage 3, GFR 30-59 ml/min      PONV (postoperative nausea and vomiting)      Shortness of breath      Arthritis      Cancer of right colon (H) 10/7/2014     Anemia      History of blood transfusion      Diabetes (H)      Other chronic pain      Cancer of liver (H)      Squamous cell carcinoma (H)      Skin disease             Past Surgical History:      Past Surgical History   Procedure Laterality Date     Transplant       Right lung tx 2/2009 for        Appendectomy       Hernia repair        Colonoscopy  3/25/2011     COMBINED COLONOSCOPY, REMOVE TUMOR/POLYP/LESION BY SNARE performed by LEIDY DEAL at  GI     Tonsillectomy       Mycobacterium a       Colonoscopy with co2 insufflation N/A 10/7/2014     Procedure: COLONOSCOPY WITH CO2 INSUFFLATION;  Surgeon: Zeinab Anthony MD;  Location: [facility] OR     Laparoscopic ileostomy N/A 10/17/2014     Procedure: LAPAROSCOPIC ILEOSTOMY;  Surgeon: Zeinab Anthony MD;  Location: [facility] OR     Ablate liver tumor N/A 3/19/2015     Procedure: ABLATE LIVER TUMOR;  Surgeon: Casper Poe MD;  Location: [facility] OR     Colectomy right N/A 3/19/2015     Procedure: COLECTOMY RIGHT;  Surgeon: Zeinab Anthony MD;  Location: [facility] OR     Takedown ileostomy N/A 3/19/2015     Procedure: TAKEDOWN ILEOSTOMY;  Surgeon: Zeinab Anthony MD;  Location: [facility] OR     Laparotomy exploratory N/A 3/29/2015     Procedure: LAPAROTOMY EXPLORATORY;  Surgeon: Zeinab Anthony MD;  Location: [facility] OR     Ileostomy N/A 3/29/2015     Procedure: ILEOSTOMY;  Surgeon: Zeinab Anthony MD;  Location: [facility] OR            Social History:     Social History     Social History     Marital Status:      Spouse Name: N/A     Number of Children: N/A     Years of Education: N/A     Occupational History     Not on file.     Social History Main Topics     Smoking status: Former Smoker -- 1.00 packs/day for 30 years     Types: Cigarettes     Quit date: 01/01/1993     Smokeless tobacco: Former User     Alcohol Use: No     Drug Use: No     Sexual Activity: Not on file     Other Topics Concern     Not on file     Social History Narrative            Family History:   The family history includes Alcohol/Drug in her father; CANCER in her maternal uncle; Other - See Comments in her mother; Respiratory in her brother and maternal aunt. There is no history of Melanoma or Skin Cancer.    Family history   reviewed and updated in EPIC         Immunizations:     Immunization History   Administered Date(s)  Administered     Influenza (High Dose) 3 valent vaccine 10/01/2015, 09/01/2016     Influenza (IIV3) 10/21/2008, 09/28/2012, 09/01/2014     Pneumococcal 23 valent 02/04/2014            Allergies:     Allergies   Allergen Reactions     Morphine Sulfate Dermatitis     Codeine Sulfate Dermatitis     Ativan [Lorazepam] Other (See Comments)     Altered mentation, hallucinations  During hospitalization for influenza in Jan 2015 - ativan caused extreme lethargy and mild confusion.  These symptoms resolved when it was stopped.    Pt's family reported a similar response when she had used it at home for nausea.        Colesevelam Nausea     no appetite, dry heaves     Sulfa Drugs Hives     Adhesive Tape Rash     ACTUALLY,  Just fragile skin.  With tear with EKG lead.            Medications:     Current Facility-Administered Medications   Medication     0.9% sodium chloride infusion     albuterol (PROAIR HFA/PROVENTIL HFA/VENTOLIN HFA) Inhaler 2 puff     amLODIPine (NORVASC) tablet 10 mg     ascorbic acid (VITAMIN C) tablet 500 mg     aspirin EC EC tablet 81 mg     atorvastatin (LIPITOR) tablet 20 mg     butalbital-acetaminophen-caffeine (FIORICET/ESGIC) per tablet 1 tablet     cyanocobalamin (VITAMIN B-12 ER) TBCR 1,000 mcg     [START ON 1/23/2017] dapsone tablet 50 mg     doxazosin (CARDURA) tablet 4 mg     fluticasone-salmeterol (ADVAIR) 500-50 MCG/DOSE diskus inhaler 1 puff     HYDROmorphone (DILAUDID) tablet 2-4 mg     insulin glargine (LANTUS) injection 4 Units     levothyroxine (SYNTHROID/LEVOTHROID) tablet 50 mcg     lidocaine (LIDODERM) 5 % Patch 1 patch     linezolid (ZYVOX) tablet 600 mg     Magnesium Oxide TABS 500 mg     montelukast (SINGULAIR) tablet 10 mg     multivitamin, therapeutic with minerals (THERA-VIT-M) tablet 1 tablet     OLANZapine (zyPREXA) tablet 5 mg     omeprazole (priLOSEC) CR capsule 40 mg     ondansetron (ZOFRAN) tablet 8 mg     [START ON 1/22/2017] predniSONE (DELTASONE) tablet 2.5 mg      "[START ON 1/22/2017] predniSONE (DELTASONE) tablet 5 mg     prochlorperazine (COMPAZINE) tablet 5 mg     sodium bicarbonate tablet 1,300 mg     tacrolimus (PROGRAF - GENERIC EQUIVALENT) capsule 3.5 mg     tacrolimus (PROGRAF - GENERIC EQUIVALENT) capsule 4 mg     valACYclovir (VALTREX) tablet 1,000 mg     zinc sulfate (ZINCATE) capsule 220 mg     Medication Instruction     heparin sodium PF injection 5,000 Units     0.9% sodium chloride infusion     prochlorperazine (COMPAZINE) tablet 5-10 mg    Or     prochlorperazine (COMPAZINE) injection 5-10 mg     ondansetron (ZOFRAN) injection 8 mg    Or     ondansetron (ZOFRAN-ODT) ODT tab 8 mg     acetaminophen (TYLENOL) tablet 650 mg     piperacillin-tazobactam (ZOSYN) 3.375 g vial to attach to  mL bag            Review of Systems:   A comprehensive review of systems was performed and found to be negative except as described in this note         Physical Exam:     Vitals were reviewed  Patient Vitals for the past 8 hrs:   BP Temp Temp src Pulse Heart Rate Resp SpO2 Height Weight   01/21/17 1500 133/63 mmHg 97.2  F (36.2  C) Oral 107 - 20 94 % 1.499 m (4' 11\") 52.935 kg (116 lb 11.2 oz)   01/21/17 1443 - - - - - - 94 % - -   01/21/17 1442 117/68 mmHg - - - - - - - -   01/21/17 1140 149/73 mmHg - - - 94 18 95 % - -   01/21/17 1120 145/72 mmHg - - - 132 16 98 % - -   01/21/17 1108 - - - - 95 - 100 % - -   01/21/17 1107 139/73 mmHg 97.3  F (36.3  C) Oral - 77 18 99 % - -     Constitutional: Awake, alert, cooperative, no apparent distress, and appears stated age.  Eyes: Lids and lashes normal, pupils equal, round and reactive to light, extra ocular muscles intact, sclera clear, conjunctiva normal.  ENT: Normocephalic, without obvious abnormality, atramatic, sinuses nontender on palpation, external ears without lesions, oral pharynx with moist mucus membranes, tonsils without erythema or exudates, gums normal and good dentition.  Neck: Supple, symmetrical, trachea midline, " no adenopathy, thyroid symmetric, not enlarged and no tenderness, skin normal.  Hematologic / Lymphatic: No cervical lymphadenopathy and no supraclavicular lymphadenopathy.  Back: Symmetric, no curvature, spinous processes are non-tender on palpation, paraspinous muscles are non-tender on palpation, no costal vertebral tenderness.  Lungs: No increased work of breathing, decrease  air exchange in the left side no wheezing Cardiovascular: Regular rate and rhythm, normal S1 and S2, no S3 or S4, and no murmur noted.  Abdomen: normal bowel sounds, soft, non-distended, non-tender, Ostomy bag in place Musculoskeletal: No redness, warmth, or swelling of the joints.  Full range of motion noted.  Motor strength is 5 out of 5 all extremities bilaterally.  Tone is normal.  Neurologic: Awake, alert, oriented to name, place and time.  Cranial nerves II-XII are grossly intact.  Motor is 5 out of 5 bilaterally.   Skin: No rashes, erythema, pallor, petechia or purpura.         Data:     Results for orders placed or performed during the hospital encounter of 01/21/17   CT Abdomen Pelvis w/o Contrast    Narrative    Exam: CT abdomen pelvis without contrast 1/21/2017 12:20 PM.    History: Nausea, vomiting, distention, history of colon cancer and  metastatic disease.    Comparison: 12/16/2016.    Technique: CT images from the lung bases through the symphysis pubis  without contrast    Findings:   Spleen is enlarged. Hyperdense subcentimeter lesions in the right  kidney, unchanged, represents hemorrhagic or proteinaceous cyst.  Exophytic simple appearing cyst in the upper pole of the right kidney.  The gallbladder contains no radiopaque stones. No gallbladder wall  thickening, however there is pericholecystic fluid, however it is  likely reactive to the liver, as this appearance has been seen on  prior exams. The liver shows multifocal areas of poorly defined  hypodensity both in the right and left lower liver. Findings  consistent with  known metastatic disease, although evaluation is  limited without contrast. Some of these poorly defined lesions appear  more prominent, for example in the left lobe measuring up to 21 x 33  mm on series 3, image 102.  The adrenal glands are unremarkable. No abnormally dilated loops of  small bowel or colon. Colonic diverticulosis. Postsurgical changes of  right hemicolectomy with right ileostomy and colostomy. Simple fluid  within the stoma. No free air. Trace ascites. Unchanged bilateral  inguinal fluid collections. There are small. No abdominal or pelvic  lymphadenopathy. Aortoiliac calcification.    Mild bibasilar atelectasis. Hyperexpansion of the emphysematous left  lung base. Unchanged hyperdense lesion in the posterior vertebral body  of T11. Sternotomy wires. Unchanged L1 compression deformity with  retropulsion. Degenerative findings of the spine.      Impression    Impression:   1. Findings consistent with known metastatic disease to the liver,  slightly progressed since prior.  2. Stable postsurgical changes of right hemicolectomy with ileostomy  and colostomy. No dilated loops of bowel.  3. Small quantity of ascites, most prominent just inferior to the  liver in the right upper quadrant. This is likely reactive to the  liver disease.    I have personally reviewed the examination and initial interpretation  and I agree with the findings.    KALLIE CALI MD   XR Chest 2 Views    Narrative    Exam: XR CHEST 2 VW, 1/21/2017 12:19 PM    Indication: weakness, fatigue, N/V, hx lung tx    Comparison: 12/16/2016, 12/12/2016    Findings:   Right-sided central catheter tip projects over the mid SVC. Median  sternotomy wires. Postoperative changes of right lung transplantation.  There are minimal patchy right pulmonary opacities. Mediastinum is  shifted to the right. Unchanged bibasilar atelectasis. Stable marked  emphysematous changes of the left native lung. Stable blunting of  costophrenic angles. No  pneumothorax. Multiple stable compression  deformity throughout the thoracic spine. Chronic right rib  deformities.      Impression    Impression:   1. Postoperative changes of right lung transplantation with stable  emphysematous changes of the left native lung.  2. Minimal patchy opacities in the right lung transplant, new compared  to the prior, may represent mild edema or early infection in the acute  setting.    I have personally reviewed the examination and initial interpretation  and I agree with the findings.    KALLIE CALI MD   Comprehensive metabolic panel   Result Value Ref Range    Sodium 137 133 - 144 mmol/L    Potassium 5.0 3.4 - 5.3 mmol/L    Chloride 107 94 - 109 mmol/L    Carbon Dioxide 19 (L) 20 - 32 mmol/L    Anion Gap 11 3 - 14 mmol/L    Glucose 109 (H) 70 - 99 mg/dL    Urea Nitrogen 20 7 - 30 mg/dL    Creatinine 1.83 (H) 0.52 - 1.04 mg/dL    GFR Estimate 27 (L) >60 mL/min/1.7m2    GFR Estimate If Black 32 (L) >60 mL/min/1.7m2    Calcium 7.1 (L) 8.5 - 10.1 mg/dL    Bilirubin Total 0.2 0.2 - 1.3 mg/dL    Albumin 2.7 (L) 3.4 - 5.0 g/dL    Protein Total 5.6 (L) 6.8 - 8.8 g/dL    Alkaline Phosphatase 373 (H) 40 - 150 U/L    ALT 22 0 - 50 U/L    AST 24 0 - 45 U/L   CBC with platelets differential   Result Value Ref Range    WBC 33.9 (H) 4.0 - 11.0 10e9/L    RBC Count 2.81 (L) 3.8 - 5.2 10e12/L    Hemoglobin 7.1 (L) 11.7 - 15.7 g/dL    Hematocrit 24.9 (L) 35.0 - 47.0 %    MCV 89 78 - 100 fl    MCH 25.3 (L) 26.5 - 33.0 pg    MCHC 28.5 (L) 31.5 - 36.5 g/dL    RDW 25.1 (H) 10.0 - 15.0 %    Platelet Count 276 150 - 450 10e9/L    Diff Method Automated Method     % Neutrophils 96.0 %    % Lymphocytes 0.8 %    % Monocytes 2.3 %    % Eosinophils 0.1 %    % Basophils 0.0 %    % Immature Granulocytes 0.8 %    Nucleated RBCs 0 0 /100    Absolute Neutrophil 32.6 (H) 1.6 - 8.3 10e9/L    Absolute Lymphocytes 0.3 (L) 0.8 - 5.3 10e9/L    Absolute Monocytes 0.8 0.0 - 1.3 10e9/L    Absolute Eosinophils 0.0 0.0 -  0.7 10e9/L    Absolute Basophils 0.0 0.0 - 0.2 10e9/L    Abs Immature Granulocytes 0.3 0 - 0.4 10e9/L    Absolute Nucleated RBC 0.0    INR   Result Value Ref Range    INR 1.31 (H) 0.86 - 1.14   UA with Microscopic reflex to Culture   Result Value Ref Range    Color Urine Yellow     Appearance Urine Clear     Glucose Urine Negative NEG mg/dL    Bilirubin Urine Negative NEG    Ketones Urine Negative NEG mg/dL    Specific Gravity Urine 1.011 1.003 - 1.035    Blood Urine Negative NEG    pH Urine 5.5 5.0 - 7.0 pH    Protein Albumin Urine 10 (A) NEG mg/dL    Urobilinogen mg/dL Normal 0.0 - 2.0 mg/dL    Nitrite Urine Negative NEG    Leukocyte Esterase Urine Negative NEG    Source Midstream Urine     WBC Urine 1 0 - 2 /HPF    RBC Urine 0 0 - 2 /HPF    Squamous Epithelial /HPF Urine 1 0 - 1 /HPF    Transitional Epi <1 0 - 1 /HPF    Mucous Urine Present (A) NEG /LPF    Hyaline Casts 1 0 - 2 /LPF   Glucose by meter   Result Value Ref Range    Glucose 111 (H) 70 - 99 mg/dL   EKG 12-lead, tracing only   Result Value Ref Range    Interpretation ECG Click View Image link to view waveform and result    ISTAT gases lactate nery POCT   Result Value Ref Range    Ph Venous 7.29 (L) 7.32 - 7.43 pH    PCO2 Venous 36 (L) 40 - 50 mm Hg    PO2 Venous 30 25 - 47 mm Hg    Bicarbonate Venous 17 (L) 21 - 28 mmol/L    O2 Sat Venous 51 %    Lactic Acid 1.0 0.7 - 2.1 mmol/L   ISTAT gases elec ica gluc nery POCT   Result Value Ref Range    Ph Venous 7.30 (L) 7.32 - 7.43 pH    PCO2 Venous 36 (L) 40 - 50 mm Hg    PO2 Venous 30 25 - 47 mm Hg    Bicarbonate Venous 18 (L) 21 - 28 mmol/L    O2 Sat Venous 52 %    Sodium 136 133 - 144 mmol/L    Potassium 4.9 3.4 - 5.3 mmol/L    Glucose 110 (H) 70 - 99 mg/dL    Calcium Ionized 4.4 4.4 - 5.2 mg/dL    Hemoglobin 7.8 (L) 11.7 - 15.7 g/dL    Hematocrit - POCT 23 (L) 35.0 - 47.0 %PCV   Blood culture   Result Value Ref Range    Specimen Description Blood PORT A CATH     Culture Micro No growth after 2 hours      Micro Report Status Pending    Blood culture   Result Value Ref Range    Specimen Description Blood Left Arm     Culture Micro Pending     Micro Report Status Pending    Clostridium difficile toxin B PCR   Result Value Ref Range    Specimen Description Feces     C Diff Toxin B PCR  NEG     Negative  Negative: Clostridium difficile target DNA sequences NOT detected, presumed   negative for Clostridium difficile toxin B or the number of bacteria present   may be below the limit of detection for the test.   FDA approved assay performed using [employer] GeneXpert real-time PCR.   A negative result does not exclude actual disease due to Clostridium difficile   and may be due to improper collection, handling and storage of the specimen or   the number of organisms in the specimen is below the detection limit of the   assay.     Influenza A/B antigen   Result Value Ref Range    Influenza A/B Agn Specimen Nasopharyngeal     Influenza A Negative NEG    Influenza B  NEG     Negative   Test results must be correlated with clinical data. If necessary, results   should be confirmed by a molecular assay or viral culture.       *Note: Due to a large number of results and/or encounters for the requested time period, some results have not been displayed. A complete set of results can be found in Results Review.       Attestation:  Total time: 70 minutes    Dayana Nolan MD

## 2017-01-21 NOTE — PHARMACY-ADMISSION MEDICATION HISTORY
Admission medication history interview status for the 1/21/2017 admission is complete. See Epic admission navigator for allergy information, pharmacy, prior to admission medications and immunization status.     Medication history interview sources:  Patient, patient's daughter, Epic chart review, [facility]    Changes made to PTA medication list (reason)  Added: Prochlorperazine, per patient and daughter, taken as needed for nausea when scheduled medications aren't enough  Deleted: N/A  Changed: Novolog insulin, changed directions from give 4 units plus sliding scale 3 times per day, to give sliding scale 3 times per day. Per patient and daughter, they no longer give an initial 4 units since the patient's glucose levels have been well controlled.     Additional medication history information (including reliability of information, actions taken by pharmacist):  -The patient and her daughter are extremely good historians of her medication use. Keeping to a strict schedule, they know exactly how and when all of her medications are given, and can give a good estimate on those given as needed.   -Patient uses [facility] ([phone]) for almost all of her medications. She uses [facility] pharmacy in [place] ([phone]) for her cholesterol and thyroid medications.   -The tacrolimus prescriptions were verified as generic medications through [facility] records of [place]-filled prescriptions.    -Patient began taking linezolid and valacyclovir on 1/17/17, prescribed for 10 days.   -Patient started Lonsurf chemotherapy medication on 12/26/16 for a 28 day cycle. According to prescription order, patient should take this on days 1-5 and 8-12 of cycle, however a chemotherapy note from 1/2/17 in chart review says days 1-5 and 8-15. Unsure of which is the correct directions. Next 28 day cycle would start on 1/23/17, however note from 1/20/17 indicates differing treatment due to cellulitis, and  next start date will have to be reevaluated.   -Patient received her influenza vaccination on 9/1/16 per [facility]'s Immunization record.       Prior to Admission medications    Medication Sig Last Dose Taking? Auth Provider   Prochlorperazine Maleate (COMPAZINE PO) Take 5 mg by mouth every 6 hours as needed for nausea 1/20/2017 at Unknown time Yes Unknown, Entered By History   valACYclovir (VALTREX) 1000 mg tablet Take 1 tablet (1,000 mg) by mouth daily 1/21/2017 at 0800 Yes Rand Man APRN CNP   HYDROmorphone (DILAUDID) 2 MG tablet Take 1-2 tablets (2-4 mg) by mouth every 3 hours as needed for moderate to severe pain 1/20/2017 at Unknown time Yes aRnd Man APRN CNP   linezolid (ZYVOX) 600 MG tablet Take 1 tablet (600 mg) by mouth 2 times daily 1/21/2017 at 0800 Yes Rand Man APRN CNP   tacrolimus (PROGRAF - GENERIC EQUIVALENT) 1 MG capsule Take four 1 MG cap in the AM and three 1 MG in the PM .  Total dose of 4 mg in the AM and 3.5 mg in the PM 1/21/2017 at 0800 Yes Marco Clay MD   tacrolimus (PROGRAF - GENERIC EQUIVALENT) 0.5 MG capsule Take 1 capsule (0.5 mg) by mouth every evening Total dose of 4 mg in the AM and 3.5 mg in the PM 1/20/2017 at 2000 Yes Marco Clay MD   Trifluridine-Tipiracil (LONSURF) 15-6.14 MG tablet Take 3 tablets by mouth 2 times daily Take within 1 hour after morning and evening meals on Days 1 thru 5 and 8 thru 12 of each 28 day cycle Past month at Unknown time Yes Salvador Lebron MD   lidocaine (LIDODERM) 5 % Patch Place 1 patch onto the skin every 24 hours 1/20/2017 at 0800 Yes Marco Clay MD   lidocaine-prilocaine (EMLA) cream APPLY TO PORT SITE ABOUT 45 TO 60 MINUTES PRIOR TO ACCESSING AS NEEDED FOR MODERATE PAIN 1/21/2017 at am Yes Rand Man APRN CNP   ondansetron (ZOFRAN) 8 MG tablet TAKE ONE TABLET BY MOUTH THREE TIMES A DAY BEFORE MEALS 1/21/2017 at 0800 Yes Salvador Lebron MD   dapsone 25 MG tablet TAKE TWO  TABLETS BY MOUTH THREE TIMES WEEKLY (MONDAY/WEDNESDAY/FRIDAY) 1/20/2017 at 0800 Yes Marco Clay MD   predniSONE (DELTASONE) 5 MG tablet TAKE ONE TABLET BY MOUTH EVERY MORNING 1/21/2017 at 0800 Yes Marco Clay MD   zinc sulfate (ZINCATE) 220 MG capsule TAKE ONE CAPSULE BY MOUTH EVERY DAY 1/20/2017 at 1800 Yes Marco Clay MD   predniSONE (DELTASONE) 2.5 MG tablet TAKE ONE TABLET BY MOUTH EVERY EVENING 1/20/2017 at 2000 Yes Marco Clay MD   sodium bicarbonate 650 MG tablet Take 2 tablets (1,300 mg) by mouth 2 times daily 1/21/2017 at 0800 Yes Tavares Gomez MD   insulin glargine (LANTUS SOLOSTAR) 100 UNIT/ML PEN Inject 4 Units Subcutaneous At Bedtime 1/20/2017 at 2200 Yes Marco Clay MD   OLANZapine (ZYPREXA) 5 MG tablet Take 1 tablet (5 mg) by mouth nightly as needed for nausea Past Month at Unknown time Yes Yvette Ruiz PA-C   albuterol (PROAIR HFA, PROVENTIL HFA, VENTOLIN HFA) 108 (90 BASE) MCG/ACT inhaler Inhale 2 puffs into the lungs every 6 hours as needed for shortness of breath / dyspnea or wheezing 1/20/2017 at Unknown time Yes Marco Clay MD   montelukast (SINGULAIR) 10 MG tablet Take 1 tablet (10 mg) by mouth every evening 1/20/2017 at 2000 Yes Marco Clay MD   fluticasone-salmeterol (ADVAIR) 500-50 MCG/DOSE diskus inhaler Inhale 1 puff into the lungs 2 times daily 1/21/2017 at 0800 Yes Marco Clay MD   doxazosin (CARDURA) 2 MG tablet Take 2 tablets (4 mg) by mouth At Bedtime 1/20/2017 at 2200 Yes Tavares Gomez MD   azithromycin (ZITHROMAX) 250 MG tablet TAKE ONE-HALF TABLET BY MOUTH EVERY DAY 1/20/2017 at 1200 Yes Marco Clay MD   omeprazole (PRILOSEC) 40 MG capsule Take 1 capsule (40 mg) by mouth daily 1/21/2017 at 0700 Yes Marco Clay MD   amLODIPine (NORVASC) 5 MG tablet Take 2 tablets (10 mg) by mouth At Bedtime 1/20/2017 at 2200 Yes Marco Clay MD  "  atorvastatin (LIPITOR) 20 MG tablet Take 1 tablet (20 mg) by mouth daily 1/20/2017 at 2200 Yes Marco Clay MD   insulin aspart (NOVOLOG PEN) 100 UNIT/ML soln Give sliding scale three times daily:140-214 give 1 unit; 215-289 give 2 units; 290-365 give 3 units and call MD   NOVOLOG 1/21/2017 at 0800 Yes Reported, Patient   aspirin EC 81 MG tablet Take 81 mg by mouth daily  1/21/2017 at 0700 Yes Reported, Patient   levothyroxine (SYNTHROID, LEVOTHROID) 50 MCG tablet Take 50 mcg by mouth daily  1/21/2017 at 0700 Yes Reported, Patient   Magnesium Oxide 250 MG TABS Take 2 tablets (500 mg) by mouth At Bedtime 1/20/2017 at 2200 Yes Marco Clay MD   loperamide (IMODIUM) 2 MG capsule Take 1 capsule (2 mg) by mouth 3 times daily as needed for other (greater than 1200mL ileostomy output in a 24 hour period.  please call colon and rectal surgery clinic to notify if patient is requiring Imodium.) 1/21/2017 at 0800 Yes Leana Gomez PA-C   ascorbic acid 500 MG TABS Take 1 tablet (500 mg) by mouth daily 1/20/2017 at 1200 Yes Ilene Beavers PA   Calcium Carbonate-Vitamin D (CALCIUM 500 + D PO) Take 1 tablet by mouth 2 times daily  1/20/2017 at 1800 Yes Reported, Patient   Multiple Vitamin (MULTI-VITAMIN) per tablet Take 1 tablet by mouth daily. 1/20/2017 at 1200 Yes Reported, Patient   Blood Glucose Calibration (CRIS DOC CONTROL) NORMAL SOLN daily as needed   Reported, Patient   order for DME Equipment being ordered:   Hernia Belt     Nu form, BG- Cool comfort- medium,  4\"  Belt ring  3 1/4  Catalog # :  [id number]   Zeinab Anthony MD   blood glucose monitoring (NO BRAND SPECIFIED) test strip TEST 4 TIMES DAILY.   Reported, Patient   insulin pen needle 32G X 4 MM USE AS DIRECTED FOR ADMINISTERING INSULIN AT HOME.   Reported, Patient   cyanocobalamin (VITAMIN B-12 ER) 1000 MCG TBCR Take 1,000 mcg by mouth every 7 days  1/20/2017 at 1200  Reported, Patient   order for DME Lightweight " Wheelchair with leg rests.   Salvador Perez MD   butalbital-acetaminophen-caffeine (FIORICET, ESGIC) -40 MG per tablet Take 1 tablet by mouth every 4 hours as needed for headaches More than a month at Unknown time  Jessenia Sams PA-C   order for DME Please dispense 1 hair prosthesis.   Jessenia Sams PA-C   ORDER FOR DME Equipment being ordered: ileostomy supplies   Jason, Leana Lujan PA-C         Medication history completed by: Callie Marks, Pharmacy Intern

## 2017-01-21 NOTE — IP AVS SNAPSHOT
[address] [address]    Phone:  [phone]                                       After Visit Summary   1/21/2017    Melissa Collazo    MRN: [id number]           After Visit Summary Signature Page     I have received my discharge instructions, and my questions have been answered. I have discussed any challenges I see with this plan with the nurse or doctor.    ..........................................................................................................................................  Patient/Patient Representative Signature      ..........................................................................................................................................  Patient Representative Print Name and Relationship to Patient    ..................................................               ................................................  Date                                            Time    ..........................................................................................................................................  Reviewed by Signature/Title    ...................................................              ..............................................  Date                                                            Time

## 2017-01-22 NOTE — PROGRESS NOTES
[facility], [place]    Hematology / Oncology Progress Note    Date of Service (when I saw the patient): 01/22/2017     Assessment and Plan  Meilssa Collazo is a pleasant 76 year old female with a PMH of metastatic colon ca (liver mets) s/p ileocolic resection and ostomy, most recently on Lonsurf, COPD s/p R lung transplant in 2009, DM2, HTN, CKD who presents fatigue, nausea, vomiting and CXR with possible PNA.     PULMONARY  #Right sided pneumonia. Reports cough with sputum production over past week. Afebrile. Worsening leukocytosis since 1/10. CXR revealed R sided opacity concerning for infection. 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  -Continue IV Zosyn and Levaquin. Continue Linezolid for cellulitis.    -Sputum culture ordered  -Blood culture NGTD, RVP pending  -IVF  -Daily CBC    #COPD s/p R lung transplant in 2009. CT on 8/19 with no acute changes. Follows with Dr. Clay.    -Cont prednisone (5 mg qam and 2.5 mg qhs)  -Cont tacrolimus, check level tonight  -Cont advair, albuterol, dapsone  -Consider contacting transplant staff if complications develop from above PNA.    ID  #Cellulitis right arm. Began a week ago or so, and treated with Linezolid (started 1/17) at the clinic. Per pt is much improved since starting antibiotics. Notable swelling of RUE.  -Continue Linezolid 600 mg BID   -RUE US negative for DVT.    #Zoster infection, around V1 dermatome. Started treatment on 1/17 (complete a 10 day course). Pt reports much improvement since starting treatment. Noted left sided conjunctival hemorrhage 1/22. Will watch and consider opthalmology consult if worsening to r/o herpetic occular involvement.  -Continue renally adjusted Valtrex 1 g daily.      #H/o EBV viremia. Last EBV DNA level 2030 on 12/12/16.   -Check EBV level     HEME/ONC  #Stage IV colon cancer mets to the liver. Follows with Dr. Lebron. Prior  treatment with Xeloda/Oxaliplatin, irinotecan and erbitux and avastin. She recently was found to have progressed on Irinotecan/Ertibux. She was initiated on Lonsurf on 12/26/16. Further chemo on hold d/t cellulitis infection.   -Continue PTA Dilaudid for pain control    #Chronic anemia. Baseline is ~8. Per p,t she prefers to only have transfusions in emergency situations. CBC diff + red blood cell fragments.  -Hbg 7.0 today. Will hold off on transfusion, but relayed to pt that if hgb drops tomorrow will likely need to transfuse. Pt agreeable.   -Peripheral smear and fibrinogen for further evaluation of red cell fragments.     GI  #Increased ostomy output. S/p right hemicolectomy with ileostomy and colostomy 3/2015. Pt with slight increase in ostomy output from baseline over last few days. CT A/P 1/21 revealed mild ascites, slightly progressed met liver disease and stable postsurgical changes of right hemicolectomy with ileostomy and colostomy. Reports output today is back to baseline.  -C diff, stool antigen panel negative.  -IVF    -N/V treated symptomatically      RENAL  #Acute on chronic kidney disease. Likely 2/2 dehydration. Baseline Cr ~1.5. Much improvement after IVF (1.83 1/21 -->1.57 1/22).   -Continue IVF  -Avoid nephrotoxic agents  -Daily BMP    CARDIOVASCULAR  #HTN.  -Cont PTA Norvasc, Doxazosin, ASA    ENDOCRINE  #DM2. Hgb A1C 7.8 1/22. Per pt had A1C checked last month and was ~6. Will continue with current PTA regimen and have pt f/u with PCP for further adjustments to insulin if needed.   -Cont lantus 4u qhs  -SSI  -Hypoglycemia protocol    #Hypothyroidism  -Cont PTA Synthroid    MISC  #Deconditioning, decreased appetite. Reports feeling more tired past couple weeks. Lives in bottom level of duplex with son on second level.  -PT/OT/Nutrition consult     FEN  -Regular diet  -IVF  -Monitor lytes      PPx  DVT: Heparin TID  GI: PTA Omeprazole     Code status Full code     Dispo: Another 1-2 days pending  clinical improvement and transition to PO antibiotics.    Above plan discussed with staff physician, Dr. Yuki Lucas PA-C  Hematology/Oncology  Pager: [phone]    Interval History  Ms. Collazo is doing well this morning. Reports a productive cough that she is unable to expel. Denies fever. Has been increasingly fatigued over past week or so along with a lack of appetite. Reports one episode of emesis yesterday as well. Had increased output for her ostomy yesterday, but now today is back to baseline. She noticed a left conjunctival hemorrhage this morning, which she says has occurred in this eye in the past. Has mild blurry vision in left eye. Denies headache, increased SOB, chest pain, abdominal pain, and lower extremity edema.    Physical Exam  Temp: 98.1  F (36.7  C) Temp src: Oral BP: 134/69 mmHg Pulse: 109 Heart Rate: 100 Resp: 20 SpO2: 92 % O2 Device: None (Room air)    Filed Vitals:    01/21/17 1500 01/22/17 0722   Weight: 52.935 kg (116 lb 11.2 oz) 54.296 kg (119 lb 11.2 oz)     Vital Signs with Ranges  Temp:  [97.2  F (36.2  C)-98.9  F (37.2  C)] 98.1  F (36.7  C)  Pulse:  [107-109] 109  Heart Rate:  [] 100  Resp:  [16-24] 20  BP: (117-149)/(54-73) 134/69 mmHg  SpO2:  [90 %-98 %] 92 %  I/O last 3 completed shifts:  In: 1568.33 [I.V.:1568.33]  Out: 50 [Stool:50]    Constitutional: Pleasant female seen laying in bed. No apparent distress, and appears stated age.  Eyes: +left conjunctival hemorrhage. EOMs intact. Lids and lashes normal.  ENT: Normocephalic, oral pharynx with moist mucus membranes, tonsils without erythema or exudates, gums normal, dentures in place.   Respiratory: No increased work of breathing, good air exchange. R sided crackles in base. L breath sounds clear. No wheezing.  Cardiovascular: Regular rate and rhythm, normal S1 and S2, and no murmur noted.  GI: Ostomy in place with colon protuberance, c/d/i. +BS. Soft. No tenderness on palpation.  Skin: RUE swelling  and mild erythema on hand and wrist. No warmth, no lesions, no jaundice.  Extremities: No lower extremity edema. No cyanosis.  Neurologic: Awake, alert, oriented to name, place and time.    Vascular access: Port, c/d/i    Medications    NaCl Stopped (01/21/17 2276)     - MEDICATION INSTRUCTIONS -       NaCl 100 mL/hr at 01/22/17 0533       [START ON 1/23/2017] levofloxacin  750 mg Intravenous Q48H     piperacillin-tazobactam  3.375 g Intravenous Q6H     calcium carb 1250 mg (500 mg [place])/vitamin D 200 units  1 tablet Oral BID w/meals     sodium chloride (PF)  20 mL Intracatheter Once     heparin lock flush  5-10 mL Intracatheter Q24H     heparin  5 mL Intracatheter Q28 Days     amLODIPine  10 mg Oral At Bedtime     ascorbic acid  500 mg Oral Daily     aspirin EC  81 mg Oral Daily     atorvastatin  20 mg Oral At Bedtime     cyanocobalamin  1,000 mcg Oral Q7 Days     [START ON 1/23/2017] dapsone  50 mg Oral Once per day on Mon Wed Fri     doxazosin  4 mg Oral At Bedtime     insulin glargine  4 Units Subcutaneous At Bedtime     levothyroxine  50 mcg Oral Daily     lidocaine  1 patch Transdermal Q24H     linezolid  600 mg Oral BID     magnesium oxide  400 mg Oral At Bedtime     montelukast  10 mg Oral QPM     multivitamin, therapeutic with minerals  1 tablet Oral Daily     omeprazole  40 mg Oral Daily     predniSONE  2.5 mg Oral QPM     predniSONE  5 mg Oral QAM     sodium bicarbonate  1,300 mg Oral BID     tacrolimus  3.5 mg Oral QPM     tacrolimus  4 mg Oral QAM     valACYclovir  1,000 mg Oral Daily     zinc sulfate  220 mg Oral Daily     heparin  5,000 Units Subcutaneous Q8H     insulin aspart  1-7 Units Subcutaneous TID AC     insulin aspart  1-5 Units Subcutaneous At Bedtime     lidocaine   Transdermal Q24h     lidocaine   Transdermal Q8H     fluticasone-vilanterol  1 puff Inhalation Daily       Data  ROUTINE IP LABS (Last four results)  BMP  Recent Labs  Lab 01/22/17  0555 01/21/17  1112 01/21/17  1050  01/20/17  1556 01/17/17  1521    136 137 135 134   POTASSIUM 5.1 4.9 5.0 5.4* 5.2   CHLORIDE 110*  --  107 104 104   RUBY 6.8*  --  7.1* 7.2* 7.7*   CO2 18*  --  19* 21 22   BUN 16  --  20 21 19   CR 1.57*  --  1.83* 1.87* 1.78*   GLC 74 110* 109* 179* 223*     CBC  Recent Labs  Lab 01/22/17  1047 01/22/17  0555 01/21/17  1112 01/21/17  1050 01/20/17  1556   WBC 36.8* 38.4*  --  33.9* 35.9*   RBC 2.89* 2.77*  --  2.81* 2.85*   HGB 7.5* 7.0* 7.8* 7.1* 7.5*   HCT 25.8* 24.7*  --  24.9* 25.6*   MCV 89 89  --  89 90   MCH 26.0* 25.3*  --  25.3* 26.3*   MCHC 29.1* 28.3*  --  28.5* 29.3*   RDW 25.7* 25.2*  --  25.1* 25.2*    266  --  276 321     INR  Recent Labs  Lab 01/21/17  1050   INR 1.31*

## 2017-01-22 NOTE — PLAN OF CARE
Problem: Goal Outcome Summary  Goal: Goal Outcome Summary    Pt admitted via ED accompanied by her daughter. N/V this am at home with continued mild nausea now. Zofran given IV and eating dinner now. Reports decreased appetitie and PO intake over the past few weeks, but weight has been maintained. Reports loose stool at home- C diff was negative and other stool cultures were negative. Enteric precautions removed. No stool via ileostomy since arrival to the floor.  Zosyn and Levaquin given IV and MIVF started. Bilateral US ordered. BUE cellulitis R>L. Pt states it is much improved, but right hand is still swollen and tender to the touch. Pt updated on the plan of care and will call for assistance when needing to get up to void.

## 2017-01-22 NOTE — PROGRESS NOTES
SPIRITUAL HEALTH SERVICES  [facility] ([place]) 7D  ON-CALL VISIT     No request for  support, but our protocol is to assess with all patients who are of the [place] c[place], whether they would like a visit from local elders. I consulted with pt's nurse, who said she had recently asked pt about  support, but pt declined. I explained our protocol to nurse, who will let pt know I stopped by. Nurse will put in request for visit from elders if pt desires.     PLAN: no further needs at this time. Pt will request if desired.     Mainor Funez) Maritza Trevino M.Div., [facility]  Staff   Pager [phone]

## 2017-01-22 NOTE — PROGRESS NOTES
"CLINICAL NUTRITION SERVICES - ASSESSMENT NOTE     Nutrition Prescription    RECOMMENDATIONS FOR MDs/PROVIDERS TO ORDER:  None today. Appetite is improving while off Chemo.    Malnutrition Status:    Non-severe malnutrition in the context of chronic illness    Recommendations already ordered by Registered Dietitian (RD):  Ensure Plus BID @ 10 and 2 PM (chocolate)     REASON FOR ASSESSMENT  Melissa Collazo is a/an 76 year old female assessed by the dietitian for Admission Nutrition Risk Screen for reduced oral intake over the last month and Provider Order - Decreased appetite, h/o met colon cancer    NUTRITION HISTORY  PMH metastatic colon ca (liver mets) s/p ileocolic resection and ostomy, COPD s/p R lung transplant in 2009, DM2, HTN, CKD who presents fatigue, nausea, vomiting and CXR with possible PNA    Pt has had a poor appetite for past few weeks d/t chemo. However, appetite improving recently d/t chemo on hold. She has been consuming 2 Boost per day plus small snacks throughout the day (will get through 1 donut and some crackers or nuts/raisins in a day in small/frequent amounts).    CURRENT NUTRITION ORDERS  Diet: Regular, Ensure Plus between meals  Intake/Tolerance: Pt had breakfast this AM, but did not finish her omelet since it was overcooked. She also had some Ensure. She states her appetite is much improved.    LABS  Labs reviewed  Vitamin D 21 on 12/13 (WNL)  K+ 5.4 on admission --> 5.1 today    MEDICATIONS  Medications reviewed  500 mg vitamin C  Oscal w/ D (1250 mg calcium carbonate, 200 units vitamin D) BID  1000 mcg vitamin B12 weekly  Multivitamin with minerals (thera-vit-M)  220 mg zinc sulfate    ANTHROPOMETRICS  Height: 149.9 cm (4' 11\")  Most Recent Weight: 54.296 kg (119 lb 11.2 oz)    IBW: 44 kg  BMI: Normal BMI  Weight History: Wt increasing since December.  Wt Readings from Last 10 Encounters:   01/22/17 54.296 kg (119 lb 11.2 oz)   01/20/17 53.298 kg (117 lb 8 oz)   01/17/17 52.8 kg (116 lb " 6.5 oz)   01/10/17 51.5 kg (113 lb 8.6 oz)   12/19/16 50.576 kg (111 lb 8 oz)   12/15/16 51.26 kg (113 lb 0.1 oz)   12/15/16 50.984 kg (112 lb 6.4 oz)   12/12/16 51.256 kg (113 lb)   12/08/16 52.3 kg (115 lb 4.8 oz)   12/01/16 52.436 kg (115 lb 9.6 oz)     Dosing Weight: 53 kg (acutal admit wt)    ASSESSED NUTRITION NEEDS  Estimated Energy Needs: 1325 - 1590 kcals/day (25 - 30 kcals/kg)  Justification: Maintenance  Estimated Protein Needs: 64 - 80 grams protein/day (1.2 - 1.5 grams of pro/kg)  Justification: Hypercatabolism with acute illness  Estimated Fluid Needs: 1 mL/kcal  Justification: Maintenance    PHYSICAL FINDINGS  See malnutrition section below.  Unable to do full physical assessment as visit was cut short when providers walked in the room to see pt.    MALNUTRITION  % Intake: Decreased intake does not meet criteria, appetite/intake improving  % Weight Loss: None noted  Subcutaneous Fat Loss: Unable to fully assess, did observe mild loss in lower arm  Muscle Loss: Unable to fully assess, did observe mild loss in lower arm  Fluid Accumulation/Edema: Mild LE and R arm edema  Malnutrition Diagnosis: Non-severe malnutrition in the context of chronic illness    NUTRITION DIAGNOSIS  Inadequate oral intake related to poor appetite 2/2 chemo as evidenced by intakes of 2 Boost + only small snacks daily PTA (however now improving since off of chemo).      INTERVENTIONS  Implementation  Nutrition Education: Discussed role of RD. Pt agreeable to consuming 2 Ensure Plus daily while inpatient (although she prefers Boost). Encouraged continuing small/frequent meal pattern as this can help when have a poor appetite. Also encouraged calorie/protein dense foods to optimize intake.   Medical food supplement therapy - Ensure Plus BID    Goals  Patient to consume % of nutritionally adequate meal trays TID, or the equivalent with supplements/snacks.    Monitoring/Evaluation  Progress toward goals will be monitored and  evaluated per protocol.    Julia Galvez RD, LD  Carrying Pager: [phone]    Weekday 7D RD Pager: [phone]  Weekend RD pager: [phone]

## 2017-01-23 NOTE — PROGRESS NOTES
01/23/17 1400   Quick Adds   Type of Visit Initial PT Evaluation       Present no   Living Environment   Lives With alone   Living Arrangements house   Home Accessibility bed and bath on same level;grab bars present (bathtub);stairs (1 railing present);stairs within home   Number of Stairs to Enter Home 4   Number of Stairs Within Home 5   Stair Railings at Home inside, present on right side;outside, present on right side   Transportation Available family or friend will provide   Living Environment Comment Lives in same duplex as adult children in downstairs of duplex.   Self-Care   Dominant Hand right   Usual Activity Tolerance moderate   Current Activity Tolerance fair   Equipment Currently Used at Home bath bench;cane, straight;walker, rolling;other (see comments)  (reacher and long handled shoe horn)   Activity/Exercise/Self-Care Comment Has cane and rolling walker at home. Does not use AD in home. Will use an electric scooter when she shops over the past 3-4 weeks due to fatigue from chemo.   Functional Level Prior   Ambulation 0-->independent   Transferring 0-->independent   Toileting 0-->independent   Bathing 3-->assistive equipment and person   Dressing 0-->independent   Eating 0-->independent   Communication 0-->understands/communicates without difficulty   Swallowing 0-->swallows foods/liquids without difficulty   Cognition 0 - no cognition issues reported   Fall history within last six months no   Which of the above functional risks had a recent onset or change? ambulation   Prior Functional Level Comment Ambulatory endurance has decreased in the past 3-4 weeks due to effects of chemo and recent cold.   General Information   Onset of Illness/Injury or Date of Surgery - Date 01/21/17   Referring Physician Ivonne Lucas PA-C   Patient/Family Goals Statement Get stronger and improve functional mobility to return home.   Pertinent History of Current Problem (include personal  factors and/or comorbidities that impact the POC) Pt is a 75 yo F with a PMH of metastatic colon ca (liver mets) s/p ileocolic resection and ostomy, on chem with irinotecan/Avastin, COPD s/p R lung transplant in 2009, DM2, HTN, CKD who presents fatigue, nausea, vomiting and CXR with possible PNA .She reports declining ambulatory ability over the past 3-4  weeks due to onset of a cold and effects of chemotherapy.   Precautions/Limitations other (see comments)  (pO2 < 90% with ambulation)   General Observations Dyspnea during ambulation.   Cognitive Status Examination   Orientation orientation to person, place and time   Level of Consciousness alert   Follows Commands and Answers Questions 100% of the time   Personal Safety and Judgment intact   Memory intact   Posture    Posture Not impaired   Range of Motion (ROM)   ROM Comment Intact   Bed Mobility   Bed Mobility Comments SBA with head of bed elevated ~45 degrees   Transfer Skills   Transfer Comments SBA   Gait   Gait Comments Pt ambulated ~15' with shuffled gait, flexed posture, and decreased gait speed.   Balance   Balance Comments Mild instability   General Therapy Interventions   Planned Therapy Interventions gait training;strengthening;transfer training;progressive activity/exercise;balance training;bed mobility training;neuromuscular re-education;risk factor education;home program guidelines   Clinical Impression   Criteria for Skilled Therapeutic Intervention yes, treatment indicated   PT Diagnosis Weakness, decreased functional mobility   Influenced by the following impairments Weakness, generalized deconditioning   Functional limitations due to impairments Difficulty with transfers, bed mobility, and gait.   Clinical Presentation Stable/Uncomplicated   Clinical Presentation Rationale Pt presentling with limited activity tolerance, lives alone, and has good family support.   Clinical Decision Making (Complexity) Low complexity   Therapy Frequency` daily  "  Predicted Duration of Therapy Intervention (days/wks) 1/30/17   Anticipated Discharge Disposition Home with Assist;Home with Home Therapy;Home with Outpatient Therapy   Risk & Benefits of therapy have been explained Yes   Patient, Family & other staff in agreement with plan of care Yes   [facility] TM \"6 Clicks\"   2016, Trustees of [facility], under license to [id number].  All rights reserved.   6 Clicks Short Forms Basic Mobility Inpatient Short Form   [facility]  \"6 Clicks\" V.2 Basic Mobility Inpatient Short Form   1. Turning from your back to your side while in a flat bed without using bedrails? 3 - A Little   2. Moving from lying on your back to sitting on the side of a flat bed without using bedrails? 3 - A Little   3. Moving to and from a bed to a chair (including a wheelchair)? 3 - A Little   4. Standing up from a chair using your arms (e.g., wheelchair, or bedside chair)? 3 - A Little   5. To walk in hospital room? 3 - A Little   6. Climbing 3-5 steps with a railing? 3 - A Little   Basic Mobility Raw Score (Score out of 24.Lower scores equate to lower levels of function) 18   Total Evaluation Time   Total Evaluation Time (Minutes) 10     "

## 2017-01-23 NOTE — PROGRESS NOTES
Heme Progress Note    Melissa Collazo MRN# [id number]   Age: 76 year old YOB: 1940           Assessment and Plan:   Assessment:  Melissa Collazo is a pleasant 76 year old female with a PMH of metastatic colon ca (liver mets) s/p ileocolic resection and ostomy, most recently on Lonsurf, COPD s/p R lung transplant in 2009, DM2, HTN, CKD who presents fatigue, nausea, vomiting and CXR with possible PNA.     Plan:  Today:  - Repeat CXR  - EBV PCR pending  - Discontinued Zosyn  - Transitioned Levaquin to PO    Problems:     Pulmonary:     # Right Sided Pneumonia - HCAP  # S/P right lung transplant  Had had productive cough week prior to admission. Worsening leukocytosis since 1/10. However did receive Neulasta on 1/10 and likely has slower clearance d/t poor renal function. CXR showed Right sided opacity concerning for infection. D/t h/o right lung transplant and h/o complex lung infection (asperfillus, MAC), will treat for HCAP. Incrased O2 requirements overnight 1/22-->1/23. Was on IVF, these were dc'd. Likely component of volume OL, patient remains afeb.   -- Repeat CXR 1/23  -- Discontinued IV Zosyn 1/23  -- Continue Levaquin, transition to PO 750mg Q48hr  -- BCx- NGTD, SpCx- mixed ayse  -- Daily CBC w/ diff    # COPD s/p R lung transplant in 2009  CT on 8/19/16 with no acute changes. Follows with Dr. Clay.   -- Continue prednisone 5mg QAm, 2.5mg QHS  -- Continue tacrolimus, Level checked 1/22- pending  -- Continue advair, albuterol, dapsone  -- Contact transplant staff if complications arise     Infectious Disease:     # Cellulitis, right arm  Began week prior to admission. Started on Linezolid in clinic on 1/17. Has notable erythema and swelling of RUE on exam. RUE US negative for DVT. Improving on Abx.   -- Continue Linezolid 600mg BID    # Zoster Infection, V1 dermatome  Patient started treatment on 1/17. She reports significant improvement. Noted left conjunctival hemorrhage on 1/22. Will consider  opthalmology consult if worsening and/or concern for herpetic occular involvement.   -- Continue Valtrex 1g daily (renally adjusted)    # H/O EBV Viremia  Last EBV DNA level was 2030 on 12/12/16. Will recheck this admission.  -- EBV level pending     Heme/Onc:    # Stage IV colon cancer w/ Liver mets   Patient follows with Dr. Lebron. Prior treatment with Xeloda/Oxaliplatin, irinotecan and erbitux and avastin. She recently was found to have progressed on Irinotecan/Ertibux. She was initiated on Lonsurf on 12/26/16. Further chemo on hold d/t cellulitis infection.   -- Continue PTA dilaudid for pain control     # Chronic Anemia   Baseline is ~8. Per patient she prefers transfusion only in emergency situations. Hgb has been stable ~ 7 this admission. If drops <7.0 will need transfusion.   -- Transfuse <7  -- Peripheral smear 1/22: pending  -- monitor with daily CBC    # Leukocytosis   Likely combination of infection (PNA, cellulitis) and residual effect from Neulasta given 1/10 in setting of renal dysfunction likely to have prolonged effect.   -- Continue to monitor  -- Infectious treatment as above.     Gastrointestinal:     # Increased Ostomy Output- resolved   S/p right hemicolectomy with ileostomy and colostomy 3/2015. Pt had slight increase in OP on admission. CT 1/21 showed mild ascites and slightly progressed liver met disease, stable post-surg changes. Output returned to baseline 1/22, continues to be as BL 1/23. C.diff checked and negative. Stopped IVF for volume OL.   -- Continue to monitor    Renal:   # Acute on Chronic Kidney Disease.   Likely secondary to dehydration. Baseline Cr is 1/5. Was 1.83 on admission (1/21) and improved with IVF to 1.57. Continues to trend down- 1.43 on 1/23.   -- monitor with daily BMP  -- holding IVF for increased O2 requirement and concern for volume overload  -- avoid nephrotoxic agents     Cardiovascular:   # HTN - continue PTA Norvasc, doxazosin, ASA    Endocrine:   # DM2  "  Hgb A1C 7.8 on 1/22. Continue PTA regimen, patient to f/u with PCP for further adjustment on discharge.   -- Continue Lantus 4U QHS  -- SSI  -- Hypoglycemia Protocol    #Hypothyroidism   -- Continue PRA Synthroid     MISC:   # Deconditioning   # Decreased Appetite   Reports fatigue last few weeks. Lives in bottom level of duplex with son on second level.   -- PT/OT/Nutrition consult    FEN:   Diet: Regular  IVF: held  Lytes: monitor daily  PPx:  DVT: Heparin TID  GI: PTA omeprazole  Code Status: Full  Dispo: Possibly tomorrow, pending improvement and transition to PO    Above plan was discussed with staff physician, Dr. Mirza    Name: Britni Luke MD  Title: Internal Medicine, PGY-1  Pager: [phone]  01/23/2017          Interval History:   Melissa was mildy more SOB overnight and required nasal canula O2. This morning on 0.5-1 L and weaning off. She continues to have hand swelling although feels this is improving. She feels her coughing is much improved.       Denies fever, chills, sweats, headache, dizziness, chest pain, abdominal pain, N/V/D/C, dysuria, extremity numbness/tingling/swelling, signs of active bleeding. No other immediate concerns or acute issues.     ROS: Negative other than as stated in above interval history.         Medications:     Medications reviewed.           Physical Exam:   Vitals were reviewed  Blood pressure 133/63, pulse 109, temperature 96.7  F (35.9  C), temperature source Oral, resp. rate 22, height 1.499 m (4' 11\"), weight 56.518 kg (124 lb 9.6 oz), SpO2 94 %, not currently breastfeeding.    Physical Exam:   General: awake, sitting at bed side , appears comfortable, in NAD, nc O2 in place  HEENT: MMM, EOM intact, sclerae clear and anicteric  CV: RRR, no murmurs appreciated, no JVD noted  Resp: Decreased BS RLL, breathing comfortably on 0.5 L NC, no wheezes, rhonchi  Abd: Soft, non-tender, BS+, ileostomy with large hernia noted, greenish/brown stool noted).   MSK:  Warm, FROM, " no joint swelling, trace LE edema  Skin: no rash or lesions on limited exam  Neuro: CN2-12 grossly intact, no lateralizing symptoms or focal neurologic deficits  Psych: Mood and affect WNL           Data:     Labs and imaging reviewed.     CBC  Recent Labs  Lab 01/23/17  0654 01/22/17  1047 01/22/17  0555 01/21/17  1112 01/21/17  1050   WBC 38.2* 36.8* 38.4*  --  33.9*   RBC 2.80* 2.89* 2.77*  --  2.81*   HGB 7.1* 7.5* 7.0* 7.8* 7.1*   HCT 24.8* 25.8* 24.7*  --  24.9*   MCV 89 89 89  --  89   MCH 25.4* 26.0* 25.3*  --  25.3*   MCHC 28.6* 29.1* 28.3*  --  28.5*   RDW 25.7* 25.7* 25.2*  --  25.1*    261 266  --  276     CMP  Recent Labs  Lab 01/23/17  0654 01/22/17  2115 01/22/17  0555 01/21/17  1112 01/21/17  1050 01/20/17  1556     --  138 136 137 135   POTASSIUM 4.9  --  5.1 4.9 5.0 5.4*   CHLORIDE 111*  --  110*  --  107 104   CO2 16*  --  18*  --  19* 21   ANIONGAP 12  --  11  --  11 10   GLC 88  --  74 110* 109* 179*   BUN 14  --  16  --  20 21   CR 1.46*  --  1.57*  --  1.83* 1.87*   GFRESTIMATED 35*  --  32*  --  27* 26*   GFRESTBLACK 42*  --  39*  --  32* 32*   RUBY 6.6*  --  6.8*  --  7.1* 7.2*   MAG 2.5* 2.6* 1.3*  --   --   --    PHOS 2.9 3.6 1.7*  --   --   --    PROTTOTAL 5.4*  --   --   --  5.6*  --    ALBUMIN 2.5*  --   --   --  2.7*  --    BILITOTAL 0.6  --   --   --  0.2  --    ALKPHOS 360*  --   --   --  373*  --    AST 20  --   --   --  24  --    ALT 26  --   --   --  22  --      INR  Recent Labs  Lab 01/21/17  1050   INR 1.31*

## 2017-01-23 NOTE — PLAN OF CARE
Problem: Goal Outcome Summary  Goal: Goal Outcome Summary  PT 7D: P.T. Evaluation completed and treatment initiated. She ambulated ~200' with CGA and no assistive device. SpO2 decreased to 87% during ambulation and recovered within 30 sec to 91%. There was one instance of instability while walking due to back pain and she was able to self-correct. Transfers and bed mobility were with SBA. Recommend discharge home with 24 hr assist and home health PT vs OP PT.     Please assist with ambulation 4 times/day.

## 2017-01-23 NOTE — PLAN OF CARE
Problem: Goal Outcome Summary  Goal: Goal Outcome Summary  Outcome: No Change    Patient's antibiotics have been changed to oral from IV as she works towards discharge. Patient had 1 episode of dry heaving & it was then discovered that patient takes Zofran 8mg PO scheduled before meals at home (was not scheduled this admission).  Zofran changed to scheduled med, per patient request.  Patient given Incentive Spirometer & duonebs added as patient was wheezy today.  Patient to have 2 view CXR this afternoon.  Calcium gluconate infused without incident.  Right arm cellulitis unchanged, per patient.  Patient has ileostomy that she takes care of herself.  Patient takes Immodium if stool becomes liquid (she'll let us know when she needs it).  IV fluids stopped today; patient aware she is in charge of her hydration.  Patient unable to eat breakfast (pancakes & srivastava) because it was delivered cold.  Patient ate a hard boiled egg, english muffin & jello for lunch.  Patient tripped Sepsis protocol; awaiting Lactic Acid draw.

## 2017-01-23 NOTE — PLAN OF CARE
Problem: Goal Outcome Summary  Goal: Goal Outcome Summary  Outcome: No Change  1900-0659: HR slightly tachy, tachypneic. O2 sats on room air was hovering around 89-90%. Pt felt SOB, resps were shallow. PRN albuterol given and 1LPM via NC applied. Sats improved to 94% and pt was able to sleep after. Triggered SIRS for HR, resps and WBC. LA was 2.2, MD notified. No interventions ordered. Phos and Mg rechecks were WNL; no need for further replacements. BG were 220 and 165. SSI given as ordered. Up with SBA. RAND and takes awhile for pt to recover. Dilaudid 2mg given once for right arm pain. NS infusing at 100ml/hr. Continues on zosyn. Cont POC.

## 2017-01-23 NOTE — PROGRESS NOTES
Pt triggered SIRS for HR, resps and WBC. LA was positive at 2.2; MD notified. Awaiting assessment and recs.

## 2017-01-23 NOTE — PROGRESS NOTES
"   01/23/17 0800   Quick Adds   Type of Visit Initial Occupational Therapy Evaluation   Living Environment   Lives With alone   Living Arrangements house  (duplex)   Number of Stairs to Enter Home 1   Number of Stairs Within Home 5   Stair Railings at Home outside, present on right side;inside, present on right side   Transportation Available family or friend will provide   Living Environment Comment Pt lives in downstairs duplex alone. Family (son and daugther in law) live above pt.   Self-Care   Dominant Hand right   Usual Activity Tolerance moderate   Current Activity Tolerance fair   Equipment Currently Used at Home bath bench;cane, straight;walker, rolling;other (see comments)  (reacher and long handled shoe horn)   Activity/Exercise/Self-Care Comment Pt owns a rolling walker and cane. Pt does not use AD in home, but brings cane for community mobility because lifting her rolling walker is difficult.   Functional Level Prior   Ambulation 0-->independent   Transferring 0-->independent   Toileting 2-->assistive person   Bathing 3-->assistive equipment and person   Dressing 0-->independent   Eating 0-->independent   Communication 0-->understands/communicates without difficulty   Swallowing 0-->swallows foods/liquids without difficulty   Cognition 0 - no cognition issues reported   Fall history within last six months no   Prior Functional Level Comment Pt reports using bath bench and assist from [employer] every 4 days for bathing. [employer] helps with ileostomy cares as well.   General Information   Onset of Illness/Injury or Date of Surgery - Date 01/21/17   Referring Physician Ivonne Lucas, PAAdaC   Patient/Family Goals Statement \"Want to be able to breath better\", return to PLOF at home   Additional Occupational Profile Info/Pertinent History of Current Problem Melissa Collazo is a pleasant 76 year old female with a PMH of metastatic colon ca (liver mets) s/p ileocolic resection and ostomy, most recently on Lonsurf, " COPD s/p R lung transplant in 2009, DM2, HTN, CKD who presents fatigue, nausea, vomiting and CXR with possible PNA. Pt reports fatigue, cellulitis, and SOB have been biggest issues and have limited her functionally.  Pt reports watching a lot of TV as she doesn't have a lot of energy for other hobbies and has required increased assist recently.    Precautions/Limitations no known precautions/limitations   General Observations Pt supine in bed upon arrival, agreeable to therapy.   General Info Comments Activity: up ad kaylin   Cognitive Status Examination   Orientation orientation to person, place and time   Visual Perception   Visual Perception No deficits were identified   Sensory Examination   Sensory Comments Pt reports tingling in BLE at baseline. Reports it does not interfere with mobility.   Pain Assessment   Patient Currently in Pain No   Integumentary/Edema   Integumentary/Edema Comments RUE cellulitis. Pt reports swelling in BLE, worse in RLE.   Range of Motion (ROM)   ROM Comment R rotator cuff injury and RUE cellulitis limite ROM.   Transfer Skill: Sit to Stand   Level of [place]: Sit/Stand stand-by assist   Lower Body Dressing   Level of [place]: Dress Lower Body dependent (less than 25% patients effort)   Instrumental Activities of Daily Living (IADL)   Previous Responsibilities meal prep;laundry;shopping;finances;driving   IADL Comments Adult children assist with med management, housekeeping, and yardwork   Activities of Daily Living Analysis   Impairments Contributing to Impaired Activities of Daily Living pain;post surgical precautions;ROM decreased;strength decreased   General Therapy Interventions   Planned Therapy Interventions ADL retraining;IADL retraining;ROM;strengthening   Clinical Impression   Criteria for Skilled Therapeutic Interventions Met yes, treatment indicated   OT Diagnosis OT order for deconditioning   Influenced by the following impairments R UE ROM limitations, pain,  "activity tolerance, fatigue   Assessment of Occupational Performance 3-5 Performance Deficits   Identified Performance Deficits dressing, bathing, home mgmt, social skills   Clinical Decision Making (Complexity) Low complexity   Therapy Frequency daily   Predicted Duration of Therapy Intervention (days/wks) 2 weeks   Anticipated Equipment Needs at Discharge other (see comments)  (TBD with further therapy)   Anticipated Discharge Disposition Home with Assist;Transitional Care Facility   Risks and Benefits of Treatment have been explained. Yes   Patient, Family & other staff in agreement with plan of care Yes   [facility] TM \"6 Clicks\"   2016, Trustees of [facility], under license to [employer].  All rights reserved.   6 Clicks Short Forms Daily Activity Inpatient Short Form   [facility]  \"6 Clicks\" Daily Activity Inpatient Short Form   1. Putting on and taking off regular lower body clothing? 2 - A Lot   2. Bathing (including washing, rinsing, drying)? 2 - A Lot   3. Toileting, which includes using toilet, bedpan or urinal? 2 - A Lot   4. Putting on and taking off regular upper body clothing? 3 - A Little   5. Taking care of personal grooming such as brushing teeth? 3 - A Little   6. Eating meals? 3 - A Little   Daily Activity Raw Score (Score out of 24.Lower scores equate to lower levels of function) 15   Total Evaluation Time   Total Evaluation Time (Minutes) 10     "

## 2017-01-23 NOTE — PLAN OF CARE
Problem: Goal Outcome Summary  Goal: Goal Outcome Summary  Outcome: Therapy, progress toward functional goals as expected  OT/7D - Pt on 1.5L NC, spO2 96% while seated EOB. Facilitated functional mobility ~35 feet on RA.  SpO2 recovers to 91% on RA within 1 min of seated rest break following activity with breathing techniques.  Reapplied 1.5L NC and pt returns to 96%. Implemented UE AROM exercises and foam block exercises to promote fluid mobilization and to prevent further deconditioning during hospitalization. Pt c/o fatigue, RUE cellulitis, and SOB being major functional limitations.      Encourage pt to complete UE AROM and foam block exercises 2 times daily for added benefit.    Anticipate d/c to home with assist from family PRN and potential referral to OP cancer rehab, if pt is interested, for increased activity tolerance needed for independence with ADLs.

## 2017-01-24 NOTE — PLAN OF CARE
Problem: Goal Outcome Summary  Goal: Goal Outcome Summary  Outcome: Therapy, progress toward functional goals as expected  OT/7D - Reviewed UE AROM exercises and foam  exercises, improved tolerance and IND with exercises on this date. Facilitated functional mobility ~300 feet pushing w/c for support/stability with CGA. Pt on 2L NC during activity, spO2 95% while seated following activity.  Pt is limited by R UE pain and limited ROM, activity tolerance, and fatigue.    REC: D/C to home with assist PRN for ADLs/IADLs and heavy lifting. Recommend OP cancer rehab or other OP therapy program to work on activity tolerance and strengthening. Pt interested in OP therapy.

## 2017-01-24 NOTE — PLAN OF CARE
Problem: Goal Outcome Summary  Goal: Goal Outcome Summary  PT 7D- pt seen for walking and transfers. Pt with low Hgb today so she did short distance walking x 4 for distances of 80 -130 feet. Pt was on 2 L O2 and maintained O2 sat at 91 -93% with  - 125 after walks. Pt should be able to go home with 24 hr assist and have home PT or attend OP PT to gain strength and endurance for activity.

## 2017-01-24 NOTE — PLAN OF CARE
Problem: Goal Outcome Summary  Goal: Goal Outcome Summary  Outcome: No Change    Patient states that she feels better, but does also state she gets short of breath rather quickly when out of bed.  Patient is currently having heart echo at the bedside.  Patient will then need 1 unit RBCs (Echo tech needed patient's port for test, so couldn't have blood running & patient declined PIV).  Patient also will need Lasix either before or after blood; commode at bedside.  Patient to have another 2 view CXR this afternoon to evaluate fluid status.  Blood glucose has not been over 100 today, as patient isn't eating much, so no insulin coverage needed.  Lantus is now on hold.  Evening RN to call Lab after RBCs infused to draw orders placed this afternoon.  Zofran scheduled at 0600, 1100 & 1700 to prevent nausea at meal times.

## 2017-01-24 NOTE — PLAN OF CARE
"Problem: Goal Outcome Summary  Goal: Goal Outcome Summary  Outcome: No Change  Tachycardic with activity. Pt. c/o SOB, satting in the high 90's on 1.5L NC. Afebrile. Pt. c/o back pain but states the lidocaine patch is helping. Pt. denies nausea. +2 edema in BLE and R arm. Pt. reports feeling \"filled with fluid.\" Fair appetite. Voiding spontaneously, good urine output. Up with assist of 1 ambulating in halls. No acute events. Will continue POC and notify MD with changes.         "

## 2017-01-24 NOTE — PLAN OF CARE
Problem: Goal Outcome Summary  Goal: Goal Outcome Summary  Outcome: No Change  HR tachy, resps tachypneic. Sat-ting 94-95% on 1.5LPM via NC. Pt reported feeling SOB at start of shift; PRN Albuterol inhaler administered with reported adequate relief. Anterior lung sounds coarse crackles, posterior clear/diminished. Pt has a frequent, non-productive, wet sounding cough. Very RAND, taking a few minutes to recover. BLE feet and ankles with moderate edema. Weight is up 8lbs since admission. C/o heartburn; Tums given. Denied nausea. C/o right arm aches but declined intervention. 0200 BG was 110. Up with SBA. Cont to monitor.     Addendum: Pt's BG was 68 this AM; apple juice given. Will recheck.

## 2017-01-24 NOTE — PROGRESS NOTES
Heme Progress Note    Melissa Collazo MRN# [id number]   Age: 76 year old YOB: 1940           Assessment and Plan:   Assessment:  Melissa Collazo is a pleasant 76 year old female with a PMH of metastatic colon ca (liver mets) s/p ileocolic resection and ostomy, most recently on Lonsurf, COPD s/p R lung transplant in 2009, DM2, HTN, CKD who presents fatigue, nausea, vomiting and CXR with possible PNA.     Plan:  Today:  -- BNP, TTE ordered  -- 1 U RBC for hbg 6.7, recheck CBC this afternoon  -- 20 IV lasix one time  -- check INR, fibrinogen, iron studies, LDH, haptoglobin, b12, folate  -- Replace Calcium   -- Repeat CXR  Today  -- Hold evening Lantus for hypoglycemia (is on 4U QHS)    Problems:     Pulmonary:     # Right Sided Pneumonia - HCAP  # S/P right lung transplant  # Volume overload  Had had productive cough week prior to admission. Worsening leukocytosis since 1/10. However did receive Neulasta on 1/10 and likely has slower clearance d/t poor renal function. CXR showed Right sided opacity concerning for infection. D/t h/o right lung transplant and h/o complex lung infection (asperfillus, MAC), will treat for HCAP. Incrased O2 requirements overnight 1/22-->1/23. Was on IVF, these were dc'd. Likely component of volume OL, is 4Kg up since admission. Patient remains afeb.    -- BNP, TTE ordered   -- Repeat CXR 1/24  -- Discontinued IV Zosyn 1/23  -- Continue Levaquin, transition to PO 750mg Q48hr  -- BCx- NGTD, SpCx- mixed ayse  -- Daily CBC w/ diff    # COPD s/p R lung transplant in 2009  CT on 8/19/16 with no acute changes. Follows with Dr. Clay.    -- Continue prednisone 5mg QAm, 2.5mg QHS  -- Continue tacrolimus decreased to 3.5mg BID on 1/23 (was 4mg AM, 3.5mg PM), Recheck level Thursday AM  -- Scheduled Duonebs  -- Continue advair, albuterol, dapsone  -- Contact transplant staff if complications arise     Infectious Disease:     # Cellulitis, right arm  Began week prior to admission. Started on  Linezolid in clinic on 1/17. Has notable erythema and swelling of RUE on exam. RUE US negative for DVT. Improving on Abx.    -- Continue Linezolid 600mg BID    # Zoster Infection, V1 dermatome  Patient started treatment on 1/17. She reports significant improvement. Noted left conjunctival hemorrhage on 1/22. Will consider opthalmology consult if worsening and/or concern for herpetic occular involvement.    -- Continue Valtrex 1g daily (renally adjusted)    # H/O EBV Viremia  Last EBV DNA level was 2030 on 12/12/16. Will recheck this admission. EBV level 4243, log 3.6 on 1/23.     Heme/Onc:    # Stage IV colon cancer w/ Liver mets   Patient follows with Dr. Lebron. Prior treatment with Xeloda/Oxaliplatin, irinotecan and erbitux and avastin. She recently was found to have progressed on Irinotecan/Ertibux. She was initiated on Lonsurf on 12/26/16. Further chemo on hold d/t cellulitis infection.    -- Continue PTA dilaudid for pain control     # Acute on Chronic Anemia   Baseline is ~8. Hgb has been stable ~ 7 this admission. Dropped to 6.7 on 1/24. Given 1 uRBC on 1/24. Peripheral smear 1/22: rare schistocytes. Iron studies 1/24 wnl. Reticulocyte index: 0.6% suggesting inefficient bone marrow response. Could be in part secondary to medications (tacro, prednisone). Rare schistocytes seen on smear, LDH wnl.- orders pending: INR, fibrinogen, haptoglobin. Also pending: B12, RBC folate.   -- Recheck CBC in afternoon  -- Work up pending: INR, fibrinogen, haptoglobin, b12, folate, TSH    # Leukocytosis   Likely combination of infection (PNA, cellulitis) and residual effect from Neulasta given 1/10 in setting of renal dysfunction likely to have prolonged effect.    -- Continue to monitor  -- Infectious treatment as above.     Gastrointestinal:     # Increased Ostomy Output- resolved   S/p right hemicolectomy with ileostomy and colostomy 3/2015. Pt had slight increase in OP on admission. CT 1/21 showed mild ascites and slightly  progressed liver met disease, stable post-surg changes. Output returned to baseline 1/22, continues to be as BL 1/23. C.diff checked and negative. Stopped IVF for volume OL.    -- Continue to monitor    Renal:   # Acute on Chronic Kidney Disease.   Likely secondary to dehydration. Baseline Cr is 1/5. Was 1.83 on admission (1/21) and improved with IVF to 1.57. Continues to trend down- 1.43 on 1/23.    -- monitor with daily BMP  -- holding IVF for increased O2 requirement and concern for volume overload  -- avoid nephrotoxic agents     Cardiovascular:   # HTN - continue PTA Norvasc, doxazosin, ASA    Endocrine:   # DM2   Hgb A1C 7.8 on 1/22. Continue PTA regimen, patient to f/u with PCP for further adjustment on discharge.    --  Lantus 4U QHS- held 1/24 for hypoglycemia   -- SSI  -- Hypoglycemia Protocol    #Hypothyroidism   -- Continue PRA Synthroid     MISC:   # Deconditioning    # Decreased Appetite   Reports fatigue last few weeks. Lives in bottom level of duplex with son on second level.    -- PT/OT/Nutrition consult    FEN:   Diet: Regular  IVF: held  Lytes: monitor daily  PPx:  DVT: Heparin TID  GI: PTA omeprazole  Code Status: Full  Dispo: Possibly tomorrow, pending improvement and transition to PO    Above plan was discussed with staff physician, Dr. Mirza    Name: Britni Luke MD  Title: Internal Medicine, PGY-1  Pager: [phone]  01/24/2017          Interval History:   No major events overnight. She was requiring 1.5 L overnight. Continues to feel SOB, however overall feels better. Her cough has especially improved.     Denies fever, chills, sweats, headache, dizziness, chest pain, cough,  abdominal pain, N/V/D/C, dysuria, extremity numbness/tingling/swelling, signs of active bleeding. No other immediate concerns or acute issues.     ROS: Negative other than as stated in above interval history.         Medications:     Medications reviewed.           Physical Exam:   Vitals were reviewed  Blood  "pressure 148/67, pulse 109, temperature 96.5  F (35.8  C), temperature source Oral, resp. rate 20, height 1.499 m (4' 11\"), weight 56.518 kg (124 lb 9.6 oz), SpO2 97 %, not currently breastfeeding.    Physical Exam:   General: awake, sitting at bed side , appears comfortable, in NAD, nc O2 in place  HEENT: MMM, EOM intact, sclerae clear and anicteric  CV: RRR, no murmurs appreciated, no JVD noted  Resp: Decreased BS RLL, breathing comfortably on 0.5 L NC, no wheezes, rhonchi  Abd: Soft, non-tender, BS+, ileostomy with large hernia noted, greenish/brown stool noted).    MSK:  Warm, FROM, no joint swelling, 1-2+ LE edema  Skin: no rash or lesions on limited exam  Neuro: CN2-12 grossly intact, no lateralizing symptoms or focal neurologic deficits  Psych: Mood and affect WNL           Data:     Labs and imaging reviewed.     CBC  Recent Labs  Lab 01/24/17  0558 01/23/17  0654 01/22/17  1047 01/22/17  0555   WBC 34.4* 38.2* 36.8* 38.4*   RBC 2.67* 2.80* 2.89* 2.77*   HGB 6.7* 7.1* 7.5* 7.0*   HCT 23.6* 24.8* 25.8* 24.7*   MCV 88 89 89 89   MCH 25.1* 25.4* 26.0* 25.3*   MCHC 28.4* 28.6* 29.1* 28.3*   RDW 25.8* 25.7* 25.7* 25.2*    215 261 266     CMP  Recent Labs  Lab 01/24/17  0558 01/23/17  0654 01/22/17  2115 01/22/17  0555 01/21/17  1112 01/21/17  1050    139  --  138 136 137   POTASSIUM 5.0 4.9  --  5.1 4.9 5.0   CHLORIDE 109 111*  --  110*  --  107   CO2 18* 16*  --  18*  --  19*   ANIONGAP 11 12  --  11  --  11   GLC 68* 88  --  74 110* 109*   BUN 13 14  --  16  --  20   CR 1.41* 1.46*  --  1.57*  --  1.83*   GFRESTIMATED 36* 35*  --  32*  --  27*   GFRESTBLACK 44* 42*  --  39*  --  32*   RUBY 7.0* 6.6*  --  6.8*  --  7.1*   MAG  --  2.5* 2.6* 1.3*  --   --    PHOS  --  2.9 3.6 1.7*  --   --    PROTTOTAL 5.2* 5.4*  --   --   --  5.6*   ALBUMIN 2.5* 2.5*  --   --   --  2.7*   BILITOTAL 0.6 0.6  --   --   --  0.2   ALKPHOS 362* 360*  --   --   --  373*   AST 27 20  --   --   --  24   ALT 28 26  --   --   " --  22     INR  Recent Labs  Lab 01/21/17  1050   INR 1.31*

## 2017-01-25 NOTE — PROGRESS NOTES
[facility], [place]   Antimicrobial Management Team (AMT) Note              To: Hem/Onc  Unit: 7D   Allergies   Allergen Reactions     Morphine Sulfate Dermatitis     Codeine Sulfate Dermatitis     Ativan [Lorazepam] Other (See Comments)     Altered mentation, hallucinations  During hospitalization for influenza in Jan 2015 - ativan caused extreme lethargy and mild confusion.  These symptoms resolved when it was stopped.    Pt's family reported a similar response when she had used it at home for nausea.        Colesevelam Nausea     no appetite, dry heaves     Sulfa Drugs Hives     Adhesive Tape Rash     ACTUALLY,  Just fragile skin.  With tear with EKG lead.       Brief Summary: Melissa Collazo is a 76 year old female with a PMH of metastatic colon ca (liver mets), COPD s/p R lung transplant in 2009, DM2, HTN, CKD who was admitted on 1/21/17 for fatigue, nausea, vomiting and chest x-ray with possible pneumonia. She is currently being treat for pneumonia and cellulitis of her RUE.    HPI: Patient was found to have swelling and redness on her right hand and left wrist in clinic on 1/17. She was started on linezolid due to poor tolerance to amoxicillin/clavulanate and doxycycline in the past (N/V and dehydration), in addition to her history of VRE of an abdominal wound. Patient was admitted to the hospital on 1/21 for pneumonia, and was started on levofloxacin and pip/tazo. Of note, patient also has a history of complex lung infections (Aspergillus, MAC). Patient is also on dapsone and valacyclovir for PJP and CMV prophylaxis.    Interval History  1/25: WBC 27.8, Tmax 98.3 F  1/24: WBC 38.6, Tmax 98.7 F    Assessment:   1. Cellulitis: Patient has erythema and swelling of RUE. RUE US negative for DVT. Cellulitis was noted to have improved on antibiotics, however patient still has significant swelling. Patient has been on linezolid since 1/17 (today is day 9). There is a concern for  myelosuppressive with linezolid. Patient likely does not need VRE coverage because her previous VRE culture was from an abdominal wound from 2 years ago, which has resolved. Additionally, VRE is not a common pathogen for cellulitis. Patient does have a history of MRSA and should be covered for MRSA. Her last MRSA culture was in August 2016, which was sensitive to TMP/SMX, vancomycin, tetracycline, linezolid, and gentamycin. Patient has an allergy to sulfa drugs (hives), and has tolerated amoxicillin/clavulanate and doxycycline poorly in the past (per 1/17 clinic note - N/V and dehydration).  Alternative antibiotic options for cellulitis are limited given her allergies and drug intolerances. Beta-lactams are preferred for cellulitis, however the only agent that would cover MRSA is ceftaroline.     2. Pneumonia vs. edema: Patient's 1/21 chest x-ray shows patchy opacities which might represents edema or infection. She has leukocytosis (35.9 on admission, 27.8 today), however she did receive Neulasta on 1/10 and this could contribute. She has productive cough, and has been afebrile. She was started on pip/tazo and levofloxacin on admission, but pip/tazo has been discontinued since 1/23. She has known volume overload, which could be contributing to her worsening respiratory function. Antibiotic coverage is appropriate given history of lung transplant and possible infectious etiology.    Recommendations:  1. Linezolid is an appropriate choice at this time, however, will need to monitor patient's blood counts (especially platelets) closely. Linezolid is associated with myelosuppression, which typically manifests with courses longer than 14 days.  2. If cellulitis does not improve, consider an ID consult.  3. Agree with levofloxacin for suspected pneumonia.    Discussed with ID Staff - Dr. Deborah Gomez and Iraida Menendez, PharmD., BCPS AQ-ID    Alona Nathan PharmD  PGY-1 Pharmacy Resident      Current Antibiotics     "levofloxacin  750 mg Oral Q48H     dapsone  50 mg Oral Once per day on Mon Wed Fri     linezolid  600 mg Oral BID     valACYclovir  1,000 mg Oral Daily         Vitals and other clinical features  /64 mmHg  Pulse 109  Temp(Src) 96.3  F (35.7  C) (Axillary)  Resp 18  Ht 1.499 m (4' 11\")  Wt 55.112 kg (121 lb 8 oz)  BMI 24.53 kg/m2  SpO2 98%    Temperature curve:      Labs  Recent Labs   Lab Test  01/25/17   0613  01/24/17   2114  01/24/17   0558  01/23/17   0654  01/22/17   1047  01/22/17   0555   01/21/17   1050   WBC  27.8*  38.6*  34.4*  38.2*  36.8*  38.4*   --   33.9*   ANEU  26.7*   --   33.1*  36.7*  35.5*  36.9*   --   32.6*   ALYM  0.5*   --   0.4*  0.5*  0.4*  0.5*   --   0.3*   MIGUELITO  0.5   --   0.6  0.7  0.7  0.7   --   0.8   AEOS  0.0   --   0.0  0.0  0.0  0.0   --   0.0   HGB  7.9*  8.3*  6.7*  7.1*  7.5*  7.0*   < >  7.1*   HCT  26.6*  28.0*  23.6*  24.8*  25.8*  24.7*   --   24.9*   MCV  87  87  88  89  89  89   --   89   PLT  176  197  189  215  261  266   --   276    < > = values in this interval not displayed.       Estimated Creatinine Clearance: 29.5 mL/min (based on Cr of 1.41).  Recent Labs   Lab Test  01/25/17   0613  01/24/17   0558  01/23/17   0654  01/22/17   0555  01/21/17   1050  01/20/17   1556   CR  1.41*  1.41*  1.46*  1.57*  1.83*  1.87*       Recent Labs   Lab Test  01/25/17   0613  01/24/17   0558  01/23/17   0654  01/21/17   1050  01/12/17   0811  01/10/17   0944   BILITOTAL  0.5  0.6  0.6  0.2  0.4  0.4   ALKPHOS  397*  362*  360*  373*  239*  234*   ALBUMIN  2.5*  2.5*  2.5*  2.7*  3.0*  3.0*   AST  22  27  20  24  15  18   ALT  24  28  26  22  28  28       Recent Labs   Lab Test  01/25/17   0242  01/24/17   2244  01/24/17   0558  01/23/17   1614  01/22/17   2115  01/21/17   2056  01/21/17   1111  06/03/16   1942  06/03/16   1505  04/17/16   0030  04/17/16   0020  09/18/15   0035  09/05/15   2333  09/05/15   2331  05/05/15   1856  01/09/15   1049  10/16/14   2217  " 09/21/14   2336  05/04/14   1536   LACT  2.1  2.3*  1.6  2.6*  2.2*  1.8  1.0  1.5   --   1.1  1.2  0.6  <0.3*  0.6*  1.0  0.7  1.1  1.0  0.8   CRP   --    --    --    --    --    --    --    --   <2.9   --    --    --    --   14.0*   --    --    --    --    --        Culture results with specimen source  CULTURE MICRO   Date Value Ref Range Status   01/22/2017 *  Final    Heavy growth Dulce albicans / dubliniensis Candida albicans and Candida   dubliniensis are not routinely speciated Susceptibility testing not routinely   done     01/21/2017 No growth after 4 days  Final   01/21/2017 No growth after 4 days  Final   08/22/2016 *  Final    Moderate growth Methicillin resistant Staphylococcus aureus (MRSA)   06/03/2016 No growth  Final   06/03/2016 No growth  Final    SPECIMEN DESCRIPTION   Date Value Ref Range Status   01/22/2017 Sputum  Final   01/22/2017 Sputum  Final   01/21/2017 Blood Left Arm  Final   01/21/2017 Feces  Final   01/21/2017 Blood PORT A CATH  Final   01/10/2017 Nasal  Final          Recent Labs  Lab 01/22/17  1200 01/21/17  1238 01/21/17  1132 01/21/17  1050   CULT Heavy growth Candida albicans / dubliniensis Candida albicans and Candida dubliniensis are not routinely speciated Susceptibility testing not routinely done* No growth after 4 days  --  No growth after 4 days   SDES Sputum  Sputum Blood Left Arm Feces Blood PORT A CATH       Urine Studies     Recent Labs   Lab Test  01/21/17   1149  06/03/16   1542  04/17/16   0055  09/06/15   0121  06/15/15   1000   URINEPH  5.5  5.0  5.0  5.0  5.0   NITRITE  Negative  Negative  Negative  Negative  Negative   LEUKEST  Negative  Negative  Negative  Negative  Negative   WBCU  1  1  2  1  O - 2       Respiratory Virus Testing    RSV RAPID ANTIGEN RESULT   Date Value Ref Range Status   06/06/2013 * NEG Final    Incorrectly ordered by PCU/Clinic   Canceled, Test credited   03/03/2010 Negative NEG Final   02/02/2010 Negative NEG Final   11/24/2009  Negative NEG Final   04/21/2009 Negative NEG Final   03/17/2009 Negative NEG Final       Last check of C difficile  C DIFF TOXIN B PCR   Date Value Ref Range Status   01/21/2017  NEG Final    Negative  Negative: Clostridium difficile target DNA sequences NOT detected, presumed   negative for Clostridium difficile toxin B or the number of bacteria present   may be below the limit of detection for the test.   FDA approved assay performed using [employer] GeneXpert real-time PCR.   A negative result does not exclude actual disease due to Clostridium difficile   and may be due to improper collection, handling and storage of the specimen or   the number of organisms in the specimen is below the detection limit of the   assay.         Imaging:  Results for orders placed or performed during the hospital encounter of 01/21/17   CT Abdomen Pelvis w/o Contrast    Narrative    Exam: CT abdomen pelvis without contrast 1/21/2017 12:20 PM.    History: Nausea, vomiting, distention, history of colon cancer and  metastatic disease.    Comparison: 12/16/2016.    Technique: CT images from the lung bases through the symphysis pubis  without contrast    Findings:   Spleen is enlarged. Hyperdense subcentimeter lesions in the right  kidney, unchanged, represents hemorrhagic or proteinaceous cyst.  Exophytic simple appearing cyst in the upper pole of the right kidney.  The gallbladder contains no radiopaque stones. No gallbladder wall  thickening, however there is pericholecystic fluid, however it is  likely reactive to the liver, as this appearance has been seen on  prior exams. The liver shows multifocal areas of poorly defined  hypodensity both in the right and left lower liver. Findings  consistent with known metastatic disease, although evaluation is  limited without contrast. Some of these poorly defined lesions appear  more prominent, for example in the left lobe measuring up to 21 x 33  mm on series 3, image 102.  The adrenal glands  are unremarkable. No abnormally dilated loops of  small bowel or colon. Colonic diverticulosis. Postsurgical changes of  right hemicolectomy with right ileostomy and colostomy. Simple fluid  within the stoma. No free air. Trace ascites. Unchanged bilateral  inguinal fluid collections. There are small. No abdominal or pelvic  lymphadenopathy. Aortoiliac calcification.    Mild bibasilar atelectasis. Hyperexpansion of the emphysematous left  lung base. Unchanged hyperdense lesion in the posterior vertebral body  of T11. Sternotomy wires. Unchanged L1 compression deformity with  retropulsion. Degenerative findings of the spine.      Impression    Impression:   1. Findings consistent with known metastatic disease to the liver,  slightly progressed since prior.  2. Stable postsurgical changes of right hemicolectomy with ileostomy  and colostomy. No dilated loops of bowel.  3. Small quantity of ascites, most prominent just inferior to the  liver in the right upper quadrant. This is likely reactive to the  liver disease.    I have personally reviewed the examination and initial interpretation  and I agree with the findings.    KALLIE CALI MD   XR Chest 2 Views    Narrative    Exam: XR CHEST 2 VW, 1/21/2017 12:19 PM    Indication: weakness, fatigue, N/V, hx lung tx    Comparison: 12/16/2016, 12/12/2016    Findings:   Right-sided central catheter tip projects over the mid SVC. Median  sternotomy wires. Postoperative changes of right lung transplantation.  There are minimal patchy right pulmonary opacities. Mediastinum is  shifted to the right. Unchanged bibasilar atelectasis. Stable marked  emphysematous changes of the left native lung. Stable blunting of  costophrenic angles. No pneumothorax. Multiple stable compression  deformity throughout the thoracic spine. Chronic right rib  deformities.      Impression    Impression:   1. Postoperative changes of right lung transplantation with stable  emphysematous changes of the  left native lung.  2. Minimal patchy opacities in the right lung transplant, new compared  to the prior, may represent mild edema or early infection in the acute  setting.    I have personally reviewed the examination and initial interpretation  and I agree with the findings.    KALLIE CALI MD   US Upper Extremity Venous Duplex Right    Narrative    EXAMINATION: US UPPER EXTREMITY VENOUS DUPLEX RIGHT  1/21/2017 9:47 PM       CLINICAL HISTORY: swelling    COMPARISON: Ultrasound 6/4/2016, which was negative for DVT.        PROCEDURE COMMENTS: Ultrasound was performed of the deep venous system  of the right upper extremity using grayscale, color, and spectral  Doppler.    FINDINGS:  The internal jugular, brachiocephalic, subclavian, brachial, basilic  and cephalic veins are visualized and are patent. Venous waveforms are  normal. There is normal response to compression.    Incidental note of subcutaneous edema in right antecubital fossa and  at the level of distal arm.      Impression    IMPRESSION:  No deep venous thrombosis in the right upper extremity.    I have personally reviewed the examination and initial interpretation  and I agree with the findings.    DOMINIC LANDA MD   XR Chest 2 Views    Narrative    Exam:  XR CHEST 2 VW, 1/23/2017 2:43 PM    History: HCAP, increased O2 requirements    Comparison:  Chest radiograph from 1/21/2017.    Findings:  PA and lateral views of the chest were obtained. Right  chest portacatheter tip terminates in the mid SVC. Stable  postoperative changes of right lung transplantation including median  sternotomy wires and mediastinal surgical clips.     The cardiomediastinal silhouette is normal size and stably shifted to  the right. Small left greater than right pleural and adjacent  opacities representing atelectasis and/or consolidation.  Hyperexpansion and hyperlucency of the native left lung consistent  with emphysematous changes. No pneumothorax. Right basilar  and  retrocardiac opacities are unchanged, and correspond to a cluster of  multiple pulmonary nodules better evaluated on prior chest CT. The  visualized upper abdomen is unremarkable. Stable multilevel mid  thoracic vertebral compression deformities.      Impression    Impression:    1. Right retrocardiac nodular opacity is unchanged, and corresponds to  cluster of pulmonary nodules at the right lung base on prior chest CT.  2. Stable postoperative changes of right lung transplantation and  emphysematous native left lung.  3. Small left greater than right pleural effusions.    I have personally reviewed the examination and initial interpretation  and I agree with the findings.    DOMINIC LANDA MD     *Note: Due to a large number of results and/or encounters for the requested time period, some results have not been displayed. A complete set of results can be found in Results Review.

## 2017-01-25 NOTE — PROGRESS NOTES
[facility], [place]    Sepsis Evaluation Progress Note    Date of Service: 01/24/2017    I was called to see Melissa Collazo due to abnormal vital signs triggering the Sepsis SIRS screening alert. She is known to have an infection.     Physical Exam    Vital Signs:  Temp: 97.9  F (36.6  C) Temp src: Oral BP: 128/58 mmHg   Heart Rate: 107 Resp: 18 SpO2: 95 % O2 Device: None (Room air) Oxygen Delivery: 1.5 LPM    Lab:  LACTIC ACID   Date Value Ref Range Status   01/24/2017 2.3* 0.7 - 2.1 mmol/L Final       The patient is at baseline mental status.    The rest of their physical exam is significant for decreased breath sounds on left relative to right, clear breath sounds on right anteriorly. Tachycardia but regular rhythm. Afebrile and mentating appropriately    Assessment and Plan    The SIRS and exam findings are likely due to fluid shifts d/t hypoalbuminemia and intravascular volume depletion, there is no sign of sepsis at this time.    Disposition: The patient has an underlying condition of known liver metastasis, hypoalbuminema and fluid shifts from IV lasix that will continue to affect their vital signs and should not have further labs drawn to assess sepsis unless their clinical appearance changes.    Will give small fluid bolus (250cc) and recheck lactate.     Thomas Caro MD  [phone]

## 2017-01-25 NOTE — PROGRESS NOTES
[facility], [place]    Hematology / Oncology Progress Note    Date of Admission: 1/21/2017  Hospital Day #: 4   Date of Service (when I saw the patient): 01/25/2017     Assessment and Plan  Melissa Collazo is a 76 year old woman with metastatic colon cancer with liver mets s/p ileocolic resection and ostomy, most recently on lonsurf. She also has COPD s/p lung transplant in 2009, DM2, HTN, and CKD. She presented with fatigue, nausea, vomiting, and CXR c/f PNA.     Plan today:  -Lasix 20mg x1  -Lymphedema therapy  -Nystatin for thrush  -Calcium gluconate  -O2 walk test  -D/c valtrex  -Tacrolimus level in AM    #HCAP. Had productive cough week prior to admission. Worsening leukocytosis since 1/10. CXR showed R-sided opacity c/f infection. D/t h/o R lung transplant and h/o complex lung infection (aspergillus, MAC), will treat for HCAP. Increased O2 requirements overnight 1/22 to 1/23, now improving also with diuresis. Pt remains afebrile.  -Continue levofloxacin 750mg q48h x10-14 day course.   -Sputum cx - mixed ayse.   -6 min walk test today.     #Hypervolemia. Hypoxic, weight up, legs and arms edematous.   -Continue to diurese. Lasix 20mg x1 today.   -Lymphedema therapy consult.     #COPD s/p R lung transplant. In 2009. Continue home prednisone and tacrolimus.   -Tac dose decreased to 3.5mg bid on 1/23. Recheck level on 1/26 AM.   -Scheduled duonebs, advair, albuterol, dapsone.     #R arm cellulitis. Began week prior to admission. Started on linezolid in clinic on 1/17. Has notable erythema and swelling of RUE on exam. Reportedly redness improving on abx, swelling continues.  -RUE US negative for DVT.  -Continue linezolid 600mg bid for now.     #V1 dermatome herpes zoster. Began prior to admission. Pt started valtrex tx on 1/17. She had significant improvement and no longer has herpetic lesions.   -D/c valtrex today as pt has completed >7 day course.    #H/o EBV viremia. Last EBV DNA level  was 2030 on 12/12/16. Repeat level on 1/23 was 4243. No e/o PTLD on restaging CT 12/16. Continue to monitor periodically, every 1-2 months.     #Stage IV colon cancer with liver mets. Primary is Dr. Lebron. Prior treatment with xeloda/oxaliplatin, irinotecan, erbitux, and avastin. She recently was found to have progression on irinotecan/erbitux. She was started on lonsurf on 12/26/16. Further chemo on hold d/t cellulitis.   -Continue dilaudid for pain control.     #Acute on chronic anemia. Baseline hgb is ~8. Dropped to 6.7 on 1/24, feeling better after RBC transfusion. Hgb stable today.   -Peripheral smear: rare schistocytes.   -Iron studies wnl.   -Retic 0.6% suggesting inefficient bone marrow response. Could be 2/2 medications in part.     #Leukocytosis. Likely combination of infection (PNA, cellulitis) and residual effect from neulasta given 1/10 in setting of renal dysfunction likely to have prolonged effect. ID tx as above. Continue to monitor CBC.     #ROGERIO on CKD. Likely 2/2 dehydration. Baseline creatinine is ~1.5, was 1.83 on admission and improved with IV fluids. Continues to trend down.   -Avoid nephrotoxic agents.   -Continue to monitor BMP.     #HTN. Continue norvasc, doxazosin, and baby ASA.     #DM2. Home regimen lantus 4 units qHS and SSI. Monitor BG. HOLD lantus as BG has been low to low end of normal --  in past 24 hours.     #Hypothyroidism. Continue home levothyroxine.     #Deconditioning. Has been fatigued for the past few weeks. Lives in bottom level of duplex with son on second level. Has a son and daughter who combined could provide 24/7 care.   -OT and PT consulted -- recommend home with 24/7 assist and outpt therapies.     #Anorexia. Appetite and oral intake improving with better controlled nausea with zofran scheduled tid.     #Hypocalcemia. Give calcium gluconate 2g x1 today.     #Thrush. Start nystatin rinses.     FEN:  -PRN lyte replacement  -RDAT    Prophy/Misc:  -VTE: heparin  ppx  -GI/PUD: PPI  -Bowels: normal via ostomy    Code status: FULL   Disposition: Anticipate d/c home in next 1-2 days pending further PT eval and sx improvement.     Lenora Cruz DNP, APRN, CNP  Hematology/Oncology  Pager: [phone]    Interval History  Melissa reports feeling better today. Cough is improving, nonproductive. Mild chest pressure with deep inspiration, o/w no pain. Denies fevers, HA, dizziness, abd pain. Nausea much better controlled with scheduled zofran. Appetite improving with better nausea control, ate a good breakfast. Remains concerned about generalized edema - b/l arms R>L and legs, as well as abdomen. Good UOP with lasix yesterday. Good stool output via ostomy. Still deconditioned but activity tolerance improving. No other immediate concerns.     Physical Exam  Temp: 98.3  F (36.8  C) Temp src: Oral BP: 127/65 mmHg   Heart Rate: 121 Resp: 18 SpO2: 95 % O2 Device: Nasal cannula Oxygen Delivery: 1.5 LPM  Filed Vitals:    01/23/17 0900 01/24/17 0900 01/25/17 0854   Weight: 56.518 kg (124 lb 9.6 oz) 56.427 kg (124 lb 6.4 oz) 55.112 kg (121 lb 8 oz)     Vital Signs with Ranges  Temp:  [96.3  F (35.7  C)-98.7  F (37.1  C)] 98.3  F (36.8  C)  Heart Rate:  [] 121  Resp:  [18-24] 18  BP: (127-169)/(58-80) 127/65 mmHg  SpO2:  [95 %-99 %] 95 %  I/O last 3 completed shifts:  In: 960 [P.O.:240; I.V.:170; IV Piggyback:250]  Out: 2250 [Urine:1925; Stool:325]    Constitutional: Pleasant woman, frail-appearing, seen resting comfortably in bed in NAD. Alert and interactive.   HEENT: NCAT. Anicteric sclera. MMM, +mild thrush.   Respiratory: Non-labored breathing on 1.5L NC, breath sounds decreased in b/l bases but o/w clear. Dry cough.   Cardiovascular: Regular rate and rhythm. No murmur.   GI: Ostomy site CDI with soft tan stool. Normoactive bowel sounds. Abdomen soft, non-distended, and non-tender.   Skin: Warm and dry. No e/o zoster rash on face.   Musculoskeletal: BUE swelling R>L, redness over R arm  (improving per report). 1-2+ pitting BLE edema aki in feet. Feet tender, no warmth or redness.   Neurologic: A&O, speech normal, no focal deficits.   Neuropsychiatric: Mentation and affect appear normal/appropriate.    Medications  Current Facility-Administered Medications   Medication     nystatin (MYCOSTATIN) suspension 500,000 Units     calcium gluconate 2 g in D5W 100 mL intermittent infusion     insulin glargine (LANTUS) injection 4 Units     levofloxacin (LEVAQUIN) tablet 750 mg     ondansetron (ZOFRAN-ODT) ODT tab 8 mg     ipratropium - albuterol 0.5 mg/2.5 mg/3 mL (DUONEB) neb solution 3 mL     tacrolimus (PROGRAF - GENERIC EQUIVALENT) capsule 3.5 mg     calcium carb 1250 mg (500 mg Marshall)/vitamin D 200 units (OSCAL with D) per tablet 1 tablet     sodium chloride (PF) 0.9% PF flush 20 mL     carboxymethylcellulose (REFRESH PLUS) 0.5 % ophthalmic solution 1 drop     lidocaine 1 % 1 mL     lidocaine (LMX4) kit     sodium chloride (PF) 0.9% PF flush 10-20 mL     heparin lock flush 10 UNIT/ML injection 5-10 mL     heparin lock flush 10 UNIT/ML injection 5-10 mL     heparin 100 UNIT/ML injection 5 mL     sodium chloride (PF) 0.9% PF flush 10-20 mL     potassium chloride SA (K-DUR/KLOR-CON M) CR tablet 20-40 mEq     potassium chloride (KLOR-CON) Packet 20-40 mEq     potassium chloride 10 mEq in 100 mL intermittent infusion     potassium chloride 10 mEq in 100 mL intermittent infusion with 10 mg lidocaine     potassium chloride 20 mEq in 50 mL intermittent infusion     magnesium sulfate 4 g in 100 mL sterile water (premade)     sodium phosphate 15 mmol in D5W intermittent infusion     sodium phosphate 20 mmol in D5W intermittent infusion     sodium phosphate 25 mmol in D5W intermittent infusion     loperamide (IMODIUM) capsule 2 mg     albuterol (PROAIR HFA/PROVENTIL HFA/VENTOLIN HFA) Inhaler 2 puff     calcium carbonate (TUMS) chewable tablet 500 mg     amLODIPine (NORVASC) tablet 10 mg     ascorbic acid (VITAMIN  C) tablet 500 mg     aspirin EC EC tablet 81 mg     atorvastatin (LIPITOR) tablet 20 mg     butalbital-acetaminophen-caffeine (FIORICET/ESGIC) per tablet 1 tablet     cyanocobalamin (vitamin  B-12) tablet 1,000 mcg     dapsone tablet 50 mg     doxazosin (CARDURA) tablet 4 mg     HYDROmorphone (DILAUDID) tablet 2-4 mg     levothyroxine (SYNTHROID/LEVOTHROID) tablet 50 mcg     lidocaine (LIDODERM) 5 % Patch 1 patch     linezolid (ZYVOX) tablet 600 mg     magnesium oxide (MAG-OX) tablet 400 mg     montelukast (SINGULAIR) tablet 10 mg     multivitamin, therapeutic with minerals (THERA-VIT-M) tablet 1 tablet     OLANZapine (zyPREXA) tablet 5 mg     omeprazole (priLOSEC) CR capsule 40 mg     predniSONE (DELTASONE) tablet 2.5 mg     predniSONE (DELTASONE) tablet 5 mg     sodium bicarbonate tablet 1,300 mg     zinc sulfate (ZINCATE) capsule 220 mg     Medication Instruction     heparin sodium PF injection 5,000 Units     prochlorperazine (COMPAZINE) tablet 5-10 mg    Or     prochlorperazine (COMPAZINE) injection 5-10 mg     acetaminophen (TYLENOL) tablet 650 mg     naloxone (NARCAN) injection 0.1-0.4 mg     glucose 40 % gel 15-30 g    Or     dextrose 50 % injection 25-50 mL    Or     glucagon injection 1 mg     insulin aspart (NovoLOG) inj (RAPID ACTING)     insulin aspart (NovoLOG) inj (RAPID ACTING)     lidocaine (LIDODERM) patch REMOVAL     lidocaine (LIDODERM) Patch in Place     fluticasone-vilanterol (BREO ELLIPTA) 200-25 MCG/INH oral inhaler 1 puff       Data  CBC  Recent Labs  Lab 01/25/17  0613 01/24/17  2114 01/24/17  0558 01/23/17  0654   WBC 27.8* 38.6* 34.4* 38.2*   RBC 3.05* 3.21* 2.67* 2.80*   HGB 7.9* 8.3* 6.7* 7.1*   HCT 26.6* 28.0* 23.6* 24.8*   MCV 87 87 88 89   MCH 25.9* 25.9* 25.1* 25.4*   MCHC 29.7* 29.6* 28.4* 28.6*   RDW 24.6* 24.1* 25.8* 25.7*    197 189 215     CMP  Recent Labs  Lab 01/25/17  0613 01/24/17  0558 01/23/17  0654 01/22/17  2115 01/22/17  0555  01/21/17  1050    138 139  --   138  < > 137   POTASSIUM 5.1 5.0 4.9  --  5.1  < > 5.0   CHLORIDE 109 109 111*  --  110*  --  107   CO2 20 18* 16*  --  18*  --  19*   ANIONGAP 10 11 12  --  11  --  11   GLC 80 68* 88  --  74  < > 109*   BUN 14 13 14  --  16  --  20   CR 1.41* 1.41* 1.46*  --  1.57*  --  1.83*   GFRESTIMATED 36* 36* 35*  --  32*  --  27*   GFRESTBLACK 44* 44* 42*  --  39*  --  32*   RUBY 7.1* 7.0* 6.6*  --  6.8*  --  7.1*   MAG 1.6  --  2.5* 2.6* 1.3*  --   --    PHOS 2.1*  --  2.9 3.6 1.7*  --   --    PROTTOTAL 5.2* 5.2* 5.4*  --   --   --  5.6*   ALBUMIN 2.5* 2.5* 2.5*  --   --   --  2.7*   BILITOTAL 0.5 0.6 0.6  --   --   --  0.2   ALKPHOS 397* 362* 360*  --   --   --  373*   AST 22 27 20  --   --   --  24   ALT 24 28 26  --   --   --  22   < > = values in this interval not displayed.  INR  Recent Labs  Lab 01/25/17  0613 01/25/17  0242 01/21/17  1050   INR 1.23* 1.23* 1.31*       Results for orders placed or performed during the hospital encounter of 01/21/17   CT Abdomen Pelvis w/o Contrast    Narrative    Exam: CT abdomen pelvis without contrast 1/21/2017 12:20 PM.    History: Nausea, vomiting, distention, history of colon cancer and  metastatic disease.    Comparison: 12/16/2016.    Technique: CT images from the lung bases through the symphysis pubis  without contrast    Findings:   Spleen is enlarged. Hyperdense subcentimeter lesions in the right  kidney, unchanged, represents hemorrhagic or proteinaceous cyst.  Exophytic simple appearing cyst in the upper pole of the right kidney.  The gallbladder contains no radiopaque stones. No gallbladder wall  thickening, however there is pericholecystic fluid, however it is  likely reactive to the liver, as this appearance has been seen on  prior exams. The liver shows multifocal areas of poorly defined  hypodensity both in the right and left lower liver. Findings  consistent with known metastatic disease, although evaluation is  limited without contrast. Some of these poorly defined  lesions appear  more prominent, for example in the left lobe measuring up to 21 x 33  mm on series 3, image 102.  The adrenal glands are unremarkable. No abnormally dilated loops of  small bowel or colon. Colonic diverticulosis. Postsurgical changes of  right hemicolectomy with right ileostomy and colostomy. Simple fluid  within the stoma. No free air. Trace ascites. Unchanged bilateral  inguinal fluid collections. There are small. No abdominal or pelvic  lymphadenopathy. Aortoiliac calcification.    Mild bibasilar atelectasis. Hyperexpansion of the emphysematous left  lung base. Unchanged hyperdense lesion in the posterior vertebral body  of T11. Sternotomy wires. Unchanged L1 compression deformity with  retropulsion. Degenerative findings of the spine.      Impression    Impression:   1. Findings consistent with known metastatic disease to the liver,  slightly progressed since prior.  2. Stable postsurgical changes of right hemicolectomy with ileostomy  and colostomy. No dilated loops of bowel.  3. Small quantity of ascites, most prominent just inferior to the  liver in the right upper quadrant. This is likely reactive to the  liver disease.    I have personally reviewed the examination and initial interpretation  and I agree with the findings.    KALLIE CALI MD   XR Chest 2 Views    Narrative    Exam: XR CHEST 2 VW, 1/21/2017 12:19 PM    Indication: weakness, fatigue, N/V, hx lung tx    Comparison: 12/16/2016, 12/12/2016    Findings:   Right-sided central catheter tip projects over the mid SVC. Median  sternotomy wires. Postoperative changes of right lung transplantation.  There are minimal patchy right pulmonary opacities. Mediastinum is  shifted to the right. Unchanged bibasilar atelectasis. Stable marked  emphysematous changes of the left native lung. Stable blunting of  costophrenic angles. No pneumothorax. Multiple stable compression  deformity throughout the thoracic spine. Chronic right  rib  deformities.      Impression    Impression:   1. Postoperative changes of right lung transplantation with stable  emphysematous changes of the left native lung.  2. Minimal patchy opacities in the right lung transplant, new compared  to the prior, may represent mild edema or early infection in the acute  setting.    I have personally reviewed the examination and initial interpretation  and I agree with the findings.    KALLIE CALI MD   US Upper Extremity Venous Duplex Right    Narrative    EXAMINATION: US UPPER EXTREMITY VENOUS DUPLEX RIGHT  1/21/2017 9:47 PM       CLINICAL HISTORY: swelling    COMPARISON: Ultrasound 6/4/2016, which was negative for DVT.        PROCEDURE COMMENTS: Ultrasound was performed of the deep venous system  of the right upper extremity using grayscale, color, and spectral  Doppler.    FINDINGS:  The internal jugular, brachiocephalic, subclavian, brachial, basilic  and cephalic veins are visualized and are patent. Venous waveforms are  normal. There is normal response to compression.    Incidental note of subcutaneous edema in right antecubital fossa and  at the level of distal arm.      Impression    IMPRESSION:  No deep venous thrombosis in the right upper extremity.    I have personally reviewed the examination and initial interpretation  and I agree with the findings.    DOMINIC LANDA MD   XR Chest 2 Views    Narrative    Exam:  XR CHEST 2 VW, 1/23/2017 2:43 PM    History: HCAP, increased O2 requirements    Comparison:  Chest radiograph from 1/21/2017.    Findings:  PA and lateral views of the chest were obtained. Right  chest portacatheter tip terminates in the mid SVC. Stable  postoperative changes of right lung transplantation including median  sternotomy wires and mediastinal surgical clips.     The cardiomediastinal silhouette is normal size and stably shifted to  the right. Small left greater than right pleural and adjacent  opacities representing atelectasis and/or  consolidation.  Hyperexpansion and hyperlucency of the native left lung consistent  with emphysematous changes. No pneumothorax. Right basilar and  retrocardiac opacities are unchanged, and correspond to a cluster of  multiple pulmonary nodules better evaluated on prior chest CT. The  visualized upper abdomen is unremarkable. Stable multilevel mid  thoracic vertebral compression deformities.      Impression    Impression:    1. Right retrocardiac nodular opacity is unchanged, and corresponds to  cluster of pulmonary nodules at the right lung base on prior chest CT.  2. Stable postoperative changes of right lung transplantation and  emphysematous native left lung.  3. Small left greater than right pleural effusions.    I have personally reviewed the examination and initial interpretation  and I agree with the findings.    DOMINIC LANDA MD     *Note: Due to a large number of results and/or encounters for the requested time period, some results have not been displayed. A complete set of results can be found in Results Review.

## 2017-01-25 NOTE — PLAN OF CARE
Problem: Goal Outcome Summary  Goal: Goal Outcome Summary  Outcome: No Change    Patient currently receiving Sodium Phos; recheck with tomorrow morning labs.  Patient to receive Calcium Gluconate.  Blood glucose still not meeting parameters to give insulin (80, 128).  Patient encouraged to only be in bed if she's sleeping or feeling sick; patient up in chair this afternoon.  Patient also encouraged to turn while in bed.  Scheduled Zofran before meals is working well, as patient is able to eat all meals.  Lasix 20mg IV x1 given with good results.  Ostomy with soft formed stool this shift.  Patient to walk with PT & see if she qualifies for home oxygen.  Patient still feeling very dyspneic while in bed & on exertion.  Lymphedema consult ordered as patient feels edema is still uncomfortable for her.

## 2017-01-25 NOTE — PLAN OF CARE
Problem: Goal Outcome Summary  Goal: Goal Outcome Summary  Outcome: No Change  HR tachy, tachypneic with exertion. Continues on 1.5LPM via NC. OVSS. Afebrile. Denied pain or nausea. C/o feeling SOB at start of shift; PRN albuterol inhaler given. Triggered sepsis on evening shift; LA came back positive at 2.3. MD notified and LR 250cc bolus given per order. Recheck was 2.1. MD aware. 0200 BG was 116. Blanchable erythema noted on coccyx; mepilex applied and pt reminded to reposition when in bed. Good UOP response from Lasix. Right hand and BLE with 3+ edema. Pt very concerned about this and if she is on the right abx? Up with SBA. Ostomy with soft, brown output. Slept between cares. Cont POC.

## 2017-01-25 NOTE — PLAN OF CARE
Problem: Goal Outcome Summary  Goal: Goal Outcome Summary  Outcome: No Change  Tachycardic -120 this evening. BP hypertensive with activity. Dyspneic with exertion. Pt. satting in the mid 90's on 1 1/2 L NC. Afebrile. Pt. Triggered sepsis, waiting on lactic acid result. Hgb 6.7, 1 unit of blood infused. No transfusion reaction noted. Hgb recheck 8.3. Pt. states her breathing is feeling better after receiving the blood. Lasix infused x2, 625cc out after the lasix. +3 edema in BLE and +2 edema in R arm. Fair appetite. Up with SBA to the bathroom. Pt. slept comfortably between cares. No acute events. Will continue POC and notify MD with changes.

## 2017-01-25 NOTE — PLAN OF CARE
Problem: Goal Outcome Summary  Goal: Goal Outcome Summary  PT/7D: pt with improved tolerance to activity. She ambulated 250ft x3 pushing w/c with SBA provided. Pt stable with gait, limited 2/2 SOB and generalized deconditioning.  Prolonged seated rest breaks taken between bouts to recover. Pt on RA with gait, SpO2 92-94% during ambulation, dropping to 90% briefly during seated rest break but recovering to 94-95% quickly.  Recommend discharge home with 24 hour assist and home vs OP PT to progress activity tolerance.    Patient has been assessed for home oxygen needs:  Oxygen Readings:     *Room Air - at rest Pulse oximetry (SpO2) = 95%    *Room Air - during activity/with exercise SpO2 92%     *O2 at 2 liters/minutes (at rest) = SPO2 95%     *O2 at 2 liters/minute (during activity/with exercise) = SPO2 92%

## 2017-01-26 NOTE — PLAN OF CARE
Problem: Goal Outcome Summary  Goal: Goal Outcome Summary  Outcome: No Change  HR tachycardic 100-130. BP hypertensive with activity. Pt. c/o SOB and was satting in the low 90's. Pt. placed on 1 L NC, now satting in the high 90's. Afebrile. Pt. denies pain and nausea.  and 86. Fair appetite. TUMS given x2 for heartburn. Voiding spontaneously. Up with SBA to bedside commode. Ostomy patent, pt. states stools are bulking up this evening. Pt. rested comfortably between cares. No acute events. Will continue POC and notify MD with changes.

## 2017-01-26 NOTE — PLAN OF CARE
Problem: Goal Outcome Summary  Goal: Goal Outcome Summary  Outcome: No Change  HR tachycardic 110's. BP hypertensive with activity. Afebrile. Pt. denies pain and nausea. . Poor to Fair appetite; gave chocolate ensure mixed with ice cream and pt enjoyed; had about 1/4 of each. Voiding spontaneously. Up with SBA to bedside commode. Ostomy patent, pt.stools are bulking up. Pt. rested comfortably between cares. No acute events. Will continue POC and notify MD with changes.

## 2017-01-26 NOTE — PLAN OF CARE
Problem: Goal Outcome Summary  Goal: Goal Outcome Summary  Outcome: No Change    Patient has been without oxygen this shift & needs to stay off oxygen, if possible, since she doesn't qualify for home oxygen.  Lymphedema wraps on both legs; patient is pleased with this.  Patient continues with labored breathing & complains of shortness of breat with activity & at rest.  Blood glucose 84 & 101 this shift.  Patient received 40mg IV Lasix; 550cc out so far.  Patient nauseated this morning, even with scheduled Zofran; Compazine given as well.  Patient ate 1/2 of both breakfast & lunch.  Patient's daughter to visit this evening to change patient's ostomy bag, per their routine.  Possible discharge to home with 24hr assist.

## 2017-01-26 NOTE — PROGRESS NOTES
[facility], [place]    Hematology / Oncology Progress Note    Date of Admission: 1/21/2017  Hospital Day #: 5   Date of Service (when I saw the patient): 01/26/2017     Assessment and Plan  Melissa Collazo is a 76 year old woman with metastatic colon cancer with liver mets s/p ileocolic resection and ostomy, most recently on lonsurf. She also has COPD s/p lung transplant in 2009, DM2, HTN, and CKD. She presented with fatigue, nausea, vomiting, and CXR c/f PNA.     Plan today:  -Lasix 40mg  -Lymphedema therapy  -D/c linezolid  -Tacrolimus level - Lung Transplant team to f/u    #HCAP. Had productive cough week prior to admission. Worsening leukocytosis since 1/10. CXR showed R-sided opacity c/f infection. D/t h/o R lung transplant and h/o complex lung infection (aspergillus, MAC), will treat for HCAP. Increased O2 requirements overnight 1/22 to 1/23, now improving also with diuresis. Pt remains afebrile and no longer needing supplemental O2 but still dyspneic.   -Continue levofloxacin 750mg q48h x10-14 day course.   -Sputum cx - mixed ayse.   -Nebs prn.     #Hypervolemia. Hypoxic, weight up, legs and arms edematous.   -Continue to diurese. Lasix 40mg this AM.   -Lymphedema therapy consulted.    #COPD s/p R lung transplant. In 2009. Continue home prednisone and tacrolimus.   -Tac dose decreased to 3.5mg bid on 1/23. Recheck level this AM - Lung Transplant team to f/u.    -Advair, albuterol, dapsone, nebs prn.     #R arm cellulitis. Began week prior to admission. Started on linezolid in clinic on 1/17. Has notable erythema and swelling of RUE on exam improving since starting abx and now nearly resolved.   -RUE US negative for DVT.  -D/c linezolid as pt has completed >7 day course.     #V1 dermatome herpes zoster. Began prior to admission. Pt started valtrex tx on 1/17. She had significant improvement and no longer has herpetic lesions. D/c'ed valtrex as pt completed >7 day course.    #H/o EBV  viremia. Last EBV DNA level was 2030 on 12/12/16. Repeat level on 1/23 was 4243. No e/o PTLD on restaging CT 12/16. Continue to monitor periodically, every 1-2 months.     #Stage IV colon cancer with liver mets. Primary is Dr. Lebron. Prior treatment with xeloda/oxaliplatin, irinotecan, erbitux, and avastin. She recently was found to have progression on irinotecan/erbitux. She was started on lonsurf on 12/26/16. Further chemo on hold d/t cellulitis.   -Continue dilaudid for pain control.     #Acute on chronic anemia. Baseline hgb is ~8. Dropped to 6.7 on 1/24, feeling better after RBC transfusion. Hgb stable >8 today.   -Peripheral smear: rare schistocytes.   -Iron studies wnl.   -Retic 0.6% suggesting inefficient bone marrow response. Could be 2/2 medications in part.     #Leukocytosis. Likely combination of infection (PNA, cellulitis) and residual effect from neulasta given 1/10 in setting of renal dysfunction likely to have prolonged effect. ID tx as above. Continue to monitor CBC.     #ROGERIO on CKD. Likely 2/2 dehydration. Baseline creatinine is ~1.5, was 1.83 on admission and improved with IV fluids. Continues to trend down, 1.36 today.   -Avoid nephrotoxic agents.   -Continue to monitor BMP.     #HTN. Continue norvasc, doxazosin, and baby ASA.     #DM2. Home regimen lantus 4 units qHS and SSI. Monitor BG. Continue to HOLD lantus as BG has been low end of normal to normal. May not need to discharge on this.     #Hypothyroidism. Continue home levothyroxine.     #Deconditioning. Has been fatigued for the past few weeks. Lives in bottom level of duplex with son on second level. Has two sons and daughter-in-law who combined could provide 24/7 care.   -OT and PT consulted -- recommend home with 24/7 assist and outpt therapies.     #Anorexia. Appetite and oral intake improving with better controlled nausea with zofran scheduled tid. Compazine prn.     #Hypocalcemia. Improving with IV calcium replacement. Continue oral  supplement as well.     #Thrush. Continue nystatin rinses.     FEN:  -PRN lyte replacement  -RDAT    Prophy/Misc:  -VTE: heparin ppx  -GI/PUD: PPI  -Bowels: normal via ostomy    Code status: FULL   Disposition: Anticipate d/c home tmrw pending sx improvement.     Lenora Cruz DNP, APRN, CNP  Hematology/Oncology  Pager: [phone]    Interval History  Melissa reports that she was feeling better in general but was nauseated this AM despite zofran. Took compazine. Couldn't eat breakfast but has o/w been eating better. Also very concerned about general edema - arms improving but abd, legs, and feet very puffy. Cough is improving, nonproductive. Mild chest pressure with deep inspiration, o/w no pain. Denies fever, HA, dizziness, vomiting. RUE redness almost completely resolved. Good stool output via ostomy. Still deconditioned, weak.     Physical Exam  Temp: 98  F (36.7  C) Temp src: Oral BP: 139/72 mmHg   Heart Rate: 107 Resp: 18 SpO2: 92 % O2 Device: None (Room air) Oxygen Delivery: 1.5 LPM  Filed Vitals:    01/24/17 0900 01/25/17 0854 01/26/17 0823   Weight: 56.427 kg (124 lb 6.4 oz) 55.112 kg (121 lb 8 oz) 53.524 kg (118 lb)     Vital Signs with Ranges  Temp:  [97.3  F (36.3  C)-99.1  F (37.3  C)] 98  F (36.7  C)  Heart Rate:  [101-125] 107  Resp:  [16-20] 18  BP: (125-168)/(60-74) 139/72 mmHg  SpO2:  [91 %-96 %] 92 %  I/O last 3 completed shifts:  In: 680 [P.O.:660; I.V.:20]  Out: 2100 [Urine:1600; Stool:500]    Constitutional: Pleasant woman, frail-appearing, seen resting in bed in mild discomfort from nausea. Alert and interactive.   HEENT: NCAT. Anicteric sclera. MMM, +mild thrush- improving.   Respiratory: Non-labored breathing on RA, breath sounds decreased in b/l bases but o/w clear. Dry cough.   Cardiovascular: Regular mild tachycardia. No murmur.   GI: Ostomy site CDI with soft tan stool. Normoactive bowel sounds. Abdomen soft, non-distended, and non-tender.   Skin: Warm and dry. No e/o zoster rash on face.    Musculoskeletal: BUE swelling R>L improving, redness over R arm almost completely resolved. 2-3+ pitting BLE edema aki in feet. Feet tender, no warmth or redness.   Neurologic: A&O, speech normal, no focal deficits.   Neuropsychiatric: Mentation and affect appear normal/appropriate.    Medications  Current Facility-Administered Medications   Medication     nystatin (MYCOSTATIN) suspension 500,000 Units     ipratropium - albuterol 0.5 mg/2.5 mg/3 mL (DUONEB) neb solution 3 mL     insulin glargine (LANTUS) injection 4 Units     levofloxacin (LEVAQUIN) tablet 750 mg     ondansetron (ZOFRAN-ODT) ODT tab 8 mg     tacrolimus (PROGRAF - GENERIC EQUIVALENT) capsule 3.5 mg     calcium carb 1250 mg (500 mg [place])/vitamin D 200 units (OSCAL with D) per tablet 1 tablet     sodium chloride (PF) 0.9% PF flush 20 mL     carboxymethylcellulose (REFRESH PLUS) 0.5 % ophthalmic solution 1 drop     lidocaine 1 % 1 mL     lidocaine (LMX4) kit     sodium chloride (PF) 0.9% PF flush 10-20 mL     heparin lock flush 10 UNIT/ML injection 5-10 mL     heparin lock flush 10 UNIT/ML injection 5-10 mL     heparin 100 UNIT/ML injection 5 mL     sodium chloride (PF) 0.9% PF flush 10-20 mL     potassium chloride SA (K-DUR/KLOR-CON M) CR tablet 20-40 mEq     potassium chloride (KLOR-CON) Packet 20-40 mEq     potassium chloride 10 mEq in 100 mL intermittent infusion     potassium chloride 10 mEq in 100 mL intermittent infusion with 10 mg lidocaine     potassium chloride 20 mEq in 50 mL intermittent infusion     magnesium sulfate 4 g in 100 mL sterile water (premade)     sodium phosphate 15 mmol in D5W intermittent infusion     sodium phosphate 20 mmol in D5W intermittent infusion     sodium phosphate 25 mmol in D5W intermittent infusion     loperamide (IMODIUM) capsule 2 mg     albuterol (PROAIR HFA/PROVENTIL HFA/VENTOLIN HFA) Inhaler 2 puff     calcium carbonate (TUMS) chewable tablet 500 mg     amLODIPine (NORVASC) tablet 10 mg     ascorbic acid  (VITAMIN C) tablet 500 mg     aspirin EC EC tablet 81 mg     atorvastatin (LIPITOR) tablet 20 mg     butalbital-acetaminophen-caffeine (FIORICET/ESGIC) per tablet 1 tablet     cyanocobalamin (vitamin  B-12) tablet 1,000 mcg     dapsone tablet 50 mg     doxazosin (CARDURA) tablet 4 mg     HYDROmorphone (DILAUDID) tablet 2-4 mg     levothyroxine (SYNTHROID/LEVOTHROID) tablet 50 mcg     lidocaine (LIDODERM) 5 % Patch 1 patch     magnesium oxide (MAG-OX) tablet 400 mg     montelukast (SINGULAIR) tablet 10 mg     multivitamin, therapeutic with minerals (THERA-VIT-M) tablet 1 tablet     OLANZapine (zyPREXA) tablet 5 mg     omeprazole (priLOSEC) CR capsule 40 mg     predniSONE (DELTASONE) tablet 2.5 mg     predniSONE (DELTASONE) tablet 5 mg     sodium bicarbonate tablet 1,300 mg     zinc sulfate (ZINCATE) capsule 220 mg     Medication Instruction     heparin sodium PF injection 5,000 Units     prochlorperazine (COMPAZINE) tablet 5-10 mg    Or     prochlorperazine (COMPAZINE) injection 5-10 mg     acetaminophen (TYLENOL) tablet 650 mg     naloxone (NARCAN) injection 0.1-0.4 mg     glucose 40 % gel 15-30 g    Or     dextrose 50 % injection 25-50 mL    Or     glucagon injection 1 mg     insulin aspart (NovoLOG) inj (RAPID ACTING)     insulin aspart (NovoLOG) inj (RAPID ACTING)     lidocaine (LIDODERM) patch REMOVAL     lidocaine (LIDODERM) Patch in Place     fluticasone-vilanterol (BREO ELLIPTA) 200-25 MCG/INH oral inhaler 1 puff       Data  CBC    Recent Labs  Lab 01/26/17  0547 01/25/17  0613 01/24/17  2114 01/24/17  0558   WBC 25.4* 27.8* 38.6* 34.4*   RBC 3.18* 3.05* 3.21* 2.67*   HGB 8.2* 7.9* 8.3* 6.7*   HCT 27.6* 26.6* 28.0* 23.6*   MCV 87 87 87 88   MCH 25.8* 25.9* 25.9* 25.1*   MCHC 29.7* 29.7* 29.6* 28.4*   RDW 24.4* 24.6* 24.1* 25.8*   * 176 197 189     CMP    Recent Labs  Lab 01/26/17  0547 01/25/17  0613 01/24/17  0558 01/23/17  0654 01/22/17 2115 01/22/17  0555    139 138 139  --  138   POTASSIUM  5.3 5.1 5.0 4.9  --  5.1   CHLORIDE 107 109 109 111*  --  110*   CO2 21 20 18* 16*  --  18*   ANIONGAP 10 10 11 12  --  11   GLC 84 80 68* 88  --  74   BUN 17 14 13 14  --  16   CR 1.36* 1.41* 1.41* 1.46*  --  1.57*   GFRESTIMATED 38* 36* 36* 35*  --  32*   GFRESTBLACK 46* 44* 44* 42*  --  39*   RUBY 7.4* 7.1* 7.0* 6.6*  --  6.8*   MAG  --  1.6  --  2.5* 2.6* 1.3*   PHOS  --  2.1*  --  2.9 3.6 1.7*   PROTTOTAL 5.4* 5.2* 5.2* 5.4*  --   --    ALBUMIN 2.6* 2.5* 2.5* 2.5*  --   --    BILITOTAL 0.4 0.5 0.6 0.6  --   --    ALKPHOS 436* 397* 362* 360*  --   --    AST 21 22 27 20  --   --    ALT 27 24 28 26  --   --      INR    Recent Labs  Lab 01/25/17  0613 01/25/17  0242 01/21/17  1050   INR 1.23* 1.23* 1.31*       Results for orders placed or performed during the hospital encounter of 01/21/17   CT Abdomen Pelvis w/o Contrast    Narrative    Exam: CT abdomen pelvis without contrast 1/21/2017 12:20 PM.    History: Nausea, vomiting, distention, history of colon cancer and  metastatic disease.    Comparison: 12/16/2016.    Technique: CT images from the lung bases through the symphysis pubis  without contrast    Findings:   Spleen is enlarged. Hyperdense subcentimeter lesions in the right  kidney, unchanged, represents hemorrhagic or proteinaceous cyst.  Exophytic simple appearing cyst in the upper pole of the right kidney.  The gallbladder contains no radiopaque stones. No gallbladder wall  thickening, however there is pericholecystic fluid, however it is  likely reactive to the liver, as this appearance has been seen on  prior exams. The liver shows multifocal areas of poorly defined  hypodensity both in the right and left lower liver. Findings  consistent with known metastatic disease, although evaluation is  limited without contrast. Some of these poorly defined lesions appear  more prominent, for example in the left lobe measuring up to 21 x 33  mm on series 3, image 102.  The adrenal glands are unremarkable. No  abnormally dilated loops of  small bowel or colon. Colonic diverticulosis. Postsurgical changes of  right hemicolectomy with right ileostomy and colostomy. Simple fluid  within the stoma. No free air. Trace ascites. Unchanged bilateral  inguinal fluid collections. There are small. No abdominal or pelvic  lymphadenopathy. Aortoiliac calcification.    Mild bibasilar atelectasis. Hyperexpansion of the emphysematous left  lung base. Unchanged hyperdense lesion in the posterior vertebral body  of T11. Sternotomy wires. Unchanged L1 compression deformity with  retropulsion. Degenerative findings of the spine.      Impression    Impression:   1. Findings consistent with known metastatic disease to the liver,  slightly progressed since prior.  2. Stable postsurgical changes of right hemicolectomy with ileostomy  and colostomy. No dilated loops of bowel.  3. Small quantity of ascites, most prominent just inferior to the  liver in the right upper quadrant. This is likely reactive to the  liver disease.    I have personally reviewed the examination and initial interpretation  and I agree with the findings.    KALLIE CALI MD   XR Chest 2 Views    Narrative    Exam: XR CHEST 2 VW, 1/21/2017 12:19 PM    Indication: weakness, fatigue, N/V, hx lung tx    Comparison: 12/16/2016, 12/12/2016    Findings:   Right-sided central catheter tip projects over the mid SVC. Median  sternotomy wires. Postoperative changes of right lung transplantation.  There are minimal patchy right pulmonary opacities. Mediastinum is  shifted to the right. Unchanged bibasilar atelectasis. Stable marked  emphysematous changes of the left native lung. Stable blunting of  costophrenic angles. No pneumothorax. Multiple stable compression  deformity throughout the thoracic spine. Chronic right rib  deformities.      Impression    Impression:   1. Postoperative changes of right lung transplantation with stable  emphysematous changes of the left native  lung.  2. Minimal patchy opacities in the right lung transplant, new compared  to the prior, may represent mild edema or early infection in the acute  setting.    I have personally reviewed the examination and initial interpretation  and I agree with the findings.    KALLIE CALI MD   US Upper Extremity Venous Duplex Right    Narrative    EXAMINATION: US UPPER EXTREMITY VENOUS DUPLEX RIGHT  1/21/2017 9:47 PM       CLINICAL HISTORY: swelling    COMPARISON: Ultrasound 6/4/2016, which was negative for DVT.        PROCEDURE COMMENTS: Ultrasound was performed of the deep venous system  of the right upper extremity using grayscale, color, and spectral  Doppler.    FINDINGS:  The internal jugular, brachiocephalic, subclavian, brachial, basilic  and cephalic veins are visualized and are patent. Venous waveforms are  normal. There is normal response to compression.    Incidental note of subcutaneous edema in right antecubital fossa and  at the level of distal arm.      Impression    IMPRESSION:  No deep venous thrombosis in the right upper extremity.    I have personally reviewed the examination and initial interpretation  and I agree with the findings.    DOMINIC LANDA MD   XR Chest 2 Views    Narrative    Exam:  XR CHEST 2 VW, 1/23/2017 2:43 PM    History: HCAP, increased O2 requirements    Comparison:  Chest radiograph from 1/21/2017.    Findings:  PA and lateral views of the chest were obtained. Right  chest portacatheter tip terminates in the mid SVC. Stable  postoperative changes of right lung transplantation including median  sternotomy wires and mediastinal surgical clips.     The cardiomediastinal silhouette is normal size and stably shifted to  the right. Small left greater than right pleural and adjacent  opacities representing atelectasis and/or consolidation.  Hyperexpansion and hyperlucency of the native left lung consistent  with emphysematous changes. No pneumothorax. Right basilar and  retrocardiac  opacities are unchanged, and correspond to a cluster of  multiple pulmonary nodules better evaluated on prior chest CT. The  visualized upper abdomen is unremarkable. Stable multilevel mid  thoracic vertebral compression deformities.      Impression    Impression:    1. Right retrocardiac nodular opacity is unchanged, and corresponds to  cluster of pulmonary nodules at the right lung base on prior chest CT.  2. Stable postoperative changes of right lung transplantation and  emphysematous native left lung.  3. Small left greater than right pleural effusions.    I have personally reviewed the examination and initial interpretation  and I agree with the findings.    DOMINIC LANDA MD     *Note: Due to a large number of results and/or encounters for the requested time period, some results have not been displayed. A complete set of results can be found in Results Review.

## 2017-01-26 NOTE — CONSULTS
Pulmonary Medicine  Cystic Fibrosis - Lung Transplant Team  Initial Consultation  2017       Patient: Melissa Collazo  MRN: [id number]  : 1940 (age 76 year old)  Transplant Date: 2009 (POD#2898)  Admission date: 2017  Reason for consultation: immunosuppression management  Referring provider: Cher Mirza    Primary Care Provider: Rosette Lin    Assessment & Plan:     Melissa Collazo is a 76 year old female with a PMHx for metastatic colon cancer with liver mets s/p ileocolic resection and ostomy, most recently on lonsurf. History also significant for COPD, s/p R single lung transplant in , DM2, HTN, and CKD. The patient admitted on 2017 for nausea, vomiting, faitgue, and CXR concerning for RML PNA.       Today's recommendations:  - tacrolimus level 11.2 (10.5-hr trough) today. Target goal 8-10. No dose change required.  - if patient is still inpatient on Monday,  she will need a repeat tacrolimus level, which I have ordered.  - continue current immunosuppression, ppx. plan.  - we will continue to follow along peripherally over the weekend and then formally see her again on Monday,  if patient is still inpatient.       #. 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 Chest XR upon admission concerning for evolving infiltrate in the RML region. Also noted to have worsening leukocytosis since 1/10. Sputum culture  growing candida albicans. Cough has nearly resolved after initiating current antibiotic plan. Continues to report shortness of breath, but attributes this to increase abdominal distention.   - agree w/ levofloxacin for a total 10-14 days course.     #. COPD s/p R single lung transplant: followed by Dr. Clay in [facility]. Post-transplant course c/b ALLI/CLAD. Patient reports feeling well from a pulmonary standpoint prior to current admission. PFTs on 16 were slightly increase from  previous study w/ overall lung function remaining relatively stable over the past 1 year. However, patient has noticed a reduction in her exercise tolerance, but attributes this d/t deconditioning from chemotherapy. On room air at home.   - next OP Pulmonary Clinic follow-up w/ CXR, PFTs and labs scheduled w/ Dr. Arzola on 2/13.     Continue 2-drug immunosuppression:  - tacrolimus 3.5mg BID. Target goal 8-10. Next level to be drawn on 1/30 (ordered).   - prednisone 5mg qAM / 2.5 mg qPM    Prophylaxis:  - continue dapsone 50mg MWF for PJP ppx.    ALLI/CLAD:  - continue home-dose Advair, montelukast   - agree w/ holding azithromycin while on levofloxacin. Please restart home dose (250mg daily) once levofloxacin is completed.    #. H/o mycobacterium avium intracellulare: last positive culture 09/2013. Was on azithromycin ppx. PTA.  - agree w/ holding azithromycin while on levofloxacin. Please restart home dose (250mg daily) once levofloxacin is completed.      Patient discussed with Dr. Caceres.    ACACIA Mcdonough, CNP  Inpatient Nurse Practitioner  [facility]  Pager [phone]    Chief Complaint:     N/V, fatigue    History of Present Illness:     History obtained from patient.    Pt is a 75 yo F with a PMH of metastatic colon ca (liver mets) s/p ileocolic resection and ostomy, on chemotherapy with lonsurf, COPD s/p R lung transplant in 2009, DM2, HTN, CKD presented to the ED 1/21 for worsening N/V and fatigue. She was seen at the Oncology Clinic a few days prior for these symptoms. She was also treated for cellulitis in her R forarm at that time, which had been present for  ~4-5 days. On the day of admission, she was vomiting and felt her symptoms were worse at the ED. Pt. also noted her ostomy out put also increased recently more than her baseline. Chest XR in the ED was concerning for possible PNA in the RML. She did have a non-productive cough; however, denied any fever, no chest pain or SOB. Admitted for further  treatment and evaluation, VS at the ED were stable.      Review of Systems:     C: negative for fever, chills, change in weight, change in appetite  INTEGUMENTARY/SKIN: + rash   ENT/MOUTH: no sore throat, no sinus pain, no nasal drainage  RESP: see interval history  CV: no chest pain, no palpitations, + peripheral edema, no orthopnea  GI: + no nausea, no vomiting, no reflux symptoms, no change in stools, + intermittent abdominal pain  : no dysuria  MUSCULOSKELETAL: no myalgias, no arthralgias  ENDOCRINE: blood sugars with adequate control  NEURO: no headache, no numbness or tingling  PSYCHIATRIC: mood stable    Medical and Surgical History:     Past Medical History   Diagnosis Date     Asthma      Hypertension      COPD (chronic obstructive pulmonary disease) (H)      Thyroid disease      Lung replaced by transplant (H) 2/19/2009     RSLT     Aspergillus (H)      in sputum prior to lung transplant, marked sun sensitivity to voraconazole     Breast nodule      Benign     MAC (mycobacterium avium-intracellulare complex)      by bronch 2/2009 treated with levo and azithro     Hypogammaglobulinaemia      Back pain      S/P radiofreq ablation     Hyperlipidaemia      Osteoporosis, unspecified      Encounter for long-term (current) use of other medications      CKD (chronic kidney disease) stage 3, GFR 30-59 ml/min      PONV (postoperative nausea and vomiting)      Shortness of breath      Arthritis      Cancer of right colon (H) 10/7/2014     Anemia      History of blood transfusion      Diabetes (H)      Other chronic pain      Cancer of liver (H)      Squamous cell carcinoma (H)      Skin disease        Past Surgical History   Procedure Laterality Date     Transplant       Right lung tx 2/2009 for        Appendectomy       Hernia repair       Colonoscopy  3/25/2011     COMBINED COLONOSCOPY, REMOVE TUMOR/POLYP/LESION BY SNARE performed by LEIDY DEAL at  GI     Tonsillectomy       Mycobacterium a        Colonoscopy with co2 insufflation N/A 10/7/2014     Procedure: COLONOSCOPY WITH CO2 INSUFFLATION;  Surgeon: Zeinab Anthony MD;  Location: [facility] OR     Laparoscopic ileostomy N/A 10/17/2014     Procedure: LAPAROSCOPIC ILEOSTOMY;  Surgeon: Zeinab Anthony MD;  Location: [facility] OR     Ablate liver tumor N/A 3/19/2015     Procedure: ABLATE LIVER TUMOR;  Surgeon: Casper Poe MD;  Location: [facility] OR     Colectomy right N/A 3/19/2015     Procedure: COLECTOMY RIGHT;  Surgeon: Zeinab Anthony MD;  Location: [facility] OR     Takedown ileostomy N/A 3/19/2015     Procedure: TAKEDOWN ILEOSTOMY;  Surgeon: Zeinab Anthony MD;  Location: [facility] OR     Laparotomy exploratory N/A 3/29/2015     Procedure: LAPAROTOMY EXPLORATORY;  Surgeon: Zeinab Anthony MD;  Location: [facility] OR     Ileostomy N/A 3/29/2015     Procedure: ILEOSTOMY;  Surgeon: Zeinab Anthony MD;  Location: [facility] OR       Social and Family History:     Social History     Social History     Marital Status:      Spouse Name: N/A     Number of Children: N/A     Years of Education: N/A     Occupational History     Not on file.     Social History Main Topics     Smoking status: Former Smoker -- 1.00 packs/day for 30 years     Types: Cigarettes     Quit date: 01/01/1993     Smokeless tobacco: Former User     Alcohol Use: No     Drug Use: No     Sexual Activity: Not on file     Other Topics Concern     Not on file     Social History Narrative       Family History   Problem Relation Age of Onset     Other - See Comments Mother      Cancer, possibly lymphoma     Alcohol/Drug Father      Alcoholism     Respiratory Brother      Emphysema     Respiratory Maternal Aunt      Emphysema     CANCER Maternal Uncle      Lung Cancer     Melanoma No family hx of      Skin Cancer No family hx of        ETOH: None  Tobacco: Former, quit 1/1/1993  Significant inhalational exposures: None  PPD/TB exposure status: None    Allergies and Home Medications:        Allergies   Allergen Reactions      Morphine Sulfate Dermatitis     Codeine Sulfate Dermatitis     Ativan [Lorazepam] Other (See Comments)     Altered mentation, hallucinations  During hospitalization for influenza in Jan 2015 - ativan caused extreme lethargy and mild confusion.  These symptoms resolved when it was stopped.    Pt's family reported a similar response when she had used it at home for nausea.        Colesevelam Nausea     no appetite, dry heaves     Sulfa Drugs Hives     Adhesive Tape Rash     ACTUALLY,  Just fragile skin.  With tear with EKG lead.       Prescriptions prior to admission   Medication Sig Dispense Refill Last Dose     Prochlorperazine Maleate (COMPAZINE PO) Take 5 mg by mouth every 6 hours as needed for nausea   1/20/2017 at Unknown time     valACYclovir (VALTREX) 1000 mg tablet Take 1 tablet (1,000 mg) by mouth daily 10 tablet 0 1/21/2017 at 0800     HYDROmorphone (DILAUDID) 2 MG tablet Take 1-2 tablets (2-4 mg) by mouth every 3 hours as needed for moderate to severe pain 100 tablet 0 1/20/2017 at Unknown time     linezolid (ZYVOX) 600 MG tablet Take 1 tablet (600 mg) by mouth 2 times daily 20 tablet 0 1/21/2017 at 0800     tacrolimus (PROGRAF - GENERIC EQUIVALENT) 1 MG capsule Take four 1 MG cap in the AM and three 1 MG in the PM .  Total dose of 4 mg in the AM and 3.5 mg in the  capsule 11 1/21/2017 at 0800     tacrolimus (PROGRAF - GENERIC EQUIVALENT) 0.5 MG capsule Take 1 capsule (0.5 mg) by mouth every evening Total dose of 4 mg in the AM and 3.5 mg in the PM 30 capsule 11 1/20/2017 at 2000     Trifluridine-Tipiracil (LONSURF) 15-6.14 MG tablet Take 3 tablets by mouth 2 times daily Take within 1 hour after morning and evening meals on Days 1 thru 5 and 8 thru 12 of each 28 day cycle 60 tablet 0 1/9/2017 at Unknown time     lidocaine (LIDODERM) 5 % Patch Place 1 patch onto the skin every 24 hours 30 patch 3 1/20/2017 at 0800     lidocaine-prilocaine (EMLA) cream APPLY TO PORT SITE ABOUT 45 TO 60 MINUTES  PRIOR TO ACCESSING AS NEEDED FOR MODERATE PAIN 30 g 3 1/21/2017 at am     ondansetron (ZOFRAN) 8 MG tablet TAKE ONE TABLET BY MOUTH THREE TIMES A DAY BEFORE MEALS 90 tablet 3 1/21/2017 at 0800     dapsone 25 MG tablet TAKE TWO TABLETS BY MOUTH THREE TIMES WEEKLY (MONDAY/WEDNESDAY/FRIDAY) 24 tablet 11 1/20/2017 at 0800     predniSONE (DELTASONE) 5 MG tablet TAKE ONE TABLET BY MOUTH EVERY MORNING 30 tablet 11 1/21/2017 at 0800     zinc sulfate (ZINCATE) 220 MG capsule TAKE ONE CAPSULE BY MOUTH EVERY DAY 30 capsule 11 1/20/2017 at 1800     predniSONE (DELTASONE) 2.5 MG tablet TAKE ONE TABLET BY MOUTH EVERY EVENING 30 tablet 11 1/20/2017 at 2000     sodium bicarbonate 650 MG tablet Take 2 tablets (1,300 mg) by mouth 2 times daily 540 tablet 3 1/21/2017 at 0800     insulin glargine (LANTUS SOLOSTAR) 100 UNIT/ML PEN Inject 4 Units Subcutaneous At Bedtime   1/20/2017 at 2200     OLANZapine (ZYPREXA) 5 MG tablet Take 1 tablet (5 mg) by mouth nightly as needed for nausea 30 tablet 2 Past Month at Unknown time     albuterol (PROAIR HFA, PROVENTIL HFA, VENTOLIN HFA) 108 (90 BASE) MCG/ACT inhaler Inhale 2 puffs into the lungs every 6 hours as needed for shortness of breath / dyspnea or wheezing 3 Inhaler 3 1/20/2017 at Unknown time     montelukast (SINGULAIR) 10 MG tablet Take 1 tablet (10 mg) by mouth every evening 90 tablet 3 1/20/2017 at 2000     fluticasone-salmeterol (ADVAIR) 500-50 MCG/DOSE diskus inhaler Inhale 1 puff into the lungs 2 times daily 3 Inhaler 3 1/21/2017 at 0800     doxazosin (CARDURA) 2 MG tablet Take 2 tablets (4 mg) by mouth At Bedtime 60 tablet 6 1/20/2017 at 2200     azithromycin (ZITHROMAX) 250 MG tablet TAKE ONE-HALF TABLET BY MOUTH EVERY DAY 15 tablet 11 1/20/2017 at 1200     omeprazole (PRILOSEC) 40 MG capsule Take 1 capsule (40 mg) by mouth daily 90 capsule 3 1/21/2017 at 0700     amLODIPine (NORVASC) 5 MG tablet Take 2 tablets (10 mg) by mouth At Bedtime 180 tablet 3 1/20/2017 at 2200      "atorvastatin (LIPITOR) 20 MG tablet Take 1 tablet (20 mg) by mouth daily 30 tablet  1/20/2017 at 2200     insulin aspart (NOVOLOG PEN) 100 UNIT/ML soln Give sliding scale three times daily:140-214 give 1 unit; 215-289 give 2 units; 290-365 give 3 units and call MD   NOVOLOG   1/21/2017 at 0800     aspirin EC 81 MG tablet Take 81 mg by mouth daily    1/21/2017 at 0700     levothyroxine (SYNTHROID, LEVOTHROID) 50 MCG tablet Take 50 mcg by mouth daily    1/21/2017 at 0700     Magnesium Oxide 250 MG TABS Take 2 tablets (500 mg) by mouth At Bedtime 180 tablet 3 1/20/2017 at 2200     loperamide (IMODIUM) 2 MG capsule Take 1 capsule (2 mg) by mouth 3 times daily as needed for other (greater than 1200mL ileostomy output in a 24 hour period.  please call colon and rectal surgery clinic to notify if patient is requiring Imodium.) 20 capsule  1/21/2017 at 0800     ascorbic acid 500 MG TABS Take 1 tablet (500 mg) by mouth daily 30 tablet 1 1/20/2017 at 1200     Calcium Carbonate-Vitamin D (CALCIUM 500 + D PO) Take 1 tablet by mouth 2 times daily    1/20/2017 at 1800     Multiple Vitamin (MULTI-VITAMIN) per tablet Take 1 tablet by mouth daily.   1/20/2017 at 1200     Blood Glucose Calibration (CRIS DOC CONTROL) NORMAL SOLN daily as needed   Taking     order for DME Equipment being ordered:   Hernia Belt     Nu form, BG- Cool comfort- medium,  4\"  Belt ring  3 1/4  Catalog # :  [id number] 1 each 11 Taking     blood glucose monitoring (NO BRAND SPECIFIED) test strip TEST 4 TIMES DAILY.   Taking     insulin pen needle 32G X 4 MM USE AS DIRECTED FOR ADMINISTERING INSULIN AT HOME.   Taking     cyanocobalamin (VITAMIN B-12 ER) 1000 MCG TBCR Take 1,000 mcg by mouth every 7 days    1/20/2017 at 1200     order for DME Lightweight Wheelchair with leg rests. 1 Units 0 Taking     butalbital-acetaminophen-caffeine (FIORICET, ESGIC) -40 MG per tablet Take 1 tablet by mouth every 4 hours as needed for headaches   More than a month at " "Unknown time     order for DME Please dispense 1 hair prosthesis. 1 Units 0 Taking     ORDER FOR DME Equipment being ordered: ileostomy supplies 1 Month 11 Taking       Physical Exam:     Constitutional: Awake, alert, in no apparent distress.   HEENT: Eyes with pink conjunctivae, no scleral jaundice. Oral mucosa moist without lesions. Neck supple without lymphadenopathy.   PULM: Good air flow R, mildly diminished L. No crackles, no rhonchi, no wheezes.   CV: Normal S1 and S2. RRR.  No murmur, gallop, or rub. +2-3 b/l LE edema. Peripheral pulses intact.   ABD: NABS, + firm, nontender, nondistended. No guarding. + abdominal distention. Ostomy present.  MSK: Moves all extremities  No apparent muscle wasting.   NEURO: Alert and oriented x 4, conversant.   SKIN: Warm, dry. No pruritus. Resolving cellulitis on R hand and forarm, as well as on L wrist.  PSYCH: Mood stable, judgment and insight appropriate.    Lines, Drains, and Devices:  Peripheral IV 05/07/15 Right;Anterior Upper forearm (Active)   Number of days:630       Port A Cath 10/22/14 Right Chest wall (Active)   Access Date 01/21/17 1/26/2017  8:30 AM   Access Attempts 1 1/20/2017  5:00 PM   Gauge/Length Noncoring 90 degree bend;20 gauge;3/4 inch 1/20/2017  5:00 PM   Site Assessment WDL 1/26/2017  8:30 AM   Line Status Heparin locked 1/26/2017  8:30 AM   Extravasation? No 1/26/2017  8:30 AM   Dressing Intervention Transparent 1/26/2017  8:30 AM   Dressing change due 01/28/17 1/26/2017  8:30 AM   Needle Change Due 01/28/17 1/26/2017  8:30 AM   De-Access Date 01/20/17 1/20/2017  5:00 PM   Date to be Reflushed 02/20/17 1/20/2017  3:57 PM   Number of days:827       Data:     Vital signs:  Temp: 98.2  F (36.8  C) Temp src: Oral BP: 161/74 mmHg   Heart Rate: 107 Resp: 16 SpO2: 92 % O2 Device: None (Room air) Oxygen Delivery: 1.5 LPM Height: 149.9 cm (4' 11\") Weight: 53.524 kg (118 lb)    Weight trend:   Filed Vitals:    01/24/17 0900 01/25/17 0854 01/26/17 0823 "   Weight: 56.427 kg (124 lb 6.4 oz) 55.112 kg (121 lb 8 oz) 53.524 kg (118 lb)        I/O:   Intake/Output Summary (Last 24 hours) at 01/26/17 1120  Last data filed at 01/26/17 0543   Gross per 24 hour   Intake    480 ml   Output   1400 ml   Net   -920 ml       Labs    CMP:   Recent Labs  Lab 01/26/17  0547 01/25/17  0613 01/24/17  0558 01/23/17  0654 01/22/17 2115 01/22/17  0555    139 138 139  --  138   POTASSIUM 5.3 5.1 5.0 4.9  --  5.1   CHLORIDE 107 109 109 111*  --  110*   CO2 21 20 18* 16*  --  18*   ANIONGAP 10 10 11 12  --  11   GLC 84 80 68* 88  --  74   BUN 17 14 13 14  --  16   CR 1.36* 1.41* 1.41* 1.46*  --  1.57*   GFRESTIMATED 38* 36* 36* 35*  --  32*   GFRESTBLACK 46* 44* 44* 42*  --  39*   RUBY 7.4* 7.1* 7.0* 6.6*  --  6.8*   MAG  --  1.6  --  2.5* 2.6* 1.3*   PHOS  --  2.1*  --  2.9 3.6 1.7*   PROTTOTAL 5.4* 5.2* 5.2* 5.4*  --   --    ALBUMIN 2.6* 2.5* 2.5* 2.5*  --   --    BILITOTAL 0.4 0.5 0.6 0.6  --   --    ALKPHOS 436* 397* 362* 360*  --   --    AST 21 22 27 20  --   --    ALT 27 24 28 26  --   --      CBC:   Recent Labs  Lab 01/26/17  0547 01/25/17 0613 01/24/17 2114 01/24/17  0558   WBC 25.4* 27.8* 38.6* 34.4*   RBC 3.18* 3.05* 3.21* 2.67*   HGB 8.2* 7.9* 8.3* 6.7*   HCT 27.6* 26.6* 28.0* 23.6*   MCV 87 87 87 88   MCH 25.8* 25.9* 25.9* 25.1*   MCHC 29.7* 29.7* 29.6* 28.4*   RDW 24.4* 24.6* 24.1* 25.8*   * 176 197 189     INR:   Recent Labs  Lab 01/25/17  0613 01/25/17  0242 01/21/17  1050   INR 1.23* 1.23* 1.31*     Glucose:   Recent Labs  Lab 01/26/17  0547 01/26/17  0208 01/25/17  2215 01/25/17  1749 01/25/17  1206 01/25/17  0613 01/25/17  0223 01/24/17  2246  01/24/17  0558  01/23/17  0654  01/22/17  0555  01/21/17  1112   GLC 84  --   --   --   --  80  --   --   --  68*  --  88  --  74  --  110*   BGM  --  114* 86 107* 128*  --  116* 118*  < >  --   < >  --   < >  --   < >  --    < > = values in this interval not displayed.  Blood Gas:   Recent Labs  Lab 01/21/17  1112  01/21/17  1111   PHV 7.30* 7.29*   PCO2V 36* 36*   PO2V 30 30   HCO3V 18* 17*     Culture Data   Recent Labs  Lab 01/22/17  1200 01/21/17  1238 01/21/17  1050   CULT Heavy growth Candida albicans / dubliniensis Candida albicans and Candida dubliniensis are not routinely speciated Susceptibility testing not routinely done* No growth after 5 days No growth after 5 days     Virology Data: RESUFAST(influa,fluah1,fluah3,pt1571,iflub,rsva,rsvb,piv1,piv2,piv3,hmpv,hrvs,advbe,advc:3)@  Historical CMV results (last 3 of prior testing):  Lab Results   Component Value Date    CMVQNT  12/12/2016     CMV DNA Not Detected   The JEREMIAS AmpliPrep/JEREMIAS TaqMan CMV Test is an FDA-approved in vitro nucleic   acid amplification test for the quantitation of cytomegalovirus DNA in human   plasma (EDTA plasma) using the BONDS.COMiPrep Instrument for automated viral   nucleic acid extraction and the [employer] TaqMan Analyzer or [employer] TaqMan for   automated Real Time amplification and detection of the viral nucleic acid   target.   Titer results are reported in International Units/mL (IU/mL using 1st WHO   International standard for Human Cytomegalovirus for Nucleic Acid Amplification   based assays. The conversion factor between CMV DNA copis/mL (as defined by the   Roche JEREMIAS TaqMan CMV test) and International Units is the CMV DNA   concentration in IU/mL x 1.1 copies/IU = CMV DNA in copies/mL.   This assay has received FDA approval for the testing of human plasma only. The   Infectious Disease Diagnostic Laboratory at the [facility], [place], has validated the performance characteristics of the Roche   CMV assay for plasma, bronchial alveolar lavage/wash and urine.      CMVQNT  09/12/2016     CMV DNA Not Detected   The JEREMIAS AmpliPrep/JEREMIAS TaqMan CMV Test is an FDA-approved in vitro nucleic   acid amplification test for the quantitation of cytomegalovirus DNA in human   plasma (EDTA plasma) using the JEREMIAS  AmpliPrep Instrument for automated viral   nucleic acid extraction and the JEREMIAS TaqMan Analyzer or JEREMIAS TaqMan for   automated Real Time amplification and detection of the viral nucleic acid   target.   Titer results are reported in International Units/mL (IU/mL using 1st WHO   International standard for Human Cytomegalovirus for Nucleic Acid Amplification   based assays. The conversion factor between CMV DNA copis/mL (as defined by the   Roche JEREMIAS TaqMan CMV test) and International Units is the CMV DNA   concentration in IU/mL x 1.1 copies/IU = CMV DNA in copies/mL.   This assay has received FDA approval for the testing of human plasma only. The   Infectious Disease Diagnostic Laboratory at the [facility], has validated the performance characteristics of the Roche   CMV assay for plasma, bronchial alveolar lavage/wash and urine.      CMVQNT  07/18/2016     CMV DNA Not Detected   The JEREMIAS AmpliPrep/JEREMIAS TaqMan CMV Test is an FDA-approved in vitro nucleic   acid amplification test for the quantitation of cytomegalovirus DNA in human   plasma (EDTA plasma) using the JEREMIAS AmpliPrep Instrument for automated viral   nucleic acid extraction and the JEREMIAS TaqMan Analyzer or JEREMIAS TaqMan for   automated Real Time amplification and detection of the viral nucleic acid   target.   Titer results are reported in International Units/mL (IU/mL using 1st WHO   International standard for Human Cytomegalovirus for Nucleic Acid Amplification   based assays. The conversion factor between CMV DNA copis/mL (as defined by the   Roche JEREMIAS TaqMan CMV test) and International Units is the CMV DNA   concentration in IU/mL x 1.1 copies/IU = CMV DNA in copies/mL.   This assay has received FDA approval for the testing of human plasma only. The   Infectious Disease Diagnostic Laboratory at the [facility], has validated the performance characteristics of the  Roche   CMV assay for plasma, bronchial alveolar lavage/wash and urine.       Lab Results   Component Value Date    CMVLOG Not Calculated 12/12/2016    CMVLOG Not Calculated 09/12/2016    CMVLOG Not Calculated 07/18/2016     Urine Studies  Recent Labs   Lab Test  01/21/17   1149  06/03/16   1542   URINEPH  5.5  5.0   NITRITE  Negative  Negative   LEUKEST  Negative  Negative   WBCU  1  1     Most Recent [place] Pulmonary Function Testing (FVC/FEV1 only)  FVC-PRE   Date Value Ref Range Status   12/12/2016 1.10 L    09/12/2016 1.09 L    07/18/2016 1.07 L    03/14/2016 1.22 L    03/14/2016 1.22 L      FVC-%PRED-PRE   Date Value Ref Range Status   12/12/2016 51 %    09/12/2016 50 %    07/18/2016 49 %    03/14/2016 55 %    03/14/2016 55 %      FEV1-PRE   Date Value Ref Range Status   12/12/2016 0.90 L    09/12/2016 0.88 L    07/18/2016 0.88 L    03/14/2016 0.99 L    03/14/2016 0.99 L      FEV1-%PRED-PRE   Date Value Ref Range Status   12/12/2016 53 %    09/12/2016 52 %    07/18/2016 52 %    03/14/2016 58 %    03/14/2016 58 %

## 2017-01-26 NOTE — PLAN OF CARE
Problem: Goal Outcome Summary  Goal: Goal Outcome Summary  OT 7D: . Pt performed sit<>stand transfer with SBA, ambulated from room down to 7th floor rehab gym with CGA and while pushing w/c. Once in gym, pt performed LE dressing by donning and doffing socks using figure 4 technique while seated. Th then educated pt on EC/WS techniques to effectively manage fatigue and optimize daily participation once returning home. Pt then performed 2x tub transfers with CGA and appropriate use of grab bars.    REC home with assist as necessary and home OT/PT to ensure safety in home environment with completion of functional mobility and ADLs/IADLs.

## 2017-01-26 NOTE — PLAN OF CARE
Problem: Goal Outcome Summary  Goal: Goal Outcome Summary  PT 7D: Pt agreeable to PT. She reported nausea this AM and could not eat breakfast. No significant nausea occurred during this treatment session. Treatment performed on room air. During activity, SpO2 remained at 91-92%. She was SOB during activity and recovered within 1-2 min with rest. She performed LE exercises seated, ambulated 175 - 200' x 4 pushing a w/c with SBA. She was able to ascend/descend 4 steps x 2 with SBA. At the end of treatment, SpO2 was 92% on room air, HR = 115, and BP = 138/68. Recommend home PT or OP PT at discharge to progress work with improving functional endurance.

## 2017-01-27 NOTE — PLAN OF CARE
Problem: Goal Outcome Summary  Goal: Goal Outcome Summary  Outcome: No Change  HR tachycardic. BP slightly hypertensive. Afebrile. Pt. satting in the mid 90's on room air. Pt. denies pain. Pt. states scheduled zofran is helping with nausea. Pt's daughter helped change the pt's ostomy this evening. Ostomy patent putting out small amounts of soft stools. Lymphedema wraps in place, pt. states wraps are helping relieve leg pain from edema. Fair appetite. Good oral intake. Voiding spontaneously, adequate urine output. Pt. slept comfortably between cares. No acute events. Will continue POC and notify MD with changes.

## 2017-01-27 NOTE — DISCHARGE SUMMARY
[facility], [place]    Discharge Summary  Hematology / Oncology    Date of Admission:  1/21/2017  Date of Discharge:  1/27/2017  Discharging Provider: Lenora Cruz DNP, APRN, CNP  Primary Heme/Oncologist: Dr. Lebron  Date of Service (when I saw the patient): 01/27/2017    Discharge Diagnoses  Metastatic colon cancer with liver mets  COPD s/p lung transplant  DM2  HTN  CKD  Failure to thrive    History of Present Illness  Melissa Collazo is a 76 year old woman with metastatic colon cancer with liver mets s/p ileocolic resection and ostomy, most recently on lonsurf. She also has COPD s/p lung transplant in 2009, DM2, HTN, and CKD. She presented with fatigue, nausea, vomiting, and CXR c/f PNA.     Hospital Course  Melissa Collazo was admitted on 1/21/2017.  The following problems were addressed during her hospitalization:    #HCAP. Had productive cough week prior to admission and worsening leukocytosis since 1/10. Sputum cx mixed ayse. CXR showed R-sided opacity c/f infection. D/t h/o R lung transplant and h/o complex lung infection (aspergillus, MAC), we decided to treat for presumed bacterial HCAP - continue levaquin 750mg po q48h (renally dosed) x10 days (will complete on 2/2). O2 requirements improved with abx and diuresis and now off supplemental O2.     #Hypervolemia. E/b hypoxia, weight gain, legs and arms edematous. Improving with diuresis. Gave lasix 20mg daily then increased to 40mg on 1/26 with better results. Hold on additional lasix as pt sx improving and creat elevated, lytes low. Outpt lymphedema tx referral.     #COPD s/p R lung transplant. In 2009. Continue home prednisone and tacrolimus. Tac dose decreased to 3.5mg bid on 1/23, recheck level wnl. Transplant team following and will f/u repeat tac level on 2/2. Continue home nebs/inhalers.     #R arm cellulitis. Began week prior to admission. Started on linezolid in clinic on 1/17. Erythema resolved, swelling improved  (also edematous d/t hypervolemia). RUE US negative for DVT. Completed course of linezolid.     #V1 dermatome herpes zoster. Began prior to admission. Pt started valtrex tx on 1/17. She had significant improvement and no longer has herpetic lesions. Completed course of valtrex.     #H/o EBV viremia. Last EBV DNA level was 2030 on 12/12/16. Repeat level on 1/23 was 4243. No e/o PTLD on restaging CT 12/16. Continue to monitor periodically, every 1-2 months.     #Stage IV colon cancer with liver mets. Primary is Dr. Lebron. Prior treatment with xeloda/oxaliplatin, irinotecan, erbitux, and avastin. She recently was found to have progression on irinotecan/erbitux. She was started on lonsurf on 12/26/16. Further chemo on hold d/t cellulitis. Continue dilaudid for pain control. F/u in clinic next week.     #Acute on chronic anemia. Baseline hgb is ~8. Dropped to 6.7 on 1/24, transfused and feeling better. Peripheral smear: rare schistocytes. Iron studies wnl. Retic 0.6% suggesting inefficient bone marrow response. Could be 2/2 medications in part. Hgb now stable >8.     #Leukocytosis. Likely combination of infection (PNA, cellulitis) and residual effect from neulasta given 1/10 in setting of renal dysfunction likely to have prolonged effect. ID tx as above. Leukocytosis improving.     #ROGERIO on CKD. Likely 2/2 dehydration. Baseline creatinine is ~1.5, was 1.83 on admission and improved with IV fluids. Continues to trend down, 1.36 yesterday, up again to 1.5 after a larger dose lasix yesterday. No lasix today. Recheck BMP in clinic next week.     #HTN. Continue norvasc, doxazosin, and baby ASA.     #DM2. Blood sugars have been low end of normal to normal (80s-120s) during this admission. We were not giving insulin. Therefore, I discontinued pt's home lantus and SSI. However, I instructed her to monitor BG bid and record the numbers. I instructed her to resume her SSI if blood sugars routinely >150.    #Hypothyroidism. Continue  home levothyroxine.     #Deconditioning. Has been fatigued for the past few weeks. Lives in bottom level of duplex with son on second level. Has two sons and daughter-in-law who combined could provide 24/7 care. OT and PT consulted -- recommended home with 24/7 assist and outpt therapies. Outpt OT and PT referrals given.      #Anorexia. Appetite and oral intake improving with better nausea control with zofran scheduled tid - continue. Compazine prn.     #Hypocalcemia. Improved with IV replacement. Continue oral supplement at home.     #Thrush. Resolved with nystatin.     Lenora Cruz DNP, APRN, CNP  Hematology/Oncology  Pager: [phone]    Significant Results and Procedures  Results for orders placed or performed during the hospital encounter of 01/21/17   CT Abdomen Pelvis w/o Contrast    Narrative    Exam: CT abdomen pelvis without contrast 1/21/2017 12:20 PM.    History: Nausea, vomiting, distention, history of colon cancer and  metastatic disease.    Comparison: 12/16/2016.    Technique: CT images from the lung bases through the symphysis pubis  without contrast    Findings:   Spleen is enlarged. Hyperdense subcentimeter lesions in the right  kidney, unchanged, represents hemorrhagic or proteinaceous cyst.  Exophytic simple appearing cyst in the upper pole of the right kidney.  The gallbladder contains no radiopaque stones. No gallbladder wall  thickening, however there is pericholecystic fluid, however it is  likely reactive to the liver, as this appearance has been seen on  prior exams. The liver shows multifocal areas of poorly defined  hypodensity both in the right and left lower liver. Findings  consistent with known metastatic disease, although evaluation is  limited without contrast. Some of these poorly defined lesions appear  more prominent, for example in the left lobe measuring up to 21 x 33  mm on series 3, image 102.  The adrenal glands are unremarkable. No abnormally dilated loops of  small  bowel or colon. Colonic diverticulosis. Postsurgical changes of  right hemicolectomy with right ileostomy and colostomy. Simple fluid  within the stoma. No free air. Trace ascites. Unchanged bilateral  inguinal fluid collections. There are small. No abdominal or pelvic  lymphadenopathy. Aortoiliac calcification.    Mild bibasilar atelectasis. Hyperexpansion of the emphysematous left  lung base. Unchanged hyperdense lesion in the posterior vertebral body  of T11. Sternotomy wires. Unchanged L1 compression deformity with  retropulsion. Degenerative findings of the spine.      Impression    Impression:   1. Findings consistent with known metastatic disease to the liver,  slightly progressed since prior.  2. Stable postsurgical changes of right hemicolectomy with ileostomy  and colostomy. No dilated loops of bowel.  3. Small quantity of ascites, most prominent just inferior to the  liver in the right upper quadrant. This is likely reactive to the  liver disease.    I have personally reviewed the examination and initial interpretation  and I agree with the findings.    KALLIE CALI MD   XR Chest 2 Views    Narrative    Exam: XR CHEST 2 VW, 1/21/2017 12:19 PM    Indication: weakness, fatigue, N/V, hx lung tx    Comparison: 12/16/2016, 12/12/2016    Findings:   Right-sided central catheter tip projects over the mid SVC. Median  sternotomy wires. Postoperative changes of right lung transplantation.  There are minimal patchy right pulmonary opacities. Mediastinum is  shifted to the right. Unchanged bibasilar atelectasis. Stable marked  emphysematous changes of the left native lung. Stable blunting of  costophrenic angles. No pneumothorax. Multiple stable compression  deformity throughout the thoracic spine. Chronic right rib  deformities.      Impression    Impression:   1. Postoperative changes of right lung transplantation with stable  emphysematous changes of the left native lung.  2. Minimal patchy opacities in the  right lung transplant, new compared  to the prior, may represent mild edema or early infection in the acute  setting.    I have personally reviewed the examination and initial interpretation  and I agree with the findings.    KALLIE CALI MD   US Upper Extremity Venous Duplex Right    Narrative    EXAMINATION: US UPPER EXTREMITY VENOUS DUPLEX RIGHT  1/21/2017 9:47 PM       CLINICAL HISTORY: swelling    COMPARISON: Ultrasound 6/4/2016, which was negative for DVT.        PROCEDURE COMMENTS: Ultrasound was performed of the deep venous system  of the right upper extremity using grayscale, color, and spectral  Doppler.    FINDINGS:  The internal jugular, brachiocephalic, subclavian, brachial, basilic  and cephalic veins are visualized and are patent. Venous waveforms are  normal. There is normal response to compression.    Incidental note of subcutaneous edema in right antecubital fossa and  at the level of distal arm.      Impression    IMPRESSION:  No deep venous thrombosis in the right upper extremity.    I have personally reviewed the examination and initial interpretation  and I agree with the findings.    DOMINIC LANDA MD   XR Chest 2 Views    Narrative    Exam:  XR CHEST 2 VW, 1/23/2017 2:43 PM    History: HCAP, increased O2 requirements    Comparison:  Chest radiograph from 1/21/2017.    Findings:  PA and lateral views of the chest were obtained. Right  chest portacatheter tip terminates in the mid SVC. Stable  postoperative changes of right lung transplantation including median  sternotomy wires and mediastinal surgical clips.     The cardiomediastinal silhouette is normal size and stably shifted to  the right. Small left greater than right pleural and adjacent  opacities representing atelectasis and/or consolidation.  Hyperexpansion and hyperlucency of the native left lung consistent  with emphysematous changes. No pneumothorax. Right basilar and  retrocardiac opacities are unchanged, and correspond to a  cluster of  multiple pulmonary nodules better evaluated on prior chest CT. The  visualized upper abdomen is unremarkable. Stable multilevel mid  thoracic vertebral compression deformities.      Impression    Impression:    1. Right retrocardiac nodular opacity is unchanged, and corresponds to  cluster of pulmonary nodules at the right lung base on prior chest CT.  2. Stable postoperative changes of right lung transplantation and  emphysematous native left lung.  3. Small left greater than right pleural effusions.    I have personally reviewed the examination and initial interpretation  and I agree with the findings.    DOMINIC LANDA MD     *Note: Due to a large number of results and/or encounters for the requested time period, some results have not been displayed. A complete set of results can be found in Results Review.       Code Status  Full Code    Physical Exam  Temp: 97.8  F (36.6  C) Temp src: Oral BP: 138/58 mmHg   Heart Rate: 103 Resp: 16 SpO2: 93 % O2 Device: None (Room air)    Filed Vitals:    01/25/17 0854 01/26/17 0823 01/27/17 0959   Weight: 55.112 kg (121 lb 8 oz) 53.524 kg (118 lb) 52.799 kg (116 lb 6.4 oz)     Vital Signs with Ranges  Temp:  [97.5  F (36.4  C)-99.3  F (37.4  C)] 97.8  F (36.6  C)  Heart Rate:  [] 103  Resp:  [16-18] 16  BP: (127-157)/(58-71) 138/58 mmHg  SpO2:  [90 %-93 %] 93 %  I/O last 3 completed shifts:  In: 240 [P.O.:240]  Out: 1725 [Urine:1600; Stool:125]    Constitutional: Pleasant woman, frail-appearing but looks better today, no distress. Alert and interactive.    HEENT: NCAT. Anicteric sclera. MMM, thrush resolved.   Respiratory: Non-labored breathing on RA, breath sounds decreased in b/l bases but o/w clear. Infrequent dry cough.    Cardiovascular: Regular mild tachycardia. No murmur.    GI: Ostomy site CDI with long pink stoma, soft tan stool. Normoactive bowel sounds. Abdomen soft, non-distended, and non-tender.   Skin: Warm and dry. No e/o zoster rash on face.     Musculoskeletal: BUE swelling R>L improving, redness over R arm resolved. 2+ pitting BLE edema aki in feet. Feet tender, no warmth or redness. Legs wrapped.   Neurologic: A&O, speech normal, no focal deficits.    Neuropsychiatric: Mentation and affect appear normal/appropriate.    Discharge Disposition  Discharged to home with 24/7 assist  Condition at discharge: Stable    Consultations This Hospital Stay  MEDICATION HISTORY IP PHARMACY CONSULT  PHYSICAL THERAPY ADULT IP CONSULT  OCCUPATIONAL THERAPY ADULT IP CONSULT  NUTRITION SERVICES ADULT IP CONSULT  LYMPHEDEMA THERAPY IP CONSULT  PULMONARY CF/TRANSPLANT ADULT IP CONSULT    Discharge Orders    XR Chest 2 Views     Tacrolimus level     CBC with platelets   Last Lab Result: HEMOGLOBIN (g/dL)      Date                     Value                01/26/2017               8.2*             ----------     Basic metabolic panel     Magnesium     Phosphorus     MED THERAPY MANAGE REFERRAL     Physical Therapy Referral     Follow Up and recommended labs and tests   Have Tacrolimus drug level drawn on 2-02-17 when you have appointment with Rand Man CNP     When to contact your care team   Notify your Transplant Coordinator for:  Oral Temperature greater then 100.5  Chest pain  Change in sputum color or amount  Increased shortness of breath or coughing  Nausea and or vomiting  Diarrhea or constipation  Exercise intolerance    Notify your Transplant Coordinator: Marbella Sauceda  at [phone] Monday through Friday  with questions or any new symptoms.    For assistance after hours, weekends and holidays call the Lung  Transplant Coordinator on call at [phone] and  ask the hospital  to page [phone].     Reason for your hospital stay   Fatigue, nausea/vomiting, shortness of breath.     Follow Up and recommended labs and tests   Labs and appointments in clinic next week as listed below.     Activity   Your activity upon discharge: Activity as tolerated with 24/7  assistance. No driving or strenuous activity while taking narcotics, if having headaches/dizziness/vision changes, or if feeling generally weak or unwell.     When to contact your care team   Call the [facility] 24-hour triage line at [phone] or [phone] for temp >100.4, uncontrolled nausea/vomiting/diarrhea/constipation, unrelieved pain, bleeding not relieved with pressure, dizziness, chest pain, shortness of breath, loss of consciousness, and any new or concerning symptoms.     Call [phone] and ask for the care coordinator that works with your oncologist if you have questions about scans, appointments, hospital follow-up, or other concerns.     Monitor and record   Monitor your blood glucose twice daily, once in the morning on waking up and once in the early evening. Bring these records to your next clinic appointment.     Full Code     Diet   Follow this diet upon discharge: Regular diet as tolerated. Be sure to drink plenty of non-caffeinated beverages. Supplement your diet with high-calorie snacks or nutritional supplements (e.g. Boost, Ensure) if needed to ensure adequate calorie intake. Notify your primary provider if you have a poor appetite, are not eating well, and/or have been losing weight unintentionally.       Discharge Medications  Current Discharge Medication List      START taking these medications    Details   levofloxacin (LEVAQUIN) 750 MG tablet Take 1 tablet (750 mg) by mouth every 48 hours for 4 days  Qty: 2 tablet, Refills: 0    Associated Diagnoses: Pneumonia due to infectious organism, unspecified laterality, unspecified part of lung         CONTINUE these medications which have CHANGED    Details   tacrolimus (PROGRAF - GENERIC EQUIVALENT) 0.5 MG capsule Take 7 capsules (3.5 mg) by mouth 2 times daily  Qty: 140 capsule, Refills: 2    Associated Diagnoses: Lung replaced by transplant (H)      azithromycin (ZITHROMAX) 250 MG tablet Take 0.5 tablets (125 mg) by mouth  daily . HOLD this medication until you are done with Levaquin, then you can resume this.  Qty: 15 tablet, Refills: 11    Associated Diagnoses: Lung replaced by transplant (H)         CONTINUE these medications which have NOT CHANGED    Details   Prochlorperazine Maleate (COMPAZINE PO) Take 5 mg by mouth every 6 hours as needed for nausea      HYDROmorphone (DILAUDID) 2 MG tablet Take 1-2 tablets (2-4 mg) by mouth every 3 hours as needed for moderate to severe pain  Qty: 100 tablet, Refills: 0    Associated Diagnoses: Metastatic colon cancer to liver (H); Uncontrolled pain; Cellulitis of right upper extremity; Cellulitis of wrist; Herpes zoster with other complication      lidocaine (LIDODERM) 5 % Patch Place 1 patch onto the skin every 24 hours  Qty: 30 patch, Refills: 3    Associated Diagnoses: Compression fracture of L1 lumbar vertebra (H); Wedge compression fracture of T9 vertebra (H)      lidocaine-prilocaine (EMLA) cream APPLY TO PORT SITE ABOUT 45 TO 60 MINUTES PRIOR TO ACCESSING AS NEEDED FOR MODERATE PAIN  Qty: 30 g, Refills: 3    Associated Diagnoses: Colon cancer metastasized to liver (H); Secondary malignant neoplasm of liver (H); Port-a-cath in place      ondansetron (ZOFRAN) 8 MG tablet TAKE ONE TABLET BY MOUTH THREE TIMES A DAY BEFORE MEALS  Qty: 90 tablet, Refills: 3    Comments: Please update your records to reflect new contact info for this provider:  [facility] [address] [place] [address] Phone: [phone] Fax: [phone]  Associated Diagnoses: Primary malignant neoplasm of colon (H)      dapsone 25 MG tablet TAKE TWO TABLETS BY MOUTH THREE TIMES WEEKLY (MONDAY/WEDNESDAY/FRIDAY)  Qty: 24 tablet, Refills: 11    Associated Diagnoses: Lung replaced by transplant (H); Encounter for long-term (current) use of medications      !! predniSONE (DELTASONE) 5 MG tablet TAKE ONE TABLET BY MOUTH EVERY MORNING  Qty: 30 tablet, Refills: 11    Associated Diagnoses: Lung replaced  by transplant (H); Encounter for long-term (current) use of medications      zinc sulfate (ZINCATE) 220 MG capsule TAKE ONE CAPSULE BY MOUTH EVERY DAY  Qty: 30 capsule, Refills: 11    Associated Diagnoses: Lung replaced by transplant (H); Encounter for long-term (current) use of medications      !! predniSONE (DELTASONE) 2.5 MG tablet TAKE ONE TABLET BY MOUTH EVERY EVENING  Qty: 30 tablet, Refills: 11    Associated Diagnoses: Lung replaced by transplant (H); Encounter for long-term (current) use of medications      sodium bicarbonate 650 MG tablet Take 2 tablets (1,300 mg) by mouth 2 times daily  Qty: 540 tablet, Refills: 3    Associated Diagnoses: CKD (chronic kidney disease) stage 3, GFR 30-59 ml/min      OLANZapine (ZYPREXA) 5 MG tablet Take 1 tablet (5 mg) by mouth nightly as needed for nausea  Qty: 30 tablet, Refills: 2    Associated Diagnoses: Metastatic colon cancer to liver (H); Uncontrolled pain; Nausea; Dry heaves      albuterol (PROAIR HFA, PROVENTIL HFA, VENTOLIN HFA) 108 (90 BASE) MCG/ACT inhaler Inhale 2 puffs into the lungs every 6 hours as needed for shortness of breath / dyspnea or wheezing  Qty: 3 Inhaler, Refills: 3    Associated Diagnoses: Lung replaced by transplant (H)      montelukast (SINGULAIR) 10 MG tablet Take 1 tablet (10 mg) by mouth every evening  Qty: 90 tablet, Refills: 3    Associated Diagnoses: Rejection of lung transplant (H)      fluticasone-salmeterol (ADVAIR) 500-50 MCG/DOSE diskus inhaler Inhale 1 puff into the lungs 2 times daily  Qty: 3 Inhaler, Refills: 3    Associated Diagnoses: Rejection of lung transplant (H)      doxazosin (CARDURA) 2 MG tablet Take 2 tablets (4 mg) by mouth At Bedtime  Qty: 60 tablet, Refills: 6    Associated Diagnoses: Essential hypertension with goal blood pressure less than 140/90      omeprazole (PRILOSEC) 40 MG capsule Take 1 capsule (40 mg) by mouth daily  Qty: 90 capsule, Refills: 3    Associated Diagnoses: Lung replaced by transplant (H); GERD  "(gastroesophageal reflux disease)      amLODIPine (NORVASC) 5 MG tablet Take 2 tablets (10 mg) by mouth At Bedtime  Qty: 180 tablet, Refills: 3    Associated Diagnoses: Lung replaced by transplant (H); Hypertension      atorvastatin (LIPITOR) 20 MG tablet Take 1 tablet (20 mg) by mouth daily  Qty: 30 tablet      aspirin EC 81 MG tablet Take 81 mg by mouth daily       levothyroxine (SYNTHROID, LEVOTHROID) 50 MCG tablet Take 50 mcg by mouth daily       Magnesium Oxide 250 MG TABS Take 2 tablets (500 mg) by mouth At Bedtime  Qty: 180 tablet, Refills: 3    Associated Diagnoses: Encounter for long-term (current) use of other medications; Lung replaced by transplant (H)      loperamide (IMODIUM) 2 MG capsule Take 1 capsule (2 mg) by mouth 3 times daily as needed for other (greater than 1200mL ileostomy output in a 24 hour period.  please call colon and rectal surgery clinic to notify if patient is requiring Imodium.)  Qty: 20 capsule    Associated Diagnoses: Ileostomy status (H); High output ileostomy (H)      ascorbic acid 500 MG TABS Take 1 tablet (500 mg) by mouth daily  Qty: 30 tablet, Refills: 1    Associated Diagnoses: Other specified prophylactic or treatment measure      Calcium Carbonate-Vitamin D (CALCIUM 500 + D PO) Take 1 tablet by mouth 2 times daily       Multiple Vitamin (MULTI-VITAMIN) per tablet Take 1 tablet by mouth daily.      Blood Glucose Calibration (CRIS DOC CONTROL) NORMAL SOLN daily as needed      !! order for DME Equipment being ordered:   Hernia Belt     Nu form, BG- Cool comfort- medium,  4\"  Belt ring  3 1/4  Catalog # :  [id number]  Qty: 1 each, Refills: 11    Associated Diagnoses: Parastomal hernia without obstruction or gangrene; Intestinal stoma prolapse (H)      blood glucose monitoring (NO BRAND SPECIFIED) test strip TEST 4 TIMES DAILY.      insulin pen needle 32G X 4 MM USE AS DIRECTED FOR ADMINISTERING INSULIN AT HOME.      cyanocobalamin (VITAMIN B-12 ER) 1000 MCG TBCR Take 1,000 " mcg by mouth every 7 days       !! order for DME Lightweight Wheelchair with leg rests.  Qty: 1 Units, Refills: 0    Associated Diagnoses: Spinal stenosis      butalbital-acetaminophen-caffeine (FIORICET, ESGIC) -40 MG per tablet Take 1 tablet by mouth every 4 hours as needed for headaches    Associated Diagnoses: Lung transplanted (H)      !! order for DME Please dispense 1 hair prosthesis.  Qty: 1 Units, Refills: 0    Associated Diagnoses: Cancer of right colon (H)      !! ORDER FOR DME Equipment being ordered: ileostomy supplies  Qty: 1 Month, Refills: 11    Associated Diagnoses: S/P ileostomy (H)       !! - Potential duplicate medications found. Please discuss with provider.      STOP taking these medications       valACYclovir (VALTREX) 1000 mg tablet Comments:   Reason for Stopping:         linezolid (ZYVOX) 600 MG tablet Comments:   Reason for Stopping:         Trifluridine-Tipiracil (LONSURF) 15-6.14 MG tablet Comments:   Reason for Stopping:         insulin glargine (LANTUS SOLOSTAR) 100 UNIT/ML PEN Comments:   Reason for Stopping:         insulin aspart (NOVOLOG PEN) 100 UNIT/ML soln Comments:   Reason for Stopping:             Allergies  Allergies   Allergen Reactions     Morphine Sulfate Dermatitis     Codeine Sulfate Dermatitis     Ativan [Lorazepam] Other (See Comments)     Altered mentation, hallucinations  During hospitalization for influenza in Jan 2015 - ativan caused extreme lethargy and mild confusion.  These symptoms resolved when it was stopped.    Pt's family reported a similar response when she had used it at home for nausea.        Colesevelam Nausea     no appetite, dry heaves     Sulfa Drugs Hives     Adhesive Tape Rash     ACTUALLY,  Just fragile skin.  With tear with EKG lead.     Data  CBC  Recent Labs  Lab 01/27/17  0552 01/26/17  0547 01/25/17  0613 01/24/17  2114   WBC 20.0* 25.4* 27.8* 38.6*   RBC 3.23* 3.18* 3.05* 3.21*   HGB 8.4* 8.2* 7.9* 8.3*   HCT 28.0* 27.6* 26.6* 28.0*    MCV 87 87 87 87   MCH 26.0* 25.8* 25.9* 25.9*   MCHC 30.0* 29.7* 29.7* 29.6*   RDW 24.7* 24.4* 24.6* 24.1*    148* 176 197     CMP  Recent Labs  Lab 01/27/17  0552 01/26/17  0547 01/25/17  0613 01/24/17  0558 01/23/17  0654 01/22/17  2115    138 139 138 139  --    POTASSIUM 5.2 5.3 5.1 5.0 4.9  --    CHLORIDE 104 107 109 109 111*  --    CO2 21 21 20 18* 16*  --    ANIONGAP 10 10 10 11 12  --    GLC 87 84 80 68* 88  --    BUN 20 17 14 13 14  --    CR 1.51* 1.36* 1.41* 1.41* 1.46*  --    GFRESTIMATED 33* 38* 36* 36* 35*  --    GFRESTBLACK 40* 46* 44* 44* 42*  --    RUBY 8.1* 7.4* 7.1* 7.0* 6.6*  --    MAG 1.5*  --  1.6  --  2.5* 2.6*   PHOS 2.3*  --  2.1*  --  2.9 3.6   PROTTOTAL 5.3* 5.4* 5.2* 5.2* 5.4*  --    ALBUMIN 2.6* 2.6* 2.5* 2.5* 2.5*  --    BILITOTAL 0.6 0.4 0.5 0.6 0.6  --    ALKPHOS 435* 436* 397* 362* 360*  --    AST 20 21 22 27 20  --    ALT 29 27 24 28 26  --      INR  Recent Labs  Lab 01/25/17  0613 01/25/17  0242 01/21/17  1050   INR 1.23* 1.23* 1.31*

## 2017-01-27 NOTE — PROGRESS NOTES
Pulmonary Medicine  Cystic Fibrosis - Lung Transplant Daily Progress Note   January 27, 2017       Patient: Melissa Collazo  MRN: [id number]  Transplant Date: 2/18/2009 (POD# 2899)  Admission date: 1/21/2017  Hospital Day #6          Assessment and Plan:   Melissa Collaoz is a 76 year old female with a PMHx for metastatic colon cancer with liver mets s/p ileocolic resection and ostomy, most recently on lonsurf. History also significant for COPD, s/p R single lung transplant in 2009, DM2, HTN, and CKD. The patient admitted on 1/21/2017 for nausea, vomiting, faitgue, and CXR concerning for RML PNA. Overall, symptomatically improving. Sating well on room air.       Today's recommendations:  - continue current immunosuppression, ppx. plan.  - next tacrolimus level to be drawn on 2/2 at clinic appointment w/ Dr. Man (ordered)   - next OP Pulmonary Clinic follow-up w/ CXR, PFTs and labs scheduled w/ Dr. Arzola on 2/13 (already ordered)  - please resume azithromycin 250mg daily once course of levofloxacin is complete          #. 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 Chest XR upon admission concerning for evolving infiltrate in the RML region. Also noted to have worsening leukocytosis since 1/10. Sputum culture 1/25 growing candida albicans. Cough has nearly resolved after initiating current antibiotic plan. Continues to report shortness of breath, but attributes this to increase abdominal distention.    - agree w/ levofloxacin for a total 10-14 days course.     #. COPD s/p R single lung transplant: followed by Dr. Clay in Pulmonary Clinic. Post-transplant course c/b ALLI/CLAD. Patient reports feeling well from a pulmonary standpoint prior to current admission. PFTs on 12/12/16 were slightly increase from previous study w/ overall lung function remaining relatively stable over the past 1 year. However, patient has noticed a reduction in her exercise  "tolerance, but attributes this d/t deconditioning from chemotherapy. On room air at home.    - next OP Pulmonary Clinic follow-up w/ CXR, PFTs and labs scheduled w/ Dr. Arzola on 2/13.      Continue 2-drug immunosuppression:  - tacrolimus 3.5mg BID. Target goal 8-10. Next level to be drawn on 2/2 at clinic appointment w/ Dr. Man -- patient aware of plan.   - prednisone 5mg qAM / 2.5 mg qPM    Prophylaxis:  - continue dapsone 50mg MWF for PJP ppx.    ALLI/CLAD:  - continue home-dose Advair, montelukast    - agree w/ holding azithromycin while on levofloxacin. Please restart home dose (250mg daily) once levofloxacin is completed.    #. H/o mycobacterium avium intracellulare: last positive culture 09/2013. Was on azithromycin ppx. PTA.  - agree w/ holding azithromycin while on levofloxacin. Please restart home dose (250mg daily) once levofloxacin is completed.      Patient discussed with Dr. Caceres.    ACACIA Mcdonough, CNP  Inpatient Nurse Practitioner  [facility]  Pager [phone]        Subjective & Interval History:     Last 24 hours of care team notes reviewed.    No acute events overnight. Diuresed yesterday. Net negative ~ 1.7 liters. Patient reports less abdominal distention, as well as legs less \"puffy\". Also notes breathing is more comfortable this AM after yesterday's lasix. Continues to endorse intermittent non-productive cough. Sating well on room air.            Review of Systems:     C: no fever, no chills, no change in weight, no change in appetite  INTEGUMENTARY/SKIN: no rash or obvious new lesions  ENT/MOUTH: no sore throat, no sinus pain, no nasal drainage  RESP: see interval history  CV: no chest pain, no palpitations, + peripheral edema, no orthopnea  GI: + nausea, no vomiting, no change in stools, no reflux symptoms, + abd distention.  : no dysuria  MUSCULOSKELETAL: no myalgias, no arthralgias  ENDOCRINE: blood sugars with adequate control  NEURO: no headache, no numbness or " tingling  PSYCHIATRIC: mood stable          Medications:     Active Medications:    nystatin  500,000 Units Oral 4x Daily     levofloxacin  750 mg Oral Q48H     ondansetron  8 mg Oral TID AC     tacrolimus  3.5 mg Oral BID     calcium carb 1250 mg (500 mg [place])/vitamin D 200 units  1 tablet Oral BID w/meals     sodium chloride (PF)  20 mL Intracatheter Once     heparin lock flush  5-10 mL Intracatheter Q24H     heparin  5 mL Intracatheter Q28 Days     amLODIPine  10 mg Oral At Bedtime     ascorbic acid  500 mg Oral Daily     aspirin EC  81 mg Oral Daily     atorvastatin  20 mg Oral At Bedtime     cyanocobalamin  1,000 mcg Oral Q7 Days     dapsone  50 mg Oral Once per day on Mon Wed Fri     doxazosin  4 mg Oral At Bedtime     levothyroxine  50 mcg Oral Daily     lidocaine  1 patch Transdermal Q24H     magnesium oxide  400 mg Oral At Bedtime     montelukast  10 mg Oral QPM     multivitamin, therapeutic with minerals  1 tablet Oral Daily     omeprazole  40 mg Oral Daily     predniSONE  2.5 mg Oral QPM     predniSONE  5 mg Oral QAM     sodium bicarbonate  1,300 mg Oral BID     zinc sulfate  220 mg Oral Daily     heparin  5,000 Units Subcutaneous Q8H     insulin aspart  1-7 Units Subcutaneous TID AC     insulin aspart  1-5 Units Subcutaneous At Bedtime     lidocaine   Transdermal Q24h     lidocaine   Transdermal Q8H     fluticasone-vilanterol  1 puff Inhalation Daily     Active PRN Medications:  magnesium sulfate, ipratropium - albuterol 0.5 mg/2.5 mg/3 mL, carboxymethylcellulose, lidocaine, lidocaine 4%, sodium chloride (PF), heparin lock flush, sodium chloride (PF), potassium chloride SA, potassium chloride, potassium chloride, potassium chloride with lidocaine, potassium chloride, sodium phosphate (NaPHOS) intermittent infusion, sodium phosphate (NaPHOS) intermittent infusion, sodium phosphate (NaPHOS) intermittent infusion, loperamide, albuterol, calcium carbonate, butalbital-acetaminophen-caffeine, HYDROmorphone,  "OLANZapine, - MEDICATION INSTRUCTIONS -, prochlorperazine **OR** prochlorperazine, acetaminophen, naloxone, glucose **OR** dextrose **OR** glucagon         Physical Exam:     Constitutional: Awake, alert, in no apparent distress.   HEENT: Eyes with pink conjunctivae, no scleral jaundice. Oral mucosa moist without lesions. Neck supple without lymphadenopathy.   PULM: Good air flow R, diminished L. No crackles, no rhonchi, no wheezes.   CV: Normal S1 and S2. RRR. No murmur, gallop, or rub. +2-3 b/l LE edema. Peripheral pulses intact.   ABD: NABS, soft, nontender, + distended. No guarding.   MSK: Moves all extremities. No apparent muscle wasting.   NEURO: Alert and oriented x 4, conversant.   SKIN: Warm. No pruritus. Resolving cellulitis on R hand and forarm, as well as on L wrist.  PSYCH: Mood stable, judgment and insight appropriate.?     Lines, Drains, and Devices:  Peripheral IV 05/07/15 Right;Anterior Upper forearm (Active)   Number of days:631       Port A Cath 10/22/14 Right Chest wall (Active)   Access Date 01/21/17 1/27/2017  8:30 AM   Access Attempts 1 1/20/2017  5:00 PM   Gauge/Length Noncoring 90 degree bend;20 gauge;3/4 inch 1/20/2017  5:00 PM   Site Assessment WDL 1/27/2017  8:30 AM   Line Status Infusing 1/27/2017  8:30 AM   Extravasation? No 1/27/2017  8:30 AM   Dressing Intervention Transparent 1/27/2017  8:30 AM   Dressing change due 01/28/17 1/27/2017  8:30 AM   Needle Change Due 01/28/17 1/27/2017  8:30 AM   De-Access Date 01/20/17 1/20/2017  5:00 PM   Date to be Reflushed 02/20/17 1/20/2017  3:57 PM   Number of days:828            Data:     All vital signs, laboratory and imaging data for the past 24 hours reviewed.      Vital signs:  Temp: 98.1  F (36.7  C) Temp src: Oral BP: 141/71 mmHg   Heart Rate: 109 Resp: 16 SpO2: 90 % O2 Device: None (Room air) Oxygen Delivery: 1.5 LPM Height: 149.9 cm (4' 11\") Weight: 53.524 kg (118 lb)    Weight trend:   Filed Vitals:    01/24/17 0900 01/25/17 0854 " 01/26/17 0823   Weight: 56.427 kg (124 lb 6.4 oz) 55.112 kg (121 lb 8 oz) 53.524 kg (118 lb)        I/O:   Intake/Output Summary (Last 24 hours) at 01/27/17 0953  Last data filed at 01/27/17 0830   Gross per 24 hour   Intake    340 ml   Output   1575 ml   Net  -1235 ml       Labs    CMP:   Recent Labs  Lab 01/27/17  0552 01/26/17  0547 01/25/17  0613 01/24/17  0558 01/23/17  0654 01/22/17  2115    138 139 138 139  --    POTASSIUM 5.2 5.3 5.1 5.0 4.9  --    CHLORIDE 104 107 109 109 111*  --    CO2 21 21 20 18* 16*  --    ANIONGAP 10 10 10 11 12  --    GLC 87 84 80 68* 88  --    BUN 20 17 14 13 14  --    CR 1.51* 1.36* 1.41* 1.41* 1.46*  --    GFRESTIMATED 33* 38* 36* 36* 35*  --    GFRESTBLACK 40* 46* 44* 44* 42*  --    RUBY 8.1* 7.4* 7.1* 7.0* 6.6*  --    MAG 1.5*  --  1.6  --  2.5* 2.6*   PHOS 2.3*  --  2.1*  --  2.9 3.6   PROTTOTAL 5.3* 5.4* 5.2* 5.2* 5.4*  --    ALBUMIN 2.6* 2.6* 2.5* 2.5* 2.5*  --    BILITOTAL 0.6 0.4 0.5 0.6 0.6  --    ALKPHOS 435* 436* 397* 362* 360*  --    AST 20 21 22 27 20  --    ALT 29 27 24 28 26  --      CBC:   Recent Labs  Lab 01/27/17  0552 01/26/17  0547 01/25/17  0613 01/24/17  2114   WBC 20.0* 25.4* 27.8* 38.6*   RBC 3.23* 3.18* 3.05* 3.21*   HGB 8.4* 8.2* 7.9* 8.3*   HCT 28.0* 27.6* 26.6* 28.0*   MCV 87 87 87 87   MCH 26.0* 25.8* 25.9* 25.9*   MCHC 30.0* 29.7* 29.7* 29.6*   RDW 24.7* 24.4* 24.6* 24.1*    148* 176 197     INR:   Recent Labs  Lab 01/25/17  0613 01/25/17  0242 01/21/17  1050   INR 1.23* 1.23* 1.31*     Glucose:   Recent Labs  Lab 01/27/17  0552 01/27/17  0204 01/26/17  2214 01/26/17  1735 01/26/17  1154 01/26/17  0547 01/26/17  0208 01/25/17  2215  01/25/17  0613  01/24/17  0558  01/23/17  0654  01/22/17  0555   GLC 87  --   --   --   --  84  --   --   --  80  --  68*  --  88  --  74   BGM  --  126* 86 95 101*  --  114* 86  < >  --   < >  --   < >  --   < >  --    < > = values in this interval not displayed.  Blood Gas:   Recent Labs  Lab 01/21/17  1112  01/21/17  1111   PHV 7.30* 7.29*   PCO2V 36* 36*   PO2V 30 30   HCO3V 18* 17*     Culture Data   Recent Labs  Lab 01/22/17  1200 01/21/17  1238 01/21/17  1050   CULT Heavy growth Candida albicans / dubliniensis Candida albicans and Candida dubliniensis are not routinely speciated Susceptibility testing not routinely done* No growth No growth     Virology Data: Lab Results   Component Value Date    INFLUA Negative 06/11/2013    FLUAH1 Negative 01/21/2017    FLUAH3 Negative 01/21/2017    NW2154 Negative 01/21/2017    IFLUB Negative 01/21/2017    RSVA Negative 01/21/2017    RSVB Negative 01/21/2017    PIV1 Negative 01/21/2017    PIV2 Negative 01/21/2017    PIV3 Negative 01/21/2017    HMPV Negative 01/21/2017    HRVS Negative 01/21/2017    ADVBE Negative 01/21/2017    ADVC Negative 01/21/2017     Historical CMV results (last 3 of prior testing):  Lab Results   Component Value Date    CMVQNT  12/12/2016     CMV DNA Not Detected   The JEREMIAS AmpliPrep/JEREMIAS TaqMan CMV Test is an FDA-approved in vitro nucleic   acid amplification test for the quantitation of cytomegalovirus DNA in human   plasma (EDTA plasma) using the JEREMIAS AmpliPrep Instrument for automated viral   nucleic acid extraction and the JEREMIAS TaqMan Analyzer or Visto TaqMan for   automated Real Time amplification and detection of the viral nucleic acid   target.   Titer results are reported in International Units/mL (IU/mL using 1st WHO   International standard for Human Cytomegalovirus for Nucleic Acid Amplification   based assays. The conversion factor between CMV DNA copis/mL (as defined by the   Roche JEREMIAS TaqMan CMV test) and International Units is the CMV DNA   concentration in IU/mL x 1.1 copies/IU = CMV DNA in copies/mL.   This assay has received FDA approval for the testing of human plasma only. The   Infectious Disease Diagnostic Laboratory at the [facility], [place], has validated the performance characteristics of the  Roche   CMV assay for plasma, bronchial alveolar lavage/wash and urine.      CMVQNT  09/12/2016     CMV DNA Not Detected   The JEREMIAS AmpliPrep/JEREMIAS TaqMan CMV Test is an FDA-approved in vitro nucleic   acid amplification test for the quantitation of cytomegalovirus DNA in human   plasma (EDTA plasma) using the JEREMIAS AmpliPrep Instrument for automated viral   nucleic acid extraction and the JEREMIAS TaqMan Analyzer or JEREMIAS TaqMan for   automated Real Time amplification and detection of the viral nucleic acid   target.   Titer results are reported in International Units/mL (IU/mL using 1st WHO   International standard for Human Cytomegalovirus for Nucleic Acid Amplification   based assays. The conversion factor between CMV DNA copis/mL (as defined by the   Roche JEREMIAS TaqMan CMV test) and International Units is the CMV DNA   concentration in IU/mL x 1.1 copies/IU = CMV DNA in copies/mL.   This assay has received FDA approval for the testing of human plasma only. The   Infectious Disease Diagnostic Laboratory at the [facility], [place], has validated the performance characteristics of the Roche   CMV assay for plasma, bronchial alveolar lavage/wash and urine.      CMVQNT  07/18/2016     CMV DNA Not Detected   The JEREMIAS AmpliPrep/JEREMIAS TaqMan CMV Test is an FDA-approved in vitro nucleic   acid amplification test for the quantitation of cytomegalovirus DNA in human   plasma (EDTA plasma) using the JEREMIAS AmpliPrep Instrument for automated viral   nucleic acid extraction and the JEREMIAS TaqMan Analyzer or JEREMIAS TaqMan for   automated Real Time amplification and detection of the viral nucleic acid   target.   Titer results are reported in International Units/mL (IU/mL using 1st WHO   International standard for Human Cytomegalovirus for Nucleic Acid Amplification   based assays. The conversion factor between CMV DNA copis/mL (as defined by the   Roche JEREMIAS TaqMan CMV test) and International Units  is the CMV DNA   concentration in IU/mL x 1.1 copies/IU = CMV DNA in copies/mL.   This assay has received FDA approval for the testing of human plasma only. The   Infectious Disease Diagnostic Laboratory at the [facility], [place], has validated the performance characteristics of the Roche   CMV assay for plasma, bronchial alveolar lavage/wash and urine.       Lab Results   Component Value Date    CMVLOG Not Calculated 12/12/2016    CMVLOG Not Calculated 09/12/2016    CMVLOG Not Calculated 07/18/2016     Urine Studies  Recent Labs   Lab Test  01/21/17   1149   URINEPH  5.5   NITRITE  Negative   LEUKEST  Negative   WBCU  1

## 2017-01-27 NOTE — DISCHARGE INSTRUCTIONS
Lung Transplant discharge instructions:  1. Continue taking Prograf 3.5mg twice daily and prednisone 5mg every morning and 2.5mg every evening.     2. Your next Prograf level will be checked on Thursday, February 2nd when you see Dr. Man in the Oncology Clinic.    3. Your next Pulmonary Clinic appointment is scheduled with Dr. Arzola on Monday, February 13th. You will also have PFTs and labs that day.     4. Please resume your azithromycin 250mg tablet once daily after you complete your course of Levaquin (current antibiotic for pneumonia).

## 2017-01-27 NOTE — PLAN OF CARE
Problem: Goal Outcome Summary  Goal: Goal Outcome Summary  Edema 7D: Initial edema evaluation performed. Patient present with progressing acute onset LE edema. Skin intact with 2+ pitting in feet/ankles and 1+ in legs. Application of GCB from MTPs to knee creases for edema management. Remove if causing pain/numbness.

## 2017-01-27 NOTE — PROGRESS NOTES
01/26/17 0856   General Information   Discipline OT   Onset of Edema (progressing during hospital stay. Acute onset. )   Affected Body Part(s) Right LE;Left LE   Etiology Comments Decreased mobility, ROGERIO, cancer   Pain   Pain comments patient reports no pain in LEs at this time.    Edema Examination / Assessment   Skin Condition Pitting;Dryness;Intact   Skin Condition Comments 2+ pitting distally; greatest in feet/ankles. Skin intact wiht dryness throughout.    Girth Measurements   Girth Measurements Refer to separate Girth Measurement flowsheet   ROM   Range of Motion (WFL) no deficits were identified   Strength   Strength (WFL) (generalized weakness throughout )   Sensation   Sensation (WFL) no deficits were identified  (light touch intact. )   Assessment/Plan   Patient presents with Edema   Assessment Patient presents with progressing acute onset LE edema impacting functional mobility. Recommend use of GCB at this time for management of LE edema.    Assessment of Occupational Performance 1-3 Performance Deficits   Identified Performance Deficits Decreased functional mobility and independence in ADLs.    Clinical Decision Making (Complexity) Low complexity   Planned Edema Interventions Gradient compression bandaging;Fit for compression garment;Exercises;Precautions to prevent infection/exacerbation;Education   Treatment Frequency 5 times/wk   Treatment Duration 1 week    Patient, Family and/or Staff in agreement with plan of care. Yes   Risks and benefits of treatment have been explained. Yes   Total Evaluation Time   Total Evaluation Time (Minutes) 5

## 2017-01-27 NOTE — PLAN OF CARE
Problem: Goal Outcome Summary  Goal: Goal Outcome Summary  Outcome: No Change  HR tachycardic; sepsis protocol triggered. Re-checked vitals stable.; lactic acid 1.8. Pt. satting @92 on room air. Pt. denies pain. Small amount of serosanguinous exudate coming from LL abdomen dampening pt's pajamas in a small area; I was not able to identify a specific site that it's coming from.  Ostomy patent no stool out this shift. Lymphedema wraps in place, pt. states wraps are helping relieve leg pain from edema. Fair appetite, had ice cream. Good oral intake. Voiding spontaneously, adequate urine output. Pt. slept comfortably between cares. No acute events. Will continue POC and notify MD with changes.

## 2017-01-27 NOTE — PLAN OF CARE
Problem: Goal Outcome Summary  Goal: Goal Outcome Summary  OT 7D: SBA for sit<>stand transfers, SBA for toilet transfer, pt independent in managing ostomy cares while seated on toilet. Pt SBA while pushing wheelchair for support for ambulation, ambulated from room down to 7th floor rehab gym, taking rest breaks in between to recover from fatigue. Pt and TH collaboratively went over fatigue management module #1, pt receptive and willing to implement Th suggestions upon return home.     REC home with assist as necessary and home OT/PT to ensure safety in home environment with completion of functional mobility and ADLs/IADLs, pt would also benefit from OP cancer rehab to effectively manage fatigue.

## 2017-01-28 NOTE — PROGRESS NOTES
Nursing Focus: Discharge    D: Patient discharged to home at 1800 hours. Patient was given discharge instructions prior to discharge and all questions were answered to satisfaction.  All belongings sent with the patient and her daughter. Magnesium and phosphorus were replaced according to sliding scale orders prior to discharge. Patient was vitally stable and afebrile. Denied pain, shortness of breath or nausea.    I: Discharge prescriptions sent to discharge pharmacy to be filled. All discharge medications and instructions reviewed with the patient and her daughter. Patient instructed to call clinic triage nurse if she experiences a fever >100.4, uncontrolled nausea, vomiting, diarrhea, or pain; or experiences any signs or symptoms of bleeding. Other phone numbers to call with questions or concerns after discharge reviewed with the patient and her daughter. Follow-up outpatient appointment scheduled reviewed with the patient and her daughter.  Port de-accessed. Education completed.  Care Plan goals met and adequate for discharge.    A: Patient and daugther verbalized understanding of discharge medications and instructions. Patient will  medications at discharge pharmacy.     P: Patient to follow-up in clinic on 2/2.

## 2017-01-28 NOTE — PLAN OF CARE
Problem: Goal Outcome Summary  Goal: Goal Outcome Summary  Physical Therapy Discharge Summary    Reason for therapy discharge:    Discharged to home with home therapy.    Progress towards therapy goal(s). See goals on Care Plan in [facility] electronic health record for goal details.  Goals met    Therapy recommendation(s):    Continued therapy is recommended.  Rationale/Recommendations:  ensure safety in home environment with transfers, ambulation, and stairs..

## 2017-01-28 NOTE — PLAN OF CARE
Problem: Goal Outcome Summary  Goal: Goal Outcome Summary  Occupational Therapy Discharge Summary    Reason for therapy discharge:    Discharged to home.    Progress towards therapy goal(s). See goals on Care Plan in [facility] electronic health record for goal details.  Goals partially met.  Barriers to achieving goals:   discharge from facility.    Therapy recommendation(s):    Continued therapy is recommended.  Rationale/Recommendations:  Pt could benefit from home OT/PT to ensure safety and independence in home envrionment with completion of daily tasks and functional mobility. .

## 2017-01-30 NOTE — PROGRESS NOTES
Patient is a transplant patient and will be followed by the transplant team for follow up    Notify your Transplant Coordinator: Marbella Sauceda  at [phone] Monday through Friday  with questions or any new symptoms.    For assistance after hours, weekends and holidays call the Lung  Transplant Coordinator on call at [phone] and  ask the hospital  to page [phone].        Reason for your hospital stay    Fatigue, nausea/vomiting, shortness of breath.        Follow Up and recommended labs and tests    Labs and appointments in clinic next week as listed below.

## 2017-01-31 NOTE — TELEPHONE ENCOUNTER
Drug Name: doxazosin 4mg  Last Fill Date: 1/5/17  Quantity: 30    Tatyana Stringer   [facility]  [phone]

## 2017-02-02 NOTE — NURSING NOTE
Chief Complaint   Patient presents with     Port Draw     port accessed for labs, heparin locked, vitals checked

## 2017-02-02 NOTE — NURSING NOTE
"Melissa Collazo is a 76 year old female who presents for:  Chief Complaint   Patient presents with     Port Draw     port accessed for labs, heparin locked, vitals checked     Oncology Clinic Visit     Colon Ca, Hospital F/U        Initial Vitals:  BP 97/44 mmHg  Pulse 110  Temp(Src) 98.9  F (37.2  C) (Oral)  Resp 18  Ht 1.499 m (4' 11.02\")  Wt 51.665 kg (113 lb 14.4 oz)  BMI 22.99 kg/m2  SpO2 92% Estimated body mass index is 22.99 kg/(m^2) as calculated from the following:    Height as of this encounter: 1.499 m (4' 11.02\").    Weight as of this encounter: 51.665 kg (113 lb 14.4 oz).. Body surface area is 1.47 meters squared. BP completed using cuff size: NA (Not Taken)  Extreme Pain (8) No LMP recorded. Patient is postmenopausal. Allergies and medications reviewed.     Medications: Medication refills not needed today.  Pharmacy name entered into SinglePlatform:    [facility] - [place], [place] - [address] [id number] - [place], [place] - [address] [facility] - [place], [place] - [id number]    Comments: Vitals completed in lab    7 minutes for nursing intake (face to face time)   Mei Zaman LPN          "

## 2017-02-02 NOTE — PROGRESS NOTES
Reason for Visit: Melissa Collazo is a 76 y.o female who presents to the clinic today for hospital f/u     Oncologic History    Melissa has a history of metastatic colon cancer with liver mets and an illioceliac resection.  She also has a history of COPD s/p R lung tx in 2009, DMII, and CKD. She has been treated with Xeloda/Oxaliplatin, irinotecan and erbitux and avastin in the past. She was found to have progressed on Irinotecan/Ertibux. She was initiated on Lonsurf on 12/26/16.     Interval History     Melissa was hospitalized 1/21/17 with n/v & dehydration.  She was found to have HCAP pneumonia and was treated with oral course of Levaquin which she completed (2/2). She was also found to have R arm and L wrist cellulitis and started linezolid on 1/17.  She developed herpes zoster on 1/17, and was treated with valtrex. Today, Melissa feels that her cellulitis and zoster viruses have improved significantly.   During admission, patient reports she became quite fatigued and deconditioned. She reports she was supposed to be discharged with physical therapy and occupational therapy, but has not heard from those services since getting home from hospital.  She reports she has been able to walk around the house, and she did walk on the treadmill for a few minutes the other day.  She reports this was quite tiring for her. She reports some baseline SOB, which is relieved with PRN albuterol inhaler. She continues to have a cough, but denies fever, chills, or increased SOB.  She denies chest pain/palpitations.  She has not been eating well since hospitalization, eating about half of what she thinks she should be eating. She does try to drink 2 boosts/day.  She reports she is drinking about three glasses of water a day. She is voiding 4-5 times a day, and her urine is clear/light yellow per patient report.  Her weight has been stable, between 113-115 lbs.  Melissa has chronic low back pain. She uses a lidocaine patch for this.  She has dilaudid,  "but rarely takes it. She does endorse some BLE swelling, specifically in her feet.  Her daughter is concerned about dehydration.  Ostomy with liquid brown stool, no evidence of increased output per patient.     Melissa reports she has been getting her Arinesp SQ injections through her infusion center in [place] every three weeks.  She would like to continue getting this there.      Medications      Current outpatient prescriptions:      doxazosin (CARDURA) 2 MG tablet, Take 2 tablets (4 mg) by mouth At Bedtime, Disp: 60 tablet, Rfl: 11     tacrolimus (PROGRAF - GENERIC EQUIVALENT) 0.5 MG capsule, Take 7 capsules (3.5 mg) by mouth 2 times daily, Disp: 140 capsule, Rfl: 2     azithromycin (ZITHROMAX) 250 MG tablet, Take 0.5 tablets (125 mg) by mouth daily . HOLD this medication until you are done with Levaquin, then you can resume this., Disp: 15 tablet, Rfl: 11     Prochlorperazine Maleate (COMPAZINE PO), Take 5 mg by mouth every 6 hours as needed for nausea, Disp: , Rfl:      HYDROmorphone (DILAUDID) 2 MG tablet, Take 1-2 tablets (2-4 mg) by mouth every 3 hours as needed for moderate to severe pain, Disp: 100 tablet, Rfl: 0     Blood Glucose Calibration (CRIS DOC CONTROL) NORMAL SOLN, daily as needed, Disp: , Rfl:      lidocaine (LIDODERM) 5 % Patch, Place 1 patch onto the skin every 24 hours, Disp: 30 patch, Rfl: 3     lidocaine-prilocaine (EMLA) cream, APPLY TO PORT SITE ABOUT 45 TO 60 MINUTES PRIOR TO ACCESSING AS NEEDED FOR MODERATE PAIN, Disp: 30 g, Rfl: 3     order for DME, Equipment being ordered:  Hernia Belt   Nu form, BG- Cool comfort- medium,  4\"  Belt ring  3 1/4  Catalog # :  [id number], Disp: 1 each, Rfl: 11     ondansetron (ZOFRAN) 8 MG tablet, TAKE ONE TABLET BY MOUTH THREE TIMES A DAY BEFORE MEALS, Disp: 90 tablet, Rfl: 3     dapsone 25 MG tablet, TAKE TWO TABLETS BY MOUTH THREE TIMES WEEKLY (MONDAY/WEDNESDAY/FRIDAY), Disp: 24 tablet, Rfl: 11     predniSONE (DELTASONE) 5 MG tablet, TAKE ONE TABLET BY " MOUTH EVERY MORNING, Disp: 30 tablet, Rfl: 11     zinc sulfate (ZINCATE) 220 MG capsule, TAKE ONE CAPSULE BY MOUTH EVERY DAY, Disp: 30 capsule, Rfl: 11     predniSONE (DELTASONE) 2.5 MG tablet, TAKE ONE TABLET BY MOUTH EVERY EVENING, Disp: 30 tablet, Rfl: 11     sodium bicarbonate 650 MG tablet, Take 2 tablets (1,300 mg) by mouth 2 times daily, Disp: 540 tablet, Rfl: 3     OLANZapine (ZYPREXA) 5 MG tablet, Take 1 tablet (5 mg) by mouth nightly as needed for nausea, Disp: 30 tablet, Rfl: 2     albuterol (PROAIR HFA, PROVENTIL HFA, VENTOLIN HFA) 108 (90 BASE) MCG/ACT inhaler, Inhale 2 puffs into the lungs every 6 hours as needed for shortness of breath / dyspnea or wheezing, Disp: 3 Inhaler, Rfl: 3     montelukast (SINGULAIR) 10 MG tablet, Take 1 tablet (10 mg) by mouth every evening, Disp: 90 tablet, Rfl: 3     fluticasone-salmeterol (ADVAIR) 500-50 MCG/DOSE diskus inhaler, Inhale 1 puff into the lungs 2 times daily, Disp: 3 Inhaler, Rfl: 3     omeprazole (PRILOSEC) 40 MG capsule, Take 1 capsule (40 mg) by mouth daily, Disp: 90 capsule, Rfl: 3     amLODIPine (NORVASC) 5 MG tablet, Take 2 tablets (10 mg) by mouth At Bedtime, Disp: 180 tablet, Rfl: 3     atorvastatin (LIPITOR) 20 MG tablet, Take 1 tablet (20 mg) by mouth daily, Disp: 30 tablet, Rfl:      blood glucose monitoring (NO BRAND SPECIFIED) test strip, TEST 4 TIMES DAILY., Disp: , Rfl:      insulin pen needle 32G X 4 MM, USE AS DIRECTED FOR ADMINISTERING INSULIN AT HOME., Disp: , Rfl:      aspirin EC 81 MG tablet, Take 81 mg by mouth daily , Disp: , Rfl:      cyanocobalamin (VITAMIN B-12 ER) 1000 MCG TBCR, Take 1,000 mcg by mouth every 7 days , Disp: , Rfl:      levothyroxine (SYNTHROID, LEVOTHROID) 50 MCG tablet, Take 50 mcg by mouth daily , Disp: , Rfl:      order for DME, Lightweight Wheelchair with leg rests., Disp: 1 Units, Rfl: 0     order for DME, Please dispense 1 hair prosthesis., Disp: 1 Units, Rfl: 0     Magnesium Oxide 250 MG TABS, Take 2 tablets  "(500 mg) by mouth At Bedtime, Disp: 180 tablet, Rfl: 3     loperamide (IMODIUM) 2 MG capsule, Take 1 capsule (2 mg) by mouth 3 times daily as needed for other (greater than 1200mL ileostomy output in a 24 hour period.  please call colon and rectal surgery clinic to notify if patient is requiring Imodium.), Disp: 20 capsule, Rfl:      ORDER FOR DME, Equipment being ordered: ileostomy supplies, Disp: 1 Month, Rfl: 11     ascorbic acid 500 MG TABS, Take 1 tablet (500 mg) by mouth daily, Disp: 30 tablet, Rfl: 1     Calcium Carbonate-Vitamin D (CALCIUM 500 + D PO), Take 1 tablet by mouth 2 times daily , Disp: , Rfl:      Multiple Vitamin (MULTI-VITAMIN) per tablet, Take 1 tablet by mouth daily., Disp: , Rfl:      butalbital-acetaminophen-caffeine (FIORICET, ESGIC) -40 MG per tablet, Take 1 tablet by mouth every 4 hours as needed for headaches, Disp: , Rfl:     Current facility-administered medications:      heparin 100 UNIT/ML injection 5 mL, 5 mL, Intracatheter, Q8H, Rand Man, APRN CNP, 5 mL at 02/02/17 0855      Exam   BP 97/44 mmHg  Pulse 110  Temp(Src) 98.9  F (37.2  C) (Oral)  Resp 18  Ht 1.499 m (4' 11.02\")  Wt 51.665 kg (113 lb 14.4 oz)  BMI 22.99 kg/m2  SpO2 92%     Constitutional: healthy, alert and no distress   Cardiovascular: negative, PMI normal. No lifts, heaves, or thrills. RRR. No murmurs, clicks gallops or rub  Respiratory:  Cough present,  Air exchange diminished throughout L lung fields, R lung fields clear to auscultation - no crackles, or abnormal lung sounds present ; no use of accessory muscles   Head: Normocephalic. No masses, lesions, tenderness or abnormalities  Neck: Neck supple. No adenopathy   ENT: ENT exam normal, no neck nodes or sinus tenderness and throat normal without erythema or exudate  Abdomen: Abdomen soft, non-tender. BS normal. No masses, organomegaly, positive findings: Colostomy with large prolapsed stoma   SKIN: no suspicious lesions or rashes, skin is " frail, dry, right hand is peeling.  Upper extremities are ecchymotic. Pt has round papular pimple-like lesion below L nostril , non purulent, no erythema   LYMPH: Normal cervical lymph nodes  JOINT/EXTREMITIES: extremities normal- no gross deformities noted, gait normal, patient using cane today, normal muscle tone and +1 edema to the bilateral feet     Labs    Results for DOUG DE LOS SANTOS (MRN [id number]) as of 2/2/2017 11:29   Ref. Range 2/2/2017 08:55   Sodium Latest Ref Range: 133-144 mmol/L 135   Potassium Latest Ref Range: 3.4-5.3 mmol/L 5.0   Chloride Latest Ref Range:  mmol/L 104   Carbon Dioxide Latest Ref Range: 20-32 mmol/L 20   Urea Nitrogen Latest Ref Range: 7-30 mg/dL 22   Creatinine Latest Ref Range: 0.52-1.04 mg/dL 1.75 (H)   GFR Estimate Latest Ref Range: >60 mL/min/1.7m2 28 (L)   GFR Estimate If Black Latest Ref Range: >60 mL/min/1.7m2 34 (L)   Calcium Latest Ref Range: 8.5-10.1 mg/dL 8.1 (L)   Anion Gap Latest Ref Range: 3-14 mmol/L 12   Magnesium Latest Ref Range: 1.6-2.3 mg/dL 1.9   Phosphorus Latest Ref Range: 2.5-4.5 mg/dL 2.6   Albumin Latest Ref Range: 3.4-5.0 g/dL 2.6 (L)   Protein Total Latest Ref Range: 6.8-8.8 g/dL 5.8 (L)   Bilirubin Total Latest Ref Range: 0.2-1.3 mg/dL 0.5   Alkaline Phosphatase Latest Ref Range:  U/L 314 (H)   ALT Latest Ref Range: 0-50 U/L 32   AST Latest Ref Range: 0-45 U/L 60 (H)   Glucose Latest Ref Range: 70-99 mg/dL 105 (H)   WBC Latest Ref Range: 4.0-11.0 10e9/L 17.5 (H)   Hemoglobin Latest Ref Range: 11.7-15.7 g/dL 7.2 (L)   Hematocrit Latest Ref Range: 35.0-47.0 % 23.6 (L)   Platelet Count Latest Ref Range: 150-450 10e9/L 274   RBC Count Latest Ref Range: 3.8-5.2 10e12/L 2.67 (L)   MCV Latest Ref Range:  fl 88   MCH Latest Ref Range: 26.5-33.0 pg 27.0   MCHC Latest Ref Range: 31.5-36.5 g/dL 30.5 (L)   RDW Latest Ref Range: 10.0-15.0 % 23.9 (H)   Diff Method Unknown Automated Method   % Neutrophils Latest Units: % 85.5   % Lymphocytes Latest  Units: % 2.9   % Monocytes Latest Units: % 9.1   % Eosinophils Latest Units: % 0.2   % Basophils Latest Units: % 0.1   % Immature Granulocytes Latest Units: % 2.2   Nucleated RBCs Latest Ref Range: 0 /100 0   Absolute Neutrophil Latest Ref Range: 1.6-8.3 10e9/L 15.0 (H)   Absolute Lymphocytes Latest Ref Range: 0.8-5.3 10e9/L 0.5 (L)   Absolute Monocytes Latest Ref Range: 0.0-1.3 10e9/L 1.6 (H)   Absolute Eosinophils Latest Ref Range: 0.0-0.7 10e9/L 0.0   Absolute Basophils Latest Ref Range: 0.0-0.2 10e9/L 0.0   Abs Immature Granulocytes Latest Ref Range: 0-0.4 10e9/L 0.4   Absolute Nucleated RBC Unknown 0.0       Assessment/Plan     1) Metastatic Colon Cancer, with liver mets :   - Most recently began Lonsurf - 12/26 - currently on hold 2/2 deconditioning & infection. Will not restart today.    -Patient is improving, but would like patient to be stronger prior to continuing therapy.    -f/u next week in clinic for labs to reevaluate restarting chemotherapy       2) Infection   -HCAP - improving, patient completed levaquin course 2/2.  No new acute symptoms, afebrile, but patient continues to have a productive cough.  No chest xray today since patient will f/u with transplant team for chest xray/PFTs in 2 weeks   - Zoster - Improved. treated with Valtrex.  Rash on face has disappeared, no acute pain, no visual changes   - Cellulitis of R hand,L wrist - Improved.  Treated with Linizolid.  Swelling has decreased, no erythema at sight.     3) Deconditioning 2/2 Infection, recent hospitalization   -ECOG grade 2 ,Karnofsky performance status consistent with scores between 50-60.    - patient was discharged from hospital with physical therapy/ occupational therapy orders.  This has not been followed through with at home.  Will f/u with PT/OT today for potential home PT/OT .  -patient not driving     4) COPD/History of R lung Transplant (2009) :   - Tacrolimus dose was decreased to 3.5mg BID during hospitalization   - Will  draw Tac level today (2/2)   - F/u with Transplant team        5) Nutrition   - weight is stable  - Encourage supplemental Boost shakes as able; currently takes two/day, encouraged to increase to 3 a day if possible.   -Albumin 2.6 - likely contributing to lower extremity edema     6) CKD   -  Baseline cr is ~ 1.5.  Today, Cr is elevated to 1.75, likely 2/2 dehydration.  Hesitant to give IVF at this time due to edema in lower extremities  - encourage fluids/nutrition PO   -recheck Cr next week   -continue Arinesp injections at [facility] every 3 weeks     7) Anemia of Chronic Disease   - Hgb 7.2 today - patient has CKD, metastatic disease.  Patient is [employer] - did receive 1 unit pRBCs inpatient for Hgb 6.7.   -Cont Arinesp injections every three weeks   - Recheck labs next week     8) low back pain   - Chronic condition, stable   - Cont lidoderm patches  -Hydromorphone  2-4mg PO q3h PRN    Kaykay Dill , RN, AGNP,DNP Student     The patient was seen in conjunction with Kaykay Dill who served as a scribe for today's visit. I have reviewed the note and agree with the above findings and plan.TW

## 2017-02-02 NOTE — Clinical Note
2/2/2017       RE: Melissa Collazo  [address] [place] [address]     Dear Colleague,    Thank you for referring your patient, Melissa Collazo, to the [facility]. Please see a copy of my visit note below.    Reason for Visit: Melissa Collazo is a 76 y.o female who presents to the clinic today for hospital f/u     Oncologic History    Melissa has a history of metastatic colon cancer with liver mets and an illioceliac resection.  She also has a history of COPD s/p R lung tx in 2009, DMII, and CKD. She has been treated with Xeloda/Oxaliplatin, irinotecan and erbitux and avastin in the past. She was found to have progressed on Irinotecan/Ertibux. She was initiated on Lonsurf on 12/26/16.     Interval History     Melissa was hospitalized 1/21/17 with n/v & dehydration.  She was found to have HCAP pneumonia and was treated with oral course of Levaquin which she completed (2/2). She was also found to have R arm and L wrist cellulitis and started linezolid on 1/17.  She developed herpes zoster on 1/17, and was treated with valtrex. Today, Melissa feels that her cellulitis and zoster viruses have improved significantly.   During admission, patient reports she became quite fatigued and deconditioned. She reports she was supposed to be discharged with physical therapy and occupational therapy, but has not heard from those services since getting home from hospital.  She reports she has been able to walk around the house, and she did walk on the treadmill for a few minutes the other day.  She reports this was quite tiring for her. She reports some baseline SOB, which is relieved with PRN albuterol inhaler. She continues to have a cough, but denies fever, chills, or increased SOB.  She denies chest pain/palpitations.  She has not been eating well since hospitalization, eating about half of what she thinks she should be eating. She does try to drink 2 boosts/day.  She reports she is drinking about three glasses of water  "a day. She is voiding 4-5 times a day, and her urine is clear/light yellow per patient report.  Her weight has been stable, between 113-115 lbs.  Melissa has chronic low back pain. She uses a lidocaine patch for this.  She has dilaudid, but rarely takes it. She does endorse some BLE swelling, specifically in her feet.  Her daughter is concerned about dehydration.  Ostomy with liquid brown stool, no evidence of increased output per patient.     Melissa reports she has been getting her Arinesp SQ injections through her infusion center in [place] every three weeks.  She would like to continue getting this there.      Medications      Current outpatient prescriptions:      doxazosin (CARDURA) 2 MG tablet, Take 2 tablets (4 mg) by mouth At Bedtime, Disp: 60 tablet, Rfl: 11     tacrolimus (PROGRAF - GENERIC EQUIVALENT) 0.5 MG capsule, Take 7 capsules (3.5 mg) by mouth 2 times daily, Disp: 140 capsule, Rfl: 2     azithromycin (ZITHROMAX) 250 MG tablet, Take 0.5 tablets (125 mg) by mouth daily . HOLD this medication until you are done with Levaquin, then you can resume this., Disp: 15 tablet, Rfl: 11     Prochlorperazine Maleate (COMPAZINE PO), Take 5 mg by mouth every 6 hours as needed for nausea, Disp: , Rfl:      HYDROmorphone (DILAUDID) 2 MG tablet, Take 1-2 tablets (2-4 mg) by mouth every 3 hours as needed for moderate to severe pain, Disp: 100 tablet, Rfl: 0     Blood Glucose Calibration (CRIS DOC CONTROL) NORMAL SOLN, daily as needed, Disp: , Rfl:      lidocaine (LIDODERM) 5 % Patch, Place 1 patch onto the skin every 24 hours, Disp: 30 patch, Rfl: 3     lidocaine-prilocaine (EMLA) cream, APPLY TO PORT SITE ABOUT 45 TO 60 MINUTES PRIOR TO ACCESSING AS NEEDED FOR MODERATE PAIN, Disp: 30 g, Rfl: 3     order for DME, Equipment being ordered:  Hernia Belt   Nu form, BG- Cool comfort- medium,  4\"  Belt ring  3 1/4  Catalog # :  [id number], Disp: 1 each, Rfl: 11     ondansetron (ZOFRAN) 8 MG tablet, TAKE ONE TABLET BY MOUTH THREE " TIMES A DAY BEFORE MEALS, Disp: 90 tablet, Rfl: 3     dapsone 25 MG tablet, TAKE TWO TABLETS BY MOUTH THREE TIMES WEEKLY (MONDAY/WEDNESDAY/FRIDAY), Disp: 24 tablet, Rfl: 11     predniSONE (DELTASONE) 5 MG tablet, TAKE ONE TABLET BY MOUTH EVERY MORNING, Disp: 30 tablet, Rfl: 11     zinc sulfate (ZINCATE) 220 MG capsule, TAKE ONE CAPSULE BY MOUTH EVERY DAY, Disp: 30 capsule, Rfl: 11     predniSONE (DELTASONE) 2.5 MG tablet, TAKE ONE TABLET BY MOUTH EVERY EVENING, Disp: 30 tablet, Rfl: 11     sodium bicarbonate 650 MG tablet, Take 2 tablets (1,300 mg) by mouth 2 times daily, Disp: 540 tablet, Rfl: 3     OLANZapine (ZYPREXA) 5 MG tablet, Take 1 tablet (5 mg) by mouth nightly as needed for nausea, Disp: 30 tablet, Rfl: 2     albuterol (PROAIR HFA, PROVENTIL HFA, VENTOLIN HFA) 108 (90 BASE) MCG/ACT inhaler, Inhale 2 puffs into the lungs every 6 hours as needed for shortness of breath / dyspnea or wheezing, Disp: 3 Inhaler, Rfl: 3     montelukast (SINGULAIR) 10 MG tablet, Take 1 tablet (10 mg) by mouth every evening, Disp: 90 tablet, Rfl: 3     fluticasone-salmeterol (ADVAIR) 500-50 MCG/DOSE diskus inhaler, Inhale 1 puff into the lungs 2 times daily, Disp: 3 Inhaler, Rfl: 3     omeprazole (PRILOSEC) 40 MG capsule, Take 1 capsule (40 mg) by mouth daily, Disp: 90 capsule, Rfl: 3     amLODIPine (NORVASC) 5 MG tablet, Take 2 tablets (10 mg) by mouth At Bedtime, Disp: 180 tablet, Rfl: 3     atorvastatin (LIPITOR) 20 MG tablet, Take 1 tablet (20 mg) by mouth daily, Disp: 30 tablet, Rfl:      blood glucose monitoring (NO BRAND SPECIFIED) test strip, TEST 4 TIMES DAILY., Disp: , Rfl:      insulin pen needle 32G X 4 MM, USE AS DIRECTED FOR ADMINISTERING INSULIN AT HOME., Disp: , Rfl:      aspirin EC 81 MG tablet, Take 81 mg by mouth daily , Disp: , Rfl:      cyanocobalamin (VITAMIN B-12 ER) 1000 MCG TBCR, Take 1,000 mcg by mouth every 7 days , Disp: , Rfl:      levothyroxine (SYNTHROID, LEVOTHROID) 50 MCG tablet, Take 50 mcg by  "mouth daily , Disp: , Rfl:      order for DME, Lightweight Wheelchair with leg rests., Disp: 1 Units, Rfl: 0     order for DME, Please dispense 1 hair prosthesis., Disp: 1 Units, Rfl: 0     Magnesium Oxide 250 MG TABS, Take 2 tablets (500 mg) by mouth At Bedtime, Disp: 180 tablet, Rfl: 3     loperamide (IMODIUM) 2 MG capsule, Take 1 capsule (2 mg) by mouth 3 times daily as needed for other (greater than 1200mL ileostomy output in a 24 hour period.  please call colon and rectal surgery clinic to notify if patient is requiring Imodium.), Disp: 20 capsule, Rfl:      ORDER FOR DME, Equipment being ordered: ileostomy supplies, Disp: 1 Month, Rfl: 11     ascorbic acid 500 MG TABS, Take 1 tablet (500 mg) by mouth daily, Disp: 30 tablet, Rfl: 1     Calcium Carbonate-Vitamin D (CALCIUM 500 + D PO), Take 1 tablet by mouth 2 times daily , Disp: , Rfl:      Multiple Vitamin (MULTI-VITAMIN) per tablet, Take 1 tablet by mouth daily., Disp: , Rfl:      butalbital-acetaminophen-caffeine (FIORICET, ESGIC) -40 MG per tablet, Take 1 tablet by mouth every 4 hours as needed for headaches, Disp: , Rfl:     Current facility-administered medications:      heparin 100 UNIT/ML injection 5 mL, 5 mL, Intracatheter, Q8H, Rand Man, ACACIA CNP, 5 mL at 02/02/17 0855      Exam   BP 97/44 mmHg  Pulse 110  Temp(Src) 98.9  F (37.2  C) (Oral)  Resp 18  Ht 1.499 m (4' 11.02\")  Wt 51.665 kg (113 lb 14.4 oz)  BMI 22.99 kg/m2  SpO2 92%     Constitutional: healthy, alert and no distress   Cardiovascular: negative, PMI normal. No lifts, heaves, or thrills. RRR. No murmurs, clicks gallops or rub  Respiratory:  Cough present,  Air exchange diminished throughout L lung fields, R lung fields clear to auscultation - no crackles, or abnormal lung sounds present ; no use of accessory muscles   Head: Normocephalic. No masses, lesions, tenderness or abnormalities  Neck: Neck supple. No adenopathy   ENT: ENT exam normal, no neck nodes or sinus " tenderness and throat normal without erythema or exudate  Abdomen: Abdomen soft, non-tender. BS normal. No masses, organomegaly, positive findings: Colostomy with large prolapsed stoma   SKIN: no suspicious lesions or rashes, skin is frail, dry, right hand is peeling.  Upper extremities are ecchymotic. Pt has round papular pimple-like lesion below L nostril , non purulent, no erythema   LYMPH: Normal cervical lymph nodes  JOINT/EXTREMITIES: extremities normal- no gross deformities noted, gait normal, patient using cane today, normal muscle tone and +1 edema to the bilateral feet     Labs    Results for DOUG DE LOS SANTOS (MRN [id number]) as of 2/2/2017 11:29   Ref. Range 2/2/2017 08:55   Sodium Latest Ref Range: 133-144 mmol/L 135   Potassium Latest Ref Range: 3.4-5.3 mmol/L 5.0   Chloride Latest Ref Range:  mmol/L 104   Carbon Dioxide Latest Ref Range: 20-32 mmol/L 20   Urea Nitrogen Latest Ref Range: 7-30 mg/dL 22   Creatinine Latest Ref Range: 0.52-1.04 mg/dL 1.75 (H)   GFR Estimate Latest Ref Range: >60 mL/min/1.7m2 28 (L)   GFR Estimate If Black Latest Ref Range: >60 mL/min/1.7m2 34 (L)   Calcium Latest Ref Range: 8.5-10.1 mg/dL 8.1 (L)   Anion Gap Latest Ref Range: 3-14 mmol/L 12   Magnesium Latest Ref Range: 1.6-2.3 mg/dL 1.9   Phosphorus Latest Ref Range: 2.5-4.5 mg/dL 2.6   Albumin Latest Ref Range: 3.4-5.0 g/dL 2.6 (L)   Protein Total Latest Ref Range: 6.8-8.8 g/dL 5.8 (L)   Bilirubin Total Latest Ref Range: 0.2-1.3 mg/dL 0.5   Alkaline Phosphatase Latest Ref Range:  U/L 314 (H)   ALT Latest Ref Range: 0-50 U/L 32   AST Latest Ref Range: 0-45 U/L 60 (H)   Glucose Latest Ref Range: 70-99 mg/dL 105 (H)   WBC Latest Ref Range: 4.0-11.0 10e9/L 17.5 (H)   Hemoglobin Latest Ref Range: 11.7-15.7 g/dL 7.2 (L)   Hematocrit Latest Ref Range: 35.0-47.0 % 23.6 (L)   Platelet Count Latest Ref Range: 150-450 10e9/L 274   RBC Count Latest Ref Range: 3.8-5.2 10e12/L 2.67 (L)   MCV Latest Ref Range:  fl 88    MCH Latest Ref Range: 26.5-33.0 pg 27.0   MCHC Latest Ref Range: 31.5-36.5 g/dL 30.5 (L)   RDW Latest Ref Range: 10.0-15.0 % 23.9 (H)   Diff Method Unknown Automated Method   % Neutrophils Latest Units: % 85.5   % Lymphocytes Latest Units: % 2.9   % Monocytes Latest Units: % 9.1   % Eosinophils Latest Units: % 0.2   % Basophils Latest Units: % 0.1   % Immature Granulocytes Latest Units: % 2.2   Nucleated RBCs Latest Ref Range: 0 /100 0   Absolute Neutrophil Latest Ref Range: 1.6-8.3 10e9/L 15.0 (H)   Absolute Lymphocytes Latest Ref Range: 0.8-5.3 10e9/L 0.5 (L)   Absolute Monocytes Latest Ref Range: 0.0-1.3 10e9/L 1.6 (H)   Absolute Eosinophils Latest Ref Range: 0.0-0.7 10e9/L 0.0   Absolute Basophils Latest Ref Range: 0.0-0.2 10e9/L 0.0   Abs Immature Granulocytes Latest Ref Range: 0-0.4 10e9/L 0.4   Absolute Nucleated RBC Unknown 0.0       Assessment/Plan     1) Metastatic Colon Cancer, with liver mets :   - Most recently began Lonsurf - 12/26 - currently on hold 2/2 deconditioning & infection. Will not restart today.    -Patient is improving, but would like patient to be stronger prior to continuing therapy.    -f/u next week in clinic for labs to reevaluate restarting chemotherapy       2) Infection   -HCAP - improving, patient completed levaquin course 2/2.  No new acute symptoms, afebrile, but patient continues to have a productive cough.  No chest xray today since patient will f/u with transplant team for chest xray/PFTs in 2 weeks   - Zoster - Improved. treated with Valtrex.  Rash on face has disappeared, no acute pain, no visual changes   - Cellulitis of R hand,L wrist - Improved.  Treated with Linizolid.  Swelling has decreased, no erythema at sight.     3) Deconditioning 2/2 Infection, recent hospitalization   -ECOG grade 2 ,Karnofsky performance status consistent with scores between 50-60.    - patient was discharged from hospital with physical therapy/ occupational therapy orders.  This has not been  followed through with at home.  Will f/u with PT/OT today for potential home PT/OT .  -patient not driving     4) COPD/History of R lung Transplant (2009) :   - Tacrolimus dose was decreased to 3.5mg BID during hospitalization   - Will draw Tac level today (2/2)   - F/u with Transplant team        5) Nutrition   - weight is stable  - Encourage supplemental Boost shakes as able; currently takes two/day, encouraged to increase to 3 a day if possible.   -Albumin 2.6 - likely contributing to lower extremity edema     6) CKD   -  Baseline cr is ~ 1.5.  Today, Cr is elevated to 1.75, likely 2/2 dehydration.  Hesitant to give IVF at this time due to edema in lower extremities  - encourage fluids/nutrition PO   -recheck Cr next week   -continue Arinesp injections at [facility] every 3 weeks     7) Anemia of Chronic Disease   - Hgb 7.2 today - patient has CKD, metastatic disease.  Patient is [employer] - did receive 1 unit pRBCs inpatient for Hgb 6.7.   -Cont Arinesp injections every three weeks   - Recheck labs next week     8) low back pain   - Chronic condition, stable   - Cont lidoderm patches  -Hydromorphone  2-4mg PO q3h PRN    Kaykay Dill , RN, AGNP,DNP Student     The patient was seen in conjunction with Kaykay Dill who served as a scribe for today's visit. I have reviewed the note and agree with the above findings and plan.TW    Again, thank you for allowing me to participate in the care of your patient.      Sincerely,    ACACIA Williamson CNP

## 2017-02-03 NOTE — PROGRESS NOTES
Recent hospital discharge. Patient states she's slow to recover, fatigued. She's completed her Levaquin course and restarted azithromycin. Prograf level on 2/2 6.9 @ 13 hours, goal 8-10. Pt would like to recheck in 1 week before making any dose changes. RTC on 2/13, she'll call if she has any questions/concerns.

## 2017-02-09 NOTE — Clinical Note
2/9/2017       RE: Melissa Collazo  [address] [place] [address]     Dear Colleague,    Thank you for referring your patient, Melissa Collazo, to the [facility]. Please see a copy of my visit note below.    No notes on file    Again, thank you for allowing me to participate in the care of your patient.      Sincerely,    ACACIA Williamson CNP

## 2017-02-09 NOTE — PROGRESS NOTES
"Reason for Visit: Melissa Collazo is a 76 y.o female with metastatic colon cancer involving the liver. She is seen for routine evaluation.    Oncologic History    Melissa has a history of metastatic colon cancer with liver mets and an illioceliac resection.  She also has a history of COPD s/p R lung tx in 2009, h/o complex lung infection (aspergillus, MAC) DMII, and CKD. She had GI bleeding while on Avastin. She has been treated with Xeloda/Oxaliplatin, irinotecan and erbitux and avastin in the past. She was found to have progressed on Irinotecan/Ertibux. She was initiated on Lonsurf on 12/26/16. She had completed cycle 1 and then developed complicating infections including facial zoster, bilateral upper extremity cellulitis. She was initiated on Valtrex and linezolid. She presented to the hospital with fatigue, nausea,vomiting. She was treated for HCAP with levofloxacin. She discharged home on 1/27/17. Lonsurf has been held as she recovers from the hospitalization.    Interval history:   Melissa is still fatigued, but not feeling any worse over the past week. Still has a cough and shortness of breath with activity. She denies change in this over the past week. No fevers/chills/sweats. No new skin rashes or redness. No headaches/vision changes. No sore throat or sinus congestion. No abdominal pain. Has mild nausea that started while on antibiotics and isn't improving. Is drinking fluids, but her daughter doesn't think she is drinking enough. Is getting about 24-36 ounces of water in daily. Urine is clear. Denies dysuria. Ileostomy output is the same. No worsening diarrhea. Continues to have bilateral LE edema. Legs look \"normal\" in the morning but worsen while she is up. No calf pain. No blood in the stool or urine.    Current Outpatient Prescriptions   Medication Sig Dispense Refill     doxazosin (CARDURA) 2 MG tablet Take 2 tablets (4 mg) by mouth At Bedtime 60 tablet 11     tacrolimus (PROGRAF - GENERIC EQUIVALENT) 0.5 MG " "capsule Take 7 capsules (3.5 mg) by mouth 2 times daily 140 capsule 2     azithromycin (ZITHROMAX) 250 MG tablet Take 125 mg by mouth daily  15 tablet 11     Prochlorperazine Maleate (COMPAZINE PO) Take 5 mg by mouth every 6 hours as needed for nausea       HYDROmorphone (DILAUDID) 2 MG tablet Take 1-2 tablets (2-4 mg) by mouth every 3 hours as needed for moderate to severe pain 100 tablet 0     Blood Glucose Calibration (CRIS DOC CONTROL) NORMAL SOLN daily as needed       lidocaine (LIDODERM) 5 % Patch Place 1 patch onto the skin every 24 hours 30 patch 3     lidocaine-prilocaine (EMLA) cream APPLY TO PORT SITE ABOUT 45 TO 60 MINUTES PRIOR TO ACCESSING AS NEEDED FOR MODERATE PAIN 30 g 3     order for DME Equipment being ordered:   Hernia Belt     Nu form, BG- Cool comfort- medium,  4\"  Belt ring  3 1/4  Catalog # :  [id number] 1 each 11     ondansetron (ZOFRAN) 8 MG tablet TAKE ONE TABLET BY MOUTH THREE TIMES A DAY BEFORE MEALS 90 tablet 3     dapsone 25 MG tablet TAKE TWO TABLETS BY MOUTH THREE TIMES WEEKLY (MONDAY/WEDNESDAY/FRIDAY) 24 tablet 11     predniSONE (DELTASONE) 5 MG tablet TAKE ONE TABLET BY MOUTH EVERY MORNING 30 tablet 11     zinc sulfate (ZINCATE) 220 MG capsule TAKE ONE CAPSULE BY MOUTH EVERY DAY 30 capsule 11     predniSONE (DELTASONE) 2.5 MG tablet TAKE ONE TABLET BY MOUTH EVERY EVENING 30 tablet 11     sodium bicarbonate 650 MG tablet Take 2 tablets (1,300 mg) by mouth 2 times daily 540 tablet 3     OLANZapine (ZYPREXA) 5 MG tablet Take 1 tablet (5 mg) by mouth nightly as needed for nausea 30 tablet 2     albuterol (PROAIR HFA, PROVENTIL HFA, VENTOLIN HFA) 108 (90 BASE) MCG/ACT inhaler Inhale 2 puffs into the lungs every 6 hours as needed for shortness of breath / dyspnea or wheezing 3 Inhaler 3     montelukast (SINGULAIR) 10 MG tablet Take 1 tablet (10 mg) by mouth every evening 90 tablet 3     fluticasone-salmeterol (ADVAIR) 500-50 MCG/DOSE diskus inhaler Inhale 1 puff into the lungs 2 times " daily 3 Inhaler 3     omeprazole (PRILOSEC) 40 MG capsule Take 1 capsule (40 mg) by mouth daily 90 capsule 3     amLODIPine (NORVASC) 5 MG tablet Take 2 tablets (10 mg) by mouth At Bedtime 180 tablet 3     atorvastatin (LIPITOR) 20 MG tablet Take 1 tablet (20 mg) by mouth daily 30 tablet      blood glucose monitoring (NO BRAND SPECIFIED) test strip TEST 4 TIMES DAILY.       insulin pen needle 32G X 4 MM USE AS DIRECTED FOR ADMINISTERING INSULIN AT HOME.       aspirin EC 81 MG tablet Take 81 mg by mouth daily        cyanocobalamin (VITAMIN B-12 ER) 1000 MCG TBCR Take 1,000 mcg by mouth every 7 days        levothyroxine (SYNTHROID, LEVOTHROID) 50 MCG tablet Take 50 mcg by mouth daily        order for DME Lightweight Wheelchair with leg rests. 1 Units 0     butalbital-acetaminophen-caffeine (FIORICET, ESGIC) -40 MG per tablet Take 1 tablet by mouth every 4 hours as needed for headaches       order for DME Please dispense 1 hair prosthesis. 1 Units 0     Magnesium Oxide 250 MG TABS Take 2 tablets (500 mg) by mouth At Bedtime 180 tablet 3     loperamide (IMODIUM) 2 MG capsule Take 1 capsule (2 mg) by mouth 3 times daily as needed for other (greater than 1200mL ileostomy output in a 24 hour period.  please call colon and rectal surgery clinic to notify if patient is requiring Imodium.) 20 capsule      ORDER FOR DME Equipment being ordered: ileostomy supplies 1 Month 11     ascorbic acid 500 MG TABS Take 1 tablet (500 mg) by mouth daily 30 tablet 1     Calcium Carbonate-Vitamin D (CALCIUM 500 + D PO) Take 1 tablet by mouth 2 times daily        Multiple Vitamin (MULTI-VITAMIN) per tablet Take 1 tablet by mouth daily.       Exam: alert, appears in NAD, chronically ill appearing. Blood pressure 103/53, pulse 103, temperature 98.2  F (36.8  C), temperature source Oral, resp. rate 16, weight 51.256 kg (113 lb), SpO2 93 %, not currently breastfeeding.  Oropharynx: moist, no lesion. Has dentures. No icterus or conjunctival  injection. Lungs: decreased left lung, no crackles/wheezes. Heart:RRR, soft systolic murmur. Abdomen: nontender, firm in the RUQ. Large herniated ileostomy. Stool is brown in the ostomy. Extremities: warm, 2+ pitting edema from the ankles to the toes bilaterally. No calf pain, cord or tenderness. No residual erythema or swelling in the wrists. Speech is clear. Walks with assistance of a cane.    Labs:Results for DOUG DE LOS SANTOS (MRN [id number]) as of 2/9/2017 16:53   Ref. Range 2/9/2017 15:31   Sodium Latest Ref Range: 133-144 mmol/L 133   Potassium Latest Ref Range: 3.4-5.3 mmol/L 5.9 (H)   Chloride Latest Ref Range:  mmol/L 103   Carbon Dioxide Latest Ref Range: 20-32 mmol/L 21   Urea Nitrogen Latest Ref Range: 7-30 mg/dL 35 (H)   Creatinine Latest Ref Range: 0.52-1.04 mg/dL 1.88 (H)   GFR Estimate Latest Ref Range: >60 mL/min/1.7m2 26 (L)   GFR Estimate If Black Latest Ref Range: >60 mL/min/1.7m2 31 (L)   Calcium Latest Ref Range: 8.5-10.1 mg/dL 8.2 (L)   Anion Gap Latest Ref Range: 3-14 mmol/L 9   Magnesium Latest Ref Range: 1.6-2.3 mg/dL 1.8   Phosphorus Latest Ref Range: 2.5-4.5 mg/dL 2.8   Glucose Latest Ref Range: 70-99 mg/dL 172 (H)   WBC Latest Ref Range: 4.0-11.0 10e9/L 24.1 (H)   Hemoglobin Latest Ref Range: 11.7-15.7 g/dL 7.5 (L)   Hematocrit Latest Ref Range: 35.0-47.0 % 25.5 (L)   Platelet Count Latest Ref Range: 150-450 10e9/L 644 (H)   RBC Count Latest Ref Range: 3.8-5.2 10e12/L 2.87 (L)   MCV Latest Ref Range:  fl 89   MCH Latest Ref Range: 26.5-33.0 pg 26.1 (L)   MCHC Latest Ref Range: 31.5-36.5 g/dL 29.4 (L)   RDW Latest Ref Range: 10.0-15.0 % 23.8 (H)     Chest xray:  Radiology read is pending. I reviewed the images. Appears stable compared to prior imaging.    Impression/plan:    1. Metastatic colon cancer. Due for Lonsurf, but given recent infection issues, dehydration and decreased PS, will continue to hold. If labs/lytes are stable tomorrow, should be set up to see me next week to  discuss whether she is stable to start cycle 2 Lonsurf.  -Leukocytosis/thombocytosis noted today--? Related to malignancy vs infection    2. ID:afebrile. WBC is again trending up. Doubt this relates to neulasta at this time. Recently completed treatment for bilateral UE cellulitis, facial zoster, and HCAP  -No new symptoms of infection, but still has cough. Chest xray doesn't appear dramatically different.  -Will follow radiology report. Obtain BC from port and peripheral, UA/C and check stool for C-diff.  -Will give her a cup for sputum culture. Will also add on aspergillus antigen  -Return to clinic tomorrow to recheck labs and follow cultures. May need to consider chest CT as she has a history of aspergillus and MAC.    -Hx of EBV --Last EBV was trending up.  -Will check with Dr. Clay regarding whether he would like to treat this.    3. Chronic anemia: worsened while on Lonsurf. Getting Aranesp every 3 weeks at her local clinic (given last week). Stable today.  Previously discussed transfusion needs. She would like to avoid transfusions for [employer] reasons, but would consider it in an emergency.     4. Osteoporosis/chronic compression fractures-On Calcium plus Vit D. Was initiated on denosumab on 9/22/16. Due in March 2017.  -manages chronic back pain with Hydromorphone 2-4 mg every 3 hours for pain.    5. Lung transplant: follows with Dr. Clay  -prophy dapsone 50 MWF   -Azythromycin 125 mg daily.  -on tacrolimus 3.5 mg bid/pred 5 mg daily    6. Acute on chronic kidney disease. Has increased hyperkalemia today as well.   -EKG was obtained in clinic. NSR noted. No peaked T waves or shortened QT interval.   -With rising BUN/Cr with give 1 liter NS today.  -Recheck lytes tomorrow. Am avoiding use of kayexolate given renal impairment.   -has f/u with Dr. Gomez in March 7. HTN: on cardura and amlodipine. BP stable today    8. Endocrine:   -DM: resumed lantus and ss insulin after discharge. Is monitoring BG    -Hypothyrodism: levothyroxine 50 mcg daily. TSH stable on 1/24/17    9. Weakness: was referred to home PT/OT. Confirmed with her that they are set to come to her home in the next few days.    10. BLE edema: has some chronic edema in the right ankle, but left ankle worsened after recent hospitalization. No clinical signs/symptoms of DVT. Edema is better than last week.  -Advised leg elevation and compression stockings

## 2017-02-09 NOTE — TELEPHONE ENCOUNTER
Patient being seen by oncology right now and they'll be addressing her elevated potassium of 5.9.

## 2017-02-09 NOTE — NURSING NOTE
"Melissa Collazo is a 76 year old female who presents for:  Chief Complaint   Patient presents with     Port Draw     Patient here today for labs to be drawn via her Port by RN. Vitals charted, and patient checked into his appt.     Oncology Clinic Visit     Return: Colon ca         Initial Vitals:  /53 mmHg  Pulse 103  Temp(Src) 98.2  F (36.8  C) (Oral)  Resp 16  Wt 51.256 kg (113 lb)  SpO2 93% Estimated body mass index is 22.81 kg/(m^2) as calculated from the following:    Height as of 2/2/17: 1.499 m (4' 11.02\").    Weight as of this encounter: 51.256 kg (113 lb).. There is no height on file to calculate BSA. BP completed using cuff size: BP was taken in LAB INTAKE  No Pain (0) No LMP recorded. Patient is postmenopausal. Allergies and medications reviewed.     Medications: MEDICATION REFILLS NEEDED TODAY.  Pharmacy name entered into [employer]:    [facility] - [place], [place] - [address] [id number] [place] [place] - [facility] - [place], [place] - [address] [place] [address] [phone]    Comments: Lidocaine cream for port access   Patient received flu vaccine. See Immunizations.  Ami Mathias CMA        6 minutes for nursing intake (face to face time)   Ami Mathias CMA            "

## 2017-02-09 NOTE — Clinical Note
"2/9/2017      RE: Melissa Collazo  [address] [place] [address]       Reason for Visit: Melissa Collazo is a 76 y.o female with metastatic colon cancer involving the liver. She is seen for routine evaluation.    Oncologic History    Melissa has a history of metastatic colon cancer with liver mets and an illioceliac resection.  She also has a history of COPD s/p R lung tx in 2009, h/o complex lung infection (aspergillus, MAC) DMII, and CKD. She had GI bleeding while on Avastin. She has been treated with Xeloda/Oxaliplatin, irinotecan and erbitux and avastin in the past. She was found to have progressed on Irinotecan/Ertibux. She was initiated on Lonsurf on 12/26/16. She had completed cycle 1 and then developed complicating infections including facial zoster, bilateral upper extremity cellulitis. She was initiated on Valtrex and linezolid. She presented to the hospital with fatigue, nausea,vomiting. She was treated for HCAP with levofloxacin. She discharged home on 1/27/17. Lonsurf has been held as she recovers from the hospitalization.    Interval history:   Melissa is still fatigued, but not feeling any worse over the past week. Still has a cough and shortness of breath with activity. She denies change in this over the past week. No fevers/chills/sweats. No new skin rashes or redness. No headaches/vision changes. No sore throat or sinus congestion. No abdominal pain. Has mild nausea that started while on antibiotics and isn't improving. Is drinking fluids, but her daughter doesn't think she is drinking enough. Is getting about 24-36 ounces of water in daily. Urine is clear. Denies dysuria. Ileostomy output is the same. No worsening diarrhea. Continues to have bilateral LE edema. Legs look \"normal\" in the morning but worsen while she is up. No calf pain. No blood in the stool or urine.    Current Outpatient Prescriptions   Medication Sig Dispense Refill     doxazosin (CARDURA) 2 MG tablet Take 2 tablets (4 mg) by mouth " "At Bedtime 60 tablet 11     tacrolimus (PROGRAF - GENERIC EQUIVALENT) 0.5 MG capsule Take 7 capsules (3.5 mg) by mouth 2 times daily 140 capsule 2     azithromycin (ZITHROMAX) 250 MG tablet Take 125 mg by mouth daily  15 tablet 11     Prochlorperazine Maleate (COMPAZINE PO) Take 5 mg by mouth every 6 hours as needed for nausea       HYDROmorphone (DILAUDID) 2 MG tablet Take 1-2 tablets (2-4 mg) by mouth every 3 hours as needed for moderate to severe pain 100 tablet 0     Blood Glucose Calibration (CRIS DOC CONTROL) NORMAL SOLN daily as needed       lidocaine (LIDODERM) 5 % Patch Place 1 patch onto the skin every 24 hours 30 patch 3     lidocaine-prilocaine (EMLA) cream APPLY TO PORT SITE ABOUT 45 TO 60 MINUTES PRIOR TO ACCESSING AS NEEDED FOR MODERATE PAIN 30 g 3     order for DME Equipment being ordered:   Hernia Belt     Nu form, BG- Cool comfort- medium,  4\"  Belt ring  3 1/4  Catalog # :  [id number] 1 each 11     ondansetron (ZOFRAN) 8 MG tablet TAKE ONE TABLET BY MOUTH THREE TIMES A DAY BEFORE MEALS 90 tablet 3     dapsone 25 MG tablet TAKE TWO TABLETS BY MOUTH THREE TIMES WEEKLY (MONDAY/WEDNESDAY/FRIDAY) 24 tablet 11     predniSONE (DELTASONE) 5 MG tablet TAKE ONE TABLET BY MOUTH EVERY MORNING 30 tablet 11     zinc sulfate (ZINCATE) 220 MG capsule TAKE ONE CAPSULE BY MOUTH EVERY DAY 30 capsule 11     predniSONE (DELTASONE) 2.5 MG tablet TAKE ONE TABLET BY MOUTH EVERY EVENING 30 tablet 11     sodium bicarbonate 650 MG tablet Take 2 tablets (1,300 mg) by mouth 2 times daily 540 tablet 3     OLANZapine (ZYPREXA) 5 MG tablet Take 1 tablet (5 mg) by mouth nightly as needed for nausea 30 tablet 2     albuterol (PROAIR HFA, PROVENTIL HFA, VENTOLIN HFA) 108 (90 BASE) MCG/ACT inhaler Inhale 2 puffs into the lungs every 6 hours as needed for shortness of breath / dyspnea or wheezing 3 Inhaler 3     montelukast (SINGULAIR) 10 MG tablet Take 1 tablet (10 mg) by mouth every evening 90 tablet 3     fluticasone-salmeterol " (ADVAIR) 500-50 MCG/DOSE diskus inhaler Inhale 1 puff into the lungs 2 times daily 3 Inhaler 3     omeprazole (PRILOSEC) 40 MG capsule Take 1 capsule (40 mg) by mouth daily 90 capsule 3     amLODIPine (NORVASC) 5 MG tablet Take 2 tablets (10 mg) by mouth At Bedtime 180 tablet 3     atorvastatin (LIPITOR) 20 MG tablet Take 1 tablet (20 mg) by mouth daily 30 tablet      blood glucose monitoring (NO BRAND SPECIFIED) test strip TEST 4 TIMES DAILY.       insulin pen needle 32G X 4 MM USE AS DIRECTED FOR ADMINISTERING INSULIN AT HOME.       aspirin EC 81 MG tablet Take 81 mg by mouth daily        cyanocobalamin (VITAMIN B-12 ER) 1000 MCG TBCR Take 1,000 mcg by mouth every 7 days        levothyroxine (SYNTHROID, LEVOTHROID) 50 MCG tablet Take 50 mcg by mouth daily        order for DME Lightweight Wheelchair with leg rests. 1 Units 0     butalbital-acetaminophen-caffeine (FIORICET, ESGIC) -40 MG per tablet Take 1 tablet by mouth every 4 hours as needed for headaches       order for DME Please dispense 1 hair prosthesis. 1 Units 0     Magnesium Oxide 250 MG TABS Take 2 tablets (500 mg) by mouth At Bedtime 180 tablet 3     loperamide (IMODIUM) 2 MG capsule Take 1 capsule (2 mg) by mouth 3 times daily as needed for other (greater than 1200mL ileostomy output in a 24 hour period.  please call colon and rectal surgery clinic to notify if patient is requiring Imodium.) 20 capsule      ORDER FOR DME Equipment being ordered: ileostomy supplies 1 Month 11     ascorbic acid 500 MG TABS Take 1 tablet (500 mg) by mouth daily 30 tablet 1     Calcium Carbonate-Vitamin D (CALCIUM 500 + D PO) Take 1 tablet by mouth 2 times daily        Multiple Vitamin (MULTI-VITAMIN) per tablet Take 1 tablet by mouth daily.       Exam: alert, appears in NAD, chronically ill appearing. Blood pressure 103/53, pulse 103, temperature 98.2  F (36.8  C), temperature source Oral, resp. rate 16, weight 51.256 kg (113 lb), SpO2 93 %, not currently  breastfeeding.  Oropharynx: moist, no lesion. Has dentures. No icterus or conjunctival injection. Lungs: decreased left lung, no crackles/wheezes. Heart:RRR, soft systolic murmur. Abdomen: nontender, firm in the RUQ. Large herniated ileostomy. Stool is brown in the ostomy. Extremities: warm, 2+ pitting edema from the ankles to the toes bilaterally. No calf pain, cord or tenderness. No residual erythema or swelling in the wrists. Speech is clear. Walks with assistance of a cane.    Labs:Results for DOUG DE LOS SANTOS (MRN [id number]) as of 2/9/2017 16:53   Ref. Range 2/9/2017 15:31   Sodium Latest Ref Range: 133-144 mmol/L 133   Potassium Latest Ref Range: 3.4-5.3 mmol/L 5.9 (H)   Chloride Latest Ref Range:  mmol/L 103   Carbon Dioxide Latest Ref Range: 20-32 mmol/L 21   Urea Nitrogen Latest Ref Range: 7-30 mg/dL 35 (H)   Creatinine Latest Ref Range: 0.52-1.04 mg/dL 1.88 (H)   GFR Estimate Latest Ref Range: >60 mL/min/1.7m2 26 (L)   GFR Estimate If Black Latest Ref Range: >60 mL/min/1.7m2 31 (L)   Calcium Latest Ref Range: 8.5-10.1 mg/dL 8.2 (L)   Anion Gap Latest Ref Range: 3-14 mmol/L 9   Magnesium Latest Ref Range: 1.6-2.3 mg/dL 1.8   Phosphorus Latest Ref Range: 2.5-4.5 mg/dL 2.8   Glucose Latest Ref Range: 70-99 mg/dL 172 (H)   WBC Latest Ref Range: 4.0-11.0 10e9/L 24.1 (H)   Hemoglobin Latest Ref Range: 11.7-15.7 g/dL 7.5 (L)   Hematocrit Latest Ref Range: 35.0-47.0 % 25.5 (L)   Platelet Count Latest Ref Range: 150-450 10e9/L 644 (H)   RBC Count Latest Ref Range: 3.8-5.2 10e12/L 2.87 (L)   MCV Latest Ref Range:  fl 89   MCH Latest Ref Range: 26.5-33.0 pg 26.1 (L)   MCHC Latest Ref Range: 31.5-36.5 g/dL 29.4 (L)   RDW Latest Ref Range: 10.0-15.0 % 23.8 (H)     Chest xray:  Radiology read is pending. I reviewed the images. Appears stable compared to prior imaging.    Impression/plan:    1. Metastatic colon cancer. Due for Lonsurf, but given recent infection issues, dehydration and decreased PS, will  continue to hold. If labs/lytes are stable tomorrow, should be set up to see me next week to discuss whether she is stable to start cycle 2 Lonsurf.  -Leukocytosis/thombocytosis noted today--? Related to malignancy vs infection    2. ID:afebrile. WBC is again trending up. Doubt this relates to neulasta at this time. Recently completed treatment for bilateral UE cellulitis, facial zoster, and HCAP  -No new symptoms of infection, but still has cough. Chest xray doesn't appear dramatically different.  -Will follow radiology report. Obtain BC from port and peripheral, UA/C and check stool for C-diff.  -Will give her a cup for sputum culture. Will also add on aspergillus antigen  -Return to clinic tomorrow to recheck labs and follow cultures. May need to consider chest CT as she has a history of aspergillus and MAC.    -Hx of EBV --Last EBV was trending up.  -Will check with Dr. Clay regarding whether he would like to treat this.    3. Chronic anemia: worsened while on Lonsurf. Getting Aranesp every 3 weeks at her local clinic (given last week). Stable today.  Previously discussed transfusion needs. She would like to avoid transfusions for [employer] reasons, but would consider it in an emergency.     4. Osteoporosis/chronic compression fractures-On Calcium plus Vit D. Was initiated on denosumab on 9/22/16. Due in March 2017.  -manages chronic back pain with Hydromorphone 2-4 mg every 3 hours for pain.    5. Lung transplant: follows with Dr. Clay  -prophy dapsone 50 MWF   -Azythromycin 125 mg daily.  -on tacrolimus 3.5 mg bid/pred 5 mg daily    6. Acute on chronic kidney disease. Has increased hyperkalemia today as well.   -EKG was obtained in clinic. NSR noted. No peaked T waves or shortened QT interval.   -With rising BUN/Cr with give 1 liter NS today.  -Recheck lytes tomorrow. Am avoiding use of kayexolate given renal impairment.   -has f/u with Dr. Gomez in March 7. HTN: on cardura and amlodipine. BP stable  today    8. Endocrine:   -DM: resumed lantus and ss insulin after discharge. Is monitoring BG   -Hypothyrodism: levothyroxine 50 mcg daily. TSH stable on 1/24/17    9. Weakness: was referred to home PT/OT. Confirmed with her that they are set to come to her home in the next few days.    10. BLE edema: has some chronic edema in the right ankle, but left ankle worsened after recent hospitalization. No clinical signs/symptoms of DVT. Edema is better than last week.  -Advised leg elevation and compression stockings        ACACIA Williamson CNP

## 2017-02-09 NOTE — MR AVS SNAPSHOT
After Visit Summary   2/9/2017    Melissa Collazo    MRN: [id number]           Patient Information     Date Of Birth          1940        Visit Information        Provider Department      2/9/2017 5:00 PM PAULETTE 13 ATC;  ONCOLOGY INFUSION [facility]        Today's Diagnoses     CKD (chronic kidney disease) stage 4, GFR 15-29 ml/min (H)    -  1     Secondary malignant neoplasm of liver (H)         Cancer of right colon (H)         CKD (chronic kidney disease) stage 3, GFR 30-59 ml/min         Lung replaced by transplant (H)         Leukocytosis, unspecified type         HCAP (healthcare-associated pneumonia)           Care Instructions    Contact Numbers    [facility] [place]: [phone]  [facility] Triage:  [phone]    Call triage with chills and/or temperature greater than or equal to 100.5, uncontrolled nausea/vomiting, diarrhea, constipation, dizziness, shortness of breath, chest pain, bleeding, unexplained bruising, or any new/concerning symptoms, questions/concerns.     If you are having any concerning symptoms or wish to speak to a provider before your next infusion visit, please call your care coordinator or triage to notify them so we can adequately serve you.       After Hours: [phone]    If after hours, weekends, or holidays, call main hospital  and ask for Oncology doctor on call.           February 2017 Sunday Monday Tuesday Wednesday Thursday Friday Saturday                  1     2     [facility]    8:45 AM   (15 min.)    [facility]   [facility] Lab Draw     [facility] RETURN    9:30 AM   (50 min.)   Rand Man APRN CNP   [facility] 3     4       5     6     7     8     9     [facility]    3:45 PM   (15 min.)    [facility] D[facility]   [facility] Lab Draw     [facility] RETURN    4:20 PM   (50 min.)   Rand Man APRN CNP   [facility]     XR CHEST 2 VIEWS    4:45 PM   (15 min.)    XR1   [facility] Center Xray     [facility] ONC INFUSION 120    5:00 PM   (120 min.)    ONCOLOGY INFUSION   [facility] 10     [facility] DRAW   12:00 PM   (15 min.)    [facility] DRAW   [facility] Lab Draw     [facility] RETURN   12:30 PM   (50 min.)   Priyanka Potts PA-C   [facility]     [facility] ONC INFUSION 120    1:00 PM   (120 min.)    ONCOLOGY INFUSION   [facility] 11       12     13     [facility] PFT    5:30 PM   (30 min.)    PFL D   [facility] Function [facility]     [facility] RETURN LUNG TX    6:20 PM   (40 min.)   Marco Clay MD   [facility] 14     15     16     17     18       19     20     21     22     23     LAB WITH [facility]    3:15 PM   (15 min.)    LAB   [facility]     [facility] RETURN    4:00 PM   (30 min.)   Tavares Gomez MD   [facility] 24     25       26 27 28 March 2017 Sunday Monday Tuesday Wednesday Thursday Friday Saturday                  1     2     3     4       5     6     7     8     9     10     11       12     13     14     15     16     17     18       19     20     LAB WITH [facility]   10:00 AM   (15 min.)    LAB   [facility]     XR CHEST 2 VIEWS   10:15 AM   (15 min.)   [address] [place] [address] Xray     [facility] PFT   10:30 AM   (30 min.)    PFL B   [facility] Function [facility]     [facility] RETURN LUNG TX   11:20 AM   (40 min.)   Marco Clay MD   [facility] 21     22     23     24     25       26     27     28     29     30     31                      Recent Results (from the past 24 hour(s))   CBC with platelets    Collection Time: 02/09/17  3:31 PM   Result Value Ref Range    WBC 24.1 (H) 4.0 - 11.0 10e9/L    RBC Count 2.87 (L) 3.8 - 5.2 10e12/L    Hemoglobin 7.5 (L) 11.7 - 15.7 g/dL    Hematocrit 25.5 (L) 35.0 - 47.0 %    MCV 89 78 - 100 fl    MCH 26.1 (L)  26.5 - 33.0 pg    MCHC 29.4 (L) 31.5 - 36.5 g/dL    RDW 23.8 (H) 10.0 - 15.0 %    Platelet Count 644 (H) 150 - 450 10e9/L   Basic metabolic panel    Collection Time: 02/09/17  3:31 PM   Result Value Ref Range    Sodium 133 133 - 144 mmol/L    Potassium 5.9 (H) 3.4 - 5.3 mmol/L    Chloride 103 94 - 109 mmol/L    Carbon Dioxide 21 20 - 32 mmol/L    Anion Gap 9 3 - 14 mmol/L    Glucose 172 (H) 70 - 99 mg/dL    Urea Nitrogen 35 (H) 7 - 30 mg/dL    Creatinine 1.88 (H) 0.52 - 1.04 mg/dL    GFR Estimate 26 (L) >60 mL/min/1.7m2    GFR Estimate If Black 31 (L) >60 mL/min/1.7m2    Calcium 8.2 (L) 8.5 - 10.1 mg/dL   Magnesium    Collection Time: 02/09/17  3:31 PM   Result Value Ref Range    Magnesium 1.8 1.6 - 2.3 mg/dL   Phosphorus    Collection Time: 02/09/17  3:31 PM   Result Value Ref Range    Phosphorus 2.8 2.5 - 4.5 mg/dL   Routine UA with micro reflex to culture    Collection Time: 02/09/17  5:30 PM   Result Value Ref Range    Color Urine Yellow     Appearance Urine Cloudy     Glucose Urine Negative NEG mg/dL    Bilirubin Urine Negative NEG    Ketones Urine Negative NEG mg/dL    Specific Gravity Urine 1.014 1.003 - 1.035    Blood Urine Negative NEG    pH Urine 5.0 5.0 - 7.0 pH    Protein Albumin Urine Negative NEG mg/dL    Urobilinogen mg/dL 0.0 0.0 - 2.0 mg/dL    Nitrite Urine Negative NEG    Leukocyte Esterase Urine Negative NEG    Source Unspecified Urine     WBC Urine 3 (H) 0 - 2 /HPF    RBC Urine 3 (H) 0 - 2 /HPF    Squamous Epithelial /HPF Urine 1 0 - 1 /HPF    Mucous Urine Present (A) NEG /LPF    Hyaline Casts 11 (A) 0 - 2 /LPF    Amorphous Crystals Few (A) NEG /HPF                 Follow-ups after your visit        Your next 10 appointments already scheduled     Feb 10, 2017 12:00 PM   Transfer To Lab Draw with  [facility] DRAW   [facility] ([facility] [place])    [address] [facility] [facility]  [address] [facility] [address]   [phone]            Feb 10, 2017 12:30 PM   (Arrive by  12:15 PM)   Return Visit with Priyanka Potts PA-C   [facility] ([facility])    [address] [place] [address]  2nd [facility] [address]   [phone]            Feb 10, 2017  1:00 PM   Infusion 120 with  ONCOLOGY INFUSION, UC 20 ATC   [facility] ([facility])    [address] [facility]  2nd [facility] [address]   [phone]            Feb 13, 2017  5:30 PM   PFT VISIT with  PFL D   [facility] Pulmonary Function Testing ([facility])    [address] [place] [address]  3rd [facility] [address]   [phone]            Feb 13, 2017  6:20 PM   (Arrive by 6:05 PM)   Return Lung Transplant with Marco Clay MD   [facility] ([facility])    [address] [place] [address]   [phone]            Feb 23, 2017  3:15 PM   Lab with  LAB   [facility] ([facility])    [address] [place] [address]  1st [facility] [address]   [phone]            Feb 23, 2017  4:00 PM   (Arrive by 3:30 PM)   Return Visit with Tavares Gomez MD   [facility] Nephrology ([facility])    [address] [place] [address]   [phone]            Mar 20, 2017 10:15 AM   (Arrive by 10:00 AM)   XR CHEST 2 VIEWS with UCXR1   [facility] Xray ([facility])    [address] [place] [address]   [phone]           Please bring a list of your current medicines to your exam. (Include vitamins, minerals and over-thecounter medicines.) Leave your valuables at home.  Tell your doctor if there is a chance you may be pregnant.  You do not need to do anything special for this exam.            Mar 20, 2017 10:30 AM   PFT VISIT with  PFL B   [facility] Pulmonary Function Testing ([facility]  [facility])    [address] [facility]  3rd [facility] [id number] [phone]              Future tests that were ordered for you today     Open Future Orders        Priority Expected Expires Ordered    Sputum Culture Aerobic Bacterial Routine  3/11/2017 2017    Gram stain Routine  4/10/2017 2017    Aspergillus Galactomannan Antigen Routine  2017            Who to contact     If you have questions or need follow up information about today's clinic visit or your schedule please contact [facility] directly at [phone].  Normal or non-critical lab and imaging results will be communicated to you by AdTonikhart, letter or phone within 4 business days after the clinic has received the results. If you do not hear from us within 7 days, please contact the clinic through AdTonikhart or phone. If you have a critical or abnormal lab result, we will notify you by phone as soon as possible.  Submit refill requests through Mediaspectrum or call your pharmacy and they will forward the refill request to us. Please allow 3 business days for your refill to be completed.          Additional Information About Your Visit        MyChart Information     [employer] lets you send messages to your doctor, view your test results, renew your prescriptions, schedule appointments and more. To sign up, go to [email or web address] . Click on \"Log in\" on the left side of the screen, which will take you to the Welcome page. Then click on \"Sign up Now\" on the right side of the page.     You will be asked to enter the access code listed below, as well as some personal information. Please follow the directions to create your username and password.     Your access code is: [email or web address]  Expires: 3/8/2017  4:28 PM     Your access code will  in 90 days. If you need help or a new code, please call your [place] [facility] clinic or [phone].        Care EveryWhere ID     This is your Care EveryWhere " ID. This could be used by other organizations to access your [facility] medical records  [id number]         Blood Pressure from Last 3 Encounters:   02/09/17 103/53   02/02/17 97/44   01/27/17 142/67    Weight from Last 3 Encounters:   02/09/17 51.256 kg (113 lb)   02/02/17 51.665 kg (113 lb 14.4 oz)   01/27/17 52.799 kg (116 lb 6.4 oz)              We Performed the Following     Blood culture     Clostridium difficile toxin B PCR     Routine UA with micro reflex to culture        Primary Care Provider Office Phone # Fax #    Rosette Lin [phone]       [address] [address]        Thank you!     Thank you for choosing [facility]  for your care. Our goal is always to provide you with excellent care. Hearing back from our patients is one way we can continue to improve our services. Please take a few minutes to complete the written survey that you may receive in the mail after your visit with us. Thank you!             Your Updated Medication List - Protect others around you: Learn how to safely use, store and throw away your medicines at www.disposemymeds.org.          This list is accurate as of: 2/9/17  6:43 PM.  Always use your most recent med list.                   Brand Name Dispense Instructions for use    albuterol 108 (90 BASE) MCG/ACT Inhaler    PROAIR HFA/PROVENTIL HFA/VENTOLIN HFA    3 Inhaler    Inhale 2 puffs into the lungs every 6 hours as needed for shortness of breath / dyspnea or wheezing       amLODIPine 5 MG tablet    NORVASC    180 tablet    Take 2 tablets (10 mg) by mouth At Bedtime       ascorbic acid 500 MG Tabs     30 tablet    Take 1 tablet (500 mg) by mouth daily       aspirin EC 81 MG EC tablet      Take 81 mg by mouth daily       atorvastatin 20 MG tablet    LIPITOR    30 tablet    Take 1 tablet (20 mg) by mouth daily       azithromycin 250 MG tablet    ZITHROMAX    15 tablet    Take 125 mg by mouth daily       blood  glucose monitoring test strip    no brand specified     TEST 4 TIMES DAILY.       butalbital-acetaminophen-caffeine -40 MG per tablet    FIORICET/ESGIC     Take 1 tablet by mouth every 4 hours as needed for headaches       CALCIUM 500 + D PO      Take 1 tablet by mouth 2 times daily       COMPAZINE PO      Take 5 mg by mouth every 6 hours as needed for nausea       cyanocobalamin 1000 MCG Tbcr    VITAMIN B-12 ER     Take 1,000 mcg by mouth every 7 days       dapsone 25 MG tablet     24 tablet    TAKE TWO TABLETS BY MOUTH THREE TIMES WEEKLY (MONDAY/WEDNESDAY/FRIDAY)       doxazosin 2 MG tablet    CARDURA    60 tablet    Take 2 tablets (4 mg) by mouth At Bedtime       fluticasone-salmeterol 500-50 MCG/DOSE diskus inhaler    ADVAIR    3 Inhaler    Inhale 1 puff into the lungs 2 times daily       HYDROmorphone 2 MG tablet    DILAUDID    100 tablet    Take 1-2 tablets (2-4 mg) by mouth every 3 hours as needed for moderate to severe pain       insulin pen needle 32G X 4 MM      USE AS DIRECTED FOR ADMINISTERING INSULIN AT HOME.       levothyroxine 50 MCG tablet    SYNTHROID/LEVOTHROID     Take 50 mcg by mouth daily       lidocaine 5 % Patch    LIDODERM    30 patch    Place 1 patch onto the skin every 24 hours       lidocaine-prilocaine cream    EMLA    30 g    APPLY TO PORT SITE ABOUT 45 TO 60 MINUTES PRIOR TO ACCESSING AS NEEDED FOR MODERATE PAIN       loperamide 2 MG capsule    IMODIUM    20 capsule    Take 1 capsule (2 mg) by mouth 3 times daily as needed for other (greater than 1200mL ileostomy output in a 24 hour period.  please call colon and rectal surgery clinic to notify if patient is requiring Imodium.)       Magnesium Oxide 250 MG Tabs     180 tablet    Take 2 tablets (500 mg) by mouth At Bedtime       montelukast 10 MG tablet    SINGULAIR    90 tablet    Take 1 tablet (10 mg) by mouth every evening       Multi-vitamin Tabs tablet   Generic drug:  multivitamin, therapeutic with minerals      Take 1 tablet  "by mouth daily.       omeprazole 40 MG capsule    priLOSEC    90 capsule    Take 1 capsule (40 mg) by mouth daily       ondansetron 8 MG tablet    ZOFRAN    90 tablet    TAKE ONE TABLET BY MOUTH THREE TIMES A DAY BEFORE MEALS       * order for DME     1 Month    Equipment being ordered: ileostomy supplies       * order for DME     1 Units    Please dispense 1 hair prosthesis.       * order for DME     1 Units    Lightweight Wheelchair with leg rests.       * order for DME     1 each    Equipment being ordered:  Hernia Belt   Nu form, BG- Cool comfort- medium,  4\"  Belt ring  3 1/4  Catalog # :  [id number]       * predniSONE 5 MG tablet    DELTASONE    30 tablet    TAKE ONE TABLET BY MOUTH EVERY MORNING       * predniSONE 2.5 MG tablet    DELTASONE    30 tablet    TAKE ONE TABLET BY MOUTH EVERY EVENING       sodium bicarbonate 650 MG tablet     540 tablet    Take 2 tablets (1,300 mg) by mouth 2 times daily       tacrolimus 0.5 MG capsule    PROGRAF - GENERIC EQUIVALENT    140 capsule    Take 7 capsules (3.5 mg) by mouth 2 times daily       CRIS DOC CONTROL NORMAL Soln      daily as needed       zinc sulfate 220 MG capsule    ZINCATE    30 capsule    TAKE ONE CAPSULE BY MOUTH EVERY DAY       zyPREXA 5 MG tablet   Generic drug:  OLANZapine     30 tablet    Take 1 tablet (5 mg) by mouth nightly as needed for nausea       * Notice:  This list has 6 medication(s) that are the same as other medications prescribed for you. Read the directions carefully, and ask your doctor or other care provider to review them with you.      "

## 2017-02-09 NOTE — NURSING NOTE
Chief Complaint   Patient presents with     Port Draw     Patient here today for labs to be drawn via her Port by RN. Vitals charted, and patient checked into his appt.     Nazia Dela Cruz RN

## 2017-02-10 NOTE — MR AVS SNAPSHOT
After Visit Summary   2/10/2017    Melissa Collazo    MRN: [id number]           Patient Information     Date Of Birth          1940        Visit Information        Provider Department      2/10/2017 1:00 PM UC 20 ATC; UC ONCOLOGY INFUSION [facility]        Today's Diagnoses     CKD (chronic kidney disease) stage 4, GFR 15-29 ml/min (H)    -  1     Secondary malignant neoplasm of liver (H)         Cancer of right colon (H)         CKD (chronic kidney disease) stage 3, GFR 30-59 ml/min           Care Instructions    Contact Numbers    [facility] [place] [phone]  [facility] [place]:  [phone]    Call triage with chills and/or temperature greater than or equal to 100.5, uncontrolled nausea/vomiting, diarrhea, constipation, dizziness, shortness of breath, chest pain, bleeding, unexplained bruising, or any new/concerning symptoms, questions/concerns.     If you are having any concerning symptoms or wish to speak to a provider before your next infusion visit, please call your care coordinator or triage to notify them so we can adequately serve you.       After Hours: [phone]    If after hours, weekends, or holidays, call main hospital  and ask for Oncology doctor on call.           February 2017 Sunday Monday Tuesday Wednesday Thursday Friday Saturday                  1     2     [facility] DRAW    8:45 AM   (15 min.)    [facility] LAB DRAW   [facility] Lab Draw     [facility] RETURN    9:30 AM   (50 min.)   Rand Man APRN CNP   [facility] 3     4       5     6     7     8     9     [facility] DRAW    3:45 PM   (15 min.)    [facility] LAB DRAW   [facility] Lab Draw     [facility] RETURN    4:20 PM   (50 min.)   Rand Man APRN CNP   [facility]     XR CHEST 2 VIEWS    4:45 PM   (15 min.)   UCXR1   [facility] Xray     UMP ONC INFUSION 120    5:00 PM   (120 min.)    ONCOLOGY INFUSION   [facility]cer [facility] 10     [facility] DRAW   12:00 PM   (15 min.)    [facility] LAB DRAW   [facility] Lab Draw     [facility] RETURN   12:30 PM   (50 min.)   Priyanka Potts PA-C   [facility]     UMP ONC INFUSION 120    1:00 PM   (120 min.)   UC ONCOLOGY INFUSION   [facility] 11       12     13     UMP PFT    5:30 PM   (30 min.)    PFL D   [facility] Pulmonary Function Testing     UMP RETURN LUNG TX    6:20 PM   (40 min.)   Marco Clay MD   [facility] 14     15     16     P [facility] LAB DRAW    3:00 PM   (15 min.)    [facility] LAB DRAW   [facility] Lab Draw     UMP RETURN    3:30 PM   (50 min.)   Rand Man, APRN CNP   [facility] 17     18       19     20     21     22     23     LAB WITH  CLINIC    3:15 PM   (15 min.)    LAB   [facility] Lab     UMP RETURN    4:00 PM   (30 min.)   Tavares Gomez MD   [facility] Nephrology 24     25       26     27     28 March 2017 Sunday Monday Tuesday Wednesday Thursday Friday Saturday                  1     2     3     4       5     6     7     8     9     10     11       12     13     14     15     16     17     18       19     20     LAB WITH  CLINIC   10:00 AM   (15 min.)    LAB   [facility] Lab     XR CHEST 2 VIEWS   10:15 AM   (15 min.)   UCXR1   [facility] Xray     UMP PFT   10:30 AM   (30 min.)    PFL B   [facility] Pulmonary Function Testing     UMP RETURN LUNG TX   11:20 AM   (40 min.)   Marco Clay MD   [facility] 21     22     23     24     25       26     27     28     29     30     31                       Lab Results:  Recent Results (from the past 12 hour(s))   Basic metabolic panel    Collection Time: 02/10/17 12:38 PM   Result Value Ref Range    Sodium 135 133 - 144 mmol/L    Potassium 5.5 (H) 3.4 - 5.3 mmol/L    Chloride 104 94 - 109 mmol/L     Carbon Dioxide 20 20 - 32 mmol/L    Anion Gap 11 3 - 14 mmol/L    Glucose 143 (H) 70 - 99 mg/dL    Urea Nitrogen 34 (H) 7 - 30 mg/dL    Creatinine 1.82 (H) 0.52 - 1.04 mg/dL    GFR Estimate 27 (L) >60 mL/min/1.7m2    GFR Estimate If Black 33 (L) >60 mL/min/1.7m2    Calcium 7.7 (L) 8.5 - 10.1 mg/dL   CBC with platelets    Collection Time: 02/10/17 12:38 PM   Result Value Ref Range    WBC 22.0 (H) 4.0 - 11.0 10e9/L    RBC Count 2.75 (L) 3.8 - 5.2 10e12/L    Hemoglobin 7.3 (L) 11.7 - 15.7 g/dL    Hematocrit 24.4 (L) 35.0 - 47.0 %    MCV 89 78 - 100 fl    MCH 26.5 26.5 - 33.0 pg    MCHC 29.9 (L) 31.5 - 36.5 g/dL    RDW 23.4 (H) 10.0 - 15.0 %    Platelet Count 548 (H) 150 - 450 10e9/L               Follow-ups after your visit        Your next 10 appointments already scheduled     Feb 13, 2017  5:30 PM   PFT VISIT with PAULETTE PFL D   [facility] Pulmonary Function Testing ([facility])    [address] [place] [address] [id number] [phone]            Feb 13, 2017  6:20 PM   (Arrive by 6:05 PM)   Return Lung Transplant with Marco Clay MD   [facility] ([facility])    [address] [place] [id number] [phone]            Feb 16, 2017  3:00 PM   [facility] Draw with  [facility] DRAW   [facility] Draw ([facility])    [address] [place] [address] [id number] [phone]            Feb 16, 2017  3:30 PM   (Arrive by 3:15 PM)   Return Visit with ACACIA Reid CNP   [facility] ([facility])    [address] [place] [phone]   [phone]            Feb 23, 2017  3:15 PM   Lab with [facility] ([facility] [place] [facility])    [address] [phone]   [phone]            Feb 23, 2017  4:00 PM    (Arrive by 3:30 PM)   Return Visit with Tavares Gomez MD   [facility] ([place])    [address] [phone]   [phone]            Mar 20, 2017 10:00 AM   Lab with  LAB   [facility] ([facility])    [address] [place] [phone]   [phone]            Mar 20, 2017 10:15 AM   (Arrive by 10:00 AM)   XR CHEST 2 VIEWS with UCXR1   [facility] Xray ([facility])    [address] [phone]           Please bring a list of your current medicines to your exam. (Include vitamins, minerals and over-thecounter medicines.) Leave your valuables at home.  Tell your doctor if there is a chance you may be pregnant.  You do not need to do anything special for this exam.            Mar 20, 2017 10:30 AM   PFT VISIT with  PFL B   [facility] Pulmonary Function Testing ([facility])    [address] [place] [phone]   [phone]            Mar 20, 2017 11:20 AM   (Arrive by 11:05 AM)   Return Lung Transplant with Marco Clay MD   [facility] ([facility])    [address] [place] [phone]   [phone]              Future tests that were ordered for you today     Open Future Orders        Priority Expected Expires Ordered    CBC with platelets differential Routine 2/13/2017 2/13/2017 2/10/2017    Comprehensive metabolic panel Routine 2/13/2017 2/13/2017 2/10/2017    Sputum Culture Aerobic Bacterial Routine  3/11/2017 2/9/2017    Gram stain Routine  4/10/2017 2/9/2017            Who to contact     If you have questions or need follow up information about today's clinic visit or your schedule please contact [facility] directly at  "[phone].  Normal or non-critical lab and imaging results will be communicated to you by MyChart, letter or phone within 4 business days after the clinic has received the results. If you do not hear from us within 7 days, please contact the clinic through Corpor[employer] or phone. If you have a critical or abnormal lab result, we will notify you by phone as soon as possible.  Submit refill requests through YouTab or call your pharmacy and they will forward the refill request to us. Please allow 3 business days for your refill to be completed.          Additional Information About Your Visit        Corpor[employer]art Information     YouTab lets you send messages to your doctor, view your test results, renew your prescriptions, schedule appointments and more. To sign up, go to [email or web address] . Click on \"Log in\" on the left side of the screen, which will take you to the Welcome page. Then click on \"Sign up Now\" on the right side of the page.     You will be asked to enter the access code listed below, as well as some personal information. Please follow the directions to create your username and password.     Your access code is: [email or web address]  Expires: 3/8/2017  4:28 PM     Your access code will  in 90 days. If you need help or a new code, please call your [facility] clinic or [phone].        Care EveryWhere ID     This is your Care EveryWhere ID. This could be used by other organizations to access your [facility] medical records  [id number]         Blood Pressure from Last 3 Encounters:   02/10/17 114/58   17 103/53   17 97/44    Weight from Last 3 Encounters:   02/10/17 52.164 kg (115 lb)   17 51.256 kg (113 lb)   17 51.665 kg (113 lb 14.4 oz)              We Performed the Following     MD Instruction for Therapy Plan        Primary Care Provider Office Phone # Fax #    Rosette NICOLE Riley [phone]       [address] [address]        Thank you!  "    Thank you for choosing [facility]  for your care. Our goal is always to provide you with excellent care. Hearing back from our patients is one way we can continue to improve our services. Please take a few minutes to complete the written survey that you may receive in the mail after your visit with us. Thank you!             Your Updated Medication List - Protect others around you: Learn how to safely use, store and throw away your medicines at www.disposemymeds.org.          This list is accurate as of: 2/10/17  3:42 PM.  Always use your most recent med list.                   Brand Name Dispense Instructions for use    albuterol 108 (90 BASE) MCG/ACT Inhaler    PROAIR HFA/PROVENTIL HFA/VENTOLIN HFA    3 Inhaler    Inhale 2 puffs into the lungs every 6 hours as needed for shortness of breath / dyspnea or wheezing       amLODIPine 5 MG tablet    NORVASC    180 tablet    Take 2 tablets (10 mg) by mouth At Bedtime       ascorbic acid 500 MG Tabs     30 tablet    Take 1 tablet (500 mg) by mouth daily       aspirin EC 81 MG EC tablet      Take 81 mg by mouth daily       atorvastatin 20 MG tablet    LIPITOR    30 tablet    Take 1 tablet (20 mg) by mouth daily       azithromycin 250 MG tablet    ZITHROMAX    15 tablet    Take 125 mg by mouth daily       blood glucose monitoring test strip    no brand specified     TEST 4 TIMES DAILY.       butalbital-acetaminophen-caffeine -40 MG per tablet    FIORICET/ESGIC     Take 1 tablet by mouth every 4 hours as needed for headaches       CALCIUM 500 + D PO      Take 1 tablet by mouth 2 times daily       COMPAZINE PO      Take 5 mg by mouth every 6 hours as needed for nausea       cyanocobalamin 1000 MCG Tbcr    VITAMIN B-12 ER     Take 1,000 mcg by mouth every 7 days       dapsone 25 MG tablet     24 tablet    TAKE TWO TABLETS BY MOUTH THREE TIMES WEEKLY (MONDAY/WEDNESDAY/FRIDAY)       doxazosin 2 MG tablet    CARDURA    60 tablet    Take 2 tablets (4  "mg) by mouth At Bedtime       fluticasone-salmeterol 500-50 MCG/DOSE diskus inhaler    ADVAIR    3 Inhaler    Inhale 1 puff into the lungs 2 times daily       HYDROmorphone 2 MG tablet    DILAUDID    100 tablet    Take 1-2 tablets (2-4 mg) by mouth every 3 hours as needed for moderate to severe pain       insulin pen needle 32G X 4 MM      USE AS DIRECTED FOR ADMINISTERING INSULIN AT HOME.       levothyroxine 50 MCG tablet    SYNTHROID/LEVOTHROID     Take 50 mcg by mouth daily       lidocaine 5 % Patch    LIDODERM    30 patch    Place 1 patch onto the skin every 24 hours       lidocaine-prilocaine cream    EMLA    30 g    APPLY TO PORT SITE ABOUT 45 TO 60 MINUTES PRIOR TO ACCESSING AS NEEDED FOR MODERATE PAIN       loperamide 2 MG capsule    IMODIUM    20 capsule    Take 1 capsule (2 mg) by mouth 3 times daily as needed for other (greater than 1200mL ileostomy output in a 24 hour period.  please call colon and rectal surgery clinic to notify if patient is requiring Imodium.)       Magnesium Oxide 250 MG Tabs     180 tablet    Take 2 tablets (500 mg) by mouth At Bedtime       montelukast 10 MG tablet    SINGULAIR    90 tablet    Take 1 tablet (10 mg) by mouth every evening       Multi-vitamin Tabs tablet   Generic drug:  multivitamin, therapeutic with minerals      Take 1 tablet by mouth daily.       omeprazole 40 MG capsule    priLOSEC    90 capsule    Take 1 capsule (40 mg) by mouth daily       ondansetron 8 MG tablet    ZOFRAN    90 tablet    TAKE ONE TABLET BY MOUTH THREE TIMES A DAY BEFORE MEALS       * order for DME     1 Month    Equipment being ordered: ileostomy supplies       * order for DME     1 Units    Please dispense 1 hair prosthesis.       * order for DME     1 Units    Lightweight Wheelchair with leg rests.       * order for DME     1 each    Equipment being ordered:  Hernia Belt   Nu form, BG- Cool comfort- medium,  4\"  Belt ring  3 1/4  Catalog # :  [id number]       * predniSONE 5 MG tablet    " DELTASONE    30 tablet    TAKE ONE TABLET BY MOUTH EVERY MORNING       * predniSONE 2.5 MG tablet    DELTASONE    30 tablet    TAKE ONE TABLET BY MOUTH EVERY EVENING       sodium bicarbonate 650 MG tablet     540 tablet    Take 2 tablets (1,300 mg) by mouth 2 times daily       tacrolimus 0.5 MG capsule    PROGRAF - GENERIC EQUIVALENT    140 capsule    Take 7 capsules (3.5 mg) by mouth 2 times daily       CRIS DOC CONTROL NORMAL Soln      daily as needed       zinc sulfate 220 MG capsule    ZINCATE    30 capsule    TAKE ONE CAPSULE BY MOUTH EVERY DAY       * Notice:  This list has 6 medication(s) that are the same as other medications prescribed for you. Read the directions carefully, and ask your doctor or other care provider to review them with you.

## 2017-02-10 NOTE — PROGRESS NOTES
Infusion Nursing Note:angel Collazo presents today for IVF.    Patient seen by provider today: Yes: Soraya EARL prior to infusion.   present during visit today: Not Applicable.    Note: Pt reports feeling somewhat improved today from yesterday. Denies nausea today. Continues to report poor po intake and fatigue.     TORB per Soraya EARL/Mary Coats RN 2/10/2017 @ 1340;  -pt to receive 1 liter 0.9NS over 2 hours today  -no additional intervention today  -Pt to have labs drawn locally on Monday  -Pt to return Thursday for Labs and possible fluids    Intravenous Access:  Implanted Port.    Treatment Conditions:  HGB      7.3   2/10/2017  WBC     22.0   2/10/2017   ANEU     15.0   2/2/2017  PLT      548   2/10/2017     NA      135   2/10/2017                POTASSIUM      5.5   2/10/2017        MAG      1.8   2/9/2017         CR     1.82   2/10/2017                RUBY      7.7   2/10/2017             BILITOTAL      0.5   2/2/2017        ALBUMIN      2.6   2/2/2017                 ALT       32   2/2/2017        AST       60   2/2/2017      Post Infusion Assessment:  Patient tolerated infusion without incident.  Blood return noted pre and post infusion.  Site patent and intact, free from redness, edema or discomfort.  No evidence of extravasations.  Access discontinued per protocol.    Discharge Plan:   Patient declined prescription refills.  Discharge instructions reviewed with: Patient.  Patient and/or family verbalized understanding of discharge instructions and all questions answered.  Copy of AVS reviewed with patient and/or family.  Patient will return Thursday 1/16/17 for next appointment.  Patient discharged in stable condition accompanied by: self and daughter.  Departure Mode: Wheelchair.    Mary Coats RN

## 2017-02-10 NOTE — NURSING NOTE
"Melissa Collazo is a 76 year old female who presents for:  Chief Complaint   Patient presents with     Port Draw     Patient is here today for labs to be drawn via her port by RN. Vitals charted and patient checked into her appt.     Oncology Clinic Visit     Colon CA        Initial Vitals:  /58 mmHg  Pulse 105  Temp(Src) 98.1  F (36.7  C) (Oral)  Ht 1.499 m (4' 11.02\")  Wt 52.164 kg (115 lb)  BMI 23.21 kg/m2  SpO2 91% Estimated body mass index is 23.21 kg/(m^2) as calculated from the following:    Height as of this encounter: 1.499 m (4' 11.02\").    Weight as of this encounter: 52.164 kg (115 lb).. Body surface area is 1.47 meters squared. BP completed using cuff size: NA not taken  Data Unavailable No LMP recorded. Patient is postmenopausal. Allergies and medications reviewed.     Medications: Medication refills not needed today.  Pharmacy name entered into [employer]:    [facility] - [place], [place] - [address] [id number] [place], [place] [facility] [address] [facility] - [place], [place] - [address] [place] [address] [phone]    Comments:     7 minutes for nursing intake (face to face time)   Jenn Leon MA           "

## 2017-02-10 NOTE — PROGRESS NOTES
Infusion Nursing Note:  Melissa Collazo presents today for IV fluids. Pt added to infusion schedule today per Rand Man DNP.    Patient seen by provider today: Yes: Rand Man DNP   present during visit today: Not Applicable.    Note: pt added on to infusion schedule today for IV fluids. Pt had chest xray prior to arriving to infusion. This RN collected blood cultures x 2, UA/UC, and stool culture as ordered. Pt was sent home with a collection cup for sputum that will be brought back to clinic tomorrow.     Intravenous Access:  Implanted Port.    Treatment Conditions:  HGB      7.5   2/9/2017  WBC     24.1   2/9/2017   PLT      644   2/9/2017     NA      133   2/9/2017                POTASSIUM      5.9   2/9/2017        MAG      1.8   2/9/2017         CR     1.88   2/9/2017                RUBY      8.2   2/9/2017               Results reviewed.      Post Infusion Assessment:  Patient tolerated infusion without incident.  Blood return noted pre and post infusion.  Site patent and intact, free from redness, edema or discomfort.  No evidence of extravasations.  Access discontinued per protocol.    Discharge Plan:   Prescription refills given for EMLA cream.  Copy of AVS reviewed with patient and/or family.  Patient will return tomorrow for next appointment (labs, provider appt, IV fluids).  Pt discharged in stable condition with: family.  Departure Mode: Wheelchair.    LIN GARCIA RN

## 2017-02-10 NOTE — PATIENT INSTRUCTIONS
Contact Numbers    [facility] [place]: [phone]  [facility] Triage:  [phone]    Call triage with chills and/or temperature greater than or equal to 100.5, uncontrolled nausea/vomiting, diarrhea, constipation, dizziness, shortness of breath, chest pain, bleeding, unexplained bruising, or any new/concerning symptoms, questions/concerns.     If you are having any concerning symptoms or wish to speak to a provider before your next infusion visit, please call your care coordinator or triage to notify them so we can adequately serve you.       After Hours: [phone]    If after hours, weekends, or holidays, call main hospital  and ask for Oncology doctor on call.           February 2017 Sunday Monday Tuesday Wednesday Thursday Friday Saturday                  1     2     [facility] DRAW    8:45 AM   (15 min.)    [facility] LAB DRAW   [facility] Lab Draw     [facility] RETURN    9:30 AM   (50 min.)   Rand Man APRN CNP   [facility] 3     4       5     6     7     8     9     [facility] DRAW    3:45 PM   (15 min.)    [facility] LAB DRAW   [facility] Lab Draw     UMP RETURN    4:20 PM   (50 min.)   Rand Man APRN CNP   [facility]     XR CHEST 2 VIEWS    4:45 PM   (15 min.)   UCXR1   [facility] Xray     UMP ONC INFUSION 120    5:00 PM   (120 min.)    ONCOLOGY INFUSION   [facility] 10     [facility] DRAW   12:00 PM   (15 min.)    [facility] LAB DRAW   [facility] Lab Draw     UMP RETURN   12:30 PM   (50 min.)   Priyanka Potts PA-C   [facility]     UMP ONC INFUSION 120    1:00 PM   (120 min.)    ONCOLOGY INFUSION   [facility] 11       12     13     UMP PFT    5:30 PM   (30 min.)    PFL D   [facility] Pulmonary Function Testing     P RETURN LUNG TX    6:20 PM   (40 min.)   Marco Clay MD   [facility] 14     15      16     17     18       19     20     21     22     23     LAB WITH [facility]    3:15 PM   (15 min.)   [facility]     UMP RETURN    4:00 PM   (30 min.)   Tavares Gomez MD   [facility]ph[facility] 24     25       26 27 28 March 2017 Sunday Monday Tuesday Wednesday Thursday Friday Saturday                  1     2     3     4       5     6     7     8     9     10     11       12     13     14     15     16     17     18       19     20     LAB WITH [facility]   10:00 AM   (15 min.)    LAB   [facility] Lab     XR CHEST 2 VIEWS   10:15 AM   (15 min.)   UCXR1   [facility] Xray     UMP PFT   10:30 AM   (30 min.)    PFL B   [facility] Pulmonary Function Testing     [facility] RETURN LUNG TX   11:20 AM   (40 min.)   Marco Clay MD   [facility] 21     22     23     24     25       26     27     28     29     30     31                      Recent Results (from the past 24 hour(s))   CBC with platelets    Collection Time: 02/09/17  3:31 PM   Result Value Ref Range    WBC 24.1 (H) 4.0 - 11.0 10e9/L    RBC Count 2.87 (L) 3.8 - 5.2 10e12/L    Hemoglobin 7.5 (L) 11.7 - 15.7 g/dL    Hematocrit 25.5 (L) 35.0 - 47.0 %    MCV 89 78 - 100 fl    MCH 26.1 (L) 26.5 - 33.0 pg    MCHC 29.4 (L) 31.5 - 36.5 g/dL    RDW 23.8 (H) 10.0 - 15.0 %    Platelet Count 644 (H) 150 - 450 10e9/L   Basic metabolic panel    Collection Time: 02/09/17  3:31 PM   Result Value Ref Range    Sodium 133 133 - 144 mmol/L    Potassium 5.9 (H) 3.4 - 5.3 mmol/L    Chloride 103 94 - 109 mmol/L    Carbon Dioxide 21 20 - 32 mmol/L    Anion Gap 9 3 - 14 mmol/L    Glucose 172 (H) 70 - 99 mg/dL    Urea Nitrogen 35 (H) 7 - 30 mg/dL    Creatinine 1.88 (H) 0.52 - 1.04 mg/dL    GFR Estimate 26 (L) >60 mL/min/1.7m2    GFR Estimate If Black 31 (L) >60 mL/min/1.7m2    Calcium 8.2 (L) 8.5 - 10.1 mg/dL   Magnesium    Collection Time: 02/09/17  3:31 PM   Result Value Ref  Range    Magnesium 1.8 1.6 - 2.3 mg/dL   Phosphorus    Collection Time: 02/09/17  3:31 PM   Result Value Ref Range    Phosphorus 2.8 2.5 - 4.5 mg/dL   Routine UA with micro reflex to culture    Collection Time: 02/09/17  5:30 PM   Result Value Ref Range    Color Urine Yellow     Appearance Urine Cloudy     Glucose Urine Negative NEG mg/dL    Bilirubin Urine Negative NEG    Ketones Urine Negative NEG mg/dL    Specific Gravity Urine 1.014 1.003 - 1.035    Blood Urine Negative NEG    pH Urine 5.0 5.0 - 7.0 pH    Protein Albumin Urine Negative NEG mg/dL    Urobilinogen mg/dL 0.0 0.0 - 2.0 mg/dL    Nitrite Urine Negative NEG    Leukocyte Esterase Urine Negative NEG    Source Unspecified Urine     WBC Urine 3 (H) 0 - 2 /HPF    RBC Urine 3 (H) 0 - 2 /HPF    Squamous Epithelial /HPF Urine 1 0 - 1 /HPF    Mucous Urine Present (A) NEG /LPF    Hyaline Casts 11 (A) 0 - 2 /LPF    Amorphous Crystals Few (A) NEG /HPF

## 2017-02-10 NOTE — PATIENT INSTRUCTIONS
Contact Numbers    [facility] Main Line: [phone]  [facility] Triage:  [phone]    Call triage with chills and/or temperature greater than or equal to 100.5, uncontrolled nausea/vomiting, diarrhea, constipation, dizziness, shortness of breath, chest pain, bleeding, unexplained bruising, or any new/concerning symptoms, questions/concerns.     If you are having any concerning symptoms or wish to speak to a provider before your next infusion visit, please call your care coordinator or triage to notify them so we can adequately serve you.       After Hours: [phone]    If after hours, weekends, or holidays, call main hospital  and ask for Oncology doctor on call.           February 2017 Sunday Monday Tuesday Wednesday Thursday Friday Saturday                  1     2     [facility] DRAW    8:45 AM   (15 min.)    [facility] DRAW   [facility] Lab Draw     [facility] RETURN    9:30 AM   (50 min.)   Rand Man APRN CNP   [facility] 3     4       5     6     7     8     9     [facility] DRAW    3:45 PM   (15 min.)    [facility] DRAW   [facility] Lab Draw     [facility] RETURN    4:20 PM   (50 min.)   Rand Man APRN CNP   [facility]     XR CHEST 2 VIEWS    4:45 PM   (15 min.)   UCXR1   [facility] Center Xray     [facility] ONC INFUSION 120    5:00 PM   (120 min.)    ONCOLOGY INFUSION   [facility] 10     [facility] DRAW   12:00 PM   (15 min.)    [facility] DRAW   [facility] Lab Draw     [facility] RETURN   12:30 PM   (50 min.)   Priyanka Potts PA-C   [facility]     [facility] ONC INFUSION 120    1:00 PM   (120 min.)    ONCOLOGY INFUSION   [facility] 11       12     13     [facility] PFT    5:30 PM   (30 min.)    PFL D   [facility] Pulmonary Function Testing     P RETURN LUNG TX    6:20 PM   (40 min.)   Marco Clay MD   [facility] 14     15      16     [facility]    3:00 PM   (15 min.)    [facility] DRAW   [facility] Lab Draw     [facility] RETURN    3:30 PM   (50 min.)   Rand Man APRN CNP   [facility] 17     18       19     20     21     22     23     LAB WITH [facility] CLINIC    3:15 PM   (15 min.)   [facility] Lab     [facility] RETURN    4:00 PM   (30 min.)   Tavares Gomez MD   [facility] 24     25       26     27     28 March 2017 Sunday Monday Tuesday Wednesday Thursday Friday Saturday                  1     2     3     4       5     6     7     8     9     10     11       12     13     14     15     16     17     18       19     20     LAB WITH [facility] CLINIC   10:00 AM   (15 min.)   [facility] Lab     XR CHEST 2 VIEWS   10:15 AM   (15 min.)   UCXR1   [facility] Xray     [facility] PFT   10:30 AM   (30 min.)    PFL B   [facility] Pulmonary Function Testing     [facility] RETURN LUNG TX   11:20 AM   (40 min.)   Marco Clay MD   [facility] 21     22     23     24     25       26     27     28     29     30     31                       Lab Results:  Recent Results (from the past 12 hour(s))   Basic metabolic panel    Collection Time: 02/10/17 12:38 PM   Result Value Ref Range    Sodium 135 133 - 144 mmol/L    Potassium 5.5 (H) 3.4 - 5.3 mmol/L    Chloride 104 94 - 109 mmol/L    Carbon Dioxide 20 20 - 32 mmol/L    Anion Gap 11 3 - 14 mmol/L    Glucose 143 (H) 70 - 99 mg/dL    Urea Nitrogen 34 (H) 7 - 30 mg/dL    Creatinine 1.82 (H) 0.52 - 1.04 mg/dL    GFR Estimate 27 (L) >60 mL/min/1.7m2    GFR Estimate If Black 33 (L) >60 mL/min/1.7m2    Calcium 7.7 (L) 8.5 - 10.1 mg/dL   CBC with platelets    Collection Time: 02/10/17 12:38 PM   Result Value Ref Range    WBC 22.0 (H) 4.0 - 11.0 10e9/L    RBC Count 2.75 (L) 3.8 - 5.2 10e12/L    Hemoglobin 7.3 (L) 11.7 - 15.7 g/dL    Hematocrit 24.4 (L) 35.0 - 47.0 %    MCV 89 78 - 100 fl     MCH 26.5 26.5 - 33.0 pg    MCHC 29.9 (L) 31.5 - 36.5 g/dL    RDW 23.4 (H) 10.0 - 15.0 %    Platelet Count 548 (H) 150 - 450 10e9/L

## 2017-02-10 NOTE — NURSING NOTE
Chief Complaint   Patient presents with     Port Draw     Patient is here today for labs to be drawn via her port by RN. Vitals charted and patient checked into her appt.     Nazia Dela Cruz RN

## 2017-02-10 NOTE — Clinical Note
2/10/2017       RE: Melissa Collazo  [address] [place] [address]     Dear Colleague,    Thank you for referring your patient, Melissa Collazo, to the [facility]. Please see a copy of my visit note below.    No notes on file    Again, thank you for allowing me to participate in the care of your patient.      Sincerely,    Priyanka Potts PA-C

## 2017-02-11 NOTE — PROGRESS NOTES
2-10-17    Reason for Visit: Melissa Collazo is a 76 y.o female with metastatic colon cancer involving the liver. She is seen for routine evaluation.    Oncologic History    Melissa has a history of metastatic colon cancer with liver mets and an illioceliac resection.  She also has a history of COPD s/p R lung tx in 2009, h/o complex lung infection (aspergillus, MAC) DMII, and CKD. She had GI bleeding while on Avastin. She has been treated with Xeloda/Oxaliplatin, irinotecan and erbitux and avastin in the past. She was found to have progressed on Irinotecan/Ertibux. She was initiated on Lonsurf on 12/26/16.     She had completed cycle 1 and then developed complicating infections including facial zoster, bilateral upper extremity cellulitis. She was initiated on Valtrex and linezolid. She presented to the hospital with fatigue, nausea,vomiting. She was treated for HCAP with levofloxacin. She discharged home on 1/27/17. Lonsurf has been held as she recovers from the hospitalization.    Interval history:   Melissa is here today with short term follow up.  She met with Rand Man yesterday and her WBC had increased.  She didn't have any new infectious concerns, but a work up was done due to her complicated history.  She had some dry heaving daily and CMP showed elevated creat to 1.8 and elevated K and she felt better after IVF.  Melissa states after her visit with Rand yesterday she felt better after the IVF and today has not had any nausea or dry heaving.  On a typical day she tries to get in two water bottles for a liter of fluids.  She has an appetite but admits the meals are small. She is drinking 2 Boost daily.  She takes scheduled Zofran for her nausea and prn compazine.  She has chronic LBP and takes prn dilaudid.  Her ostomy output has been loose, but no belly pain or cramping.  Overall- she actually feels more fatigued than when she went in to the hospital, but slightly better than last week.  No f/s/c. Stable cough and  "RAND.     Current Outpatient Prescriptions   Medication Sig Dispense Refill     doxazosin (CARDURA) 2 MG tablet Take 2 tablets (4 mg) by mouth At Bedtime 60 tablet 11     tacrolimus (PROGRAF - GENERIC EQUIVALENT) 0.5 MG capsule Take 7 capsules (3.5 mg) by mouth 2 times daily 140 capsule 2     azithromycin (ZITHROMAX) 250 MG tablet Take 125 mg by mouth daily  15 tablet 11     Prochlorperazine Maleate (COMPAZINE PO) Take 5 mg by mouth every 6 hours as needed for nausea       HYDROmorphone (DILAUDID) 2 MG tablet Take 1-2 tablets (2-4 mg) by mouth every 3 hours as needed for moderate to severe pain 100 tablet 0     Blood Glucose Calibration ([employer] DOC CONTROL) NORMAL SOLN daily as needed       lidocaine (LIDODERM) 5 % Patch Place 1 patch onto the skin every 24 hours 30 patch 3     lidocaine-prilocaine (EMLA) cream APPLY TO PORT SITE ABOUT 45 TO 60 MINUTES PRIOR TO ACCESSING AS NEEDED FOR MODERATE PAIN 30 g 3     order for DME Equipment being ordered:   Hernia Belt     Nu form, BG- Cool comfort- medium,  4\"  Belt ring  3 1/4  Catalog # :  [id number] 1 each 11     ondansetron (ZOFRAN) 8 MG tablet TAKE ONE TABLET BY MOUTH THREE TIMES A DAY BEFORE MEALS 90 tablet 3     dapsone 25 MG tablet TAKE TWO TABLETS BY MOUTH THREE TIMES WEEKLY (MONDAY/WEDNESDAY/FRIDAY) 24 tablet 11     predniSONE (DELTASONE) 5 MG tablet TAKE ONE TABLET BY MOUTH EVERY MORNING 30 tablet 11     zinc sulfate (ZINCATE) 220 MG capsule TAKE ONE CAPSULE BY MOUTH EVERY DAY 30 capsule 11     predniSONE (DELTASONE) 2.5 MG tablet TAKE ONE TABLET BY MOUTH EVERY EVENING 30 tablet 11     sodium bicarbonate 650 MG tablet Take 2 tablets (1,300 mg) by mouth 2 times daily 540 tablet 3     albuterol (PROAIR HFA, PROVENTIL HFA, VENTOLIN HFA) 108 (90 BASE) MCG/ACT inhaler Inhale 2 puffs into the lungs every 6 hours as needed for shortness of breath / dyspnea or wheezing 3 Inhaler 3     montelukast (SINGULAIR) 10 MG tablet Take 1 tablet (10 mg) by mouth every evening 90 " "tablet 3     fluticasone-salmeterol (ADVAIR) 500-50 MCG/DOSE diskus inhaler Inhale 1 puff into the lungs 2 times daily 3 Inhaler 3     omeprazole (PRILOSEC) 40 MG capsule Take 1 capsule (40 mg) by mouth daily 90 capsule 3     amLODIPine (NORVASC) 5 MG tablet Take 2 tablets (10 mg) by mouth At Bedtime 180 tablet 3     atorvastatin (LIPITOR) 20 MG tablet Take 1 tablet (20 mg) by mouth daily 30 tablet      blood glucose monitoring (NO BRAND SPECIFIED) test strip TEST 4 TIMES DAILY.       insulin pen needle 32G X 4 MM USE AS DIRECTED FOR ADMINISTERING INSULIN AT HOME.       aspirin EC 81 MG tablet Take 81 mg by mouth daily        cyanocobalamin (VITAMIN B-12 ER) 1000 MCG TBCR Take 1,000 mcg by mouth every 7 days        levothyroxine (SYNTHROID, LEVOTHROID) 50 MCG tablet Take 50 mcg by mouth daily        order for DME Lightweight Wheelchair with leg rests. 1 Units 0     butalbital-acetaminophen-caffeine (FIORICET, ESGIC) -40 MG per tablet Take 1 tablet by mouth every 4 hours as needed for headaches       order for DME Please dispense 1 hair prosthesis. 1 Units 0     Magnesium Oxide 250 MG TABS Take 2 tablets (500 mg) by mouth At Bedtime 180 tablet 3     loperamide (IMODIUM) 2 MG capsule Take 1 capsule (2 mg) by mouth 3 times daily as needed for other (greater than 1200mL ileostomy output in a 24 hour period.  please call colon and rectal surgery clinic to notify if patient is requiring Imodium.) 20 capsule      ORDER FOR DME Equipment being ordered: ileostomy supplies 1 Month 11     ascorbic acid 500 MG TABS Take 1 tablet (500 mg) by mouth daily 30 tablet 1     Calcium Carbonate-Vitamin D (CALCIUM 500 + D PO) Take 1 tablet by mouth 2 times daily        Multiple Vitamin (MULTI-VITAMIN) per tablet Take 1 tablet by mouth daily.       Exam: alert, appears in NAD, chronically ill appearing. Blood pressure 114/58, pulse 105, temperature 98.1  F (36.7  C), temperature source Oral, height 1.499 m (4' 11.02\"), weight 52.164 " kg (115 lb), SpO2 91 %, not currently breastfeeding.  Oropharynx: moist, no lesion. Has dentures. No icterus or conjunctival injection. Lungs: decreased left lung, no crackles/wheezes. Heart:RRR, soft systolic murmur. Abdomen: nontender, firm in the RUQ. Large herniated ileostomy. Stool is brown in the ostomy. Extremities: warm, 2+ pitting edema from the ankles to the toes bilaterally. L>R which is always the case. No calf pain, cord or tenderness. No residual erythema or swelling in the wrists. Speech is clear. Walks with assistance of a cane.    Labs:   2/2/2017 08:55 2/9/2017 15:31 2/10/2017 12:38   WBC 17.5 (H) 24.1 (H) 22.0 (H)   Hemoglobin 7.2 (L) 7.5 (L) 7.3 (L)   Hematocrit 23.6 (L) 25.5 (L) 24.4 (L)   Platelet Count 274 644 (H) 548 (H)   RBC Count 2.67 (L) 2.87 (L) 2.75 (L)   MCV 88 89 89      2/2/2017 08:55 2/9/2017 15:31 2/10/2017 12:38   Sodium 135 133 135   Potassium 5.0 5.9 (H) 5.5 (H)   Chloride 104 103 104   Carbon Dioxide 20 21 20   Urea Nitrogen 22 35 (H) 34 (H)   Creatinine 1.75 (H) 1.88 (H) 1.82 (H)   GFR Estimate 28 (L) 26 (L) 27 (L)   GFR Estimate If Black 34 (L) 31 (L) 33 (L)   Calcium 8.1 (L) 8.2 (L) 7.7 (L)   Anion Gap 12 9 11     Impression/plan:    1. Metastatic colon cancer. Due for Lonsurf, but given recent infection issues, dehydration and decreased PS, will continue to hold. If labs/lytes are stable tomorrow, should be set up to see me next week to discuss whether she is stable to start cycle 2 Lonsurf.  -Leukocytosis/thombocytosis noted yesterday--? Related to malignancy vs infection, uncertain, but improved today with no new symptoms.    2. ID:afebrile. WBC is again trended up yesterday, better today. Recently completed treatment for bilateral UE cellulitis, facial zoster, and HCAP  -No new symptoms of infection, but still has cough. Chest xray-WNL  -C Diff- NEG  -UAUC-NEG  -aspergillus antigen, pending  -unable to get sputum culture  -Hx of EBV --Last EBV was trending up (Obed  messaged)    3. Chronic anemia: worsened while on Lonsurf. Getting Aranesp every 3 weeks at her local clinic (given last week). Slightly worse today, likely dilutional from IVF. She would like to avoid transfusions for Shinto reasons, but would consider it in an emergency.   -denies dizziness or worsening RAND    4. Osteoporosis/chronic compression fractures-On Calcium plus Vit D. Was initiated on denosumab on 9/22/16. Due in March 2017.  -manages chronic back pain with Hydromorphone 2-4 mg every 3 hours for pain.    5. Lung transplant: follows with Dr. Clay  -prophy dapsone 50 MWF   -Azythromycin 125 mg daily.  -on tacrolimus 3.5 mg bid/pred 5 mg daily    6. Acute on chronic kidney disease. Has increased hyperkalemia today as well.   -EKG yesterday was stable.  K improved today.   -BUN/Cr not much improvement today? No hydro noted on last scans, no new medications.  She felt much better yesterday after IVF, thus another L given today  -has f/u with Dr. Gomez in March      7. HTN: on cardura and amlodipine. BP stable today    8. Endocrine:   -DM: resumed lantus and ss insulin after discharge. Is monitoring BG   -Hypothyrodism: levothyroxine 50 mcg daily. TSH stable on 1/24/17    9. Weakness: was referred to home PT/OT. Melissa stated they are both coming to her house    10. BLE edema: has some chronic edema in the right ankle, but left ankle worsened after recent hospitalization. No clinical signs/symptoms of DVT. Edema is better than last week.  -Advised leg elevation and compression stockings  -didn't feel the IVF from yesterday worsened her edema    11. GI: has had nausea and dry heaving - admits during hospital and since being home as well.  Taking zofran scheduled and prn compazine.  Today feels better, she thinks its the IVF that helped, not sure.     Final plan: reviewed all infection work up from yesterday, leukocytosis improved today. No new s/s of infection.  Creat stable today, clinical benefit from IVF  yesterday and improved K, thus gave another liter today.  I wrote orders for local labs to be drawn on Monday at her local clinic.  She will come later in the week to see Rand Man.     Soraya Potts PA-C

## 2017-02-11 NOTE — TELEPHONE ENCOUNTER
Coordinator on call received call from drug analysis lab with critical prograf level of 29.0.  Dose was documented as having been taken at 8 am on 1/10.  Contacted Melissa.  She had taken her tacrolimus at 8 am on 2/10 (1/10---error) so drug level is not valid.  She had other labs completed.  She has clinic on Monday in evening so will not be able to repeat level on Monday.  She will confirm plan for repeat drug level on Monday and plan to draw locally later next week.

## 2017-02-13 PROBLEM — R06.02 SOB (SHORTNESS OF BREATH): Status: ACTIVE | Noted: 2017-01-01

## 2017-02-13 NOTE — ED PROVIDER NOTES
History     Chief Complaint   Patient presents with     Shortness of Breath     Nausea & Vomiting     HPI  Melissa Collazo is a 76 year old female with a history of metastatic colon cancer with liver metastases, status-post ileocolic resection and anastomosis, end-ileostomy and mucous fistula, complex lung infection (aspergillus, MAC), COPD, status-post right lung transplant (2009), type II diabetes and chronic kidney disease. She was recently hospitalized (1/21/17-1/27/17) with nausea, vomiting and dehydration, and was found to have HCAP, treated with oral Levaquin (completed 2/2/17, 11 days ago), right arm and left wrist cellulitis, completed a course of linezolid and herpes zoster which was treated with valtrex,  The patient presents to the emergency department today with multiple complaints. Over the past week, she complains of worsening fatigue, shortness of breath, upper abdominal pain, nausea and vomiting with PO food intake. She notes that she has been able to drink liquids and tolerate small amounts of yogurt, Jello or crackers in order to take her medications. She has been drinking Boost twice a day for nutrition. She is on azithromycin and dapsone chronically for maintenance. She complains of bilateral lower extremity swelling and notes that she has poor kidney function.      Social:   Patient is accompanied by her daughter.    Past Medical History   Diagnosis Date     Anemia      Arthritis      Aspergillus (H)      in sputum prior to lung transplant, marked sun sensitivity to voraconazole     Asthma      Back pain      S/P radiofreq ablation     Breast nodule      Benign     Cancer of liver (H)      Cancer of right colon (H) 10/7/2014     CKD (chronic kidney disease) stage 3, GFR 30-59 ml/min      COPD (chronic obstructive pulmonary disease) (H)      Diabetes (H)      Encounter for long-term (current) use of other medications      History of blood transfusion      Hyperlipidaemia      Hypertension       Hypogammaglobulinaemia      Lung replaced by transplant (H) 2/19/2009     RSLT     MAC (mycobacterium avium-intracellulare complex)      by bronch 2/2009 treated with levo and azithro     Osteoporosis, unspecified      Other chronic pain      PONV (postoperative nausea and vomiting)      Shortness of breath      Skin disease      Squamous cell carcinoma (H)      Thyroid disease        Past Surgical History   Procedure Laterality Date     Transplant       Right lung tx 2/2009 for        Appendectomy       Hernia repair       Colonoscopy  3/25/2011     COMBINED COLONOSCOPY, REMOVE TUMOR/POLYP/LESION BY SNARE performed by LEIDY DEAL at  GI     Tonsillectomy       Mycobacterium a       Colonoscopy with co2 insufflation N/A 10/7/2014     Procedure: COLONOSCOPY WITH CO2 INSUFFLATION;  Surgeon: Zeinab Anthony MD;  Location: [facility] OR     Laparoscopic ileostomy N/A 10/17/2014     Procedure: LAPAROSCOPIC ILEOSTOMY;  Surgeon: Zeinab Anthony MD;  Location: [facility] OR     Ablate liver tumor N/A 3/19/2015     Procedure: ABLATE LIVER TUMOR;  Surgeon: Casper Poe MD;  Location: [facility] OR     Colectomy right N/A 3/19/2015     Procedure: COLECTOMY RIGHT;  Surgeon: Zeinab Anthony MD;  Location: [facility] OR     Takedown ileostomy N/A 3/19/2015     Procedure: TAKEDOWN ILEOSTOMY;  Surgeon: Zeinab Anthony MD;  Location: [facility] OR     Laparotomy exploratory N/A 3/29/2015     Procedure: LAPAROTOMY EXPLORATORY;  Surgeon: Zeinab Anthony MD;  Location: [facility]     Ileostomy N/A 3/29/2015     Procedure: ILEOSTOMY;  Surgeon: Zeinab Anthony MD;  Location: [facility] OR       Family History   Problem Relation Age of Onset     Other - See Comments Mother      Cancer, possibly lymphoma     Alcohol/Drug Father      Alcoholism     Respiratory Brother      Emphysema     Respiratory Maternal Aunt      Emphysema     CANCER Maternal Uncle      Lung Cancer     Melanoma No family hx of      Skin Cancer No family hx of        Social History    Substance Use Topics     Smoking status: Former Smoker     Packs/day: 1.00     Years: 30.00     Types: Cigarettes     Quit date: 1/1/1993     Smokeless tobacco: Former User     Alcohol use No     Current Facility-Administered Medications   Medication     piperacillin-tazobactam (ZOSYN) 3.375 g vial to attach to  mL bag     vancomycin (VANCOCIN) 1,250 mg in NaCl 0.9 % 250 mL intermittent infusion     vancomycin place hassan - receiving intermittent dosing     Current Outpatient Prescriptions   Medication     doxazosin (CARDURA) 2 MG tablet     tacrolimus (PROGRAF - GENERIC EQUIVALENT) 0.5 MG capsule     azithromycin (ZITHROMAX) 250 MG tablet     Prochlorperazine Maleate (COMPAZINE PO)     HYDROmorphone (DILAUDID) 2 MG tablet     Blood Glucose Calibration (CRIS DOC CONTROL) NORMAL SOLN     lidocaine (LIDODERM) 5 % Patch     lidocaine-prilocaine (EMLA) cream     order for DME     ondansetron (ZOFRAN) 8 MG tablet     dapsone 25 MG tablet     predniSONE (DELTASONE) 5 MG tablet     zinc sulfate (ZINCATE) 220 MG capsule     predniSONE (DELTASONE) 2.5 MG tablet     sodium bicarbonate 650 MG tablet     albuterol (PROAIR HFA, PROVENTIL HFA, VENTOLIN HFA) 108 (90 BASE) MCG/ACT inhaler     montelukast (SINGULAIR) 10 MG tablet     fluticasone-salmeterol (ADVAIR) 500-50 MCG/DOSE diskus inhaler     omeprazole (PRILOSEC) 40 MG capsule     amLODIPine (NORVASC) 5 MG tablet     atorvastatin (LIPITOR) 20 MG tablet     blood glucose monitoring (NO BRAND SPECIFIED) test strip     insulin pen needle 32G X 4 MM     aspirin EC 81 MG tablet     cyanocobalamin (VITAMIN B-12 ER) 1000 MCG TBCR     levothyroxine (SYNTHROID, LEVOTHROID) 50 MCG tablet     order for DME     butalbital-acetaminophen-caffeine (FIORICET, ESGIC) -40 MG per tablet     order for DME     Magnesium Oxide 250 MG TABS     loperamide (IMODIUM) 2 MG capsule     ORDER FOR DME     ascorbic acid 500 MG TABS     Calcium Carbonate-Vitamin D (CALCIUM 500 + D PO)      "Multiple Vitamin (MULTI-VITAMIN) per tablet        Allergies   Allergen Reactions     Morphine Sulfate Dermatitis     Codeine Sulfate Dermatitis     Ativan [Lorazepam] Other (See Comments)     Altered mentation, hallucinations  During hospitalization for influenza in Jan 2015 - ativan caused extreme lethargy and mild confusion.  These symptoms resolved when it was stopped.    Pt's family reported a similar response when she had used it at home for nausea.        Colesevelam Nausea     no appetite, dry heaves     Sulfa Drugs Hives     Adhesive Tape Rash     ACTUALLY,  Just fragile skin.  With tear with EKG lead.      I have reviewed the Medications, Allergies, Past Medical and Surgical History, and Social History in the Epic system.    Review of Systems   Constitutional: Positive for fatigue.   Respiratory: Positive for cough and shortness of breath.    Cardiovascular: Positive for leg swelling (bilateral).   Gastrointestinal: Positive for abdominal pain (upper), nausea and vomiting.   Genitourinary: Negative.    Musculoskeletal: Negative.    Skin: Negative.    All other systems reviewed and are negative.      Physical Exam   BP: 112/55  Heart Rate: 100  Temp: 97.9  F (36.6  C)  Resp: 17  Height: 149.9 cm (4' 11\")  SpO2: (!) 87 %  Physical Exam  Physical Exam   Constitutional:  Elderly female  HENT:   Head: Normocephalic and atraumatic.   Eyes: Conjunctivae are normal. Pupils are equal, round, and reactive to light.   pharynx has no erythema or exudate, mucous membranes are dry  Neck:   no adenopathy, no bony tenderness  Cardiovascular: regular rate and rhythm without murmurs or gallops  Pulmonary/Chest: bibasilar crackles with no wheezes or retractions. No respiratory distress.  GI: Soft with good bowel sounds. Tender midepigastric area, non-distended, with no guarding, no rebound, no peritoneal signs. Ileostomy  Back:  No bony or CVA tenderness   Musculoskeletal:  no edema or clubbing, brownish discoloration of " dorsum of hands, hands and wrists do not appear swollen, venous stasis changes  of lower extremities,    Skin: Skin is warm and dry. No rash noted.   Neurological: alert and oriented to person, place, and time. Nonfocal exam  Psychiatric:  normal mood and affect.   ED Course     ED Course     Procedures             EKG Interpretation:      Interpreted by Janna Gutierrez  Time reviewed: 1155 am  Symptoms at time of EKG: see hpi   Rhythm: normal sinus   Rate: normal  Axis: normal  Ectopy: none  Conduction: normal  ST Segments/ T Waves: No ST-T wave changes  Q Waves: none  Comparison to prior: 2/9/2017: sinus tachycardia rate of 102 bpm    Clinical Impression: NSR, rate of 93 bpm           Critical Care time:  none             Results for orders placed or performed during the hospital encounter of 02/13/17 (from the past 24 hour(s))   CBC with platelets differential   Result Value Ref Range    WBC 17.1 (H) 4.0 - 11.0 10e9/L    RBC Count 2.96 (L) 3.8 - 5.2 10e12/L    Hemoglobin 7.3 (L) 11.7 - 15.7 g/dL    Hematocrit 25.3 (L) 35.0 - 47.0 %    MCV 86 78 - 100 fl    MCH 24.7 (L) 26.5 - 33.0 pg    MCHC 28.9 (L) 31.5 - 36.5 g/dL    RDW 22.4 (H) 10.0 - 15.0 %    Platelet Count 443 150 - 450 10e9/L    Diff Method Automated Method     % Neutrophils 89.3 %    % Lymphocytes 5.4 %    % Monocytes 3.9 %    % Eosinophils 1.0 %    % Basophils 0.0 %    % Immature Granulocytes 0.4 %    Nucleated RBCs 0 0 /100    Absolute Neutrophil 15.3 (H) 1.6 - 8.3 10e9/L    Absolute Lymphocytes 0.9 0.8 - 5.3 10e9/L    Absolute Monocytes 0.7 0.0 - 1.3 10e9/L    Absolute Eosinophils 0.2 0.0 - 0.7 10e9/L    Absolute Basophils 0.0 0.0 - 0.2 10e9/L    Abs Immature Granulocytes 0.1 0 - 0.4 10e9/L    Absolute Nucleated RBC 0.0     Platelet Estimate Confirming automated cell count    INR   Result Value Ref Range    INR 1.48 (H) 0.86 - 1.14   Comprehensive metabolic panel   Result Value Ref Range    Sodium 134 133 - 144 mmol/L    Potassium 5.5 (H) 3.4 - 5.3  mmol/L    Chloride 103 94 - 109 mmol/L    Carbon Dioxide 22 20 - 32 mmol/L    Anion Gap 9 3 - 14 mmol/L    Glucose 77 70 - 99 mg/dL    Urea Nitrogen 36 (H) 7 - 30 mg/dL    Creatinine 2.04 (H) 0.52 - 1.04 mg/dL    GFR Estimate 24 (L) >60 mL/min/1.7m2    GFR Estimate If Black 29 (L) >60 mL/min/1.7m2    Calcium 8.0 (L) 8.5 - 10.1 mg/dL    Bilirubin Total 0.4 0.2 - 1.3 mg/dL    Albumin 2.5 (L) 3.4 - 5.0 g/dL    Protein Total 5.6 (L) 6.8 - 8.8 g/dL    Alkaline Phosphatase 492 (H) 40 - 150 U/L    ALT 36 0 - 50 U/L    AST 65 (H) 0 - 45 U/L   Lipase   Result Value Ref Range    Lipase 67 (L) 73 - 393 U/L   Lactic acid whole blood   Result Value Ref Range    Lactic Acid 1.1 0.7 - 2.1 mmol/L   Troponin I   Result Value Ref Range    Troponin I ES  0.000 - 0.045 ug/L     <0.015  The 99th percentile for upper reference range is 0.045 ug/L.  Troponin values in   the range of 0.045 - 0.120 ug/L may be associated with risks of adverse   clinical events.     UA reflex to Microscopic and Culture   Result Value Ref Range    Color Urine Yellow     Appearance Urine Slightly Cloudy     Glucose Urine Negative NEG mg/dL    Bilirubin Urine Negative NEG    Ketones Urine Negative NEG mg/dL    Specific Gravity Urine 1.010 1.003 - 1.035    Blood Urine Negative NEG    pH Urine 5.0 5.0 - 7.0 pH    Protein Albumin Urine 10 (A) NEG mg/dL    Urobilinogen mg/dL Normal 0.0 - 2.0 mg/dL    Nitrite Urine Negative NEG    Leukocyte Esterase Urine Negative NEG    Source Midstream Urine     RBC Urine 1 0 - 2 /HPF    WBC Urine 4 (H) 0 - 2 /HPF    Bacteria Urine Few (A) NEG /HPF    Squamous Epithelial /HPF Urine 1 0 - 1 /HPF    Transitional Epi 1 0 - 1 /HPF    Mucous Urine Present (A) NEG /LPF    Hyaline Casts 18 (H) 0 - 2 /LPF    Granular Casts 2 (A) NEG /LPF   Chest CT w/o contrast    Narrative    CT of the chest without contrast    HISTORY: Shortness of breath lung transplant nausea    COMPARISON STUDY: 12/16/2016.    FINDINGS: Right IJ Port-A-Cath tip in  the mid SVC. Heart is not  enlarged. Coronary calcifications. Surgical changes of right-sided  lung transplant. Emphysematous changes of the native left lung with  wedge resection scarring in the left apex. Right lower lobe  atelectasis and consolidation with subpleural calcification is new.  The main pulmonary artery measures 2.6 cm. Ascending aorta is within  normal size limits. No mediastinal, hilar or axillary adenopathy.  Trace right pleural effusion.    Evaluation of the upper abdomen is limited and is without contrast.  Diffuse hepatic metastases and hepatomegaly are again noted but  difficult to visualize due to lack of intravenous contrast. Trace  ascites.. A cyst and a low-attenuation lesion in the upper pole of the  right kidney again noted.    Bones: Ill-defined osteosclerotic bone metastases are noted throughout  the thoracic spine. Multiple old right-sided rib fractures.  Compression deformities of T9 and L1.      Impression    IMPRESSION:  1. New consolidation containing parenchymal calcification in the right  lower lobe with associated trace pleural effusion. Findings may be  secondary to or combination of infection, aspiration pneumonia or  possibly sequela of thromboembolic disease.  2. Diffuse hepatic metastases partially imaged.  3.. Diffuse osteoblastic metastases throughout the thoracic spine.    DOMINIC LANDA MD   Glucose by meter   Result Value Ref Range    Glucose 84 70 - 99 mg/dL     *Note: Due to a large number of results and/or encounters for the requested time period, some results have not been displayed. A complete set of results can be found in Results Review.      Labs Ordered and Resulted from Time of ED Arrival Up to the Time of Departure from the ED   CBC WITH PLATELETS DIFFERENTIAL - Abnormal; Notable for the following:        Result Value    WBC 17.1 (*)     RBC Count 2.96 (*)     Hemoglobin 7.3 (*)     Hematocrit 25.3 (*)     MCH 24.7 (*)     MCHC 28.9 (*)     RDW 22.4 (*)      Absolute Neutrophil 15.3 (*)     All other components within normal limits   INR - Abnormal; Notable for the following:     INR 1.48 (*)     All other components within normal limits   COMPREHENSIVE METABOLIC PANEL - Abnormal; Notable for the following:     Potassium 5.5 (*)     Urea Nitrogen 36 (*)     Creatinine 2.04 (*)     GFR Estimate 24 (*)     GFR Estimate If Black 29 (*)     Calcium 8.0 (*)     Albumin 2.5 (*)     Protein Total 5.6 (*)     Alkaline Phosphatase 492 (*)     AST 65 (*)     All other components within normal limits   LIPASE - Abnormal; Notable for the following:     Lipase 67 (*)     All other components within normal limits   UA MACROSCOPIC WITH REFLEX TO MICRO AND CULTURE - Abnormal; Notable for the following:     Protein Albumin Urine 10 (*)     WBC Urine 4 (*)     Bacteria Urine Few (*)     Mucous Urine Present (*)     Hyaline Casts 18 (*)     Granular Casts 2 (*)     All other components within normal limits   LACTIC ACID WHOLE BLOOD   TROPONIN I   GLUCOSE BY METER   BLOOD CULTURE   BLOOD CULTURE       Assessments & Plan (with Medical Decision Making)       I have reviewed the nursing notes.  Emergency Department course:  The patient was seen and examined at 1119 am.   Laboratory studies are notable for leukocytosis, with a WBC of 17.1.  This is down from her previous WBC of 22,000 the patient is anemic, with a hemoglobin of 7.3, consistent with prior results.  INR is elevated at 1.48.  Patient is hyperkalemic with potassium of 5.5.  She also has renal insufficiency, with creatinine of 2.04 and a GFR of 24.  This is slightly worse than prior results.  Alkphos is elevated at 492 and AST of 65. .  Lipase is low at 67. Lactic is within normal limits at 1.1.  Blood cultures are pending.   Chest CT shows IMPRESSION:  1. New consolidation containing parenchymal calcification in the right  lower lobe with associated trace pleural effusion. Findings may be  secondary to or combination of infection,  aspiration pneumonia or  possibly sequela of thromboembolic disease.  2. Diffuse hepatic metastases partially imaged.  3.. Diffuse osteoblastic metastases throughout the thoracic spine.   I consulted Pulmonology regarding antibiotic choice in this complicated patient with lung transplantation, metastatic colon cancer and history of COPD, Aspergillus and MAC.  Dr. Solis of pulmonary recommended treating the patient with Zosyn and vancomycin.  These were both ordered IV in the ED.  The patient will be admitted to the pulmonology service for further evaluation and treatment and for IV antibiotics.  I did speak to heme/onc regarding the patient and heme/onc felt she would be more appropriately be admitted to the pulmonary service.  I have reviewed the findings, diagnosis, plan and need for follow up with the patient.    New Prescriptions    No medications on file       Final diagnoses:   HCAP (healthcare-associated pneumonia)   Lung replaced by transplant (H)   Colon carcinoma metastatic to multiple sites (H)     I, Kristofer Gomez, am serving as a trained medical scribe to document services personally performed by Janna Gutierrez MD, based on the provider's statements to me.   Janna BAUM MD, was physically present and have reviewed and verified the accuracy of this note documented by Kristofer Gomez.     2/13/2017   [facility], EMERGENCY DEPARTMENT     Janna Gutierrez MD  02/13/17 [id number]

## 2017-02-13 NOTE — H&P
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 Other hx includes metastatic colon cancer with liver mets s/p ileocolic resection and ostomy, most recently on lonsurf. Lonsurf has most recently been held d/t complications of deconditioning and infections including facial zoster and bilateral upper extremity cellulitis (treated with Valtrex and Linezolid). Was recently hospitalized -, treated for HCAP with levaquin (completed ). The patient is now admitted on 2017 for increasing fatigue, SOB, N/V, and upper abdominal pain. New RLL consolidation seen on CT chest (). Also with ROGERIO.    #HCAP:  #Leukocytosis: Has been experiencing worsening shortness of breath and cough over the past week. Associated with persistent fatigue. Has not felt to be at her baseline since prior to last admission. Presents with leukocytosis and new RLL consolidation on CT chest (). Recently completed a 10 day course of Levaquin as of  for HCAP treatment. Most recent sputum results from  growing candida albicans. Lactate 1.1 ().  - Check procalcitonin.  - Blood cultures pending results ()  - Start empiric zosyn/ vancomycin  - Sputum cultures ordered  - Respiratory viral panel and influenza A/B pending results    #Nausea/ Anorexia:  # Abdominal Pain: Persistent dry heaves over the past week. No significant emesis. Loss of appetite. Upper abdominal pain, worse on palpation. Elevated LFTs from baseline ().  - Add Marionol 5 mg BID AC  - Zofran and compazine PRN nausea  - Regular diet, advance as  tolerated  - Plan for nutrition consult  - Resume multivitamins per home regimen.  - Trend LFTs tomorrow AM.    #Dehydration:  #ROGERIO on CKD: Baseline creatinine 1.4-1.8. Creatinine (02/13) 2.04. Etiology likely dehydration secondary to nausea. Has been receiving intermittent IV fluids as outpatient over the past couple of weeks.  - NS at 90ml/hr for hydration.  - Avoid/ hold nephrotoxic agents as possible.  - Daily BMP  - Ordered EKG    #Fatigue:  #Deconditioning: Persistent, progressive fatigue over the past week. Deconditioning over the past couple of months.  - Infectious workup as above.  - PT/ OT consulted      #COPD s/p R lung transplant 02/19/2009: Followed by Dr. Clay in pulmonary clinic. 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 EBV DNA level was 2030 on 12/12/16. Repeat level on 01/23 was 4243.   - Continue PTA singulair, Advair, and azithromycin (125 mg daily)  - Monitoring EBV level periodically, every 1-2 months    Continue 2 drug immunosuppression:  - Tacrolimus 3.5 mg BID. Target goal 8-10. Next level scheduled for 02/14 (ordered).  - Prednisone 5 mg qAM/ 2.5 mg qPM    Prophylaxis:  - Continue dapsone 50 mg MWF for PJP ppx.    ALLI/ CLAD:  - Continue home dose Advair, montelukast, azithromycin (125 mg daily)    # H/o mycobacterium avium intracellulare:  Last positive culture 09/2013.   - Continue azithromycin per PTA dose as above.    #Stage IV colon cancer with liver mets. Primary is Dr. Lebron. Prior treatment with xeloda/oxaliplatin, irinotecan, erbitux, and avastin. She recently was found to have progression on irinotecan/erbitux. She was started on lonsurf on 12/26/16. Has not received chemo in about three weeks per patient report. Had been on hold d/t deconditioning and infections. 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  - Hematology/ Oncology consulted    # Osteoporosis/ chronic compression  "fractures:  # Low Back Pain: Was taking calcium plus vitamin D PTA. Was initiated on denosumab on 09/22/16, next dose due March 2017.  - Hold caltrate until ROGERIO improved  - Continue PRN dilaudid and lidocaine patch per PTA dosing.    #Hypothyroidism.   - Continue home levothyroxine.      #Acute on chronic anemia. Baseline hgb in the past couple of weeks 7.2-7.5. Admission Hgb 7.3 (02/13). No signs of acute blood loss. Patient is a [employer]. Has received blood transfusion in the past.  - Will repeat CBC daily to trend.  - Receives aranesp injections as outpatient [id number]     #HTN. Controlled, /66.  - Resume norvasc and doxazosin per home regimen    # HLD:  Was taking statin and low dose ASA PTA.  - Resume per home regimen.     #DM2. Glucose well controlled, 77-84. Patient reports she had been taking lantus 4 units daily HS with novolog sliding scale PTA.  - Will hold Lantus for now d/t poor PO intake and adequately controlled glucose levels.  - Ordered low resistance Novolog sliding scale TID meals.  - Monitoring glucose QID ACHS.     Patient seen and discussed with Dr. Solis.    ACACIA Haider, CNP  Inpatient Nurse Practitioner  [facility]  Pager [phone]     Chief Complaint:     Worsening shortness of breath, persistent fatigue    History of Present Illness:     History obtained from Patient.    Worsening shortness of breath over the past week. Upon recent discharge, still did not feel she was totally back to her baseline although she was feeling better. Nonproductive cough. Feels \"rattly\". Fatigue worsening. Afebrile. Hemodynamically stable. Has been around a sick grandson recently. Has been experiencing dry heaves over the past several days with no significant emesis. Some loose stools in ostomy, but nothing out of the ordinary per patient. Endorsing mild abdominal pain, worse upon palpation. Lack of appetite.    Review of Systems:     C: negative for fever, chills, change in weight, + " change in appetite  INTEGUMENTARY/SKIN: no rash or obvious new lesions  ENT/MOUTH: no sore throat, no sinus pain, no nasal drainage  RESP: see interval history  CV: no chest pain, no palpitations, + peripheral edema, no orthopnea  GI: + nausea, no vomiting, no reflux symptoms, + change in stools  : no dysuria  MUSCULOSKELETAL: no myalgias, no arthralgias  ENDOCRINE: blood sugars with adequate control  NEURO: no headache, no numbness or tingling  PSYCHIATRIC: mood stable    Medical and Surgical History:     Past Medical History   Diagnosis Date     Anemia      Arthritis      Aspergillus (H)      in sputum prior to lung transplant, marked sun sensitivity to voraconazole     Asthma      Back pain      S/P radiofreq ablation     Breast nodule      Benign     Cancer of liver (H)      Cancer of right colon (H) 10/7/2014     CKD (chronic kidney disease) stage 3, GFR 30-59 ml/min      COPD (chronic obstructive pulmonary disease) (H)      Diabetes (H)      Encounter for long-term (current) use of other medications      History of blood transfusion      Hyperlipidaemia      Hypertension      Hypogammaglobulinaemia      Lung replaced by transplant (H) 2/19/2009     RSLT     MAC (mycobacterium avium-intracellulare complex)      by bronch 2/2009 treated with levo and azithro     Osteoporosis, unspecified      Other chronic pain      PONV (postoperative nausea and vomiting)      Shortness of breath      Skin disease      Squamous cell carcinoma (H)      Thyroid disease        Past Surgical History   Procedure Laterality Date     Transplant       Right lung tx 2/2009 for        Appendectomy       Hernia repair       Colonoscopy  3/25/2011     COMBINED COLONOSCOPY, REMOVE TUMOR/POLYP/LESION BY SNARE performed by LEIDY DEAL at  GI     Tonsillectomy       Mycobacterium a       Colonoscopy with co2 insufflation N/A 10/7/2014     Procedure: COLONOSCOPY WITH CO2 INSUFFLATION;  Surgeon: Zeinab Anthony MD;   Location: [facility] OR     Laparoscopic ileostomy N/A 10/17/2014     Procedure: LAPAROSCOPIC ILEOSTOMY;  Surgeon: Zeinab Anthony MD;  Location: [facility] OR     Ablate liver tumor N/A 3/19/2015     Procedure: ABLATE LIVER TUMOR;  Surgeon: Casper Poe MD;  Location: [facility] OR     Colectomy right N/A 3/19/2015     Procedure: COLECTOMY RIGHT;  Surgeon: Zeinab Anthony MD;  Location: [facility] OR     Takedown ileostomy N/A 3/19/2015     Procedure: TAKEDOWN ILEOSTOMY;  Surgeon: Zeinab Anthony MD;  Location: [facility] OR     Laparotomy exploratory N/A 3/29/2015     Procedure: LAPAROTOMY EXPLORATORY;  Surgeon: Zeinab Anthony MD;  Location: [facility] OR     Ileostomy N/A 3/29/2015     Procedure: ILEOSTOMY;  Surgeon: Zeinab Anthony MD;  Location: [facility] OR       Social and Family History:     Social History     Social History     Marital status:      Spouse name: N/A     Number of children: N/A     Years of education: N/A     Occupational History     Not on file.     Social History Main Topics     Smoking status: Former Smoker     Packs/day: 1.00     Years: 30.00     Types: Cigarettes     Quit date: 1/1/1993     Smokeless tobacco: Former User     Alcohol use No     Drug use: No     Sexual activity: Not on file     Other Topics Concern     Not on file     Social History Narrative       Family History   Problem Relation Age of Onset     Other - See Comments Mother      Cancer, possibly lymphoma     Alcohol/Drug Father      Alcoholism     Respiratory Brother      Emphysema     Respiratory Maternal Aunt      Emphysema     CANCER Maternal Uncle      Lung Cancer     Melanoma No family hx of      Skin Cancer No family hx of        Allergies and Home Medications:        Allergies   Allergen Reactions     Morphine Sulfate Dermatitis     Codeine Sulfate Dermatitis     Ativan [Lorazepam] Other (See Comments)     Altered mentation, hallucinations  During hospitalization for influenza in Jan 2015 - ativan caused extreme lethargy and mild confusion.   "These symptoms resolved when it was stopped.    Pt's family reported a similar response when she had used it at home for nausea.        Colesevelam Nausea     no appetite, dry heaves     Sulfa Drugs Hives     Adhesive Tape Rash     ACTUALLY,  Just fragile skin.  With tear with EKG lead.         (Not in a hospital admission)    Physical Exam:     Constitutional: Awake, alert, in no apparent distress.   HEENT: Eyes with pink conjunctivae, no scleral jaundice.  Oral mucosa moist without lesions. Neck supple without lymphadenopathy.   PULM: Lung sounds diminished to the left. Fine, inspiratory crackles to RLL. No wheeze.  CV: Normal S1 and S2. RRR.  No murmur, gallop, or rub.  1-2+ peripheral edema.  Peripheral pulses intact.   ABD: NABS, soft, tender, nondistended.  No guarding.   MSK: Moves all extremities.  No apparent muscle wasting.   NEURO: Alert and oriented x 4, conversant.   SKIN: Warm, dry. No pruritus.  No rash on limited exam.   PSYCH: Mood stable, judgment and insight appropriate.    Lines, Drains, and Devices:  Peripheral IV 05/07/15 Right;Anterior Upper forearm (Active)   Number of days:648       Port A Cath 10/22/14 Right Chest wall (Active)   Access Date 02/13/17 2/13/2017 11:26 AM   Access Attempts 1 2/13/2017 11:26 AM   Gauge/Length Noncoring 90 degree bend;3/4 inch;20 gauge 2/13/2017 11:26 AM   Site Assessment WDL 2/13/2017 11:26 AM   Line Status Blood return on all lumens 2/13/2017 11:26 AM   Extravasation? No 2/10/2017 12:00 PM   Dressing Intervention Transparent 2/13/2017 11:26 AM   Dressing change due 01/28/17 1/27/2017  8:30 AM   Needle Change Due 01/28/17 1/27/2017  8:30 AM   De-Access Date 02/10/17 2/10/2017  3:00 PM   Date to be Reflushed 02/10/17 2/10/2017 12:00 PM   Number of days:845     Data:     Vital signs:  Temp: 97.9  F (36.6  C) Temp src: Oral BP: 129/66   Heart Rate: 100 Resp: 17 SpO2: 96 % O2 Device: Nasal cannula Oxygen Delivery: 4 LPM Height: 149.9 cm (4' 11\")      Weight trend: " There were no vitals filed for this visit.     I/O: No intake or output data in the 24 hours ending 02/13/17 1608    Labs    CMP:   Recent Labs  Lab 02/13/17  1128 02/10/17  1238 02/09/17  1531    135 133   POTASSIUM 5.5* 5.5* 5.9*   CHLORIDE 103 104 103   CO2 22 20 21   ANIONGAP 9 11 9   GLC 77 143* 172*   BUN 36* 34* 35*   CR 2.04* 1.82* 1.88*   GFRESTIMATED 24* 27* 26*   GFRESTBLACK 29* 33* 31*   RUBY 8.0* 7.7* 8.2*   MAG  --   --  1.8   PHOS  --   --  2.8   PROTTOTAL 5.6*  --   --    ALBUMIN 2.5*  --   --    BILITOTAL 0.4  --   --    ALKPHOS 492*  --   --    AST 65*  --   --    ALT 36  --   --      CBC:   Recent Labs  Lab 02/13/17  1128 02/10/17  1238 02/09/17  1531   WBC 17.1* 22.0* 24.1*   RBC 2.96* 2.75* 2.87*   HGB 7.3* 7.3* 7.5*   HCT 25.3* 24.4* 25.5*   MCV 86 89 89   MCH 24.7* 26.5 26.1*   MCHC 28.9* 29.9* 29.4*   RDW 22.4* 23.4* 23.8*    548* 644*     INR:   Recent Labs  Lab 02/13/17  1128   INR 1.48*     Glucose:   Recent Labs  Lab 02/13/17  1314 02/13/17  1128 02/10/17  1238 02/09/17  1531   GLC  --  77 143* 172*   BGM 84  --   --   --      Blood Gas: No lab results found in last 7 days.  Culture Data   Recent Labs  Lab 02/13/17  1128 02/09/17  1803 02/09/17  1719 02/09/17  1710   CULT No growth after 2 hours Should be ordered as a future orderCanceled, Test credited No growth after 4 days No growth after 4 days     Virology Data: Lab Results   Component Value Date    INFLUA Negative 06/11/2013    FLUAH1 Negative 01/21/2017    FLUAH3 Negative 01/21/2017    VP4728 Negative 01/21/2017    IFLUB Negative 01/21/2017    RSVA Negative 01/21/2017    RSVB Negative 01/21/2017    PIV1 Negative 01/21/2017    PIV2 Negative 01/21/2017    PIV3 Negative 01/21/2017    HMPV Negative 01/21/2017    HRVS Negative 01/21/2017    ADVBE Negative 01/21/2017    ADVC Negative 01/21/2017     Historical CMV results (last 3 of prior testing):  Lab Results   Component Value Date    CMVQNT  12/12/2016     CMV DNA Not  Detected   The JEREMIAS AmpliPrep/JEREMIAS TaqMan CMV Test is an FDA-approved in vitro nucleic   acid amplification test for the quantitation of cytomegalovirus DNA in human   plasma (EDTA plasma) using the JEREMIAS AmpliPrep Instrument for automated viral   nucleic acid extraction and the JEREMIAS TaqMan Analyzer or JEREMIAS TaqMan for   automated Real Time amplification and detection of the viral nucleic acid   target.   Titer results are reported in International Units/mL (IU/mL using 1st WHO   International standard for Human Cytomegalovirus for Nucleic Acid Amplification   based assays. The conversion factor between CMV DNA copis/mL (as defined by the   Roche JEREMIAS TaqMan CMV test) and International Units is the CMV DNA   concentration in IU/mL x 1.1 copies/IU = CMV DNA in copies/mL.   This assay has received FDA approval for the testing of human plasma only. The   Infectious Disease Diagnostic Laboratory at the [facility], [place], has validated the performance characteristics of the Roche   CMV assay for plasma, bronchial alveolar lavage/wash and urine.      CMVQNT  09/12/2016     CMV DNA Not Detected   The JEREMIAS AmpliPrep/JEREMIAS TaqMan CMV Test is an FDA-approved in vitro nucleic   acid amplification test for the quantitation of cytomegalovirus DNA in human   plasma (EDTA plasma) using the JEREMIAS AmpliPrep Instrument for automated viral   nucleic acid extraction and the JEREMIAS TaqMan Analyzer or JEREMIAS TaqMan for   automated Real Time amplification and detection of the viral nucleic acid   target.   Titer results are reported in International Units/mL (IU/mL using 1st WHO   International standard for Human Cytomegalovirus for Nucleic Acid Amplification   based assays. The conversion factor between CMV DNA copis/mL (as defined by the   Roche JEREMIAS TaqMan CMV test) and International Units is the CMV DNA   concentration in IU/mL x 1.1 copies/IU = CMV DNA in copies/mL.   This assay has received FDA  approval for the testing of human plasma only. The   Infectious Disease Diagnostic Laboratory at the [facility], [place], has validated the performance characteristics of the Roche   CMV assay for plasma, bronchial alveolar lavage/wash and urine.      CMVQNT  07/18/2016     CMV DNA Not Detected   The JEREMIAS AmpliPrep/JEREMIAS TaqMan CMV Test is an FDA-approved in vitro nucleic   acid amplification test for the quantitation of cytomegalovirus DNA in human   plasma (EDTA plasma) using the JEREMIAS AmpliPrep Instrument for automated viral   nucleic acid extraction and the JEREMIAS TaqMan Analyzer or JEREMIAS TaqMan for   automated Real Time amplification and detection of the viral nucleic acid   target.   Titer results are reported in International Units/mL (IU/mL using 1st WHO   International standard for Human Cytomegalovirus for Nucleic Acid Amplification   based assays. The conversion factor between CMV DNA copis/mL (as defined by the   Roche JEREMIAS TaqMan CMV test) and International Units is the CMV DNA   concentration in IU/mL x 1.1 copies/IU = CMV DNA in copies/mL.   This assay has received FDA approval for the testing of human plasma only. The   Infectious Disease Diagnostic Laboratory at the [facility], [place], has validated the performance characteristics of the Roche   CMV assay for plasma, bronchial alveolar lavage/wash and urine.       Lab Results   Component Value Date    CMVLOG Not Calculated 12/12/2016    CMVLOG Not Calculated 09/12/2016    CMVLOG Not Calculated 07/18/2016     Urine Studies  Recent Labs   Lab Test  02/13/17   1306  02/09/17   1730   URINEPH  5.0  5.0   NITRITE  Negative  Negative   LEUKEST  Negative  Negative   WBCU  4*  3*     Most Recent [place] Pulmonary Function Testing (FVC/FEV1 only)  FVC-Pre   Date Value Ref Range Status   12/12/2016 1.10 L    09/12/2016 1.09 L    07/18/2016 1.07 L    03/14/2016 1.22 L    03/14/2016 1.22 L       FVC-%Pred-Pre   Date Value Ref Range Status   12/12/2016 51 %    09/12/2016 50 %    07/18/2016 49 %    03/14/2016 55 %    03/14/2016 55 %      FEV1-Pre   Date Value Ref Range Status   12/12/2016 0.90 L    09/12/2016 0.88 L    07/18/2016 0.88 L    03/14/2016 0.99 L    03/14/2016 0.99 L      FEV1-%Pred-Pre   Date Value Ref Range Status   12/12/2016 53 %    09/12/2016 52 %    07/18/2016 52 %    03/14/2016 58 %    03/14/2016 58 %

## 2017-02-13 NOTE — NURSING NOTE
Labs per clinic 2A protocol.  Follow up/CKD 3  Last OV: 12/15/16  Bing Marks LPN  Nephrology  [facility]  [phone]

## 2017-02-13 NOTE — PHARMACY-VANCOMYCIN DOSING SERVICE
Pharmacy Vancomycin Initial Note  Date of Service 2017  Patient's  1940  76 year old, female    Indication: [facility] Pneumonia    Current estimated CrCl = Estimated Creatinine Clearance: 19.3 mL/min (based on Cr of 2.04).    Creatinine for last 3 days  2017: 11:28 AM Creatinine 2.04 mg/dL    Recent Vancomycin Level(s) for last 3 days  No results found for requested labs within last 72 hours.      Vancomycin IV Administrations (past 72 hours)      No vancomycin orders with administrations in past 72 hours.                Nephrotoxins and other renal medications (Future)    Start     Dose/Rate Route Frequency Ordered Stop    17 1425  vancomycin (VANCOCIN) 1,250 mg in NaCl 0.9 % 250 mL intermittent infusion      1,250 mg Intravenous ONCE 17 1424      17 1423  vancomycin place hassan - receiving intermittent dosing      1 each Does not apply SEE ADMIN INSTRUCTIONS 17 1424      17 1408  piperacillin-tazobactam (ZOSYN) 3.375 g vial to attach to  mL bag     Comments:  DO NOT USE THIS FIELD FOR ADMIN INSTRUCTIONS; INFORMATION DOES NOT SHOW ON MAR. USE THE FIELD ABOVE MARKED ADMIN INSTRUCTIONS    3.375 g  100 mL/hr over 1 Hours Intravenous ONCE 17 1407            Contrast Orders - past 72 hours     None                Plan:  1.  Start intermittent dosing.  Give vancomycin 1250 mg IV x1 (24mg/kg using ABW = 52.2kg).    2.  Goal Trough Level: 15-20 mg/L   3.  Pharmacy will check trough levels as appropriate in 1-3 Days.    4. Serum creatinine levels will be ordered daily for the first week of therapy and at least twice weekly for subsequent weeks.    5. [place] method utilized to dose vancomycin therapy: Method 2    Vancomycin is a restricted antibiotic and requires ID/Antimicrobial Management Team (AMT) approval for empiric use beyond 48 hours (2/15).    Stella Mixon, PharmD

## 2017-02-14 NOTE — PROGRESS NOTES
"CLINICAL NUTRITION SERVICES - ASSESSMENT NOTE     Nutrition Prescription    RECOMMENDATIONS FOR MDs/PROVIDERS TO ORDER:  -If patient having ongoing n/v consider scheduling anti-emetics    Malnutrition Status:    -Unable to assess    Recommendations already ordered by Registered Dietitian (RD):  -Ordered ensure BID (chocolate)  -Ordered calorie counts to assess PO intake    Future/Additional Recommendations:  -Continue vitamin/mineral supplementation per home regimen  -Continue marinol     REASON FOR ASSESSMENT  Melissa Collazo is a/an 76 year old female assessed by the dietitian for Admission Nutrition Risk Screen for unintentional loss of 10# or more in the past two months and reduced oral intake over the last month and Provider Order - Evaluate nutritional status    NUTRITION HISTORY  Attempted to visit with patient but unable to see d/t pt sleeping and then not in room.     Patient seen and assessed by RD on 1/22/17. At this time patient had been having poor PO for a few weeks d/t chemotherapy. The chemo was put on hold and intake improved some with pt drinking ~ 2 boost per day + small snacks throughout the day.    Noted dry heaves and loss of appetite PTA.    PMH: lung tx and metastatic colon caner current lonsurf currently on hold d/t complications. Admitted for fatigue, SOB, n/v and abdominal pain.     CURRENT NUTRITION ORDERS  Diet: Regular  Intake/Tolerance: per nursing flowsheets patient eating 25-75% of meals    LABS  Labs reviewed    MEDICATIONS  Calcium w/ vitaminD  Vitamin B12  marinol-started 2/14  MVI w/minerals  Zinc 220mg/day  IVF @ 90mL/hr    ANTHROPOMETRICS  Height: 149.9 cm (4' 11\")  Most Recent Weight: 53 kg (116 lb 14.4 oz)    IBW: 44kg  BMI: Normal BMI  Weight History:   Wt Readings from Last 15 Encounters:   02/13/17 53 kg (116 lb 14.4 oz)   02/10/17 52.2 kg (115 lb)   02/09/17 51.3 kg (113 lb)   02/02/17 51.7 kg (113 lb 14.4 oz)   01/27/17 52.8 kg (116 lb 6.4 oz)   01/20/17 53.3 kg (117 lb 8 " oz)   01/17/17 52.8 kg (116 lb 6.5 oz)   01/10/17 51.5 kg (113 lb 8.6 oz)   12/19/16 50.6 kg (111 lb 8 oz)   12/15/16 51.3 kg (113 lb 0.1 oz)   12/15/16 51 kg (112 lb 6.4 oz)   12/12/16 51.3 kg (113 lb)   12/08/16 52.3 kg (115 lb 4.8 oz)   12/01/16 52.4 kg (115 lb 9.6 oz)   11/25/16 52 kg (114 lb 9.6 oz)   -Weights stable    Dosing Weight: 53kg    ASSESSED NUTRITION NEEDS  Estimated Energy Needs: 5001-0151 kcals/day (25 - 30 kcals/kg)  Justification: Maintenance  Estimated Protein Needs: 64-80 grams protein/day (1.2 - 1.5 grams of pro/kg)  Justification: Hypercatabolism with critical illness and Wound healing  Estimated Fluid Needs: 1 mL/kcal  Justification: Maintenance    PHYSICAL FINDINGS  Wound on sacrum (not pressure injury)    MALNUTRITION  % Intake: </=75% for >/= 1 month (severe)  % Weight Loss: None noted  Subcutaneous Fat Loss: Unable to assess  Muscle Loss: Unable to assess  Fluid Accumulation/Edema: None noted  Malnutrition Diagnosis: Unable to determine due to patient not available for physical assessment/interview at this time    NUTRITION DIAGNOSIS  Inadequate oral intake related to nausea with dry heaves and loss of appetite as evidenced by pt verbalization    INTERVENTIONS  Implementation  Nutrition Education: Unable to provide education d/t patient sleeping at first attempt and not available during second attempt to see patient. Will re-attempt at later time as able.     Goals  Patient to consume % of nutritionally adequate meal trays TID, or the equivalent with supplements/snacks.    Monitoring/Evaluation  Progress toward goals will be monitored and evaluated per protocol.    Kelly Mckeon RD, LD  Pager: [phone]

## 2017-02-14 NOTE — PLAN OF CARE
Problem: Goal Outcome Summary  Goal: Goal Outcome Summary  PT-5A: eval completed and treatment initiated. Able to amb with WW 30 ft with SBA to manage multiple lines. Recommend discharge to home with home PT intervention when medically appropriate.

## 2017-02-14 NOTE — PROGRESS NOTES
[facility]  Patient is currently open to home care services with [facility].  The patient is currently receiving RN,PT and OT       services.  [facility]  and team have been notified of patient admission.  [facility] liaison will continue to follow patient during stay.  If appropriate provide orders to resume home care at time of discharge.    Isatu Coats RN  [facility] Liaison

## 2017-02-14 NOTE — PLAN OF CARE
Admitted from [facility] for pneumonia. AOx4. Admission packet given. Oriented to room, unit, call light system. Adult profile completed. VSS. On 4L NC. Courtesy meal given. WOCN consult placed for suspected coccyx pressure ulcer. On droplet precautions for r/o flu and RSV. Nasal pharyngeal swab sent. Need sputum sample. Mediport already accessed. Bilateral hearing aids in place. Daughter at bedside.

## 2017-02-14 NOTE — PLAN OF CARE
Problem: Goal Outcome Summary  Goal: Goal Outcome Summary  Outcome: No Change  Pt is alert and oriented x 4. Denies pain. Able to make needs known. Dyspneic with activity. Continuous 02 inhalation via nasal cannula at 4 lpm, 02 sats in the low to mid 90's overnight. Has poor appetite. Up to the commode with standby assist of 1. 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 Influenza test negative, awaiting RSV panel results. Triggered for sepsis protocol at bedtime, lactic acid is 1.0, Dr. Caro informed. BG monitored overnight. Vital signs stable. Needs sputum sample. Will continue to monitor and follow plan of care

## 2017-02-14 NOTE — PHARMACY-ADMISSION MEDICATION HISTORY
Admission medication history interview status for the 2/13/2017 admission is complete. See Epic admission navigator for allergy information, pharmacy, prior to admission medications and immunization status.     Medication history interview sources:  Patient    Changes made to PTA medication list (reason)  Added: None  Deleted: None  Changed: None    Additional medication history information (including reliability of information, actions taken by pharmacist):  Patient was alert, pleasant to speak with, and an excellent historian of her medications.    Tacrolimus is confirmed generic; the patient reports she is still taking 3.5 mg twice daily, but is using (3x) 1mg and (1x) 0.5 mg capsules to reduce her overall medication burden, rather than (7x) 0.5 mg.    Hydromorphone has been getting filled as 4 mg tablets, 50 tablets per refill, rather than 2 mg, 100 tablets per refill.    Lidocaine patches are usually applied ~8 am and removed ~8 pm. Patient is NOT currently wearing a patch as of 1900 on 2/13/2017.      Prior to Admission medications    Medication Sig Last Dose Taking? Auth Provider   doxazosin (CARDURA) 2 MG tablet Take 2 tablets (4 mg) by mouth At Bedtime 2/12/2017 at pm Yes Marco Clay MD   tacrolimus (PROGRAF - GENERIC EQUIVALENT) 0.5 MG capsule Take 7 capsules (3.5 mg) by mouth 2 times daily 2/13/2017 at am Yes Lenora Cruz APRN CNP   azithromycin (ZITHROMAX) 250 MG tablet Take 125 mg by mouth daily  2/12/2017 at pm Yes Lenora Cruz APRN CNP   Prochlorperazine Maleate (COMPAZINE PO) Take 5 mg by mouth every 6 hours as needed for nausea Past Week at ~2 days Yes Unknown, Entered By History   HYDROmorphone (DILAUDID) 2 MG tablet Take 1-2 tablets (2-4 mg) by mouth every 3 hours as needed for moderate to severe pain 2/12/2017 at am Yes Rand Man APRN CNP   lidocaine (LIDODERM) 5 % Patch Place 1 patch onto the skin every 24 hours 2/13/2017 at am Yes Marco Clay MD    lidocaine-prilocaine (EMLA) cream APPLY TO PORT SITE ABOUT 45 TO 60 MINUTES PRIOR TO ACCESSING AS NEEDED FOR MODERATE PAIN 2/13/2017 at am Yes Rand Man APRN CNP   ondansetron (ZOFRAN) 8 MG tablet TAKE ONE TABLET BY MOUTH THREE TIMES A DAY BEFORE MEALS 2/13/2017 at am Yes Salvador Lebron MD   predniSONE (DELTASONE) 5 MG tablet TAKE ONE TABLET BY MOUTH EVERY MORNING 2/13/2017 at am Yes Marco Clay MD   zinc sulfate (ZINCATE) 220 MG capsule TAKE ONE CAPSULE BY MOUTH EVERY DAY 2/12/2017 at am Yes Marco Clay MD   predniSONE (DELTASONE) 2.5 MG tablet TAKE ONE TABLET BY MOUTH EVERY EVENING 2/12/2017 at pm Yes Marco Clay MD   sodium bicarbonate 650 MG tablet Take 2 tablets (1,300 mg) by mouth 2 times daily 2/12/2017 at pm Yes Tavares Gomez MD   albuterol (PROAIR HFA, PROVENTIL HFA, VENTOLIN HFA) 108 (90 BASE) MCG/ACT inhaler Inhale 2 puffs into the lungs every 6 hours as needed for shortness of breath / dyspnea or wheezing 2/13/2017 at am Yes Marco Clay MD   montelukast (SINGULAIR) 10 MG tablet Take 1 tablet (10 mg) by mouth every evening 2/12/2017 at pm Yes Marco Clay MD   fluticasone-salmeterol (ADVAIR) 500-50 MCG/DOSE diskus inhaler Inhale 1 puff into the lungs 2 times daily 2/13/2017 at am Yes Marco Clay MD   omeprazole (PRILOSEC) 40 MG capsule Take 1 capsule (40 mg) by mouth daily 2/13/2017 at am Yes Marco Clay MD   amLODIPine (NORVASC) 5 MG tablet Take 2 tablets (10 mg) by mouth At Bedtime 2/12/2017 at pm Yes Marco Clay MD   atorvastatin (LIPITOR) 20 MG tablet Take 1 tablet (20 mg) by mouth daily 2/12/2017 at pm Yes Marco Clay MD   aspirin EC 81 MG tablet Take 81 mg by mouth daily  2/13/2017 at am Yes Reported, Patient   levothyroxine (SYNTHROID, LEVOTHROID) 50 MCG tablet Take 50 mcg by mouth daily  2/13/2017 at am Yes Reported, Patient   Magnesium Oxide 250 MG TABS Take 2  "tablets (500 mg) by mouth At Bedtime 2/12/2017 at pm Yes Marco Clay MD   loperamide (IMODIUM) 2 MG capsule Take 1 capsule (2 mg) by mouth 3 times daily as needed for other (greater than 1200mL ileostomy output in a 24 hour period.  please call colon and rectal surgery clinic to notify if patient is requiring Imodium.) 2/12/2017 at am Yes Leana Gomez PA-C   ascorbic acid 500 MG TABS Take 1 tablet (500 mg) by mouth daily 2/12/2017 at am Yes Ilene Beavers PA   Calcium Carbonate-Vitamin D (CALCIUM 500 + D PO) Take 1 tablet by mouth 2 times daily  2/12/2017 at pm Yes Reported, Patient   Multiple Vitamin (MULTI-VITAMIN) per tablet Take 1 tablet by mouth daily. 2/12/2017 at am Yes Reported, Patient   Blood Glucose Calibration (CRIS DOC CONTROL) NORMAL SOLN daily as needed   Reported, Patient   order for DME Equipment being ordered:   Hernia Belt     Nu form, BG- Cool comfort- medium,  4\"  Belt ring  3 1/4  Catalog # :  [id number]   Zeinab Anthony MD   dapsone 25 MG tablet TAKE TWO TABLETS BY MOUTH THREE TIMES WEEKLY (MONDAY/WEDNESDAY/FRIDAY) 2/10/2017 at am  Marco Clay MD   blood glucose monitoring (NO BRAND SPECIFIED) test strip TEST 4 TIMES DAILY.   Reported, Patient   insulin pen needle 32G X 4 MM USE AS DIRECTED FOR ADMINISTERING INSULIN AT HOME.   Reported, Patient   cyanocobalamin (VITAMIN B-12 ER) 1000 MCG TBCR Take 1,000 mcg by mouth every 7 days  2/10/2017 at am  Reported, Patient   order for DME Lightweight Wheelchair with leg rests.   Salvador Perez MD   butalbital-acetaminophen-caffeine (FIORICET, ESGIC) -40 MG per tablet Take 1 tablet by mouth every 4 hours as needed for headaches More than a month at PRN  Jessenia Sams PA-C   order for DME Please dispense 1 hair prosthesis.   Jessenia Sams PA-C   ORDER FOR DME Equipment being ordered: ileostomy supplies   Leana Gomez PA-C         Medication history " completed by: Mendez Walker, PD3, Pharmacy Student

## 2017-02-14 NOTE — PROGRESS NOTES
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 Other hx includes metastatic colon cancer with liver mets s/p ileocolic resection and ostomy, most recently on lonsurf. Lonsurf has most recently been held d/t complications of deconditioning and infections including facial zoster and bilateral upper extremity cellulitis (treated with Valtrex and Linezolid). Was recently hospitalized 01/21-01/27, treated for HCAP with levaquin (completed 2/2). The patient is now admitted on 2/13/2017 for increasing fatigue, SOB, N/V, and upper abdominal pain. New RLL consolidation seen on CT chest (02/13). Also with ROGERIO and elevated Alk Phos level.    Today's Plan:  - Continue empiric zosyn/ vanco  - Ordered induced sputum culture by RT  - Follow respiratory viral panel results.  - Continue marinol BID and PRN zofran/ compazine for nausea  - Consult nutrition  - Check GGT. Trend daily hepatic panel.  - Consult GI  - Continue IV fluid hydration  - Recheck tacrolimus level 02/15.     #HCAP:  #Leukocytosis: Has been experiencing worsening shortness of breath and cough over the past week. Associated with persistent fatigue. Has not felt to be at her baseline since prior to last admission. Presents with leukocytosis and new RLL consolidation on CT chest (02/13). Procalcitonin 0.99 (02/13). Recently completed a 10 day course of Levaquin as of 02/02 for HCAP treatment. Most recent sputum results from 01/22 growing candida albicans. Hx of MRSA (nares). Lactate 1 (02/13). Influenza A/B negative.  - Blood cultures pending results (02/13)  - Continue empiric zosyn/ vancomycin  - Sputum cultures ordered  (02/13), pending collection  - Respiratory viral panel pending results     #Nausea/ Anorexia: Persistent dry heaves over the past week. No significant emesis. Loss of appetite.   - Add Marionol 5 mg BID AC  - Zofran and compazine PRN nausea  - Regular diet, advance as tolerated  - Nutrition consulted    #Abdominal Pain:  Elevated Transaminases: Elevated LFTs from baseline, Alk phos 558 (02/14). AST 64 (02/14).  - Check GGT  - Trend hepatic panel daily  - GI consult, appreciate recommendations     #Dehydration:  #ROGERIO on CKD: Baseline creatinine 1.4-1.8. Creatinine (02/13) 2.04--> (02/14) 1.97. Etiology likely dehydration secondary to nausea vs elected tacrolimus levels. Has been receiving intermittent IV fluids as outpatient over the past couple of weeks.  - Continue NS at 90ml/hr for hydration.  - Avoid/ hold nephrotoxic agents as possible.  - Daily BMP     #Fatigue:  #Deconditioning: Persistent, progressive fatigue over the past week. Deconditioning over the past couple of months.  - Infectious workup as above.  - PT/ OT consulted      #COPD s/p R lung transplant 02/19/2009: Followed by Dr. Clay in pulmonary clinic. 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 EBV DNA level was 2030 on 12/12/16. Repeat level on 01/23 was 4243.   - Continue PTA singulair, Advair, and azithromycin (125 mg daily)  - Monitoring EBV level periodically, every 1-2 months     Continue 2 drug immunosuppression:  - Tacrolimus 3.5 mg BID. Target goal 8-10. Elevated (10 hour) level 02/14, 21.2. Suspect likely secondary to lack of adequate PO intake. Will recheck level tomorrow AM and adjust dose accordingly.  - Prednisone 5 mg qAM/ 2.5 mg qPM     Prophylaxis:  - Continue dapsone 50 mg MWF for PJP ppx.     ALLI/ CLAD:  - Continue home dose Advair, montelukast, azithromycin (125 mg daily)     # H/o mycobacterium avium intracellulare:  Last positive culture 09/2013.   - Continue azithromycin per PTA dose as above.     #Stage  IV colon cancer with liver mets. Primary is Dr. Lebron. Prior treatment with xeloda/oxaliplatin, irinotecan, erbitux, and avastin. She recently was found to have progression on irinotecan/erbitux. She was started on lonsurf on 12/26/16. Has not received chemo in about three weeks per patient report. Had been on hold d/t deconditioning and infections. 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  - No need for hematology/ oncology consult at this time as patient will not be able to receive systemic chemo until acute infection resolved. Did speak with hematology/ oncology service 02/14, will be following along.     # Osteoporosis/ chronic compression fractures:  # Low Back Pain: Was taking calcium plus vitamin D PTA. Was initiated on denosumab on 09/22/16, next dose due March 2017.  - Continue calcium plus vitamin D.  - Continue PRN dilaudid and lidocaine patch per PTA dosing.     #Hypothyroidism.   - Continue home levothyroxine.       #Acute on chronic anemia. Baseline hgb in the past couple of weeks 7.2-7.5. Admission Hgb 7.3 (02/13). No signs of acute blood loss. Patient is a Ca[employer]ism. Has received blood transfusion in the past.  - Will repeat CBC daily to trend.  - Receives aranesp injections as outpatient [id number]      #HTN. Controlled.  - Continue norvasc and doxazosin per home regimen     # HLD:  Was taking statin and low dose ASA PTA.  - Resume per home regimen.      #DM2. Glucose well controlled. Patient reports she had been taking lantus 4 units daily HS with novolog sliding scale PTA.  - Will hold Lantus for now d/t poor PO intake and adequately controlled glucose levels.  - Ordered low resistance Novolog sliding scale TID meals.  - Monitoring glucose QID ACHS.    FEN: Regular diet  LINES: PIV  PROPHY: Heparin SQ  DISPO: Requiring inpatient care. Discharge pending resolution of pulmonary and GI symptoms.      Patient seen and  discussed with Dr. Solis.     Cassandra Haas, APRN, CNP  Inpatient Nurse Practitioner  [facility]  Pager [phone]          Subjective & Interval History:     Last 24 hours of care team notes reviewed.    No acute events overnight. Hemodynamically stable, afebrile. Endorsing continued shortness of breath, stable since admission. Sating well on 4L NC. No reported desaturations with activity. Persistent cough, nonproductive. No chest pain or palpitations. Persistent, diffuse abdominal pain and nausea. Lack of appetite. No emesis. Loose stools via ostomy.            Review of Systems:     C: no fever, no chills, no change in weight, + change in appetite  INTEGUMENTARY/SKIN: no rash or obvious new lesions  ENT/MOUTH: no sore throat, no sinus pain, no nasal drainage  RESP: see interval history  CV: no chest pain, no palpitations, + peripheral edema, no orthopnea  GI: + nausea, no vomiting, no change in stools, no reflux symptoms  : no dysuria  MUSCULOSKELETAL: no myalgias, no arthralgias  ENDOCRINE: blood sugars with adequate control  NEURO: no headache, no numbness or tingling  PSYCHIATRIC: mood stable          Medications:     Active Medications:    predniSONE  2.5 mg Oral QPM     vancomycin (VANCOCIN) IV  19 mg/kg Intravenous Once     piperacillin-tazobactam  3.375 g Intravenous Q6H     vancomycin place hassan - receiving intermittent dosing  1 each Does not apply See Admin Instructions     amLODIPine  10 mg Oral At Bedtime     ascorbic acid  500 mg Oral Daily     aspirin EC  81 mg Oral Daily     azithromycin  125 mg Oral Daily     [START ON 2/17/2017] cyanocobalamin  1,000 mcg Oral Q7 Days     dapsone  50 mg Oral Once per day on Mon Wed Fri     doxazosin  4 mg Oral At Bedtime     levothyroxine  50 mcg Oral QAM AC     montelukast  10 mg Oral QPM     multivitamin, therapeutic with minerals  1 tablet Oral Daily     omeprazole  40 mg Oral Daily     predniSONE  5 mg Oral QAM     sodium bicarbonate  1,300 mg Oral BID      tacrolimus  3.5 mg Oral BID IS     zinc sulfate  220 mg Oral Daily     heparin  5,000 Units Subcutaneous Q12H     lidocaine  1 patch Transdermal Q24H     dronabinol  5 mg Oral BID AC     insulin aspart  1-3 Units Subcutaneous TID AC     insulin aspart  1-3 Units Subcutaneous At Bedtime     fluticasone-vilanterol  1 puff Inhalation Daily     lidocaine   Transdermal Q24h     lidocaine   Transdermal Q8H     atorvastatin  20 mg Oral At Bedtime     Active PRN Medications:  albuterol, butalbital-acetaminophen-caffeine, HYDROmorphone, loperamide, naloxone, ondansetron **OR** ondansetron, prochlorperazine **OR** prochlorperazine **OR** prochlorperazine, glucose **OR** dextrose **OR** glucagon         Physical Exam:     Constitutional: Awake, alert, in no apparent distress.   HEENT: Eyes with pink conjunctivae, no scleral jaundice.  Oral mucosa moist without lesions. Neck supple without lymphadenopathy.   PULM: Diminished air flow on the left. Crackles to lower lobes.   CV: Normal S1 and S2. RRR.  No murmur, gallop, or rub.  2+ peripheral edema.  Peripheral pulses intact.   ABD: Hypoactive bowel sounds, RUQ firm- hepatomegaly, soft otherwise, tender to RLQ upon palpation, no guarding. Ostomy with prolapse.  MSK: Moves all extremities.  No apparent muscle wasting.   NEURO: Alert and oriented x 4, conversant.   SKIN: Warm, dry. No pruritus.  No rash on limited exam.   PSYCH: Mood stable, judgment and insight appropriate.?     Lines, Drains, and Devices:  Peripheral IV 05/07/15 Right;Anterior Upper forearm (Active)   Number of days:649       Port A Cath 10/22/14 Right Chest wall (Active)   Access Date 02/13/17 2/13/2017 11:26 AM   Access Attempts 1 2/13/2017 11:26 AM   Gauge/Length Noncoring 90 degree bend;3/4 inch;20 gauge 2/13/2017 11:26 AM   Site Assessment WDL 2/13/2017 10:36 PM   Line Status Infusing 2/13/2017 10:36 PM   Extravasation? No 2/10/2017 12:00 PM   Dressing Intervention Transparent 2/13/2017 11:26 AM  "  Dressing change due 01/28/17 1/27/2017  8:30 AM   Needle Change Due 01/28/17 1/27/2017  8:30 AM   De-Access Date 02/10/17 2/10/2017  3:00 PM   Date to be Reflushed 02/10/17 2/10/2017 12:00 PM   Number of days:846          Data:     All vital signs, laboratory and imaging data for the past 24 hours reviewed.      Vital signs:  Temp: 97.1  F (36.2  C) Temp src: Oral BP: 116/53 (PT session) Pulse: 97 Heart Rate: 99 Resp: 20 SpO2: 92 % O2 Device: Nasal cannula Oxygen Delivery: 4 LPM Height: 149.9 cm (4' 11\") Weight: 53 kg (116 lb 14.4 oz)    Weight trend:   Vitals:    02/13/17 1837   Weight: 53 kg (116 lb 14.4 oz)        I/O:   Intake/Output Summary (Last 24 hours) at 02/14/17 1158  Last data filed at 02/14/17 0932   Gross per 24 hour   Intake             1394 ml   Output              800 ml   Net              594 ml       Labs    CMP:   Recent Labs  Lab 02/14/17  0607 02/13/17  1932 02/13/17  1128 02/10/17  1238 02/09/17  1531     --  134 135 133   POTASSIUM 5.1  --  5.5* 5.5* 5.9*   CHLORIDE 103  --  103 104 103   CO2 21  --  22 20 21   ANIONGAP 9  --  9 11 9   GLC 84  --  77 143* 172*   BUN 32*  --  36* 34* 35*   CR 1.97*  --  2.04* 1.82* 1.88*   GFRESTIMATED 25*  --  24* 27* 26*   GFRESTBLACK 30*  --  29* 33* 31*   RUBY 7.4*  --  8.0* 7.7* 8.2*   MAG 2.0 2.0  --   --  1.8   PHOS 4.0 4.0  --   --  2.8   PROTTOTAL 5.0*  --  5.6*  --   --    ALBUMIN 2.2*  --  2.5*  --   --    BILITOTAL 0.8  --  0.4  --   --    ALKPHOS 558*  --  492*  --   --    AST 64*  --  65*  --   --    ALT 36  --  36  --   --      CBC:   Recent Labs  Lab 02/14/17  0607 02/13/17  1932 02/13/17  1128 02/10/17  1238 02/09/17  1531   WBC 14.4*  --  17.1* 22.0* 24.1*   RBC 2.92*  --  2.96* 2.75* 2.87*   HGB 7.2*  --  7.3* 7.3* 7.5*   HCT 25.2*  --  25.3* 24.4* 25.5*   MCV 86  --  86 89 89   MCH 24.7*  --  24.7* 26.5 26.1*   MCHC 28.6*  --  28.9* 29.9* 29.4*   RDW 22.2*  --  22.4* 23.4* 23.8*    383 443 548* 644*     INR:   Recent Labs  Lab " 02/13/17  1128   INR 1.48*     Glucose:   Recent Labs  Lab 02/14/17  0805 02/14/17  0607 02/14/17  0547 02/14/17  0151 02/13/17  2215 02/13/17  1850 02/13/17  1314 02/13/17  1128 02/10/17  1238 02/09/17  1531   GLC  --  84  --   --   --   --   --  77 143* 172*   BGM 97  --  99 77 90 74 84  --   --   --      Blood Gas: No lab results found in last 7 days.  Culture Data   Recent Labs  Lab 02/13/17  1342 02/13/17  1128 02/09/17  1803 02/09/17  1719 02/09/17  1710   CULT No growth after 15 hours No growth after 18 hours Should be ordered as a future orderCanceled, Test credited No growth after 5 days No growth after 5 days     Virology Data: Lab Results   Component Value Date    INFLUA Negative 06/11/2013    FLUAH1 Negative 01/21/2017    FLUAH3 Negative 01/21/2017    PT2302 Negative 01/21/2017    IFLUB Negative 01/21/2017    RSVA Negative 01/21/2017    RSVB Negative 01/21/2017    PIV1 Negative 01/21/2017    PIV2 Negative 01/21/2017    PIV3 Negative 01/21/2017    HMPV Negative 01/21/2017    HRVS Negative 01/21/2017    ADVBE Negative 01/21/2017    ADVC Negative 01/21/2017     Historical CMV results (last 3 of prior testing):  Lab Results   Component Value Date    CMVQNT  12/12/2016     CMV DNA Not Detected   The JEREMIAS AmpliPrep/JEREMIAS TaqMan CMV Test is an FDA-approved in vitro nucleic   acid amplification test for the quantitation of cytomegalovirus DNA in human   plasma (EDTA plasma) using the JEREMIAS AmpliPrep Instrument for automated viral   nucleic acid extraction and the JEREMIAS TaqMan Analyzer or JEREMIAS TaqMan for   automated Real Time amplification and detection of the viral nucleic acid   target.   Titer results are reported in International Units/mL (IU/mL using 1st WHO   International standard for Human Cytomegalovirus for Nucleic Acid Amplification   based assays. The conversion factor between CMV DNA copis/mL (as defined by the   Roche JEREMIAS TaqMan CMV test) and International Units is the CMV DNA   concentration in  IU/mL x 1.1 copies/IU = CMV DNA in copies/mL.   This assay has received FDA approval for the testing of human plasma only. The   Infect[facility] Disease Diagnostic Laboratory at the [facility], [place], has validated the performance characteristics of the Roche   CMV assay for plasma, bronchial alveolar lavage/wash and urine.      CMVQNT  09/12/2016     CMV DNA Not Detected   The JEREMIAS AmpliPrep/JEREMIAS TaqMan CMV Test is an FDA-approved in vitro nucleic   acid amplification test for the quantitation of cytomegalovirus DNA in human   plasma (EDTA plasma) using the JEREMIAS AmpliPrep Instrument for automated viral   nucleic acid extraction and the JEREMIAS TaqMan Analyzer or Index TaqMan for   automated Real Time amplification and detection of the viral nucleic acid   target.   Titer results are reported in International Units/mL (IU/mL using 1st WHO   International standard for Human Cytomegalovirus for Nucleic Acid Amplification   based assays. The conversion factor between CMV DNA copis/mL (as defined by the   Roche JEREMIAS TaqMan CMV test) and International Units is the CMV DNA   concentration in IU/mL x 1.1 copies/IU = CMV DNA in copies/mL.   This assay has received FDA approval for the testing of human plasma only. The   [facility] Disease Diagnostic Laboratory at the [facility], [place], has validated the performance characteristics of the Roche   CMV assay for plasma, bronchial alveolar lavage/wash and urine.      CMVQNT  07/18/2016     CMV DNA Not Detected   The JEREMIAS AmpliPrep/JEREMIAS TaqMan CMV Test is an FDA-approved in vitro nucleic   acid amplification test for the quantitation of cytomegalovirus DNA in human   plasma (EDTA plasma) using the JEREMIAS AmpliPrep Instrument for automated viral   nucleic acid extraction and the JEREMIAS TaqMan Analyzer or JEREMIAS TaqMan for   automated Real Time amplification and detection of the viral nucleic acid   target.   Titer  results are reported in International Units/mL (IU/mL using 1st WHO   International standard for Human Cytomegalovirus for Nucleic Acid Amplification   based assays. The conversion factor between CMV DNA copis/mL (as defined by the   Roche JEREMIAS TaqMan CMV test) and International Units is the CMV DNA   concentration in IU/mL x 1.1 copies/IU = CMV DNA in copies/mL.   This assay has received FDA approval for the testing of human plasma only. The   Infectious Disease Diagnostic Laboratory at the [facility], [place], has validated the performance characteristics of the Roche   CMV assay for plasma, bronchial alveolar lavage/wash and urine.       Lab Results   Component Value Date    CMVLOG Not Calculated 12/12/2016    CMVLOG Not Calculated 09/12/2016    CMVLOG Not Calculated 07/18/2016     Urine Studies  Recent Labs   Lab Test  02/13/17   1306   URINEPH  5.0   NITRITE  Negative   LEUKEST  Negative   WBCU  4*     Attending statement:    The patient was seen and examined by me with Lillian Haas CNP on February 14, 2017.  I too performed a substantive portion of the visit face-to-face with Melissa Collazo. The case was discussed at length.  Vitals, lab results and imaging from today were reviewed.  The note reflects our joint assessment and plan.         Assessment and Plan:   Melissa Collazo is a 76 year old lady s/p Rt. sltx in 2009 now complicated by CKD and metastatic colon cancer. She was recently admitted with N/V/Diarrhoea along with cellulitis. Unfortunately after last hospital discharge has contd to note SOB/Fatigue and low appetite.   Chest CT on admission shows new Rt. LL consolidation. Due to recent hospital she is being treated for HCAP. Will try to induce sputum to obtain cultures but for now will continue zosyn/vanc.   The other issue that is playing a significant role in her sx is fatigue. It is possible due to low intake she is having fatigue hence will consult GI. TSH was  normal.  Wondering if her nausea/vomiting and abd pain is due to liver filled with mets and causing increased portal vein pressures. Mild elevation in AST/ALT and normal bilirubin makes it less likely that liver mets alone playing a role in her sx. Elevated Alk phos could be due to bone mets.           Interval History:     Still having a lot nausea. Eating slightly better. BM's normal. Abd pain still present. SOB is unchanged and working with PT/OT.           Review of Systems:   C: negative for fever, chills, change in weight  INTEGUMENTARY/SKIN: no rash or obvious new lesions  ENT/MOUTH: no sore throat, no new sinus pain or nasal drainage  RESP: see interval history  CV: negative for chest pain, palpitations.  GI: no change in stools           Physical Exam:   Temp:  [96.4  F (35.8  C)-97.7  F (36.5  C)] 96.7  F (35.9  C)  Pulse:  [96-97] 97  Heart Rate:  [] 81  Resp:  [18-20] 18  BP: (106-129)/(47-65) 116/49  SpO2:  [89 %-95 %] 91 %    Constitutional:   Awake, alert and in no apparent distress, Alopecia noted.     ENT:    oral mucosa moist without lesions     Lungs:   Good air flow except Left lung. ?Bronchial breath sounds in Rt. LL.  No crackles. No rhonchi.  No wheezes.     Cardiovascular:   Normal S1 and S2.  RRR.  No murmur, gallop or rub.     Abdomen:   Mildly distended.  Firm hepatomegaly. Ostomy noted with prolapse. Diffuse tenderness.

## 2017-02-14 NOTE — PROGRESS NOTES
Received consult for suspected pressure injury on sacrum. Assessed the area and noted 4 x 2 x 0.0cm blanchable erythema to sacrococcygeal area. Currently covered with Mepilex for protection. No pressure ulcer detected. Able to shift weight from side to side. Educated pt on pressure injury, risk factors, and preventive measures. Verbalized understanding.  P. Continue to follow pressure ulcer prevention protocol. Cover the area with Mepilex for protection and change as needed. Use Evaristo-laura cushion while sitting in the chair.  No further visit planned, will sign off.  Face to face time-15mins

## 2017-02-14 NOTE — PROVIDER NOTIFICATION
Pepe ( Dr. Caro ) informed thru text page that pt triggered sepsis protocol with Lactic acid result of 1.0

## 2017-02-14 NOTE — PROGRESS NOTES
SPIRITUAL HEALTH SERVICES  SPIRITUAL ASSESSMENT Progress Note   [facility] ([place]) 5A  ON-CALL VISIT    Visited pt Melissa on the basis of hospital  request upon admission. Melissa engaged in reflective conversation around illness narrative- her hopes to get rid of her nausea, and how she has unfortunately been frequently hospitalized. She affirmed good spiritual support through her Christianity caryl and community, and shared her disappointment that she has been too fatigued to attend services the past few months.   [facility] remains available for any further requests or referrals.    Jess Gómez M.T.S.  Associate   Pager [phone]

## 2017-02-14 NOTE — PROGRESS NOTES
02/14/17 1133   Quick Adds   Type of Visit Initial Occupational Therapy Evaluation   Living Environment   Lives With alone  (janette and son in law live in duplex upstairs)   Living Arrangements condominium   Home Accessibility stairs to enter home   Number of Stairs to Enter Home 3   Number of Stairs Within Home 0   Stair Railings at Home outside, present on left side   Transportation Available car;family or friend will provide   Living Environment Comment Pt I at baseline and for past 3 weeks family has been helping with IADLs and showering since getting out of hospital the end of January   Self-Care   Dominant Hand right   Usual Activity Tolerance moderate   Current Activity Tolerance poor   Regular Exercise yes   Activity/Exercise Type strength training   Exercise Amount/Frequency 10 mins;3-5 times/wk   Equipment Currently Used at Home cane, straight;walker, rolling   Activity/Exercise/Self-Care Comment Pt has walker and cane but when healthy does not use.  Pt has been using cane recently   Functional Level Prior   Ambulation 1-->assistive equipment   Transferring 1-->assistive equipment   Toileting 1-->assistive equipment   Bathing 2-->assistive person   Dressing 0-->independent   Eating 0-->independent   Communication 0-->understands/communicates without difficulty   Swallowing 0-->swallows foods/liquids without difficulty   Cognition 0 - no cognition issues reported   Fall history within last six months no   Which of the above functional risks had a recent onset or change? ambulation;transferring   Prior Functional Level Comment over the past 2 weeks family has been helping with all IADLs, showering, and driving       Present no   General Information   Onset of Illness/Injury or Date of Surgery - Date 02/13/17   Referring Physician Cassandra Haas   Patient/Family Goals Statement get stronger   Additional Occupational Profile Info/Pertinent History of Current Problem Pt admitted for  increased weakness, nausea, vomitting.  Pt with recent admission for cellulitis and pneumonia.  Pt with history of lung transplant in 2009, metastatic colon cancer with mets to liver, COPD   Precautions/Limitations fall precautions;oxygen therapy device and L/min   Weight-Bearing Status - LUE weight-bearing as tolerated   Weight-Bearing Status - RUE weight-bearing as tolerated   Weight-Bearing Status - LLE weight-bearing as tolerated   Weight-Bearing Status - RLE weight-bearing as tolerated   General Observations motivated to participate   Cognitive Status Examination   Orientation orientation to person, place and time   Level of Consciousness alert   Able to Follow Commands WNL/WFL   Personal Safety (Cognitive) WNL/WFL   Memory intact   Attention No deficits were identified   Organization/Problem Solving No deficits were identified   Executive Function No deficits were identified   Visual Perception   Visual Perception No deficits were identified   Sensory Examination   Sensory Quick Adds No deficits were identified   Pain Assessment   Patient Currently in Pain No   Range of Motion (ROM)   ROM Comment Pt with R torn rotator cuff, 100 degrees AROM    Strength   Manual Muscle Testing Quick Adds No deficits were identified   Muscle Tone Assessment   Muscle Tone Quick Adds No deficits were identified   Mobility   Bed Mobility Bed mobility skill: Supine to sit   Bed Mobility Skill: Supine to Sit   Level of [place]: Supine/Sit independent   Transfer Skills   Transfer Transfer Skill: Stand to Sit   Transfer Skill: Sit to Stand   Level of [place]: Sit/Stand stand-by assist   Lower Body Dressing   Level of [place]: Dress Lower Body independent   Instrumental Activities of Daily Living (IADL)   Previous Responsibilities meal prep;housekeeping;laundry;medication management;finances;driving   Activities of Daily Living Analysis   Impairments Contributing to Impaired Activities of Daily Living balance  "impaired;ROM decreased;strength decreased   General Therapy Interventions   Planned Therapy Interventions ADL retraining;IADL retraining;balance training;bed mobility training;strengthening;transfer training   Clinical Impression   Criteria for Skilled Therapeutic Interventions Met yes, treatment indicated   OT Diagnosis deconditioning   Influenced by the following impairments none   Assessment of Occupational Performance 1-3 Performance Deficits   Identified Performance Deficits deconditioning, decreased ability to shower, decreased ability for IADLs   Clinical Decision Making (Complexity) Moderate complexity   Therapy Frequency daily   Predicted Duration of Therapy Intervention (days/wks) 1 week, by 2-21   Anticipated Discharge Disposition Home with Home Therapy   Risks and Benefits of Treatment have been explained. Yes   Patient, Family & other staff in agreement with plan of care Yes   [facility] TM \"6 Clicks\"   2016, Trustees of [facility], under license to [employer].  All rights reserved.   6 Clicks Short Forms Daily Activity Inpatient Short Form   [facility]  \"6 Clicks\" Daily Activity Inpatient Short Form   1. Putting on and taking off regular lower body clothing? 4 - None   2. Bathing (including washing, rinsing, drying)? 3 - A Little   3. Toileting, which includes using toilet, bedpan or urinal? 4 - None   4. Putting on and taking off regular upper body clothing? 4 - None   5. Taking care of personal grooming such as brushing teeth? 4 - None   6. Eating meals? 4 - None   Daily Activity Raw Score (Score out of 24.Lower scores equate to lower levels of function) 23   Total Evaluation Time   Total Evaluation Time (Minutes) 6     "

## 2017-02-14 NOTE — PLAN OF CARE
Problem: Goal Outcome Summary  Goal: Goal Outcome Summary  OT/5A: Recommend home with assist for IADLs and continued home OT/PT.  Patient having increased nausea but still agreeable to ex at EOB.  Pt I with bed mobility and SBA sit to stand when wound care present and needed pt to stand.  Pt instructed in BUE EX at EOB, 15 reps of 7 ex.  Pt on 4L oxygen and oxygen saturation of 91-93%.

## 2017-02-14 NOTE — CONSULTS
GASTROENTEROLOGY CONSULTATION      Date of Admission:  2/13/2017  Consulting physician:  Cassandra Haas APRN CNP.          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 CT chest (2/13/17) showed new RLL infiltrate, extensive hepatomegaly with diffuse metastatic disease and bone metastasis.  Labs showed chronic anemia, mild LFT elevation consistent with hepatic mets and mild ALP elevation also possible from bony mets. Currently her nausea is multifactorial, progression of metastatic disease with diffuse metastatic disease, medication side effects from narcotics, chemotherapy (Lonsurf), antibiotics, tacrolimus. Also possibility of metastatic disease in brain causing persistent nausea.       Recommendations    Supportive treatment with antiemetics, will schedule Zofran with PRN Compazine, Phenergan.  Agree with Marinol.    Minimize narcotics, polypharmacy.    Small, low fat, more frequent meals. Aspiration precautions.     Consider  CT head to evaluate for metastatic disease.     Close monitoring of ileostomy output, match intake and output.     Antibiotics for pneumonia according to primary team.       Gastroenterology follow up recommendations: TBD      Thank you for involving us in this patient's care. Please do not hesitate to contact the GI service with any questions or concerns.   Pt care plan discussed with Dr. Restrepo , GI staff physician.    Denisse Zayas  GI fellow         Chief Complaint:     Nausea, anorexia.          HPI:     76 year old female with a history of R single lung transplant 02/2009 secondary to COPD, h/o metastatic colon cancer ( dx  10/2014) with liver metastasis s/p R hemicolectomy with end ileostomy,  on chemotherapy (Trifluridine/tipiracil) until 1-2 months ago coming to hospital with worsening SOB and anorexia. She was recently hospitalized from  01/21-01/27, treated for HCAP with Levaquin (completed 2/2/2017).   According to patient since she left hospital she was not feeling well, constant nausea, she was also having worsening SOB for past 2 wks. Denied any chest pain. She had abdominal pain only when she cough. She had nausea but denied any vomiting. Also having worsening anorexia. Denied any dysphagia. Regarding her ostomy output the only change in consistency, more liquid, no change in amount. She denied any blood in stool. She empty her bag around 5-6 times/d. She denied any fever or chills. She takes Zofran scheduled at home before meals, and rarely takes Compazine PRN. She takes oral Dilaudid at home PRN for back pain.     H/o Colon cancer:  10/2014 - R abdominal pain, liver lesion, R colonic mass, biopsy - adenocarcinoma, complicated by large bowel obstruction - laparoscopic assisted loop ileostomy.   3/2015 - ileostomy takedown, right hemicolectomy with ileocolonic anastomosis, USG guided microwave ablation of liver tumor.   3/2015 -  Anastomotic leak, ex lap, end ileostomy with mucous fistula of distal colon.     Treatment with capecitabine/oxaliplatin, irinotecan, cituximab, and bevacizumab. She recently was found to have progression on irinotecan/cituximab. She was started on Lonsurf (Trifluridine/tipiracil)  on 12/26/16. Completed 1 cycle and currently on hold for past 3 wks because of recent hospitalizations.    Last EGD: 3/2015 [facility]  Normal esophagus, Hiatal hernia, normal stomach and duodenum, Mild non-specific chronic gastritis, negative for Hpylori and celiac.              Past Medical History:     Reviewed and edited as appropriate  Past Medical History   Diagnosis Date     Anemia      Arthritis      Aspergillus  (H)      in sputum prior to lung transplant, marked sun sensitivity to voraconazole     Asthma      Back pain      S/P radiofreq ablation     Breast nodule      Benign     Cancer of liver (H)      Cancer of right colon (H) 10/7/2014     CKD (chronic kidney disease) stage 3, GFR 30-59 ml/min      COPD (chronic obstructive pulmonary disease) (H)      Diabetes (H)      Encounter for long-term (current) use of other medications      History of blood transfusion      Hyperlipidaemia      Hypertension      Hypogammaglobulinaemia      Lung replaced by transplant (H) 2/19/2009     RSLT     MAC (mycobacterium avium-intracellulare complex)      by bronch 2/2009 treated with levo and azithro     Osteoporosis, unspecified      Other chronic pain      PONV (postoperative nausea and vomiting)      Shortness of breath      Skin disease      Squamous cell carcinoma (H)      Thyroid disease             Past Surgical History:   Reviewed and edited as appropriate   Past Surgical History   Procedure Laterality Date     Transplant       Right lung tx 2/2009 for        Appendectomy       Hernia repair       Colonoscopy  3/25/2011     COMBINED COLONOSCOPY, REMOVE TUMOR/POLYP/LESION BY SNARE performed by LEIDY DEAL at  GI     Tonsillectomy       Mycobacterium a       Colonoscopy with co2 insufflation N/A 10/7/2014     Procedure: COLONOSCOPY WITH CO2 INSUFFLATION;  Surgeon: Zeinab Anthony MD;  Location: [facility] OR     Laparoscopic ileostomy N/A 10/17/2014     Procedure: LAPAROSCOPIC ILEOSTOMY;  Surgeon: Zeinab Anthony MD;  Location: [facility] OR     Ablate liver tumor N/A 3/19/2015     Procedure: ABLATE LIVER TUMOR;  Surgeon: Casper Poe MD;  Location: [facility] OR     Colectomy right N/A 3/19/2015     Procedure: COLECTOMY RIGHT;  Surgeon: Zeinab Anthony MD;  Location: [facility] OR     Takedown ileostomy N/A 3/19/2015     Procedure: TAKEDOWN ILEOSTOMY;  Surgeon: Zeinab Anthony MD;  Location: [facility] OR     Laparotomy exploratory N/A  3/29/2015     Procedure: LAPAROTOMY EXPLORATORY;  Surgeon: Zeinab Anthony MD;  Location: [facility] OR     Ileostomy N/A 3/29/2015     Procedure: ILEOSTOMY;  Surgeon: Zeinab Anthony MD;  Location: [facility] OR            Previous Endoscopy:   No results found. However, due to the size of the patient record, not all encounters were searched. Please check Results Review for a complete set of results.         Social History:   Reviewed and edited as appropriate  Social History     Social History     Marital status:      Spouse name: N/A     Number of children: N/A     Years of education: N/A     Occupational History     Not on file.     Social History Main Topics     Smoking status: Former Smoker     Packs/day: 1.00     Years: 30.00     Types: Cigarettes     Quit date: 1/1/1993     Smokeless tobacco: Former User     Alcohol use No     Drug use: No     Sexual activity: Not on file     Other Topics Concern     Not on file     Social History Narrative            Family History:   Reviewed and edited as appropriate  Family History   Problem Relation Age of Onset     Other - See Comments Mother      Cancer, possibly lymphoma     Alcohol/Drug Father      Alcoholism     Respiratory Brother      Emphysema     Respiratory Maternal Aunt      Emphysema     CANCER Maternal Uncle      Lung Cancer     Melanoma No family hx of      Skin Cancer No family hx of            Allergies:   Reviewed and edited as appropriate     Allergies   Allergen Reactions     Morphine Sulfate Dermatitis     Codeine Sulfate Dermatitis     Ativan [Lorazepam] Other (See Comments)     Altered mentation, hallucinations  During hospitalization for influenza in Jan 2015 - ativan caused extreme lethargy and mild confusion.  These symptoms resolved when it was stopped.    Pt's family reported a similar response when she had used it at home for nausea.        Colesevelam Nausea     no appetite, dry heaves     Sulfa Drugs Hives     Adhesive Tape Rash     ACTUALLY,   Just fragile skin.  With tear with EKG lead.            Medications:     Prescriptions Prior to Admission   Medication Sig Dispense Refill Last Dose     doxazosin (CARDURA) 2 MG tablet Take 2 tablets (4 mg) by mouth At Bedtime 60 tablet 11 2/12/2017 at pm     tacrolimus (PROGRAF - GENERIC EQUIVALENT) 0.5 MG capsule Take 7 capsules (3.5 mg) by mouth 2 times daily 140 capsule 2 2/13/2017 at am     azithromycin (ZITHROMAX) 250 MG tablet Take 125 mg by mouth daily  15 tablet 11 2/12/2017 at pm     Prochlorperazine Maleate (COMPAZINE PO) Take 5 mg by mouth every 6 hours as needed for nausea   Past Week at ~2 days     HYDROmorphone (DILAUDID) 2 MG tablet Take 1-2 tablets (2-4 mg) by mouth every 3 hours as needed for moderate to severe pain 100 tablet 0 2/12/2017 at am     lidocaine (LIDODERM) 5 % Patch Place 1 patch onto the skin every 24 hours 30 patch 3 2/13/2017 at am     lidocaine-prilocaine (EMLA) cream APPLY TO PORT SITE ABOUT 45 TO 60 MINUTES PRIOR TO ACCESSING AS NEEDED FOR MODERATE PAIN 30 g 3 2/13/2017 at am     ondansetron (ZOFRAN) 8 MG tablet TAKE ONE TABLET BY MOUTH THREE TIMES A DAY BEFORE MEALS 90 tablet 3 2/13/2017 at am     predniSONE (DELTASONE) 5 MG tablet TAKE ONE TABLET BY MOUTH EVERY MORNING 30 tablet 11 2/13/2017 at am     zinc sulfate (ZINCATE) 220 MG capsule TAKE ONE CAPSULE BY MOUTH EVERY DAY 30 capsule 11 2/12/2017 at am     predniSONE (DELTASONE) 2.5 MG tablet TAKE ONE TABLET BY MOUTH EVERY EVENING 30 tablet 11 2/12/2017 at pm     sodium bicarbonate 650 MG tablet Take 2 tablets (1,300 mg) by mouth 2 times daily 540 tablet 3 2/12/2017 at pm     albuterol (PROAIR HFA, PROVENTIL HFA, VENTOLIN HFA) 108 (90 BASE) MCG/ACT inhaler Inhale 2 puffs into the lungs every 6 hours as needed for shortness of breath / dyspnea or wheezing 3 Inhaler 3 2/13/2017 at am     montelukast (SINGULAIR) 10 MG tablet Take 1 tablet (10 mg) by mouth every evening 90 tablet 3 2/12/2017 at pm     fluticasone-salmeterol  "(ADVAIR) 500-50 MCG/DOSE diskus inhaler Inhale 1 puff into the lungs 2 times daily 3 Inhaler 3 2/13/2017 at am     omeprazole (PRILOSEC) 40 MG capsule Take 1 capsule (40 mg) by mouth daily 90 capsule 3 2/13/2017 at am     amLODIPine (NORVASC) 5 MG tablet Take 2 tablets (10 mg) by mouth At Bedtime 180 tablet 3 2/12/2017 at pm     atorvastatin (LIPITOR) 20 MG tablet Take 1 tablet (20 mg) by mouth daily 30 tablet  2/12/2017 at pm     aspirin EC 81 MG tablet Take 81 mg by mouth daily    2/13/2017 at am     levothyroxine (SYNTHROID, LEVOTHROID) 50 MCG tablet Take 50 mcg by mouth daily    2/13/2017 at am     Magnesium Oxide 250 MG TABS Take 2 tablets (500 mg) by mouth At Bedtime 180 tablet 3 2/12/2017 at pm     loperamide (IMODIUM) 2 MG capsule Take 1 capsule (2 mg) by mouth 3 times daily as needed for other (greater than 1200mL ileostomy output in a 24 hour period.  please call colon and rectal surgery clinic to notify if patient is requiring Imodium.) 20 capsule  2/12/2017 at am     ascorbic acid 500 MG TABS Take 1 tablet (500 mg) by mouth daily 30 tablet 1 2/12/2017 at am     Calcium Carbonate-Vitamin D (CALCIUM 500 + D PO) Take 1 tablet by mouth 2 times daily    2/12/2017 at pm     Multiple Vitamin (MULTI-VITAMIN) per tablet Take 1 tablet by mouth daily.   2/12/2017 at am     Blood Glucose Calibration (CRIS DOC CONTROL) NORMAL SOLN daily as needed   Taking     order for DME Equipment being ordered:   Hernia Belt     Nu form, BG- Cool comfort- medium,  4\"  Belt ring  3 1/4  Catalog # :  [id number] 1 each 11 Taking     dapsone 25 MG tablet TAKE TWO TABLETS BY MOUTH THREE TIMES WEEKLY (MONDAY/WEDNESDAY/FRIDAY) 24 tablet 11 2/10/2017 at am     blood glucose monitoring (NO BRAND SPECIFIED) test strip TEST 4 TIMES DAILY.   Taking     insulin pen needle 32G X 4 MM USE AS DIRECTED FOR ADMINISTERING INSULIN AT HOME.   Taking     cyanocobalamin (VITAMIN B-12 ER) 1000 MCG TBCR Take 1,000 mcg by mouth every 7 days    2/10/2017 " "at am     order for DME Lightweight Wheelchair with leg rests. 1 Units 0 Taking     butalbital-acetaminophen-caffeine (FIORICET, ESGIC) -40 MG per tablet Take 1 tablet by mouth every 4 hours as needed for headaches   More than a month at PRN     order for DME Please dispense 1 hair prosthesis. 1 Units 0 Taking     ORDER FOR DME Equipment being ordered: ileostomy supplies 1 Month 11 Taking             Review of Systems:   A complete review of systems was performed and is negative except as noted in the HPI           Physical Exam:     /54 (BP Location: Left arm)  Pulse 97  Temp 96.4  F (35.8  C) (Oral)  Resp 20  Ht 1.499 m (4' 11\")  Wt 53 kg (116 lb 14.4 oz)  SpO2 90%  BMI 23.61 kg/m2  Wt:   Wt Readings from Last 2 Encounters:   02/13/17 53 kg (116 lb 14.4 oz)   02/10/17 52.2 kg (115 lb)        Constitutional: cooperative, not dyspneic/diaphoretic, no acute distress  Eyes: Sclera anicteric/ no pallor  Ears/nose/mouth/throat: Normal oropharynx without ulcers or exudate, mucus membranes moist,  CV: Normal S1, S2, no murmurs.  Respiratory: clear to auscultation b/l, no murmur  Abd: Nondistended, +bs, mild tenderness RUQ, hepatomegaly, ileostomy in RLQ with mucus fistula with green stool, no peritoneal signs  Skin: warm, perfused, no jaundice, no LE edema. Port present R upper chest, bruises in upper extremities.   Neuro: AAO x 3, no focal motor sensory deficits           Data:   Labs and imaging below were independently reviewed and interpreted    BMP  Recent Labs  Lab 02/14/17  0607 02/13/17  1128 02/10/17  1238 02/09/17  1531    134 135 133   POTASSIUM 5.1 5.5* 5.5* 5.9*   CHLORIDE 103 103 104 103   RUBY 7.4* 8.0* 7.7* 8.2*   CO2 21 22 20 21   BUN 32* 36* 34* 35*   CR 1.97* 2.04* 1.82* 1.88*   GLC 84 77 143* 172*     CBC  Recent Labs  Lab 02/14/17  0607 02/13/17  1932 02/13/17  1128 02/10/17  1238 02/09/17  1531   WBC 14.4*  --  17.1* 22.0* 24.1*   RBC 2.92*  --  2.96* 2.75* 2.87*   HGB 7.2*  --  7.3* " 7.3* 7.5*   HCT 25.2*  --  25.3* 24.4* 25.5*   MCV 86  --  86 89 89   MCH 24.7*  --  24.7* 26.5 26.1*   MCHC 28.6*  --  28.9* 29.9* 29.4*   RDW 22.2*  --  22.4* 23.4* 23.8*    383 443 548* 644*     INR  Recent Labs  Lab 02/13/17  1128   INR 1.48*     LFTs  Recent Labs  Lab 02/14/17  0607 02/13/17  1128   ALKPHOS 558* 492*   AST 64* 65*   ALT 36 36   BILITOTAL 0.8 0.4   PROTTOTAL 5.0* 5.6*   ALBUMIN 2.2* 2.5*      PANC  Recent Labs  Lab 02/13/17  1128   LIPASE 67*       Imaging: reviewed    CT scan showing extensive hepatomegaly.

## 2017-02-14 NOTE — PHARMACY-VANCOMYCIN DOSING SERVICE
Pharmacy Vancomycin Note  Date of Service 2017  Patient's  1940   76 year old, female    Indication: [facility] Pneumonia  Goal Trough Level: 15-20 mg/L  Day of Therapy: 2  Current Vancomycin regimen: Intermittent dosing. 1250mg (24mg/kg) administered  at 16:00    Current estimated CrCl = Estimated Creatinine Clearance: 20.3 mL/min (based on Cr of 1.97).    Creatinine for last 3 days  2017: 11:28 AM Creatinine 2.04 mg/dL  2017:  6:07 AM Creatinine 1.97 mg/dL    Recent Vancomycin Levels (past 3 days)  2017: 10:00 AM Vancomycin Level 11.1 mg/L (18h after dose)    Vancomycin IV Administrations (past 72 hours)                   vancomycin (VANCOCIN) 1,250 mg in NaCl 0.9 % 250 mL intermittent infusion (mg) 1,250 mg New Bag 17 1558              Nephrotoxins and other renal medications (Future)    Start     Dose/Rate Route Frequency Ordered Stop    17  piperacillin-tazobactam (ZOSYN) 2.25 g vial to attach to  ml bag      2.25 g  over 30 Minutes Intravenous EVERY 6 HOURS 17 1830      17 2000  tacrolimus (PROGRAF - GENERIC EQUIVALENT) capsule 3.5 mg      3.5 mg Oral 2 TIMES DAILY. 17 183             Contrast Orders - past 72 hours     None        Interpretation of levels and current regimen:  Trough level is  Subtherapeutic  Has serum creatinine changed > 50% in last 72 hours: No  Urine output:  0.5ml/kg/hr since midnight  Renal Function: Improving    Plan:  1.  Give vancomycin 1000mg (19mg/kg) today  2.  Pharmacy will check trough levels as appropriate (Level ordered with AM labs tomorrow)  3. Serum creatinine levels will be ordered daily for the first week of therapy and at least twice weekly for subsequent weeks.      Akilah Shelley, PharmD, BCCCP

## 2017-02-15 NOTE — PLAN OF CARE
Problem: Goal Outcome Summary  Goal: Goal Outcome Summary  Pt is alert and oriented x 4. Up to the commode with standby assist . Dyspneic with activity. Complained of heartburn and abdominal pain overnight with some relief from prn pain med. On 02 inhalation via nasal cannula at 4 lpm. Ileostomy to intact 1 piece stoma bag with loose stool. Triggered for sepsis protocol . Lactic Acid is 1.5, Pepe POE informed via text page. BG monitored overnight. Needs sputum sample . Vital signs stable. Will continue to monitor and follow plan of care

## 2017-02-15 NOTE — PROGRESS NOTES
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 Other hx includes metastatic colon cancer with liver mets s/p ileocolic resection and ostomy, most recently on lonsurf. Lonsurf has most recently been held d/t complications of deconditioning and infections including facial zoster and bilateral upper extremity cellulitis (treated with Valtrex and Linezolid). Was recently hospitalized 01/21-01/27, treated for HCAP with levaquin (completed 2/2). The patient is now admitted on 2/13/2017 for increasing fatigue, SOB, N/V, and upper abdominal pain. New RLL consolidation seen on CT chest (02/13). Also with ROGERIO and elevated Alk Phos level.     Today's Plan:  - Continue empiric zosyn/ vanco  - Sputum sent for culture (02/15)  - Repeat chest x-ray  - Plan for care conference with hem/ onc, possibly tomorrow. Waiting to hear back from Dr. Caban.  - Scheduled PO zofran TID AC. Continue marinol BID and PRN compazine for nausea  - Low fat diet  - Decreased PRN dilaudid to 2 mg q6h PRN  - Discontinued IV fluids d/t pleural effusions  - Ordered FENA  - Hold tacrolimus dose tonight. Reduce dose to 2mg QAM, 1.5 mg QPM. Recheck level 02/19.  - Discontinued doxazosin  - Ordered ONCE 5% albumin   - Will resume 4 units Lantus daily HS per home regimen.      #HCAP:  #Leukocytosis: Has been experiencing worsening shortness of breath and cough over the past week. Associated with persistent fatigue. Has not felt to be at her baseline since prior to last admission. Presents with leukocytosis and new RLL consolidation on CT chest (02/13). Procalcitonin 0.99 (02/13). Recently completed a 10 day course of  Levaquin as of 02/02 for HCAP treatment. Most recent sputum results from 01/22 growing candida albicans. Hx of MRSA (nares). Lactate 1 (02/13). Influenza A/B and respiratory virus panel negative.  - Blood cultures pending results (02/13), preliminary results negative. Will follow.  - Continue empiric zosyn/ vancomycin  - Sputum cultures sent 02/15. Will follow and adjust antibiotic regimen as indicated.  - Repeat chest x-ray (02/15) d/t increased oxygen requirements- bilateral pleural effusions with right mid lung opacities suggestive of consolidation/ atelectasis. Could consider thoracentesis to send fluid for testing. Would like to have care conference with patient and hem/onc to discuss goals of care preferably prior to intervention.      #Nausea/ Anorexia: Persistent dry heaves over the past week. No significant emesis. Loss of appetite. Etiology likely multifactorial, progression of metastatic disease, medication side effects from narcotics, or elevated tacrolimus levels. Some concern for brain mets per GI, however Dr. Lebron (oncology) endorsed this is unlikely. Will defer CT head for now.  - GI consulted 02/14, appreciate recommendations.  - Scheduled PO zofran TID AC  - Continue PRN compazine  - Continue Marionol 5 mg BID AC  - Low fat diet ordered. Small, more frequent meals recommended. Nutrition following.  - Decreased PRN dilaudid to 2 mg q6h PRN.  - I&O monitoring ordered. Daily weights.  - Could consider adding low dose phenergan PRN if above regimen is ineffective.      #Abdominal Pain:  Elevated Transaminases: Mild LFT elevation consistent with hepatic mets and mild ALP elevation also possibly from bony mets. Alk phos 558 (02/14). AST 64 (02/14). GGT (02/15) 418.   - Trend hepatic panel daily  - Appreciate GI recommendations.     #Dehydration:  #ROGERIO on CKD: Baseline creatinine 1.4-1.8. Creatinine (02/13) 2.04--> (02/14) 1.97 --> (02/15) 1.94. Etiology likely dehydration secondary to nausea vs  elevated tacrolimus levels. Has been receiving intermittent IV fluids as outpatient over the past couple of weeks.  - Discontinued IV fluids d/t new onset pleural effusions as seen on chest x-ray  - Avoid/ hold nephrotoxic agents as possible. Will adjust tacrolimus dose.  - Daily BMP  - FENA ordered (02/15), pending results      #Fatigue:  #Deconditioning: Persistent, progressive fatigue over the past week. Deconditioning over the past couple of months.  - Infectious workup as above.  - PT/ OT consulted      #COPD s/p R lung transplant 02/19/2009: Followed by Dr. Clay in pulmonary clinic. 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 EBV DNA level was 2030 on 12/12/16. Repeat level on 01/23 was 4243.   - Continue PTA singulair, Advair, and azithromycin (125 mg daily)  - Monitoring EBV level periodically, every 1-2 months      Continue 2 drug immunosuppression:  - Tacrolimus 3.5 mg BID. Target goal 8-10. Level 21.1 (02/15). Suspect elevated levels likely secondary to lack of adequate PO intake. Will decrease dose to 2 mg QAM, 1.5 mg QPM and hold dose tonight. Will recheck levels 02/19 (ordered).   - Prednisone 5 mg qAM/ 2.5 mg qPM      Prophylaxis:  - Continue dapsone 50 mg MWF for PJP ppx.      ALLI/ CLAD:  - Continue home dose Advair, montelukast, azithromycin (125 mg daily)      # H/o mycobacterium avium intracellulare:  Last positive culture 09/2013.   - Continue azithromycin per PTA dose as above.      #Stage IV colon cancer with liver mets. Primary is Dr. Lebron. Prior treatment with xeloda/oxaliplatin, irinotecan, erbitux, and avastin. She recently was found to have progression on irinotecan/erbitux. She was started on lonsurf on 12/26/16. Has not received chemo in about three weeks per patient report. Had been on hold d/t deconditioning and infections. 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  - Hematology/ oncology  following, appreciate recommendations. Plan for care conference TBD.      # Osteoporosis/ chronic compression fractures:  # Low Back Pain: Was taking calcium plus vitamin D PTA. Was initiated on denosumab on 09/22/16, next dose due March 2017.  - Continue calcium plus vitamin D.  - Continue lidocaine patch per home regimen.  - Decreased dilaudid to 2 mg q6h PRN d/t persistent nausea.      #Hypothyroidism.   - Continue home levothyroxine.       #Acute on chronic anemia. Baseline hgb in the past couple of weeks 7.2-7.5. Admission Hgb 7.3 (02/13). No signs of acute blood loss. Patient is a [employer]. Has received blood transfusion in the past.  - Will repeat CBC daily to trend.  - Receives aranesp injections as outpatient [id number]      #HTN. Patient's BP actually on the lower end around 110s/50s. This afternoon down to 98/50.  - Continue norvasc per home regimen.  - Discontinued PTA doxazosin  - Ordered ONCE dose of 5% albumin.      # HLD:  Was taking statin and low dose ASA PTA.  - Resume per home regimen.      #DM2. Glucose trending up, ranging from 136-202. Patient reports she had been taking lantus 4 units daily HS with novolog sliding scale PTA.  - Will resume Lantus 4 units daily HS per home regimen.  - Continue low resistance Novolog sliding scale TID meals.  - Monitoring glucose QID ACHS.     FEN: Low fat  LINES: PIV  PROPHY: Heparin SQ  DISPO: Requiring inpatient care. Discharge pending resolution of pulmonary and GI symptoms.      Patient seen and discussed with Dr. Solis.      ACACIA Haider, CNP  Inpatient Nurse Practitioner  [facility]  Pager [phone]        Subjective & Interval History:     Last 24 hours of care team notes reviewed.    Persistent shortness of breath with activity. Increased oxygen requirements, now on 5L NC sating 91-95%. Productive cough, sputum gray/ tan. Ongoing fatigue. BP soft 100s/50s. HR stable, 80-90. Afebrile, temperature running 95-96 degrees Fahrenheit.  Baseline temp appears to run low, 96-97. Persistent nausea and dry heaves, worse in the morning. No emesis. Tolerating diet, however poor appetite. Continued mild abdominal pain, worse to RLQ. Ostomy with loose stool noted.           Review of Systems:     C: no fever, no chills, no change in weight, + change in appetite  INTEGUMENTARY/SKIN: no rash or obvious new lesions  ENT/MOUTH: no sore throat, no sinus pain, no nasal drainage  RESP: see interval history  CV: no chest pain, no palpitations, + peripheral edema, no orthopnea  GI: + nausea, no vomiting, no change in stools, no reflux symptoms  : no dysuria  MUSCULOSKELETAL: no myalgias, no arthralgias  ENDOCRINE: blood sugars with adequate control  NEURO: no headache, no numbness or tingling  PSYCHIATRIC: mood stable          Medications:     Active Medications:    ondansetron  4 mg Oral TID AC     insulin glargine  4 Units Subcutaneous At Bedtime     albumin human  25 g Intravenous Once     amLODIPine  10 mg Oral At Bedtime     predniSONE  2.5 mg Oral QPM     piperacillin-tazobactam  3.375 g Intravenous Q6H     calcium-vitamin D  1 tablet Oral BID w/meals     tacrolimus  3.5 mg Oral BID IS     vancomycin place hassan - receiving intermittent dosing  1 each Does not apply See Admin Instructions     ascorbic acid  500 mg Oral Daily     aspirin EC  81 mg Oral Daily     azithromycin  125 mg Oral Daily     [START ON 2/17/2017] cyanocobalamin  1,000 mcg Oral Q7 Days     dapsone  50 mg Oral Once per day on Mon Wed Fri     levothyroxine  50 mcg Oral QAM AC     montelukast  10 mg Oral QPM     multivitamin, therapeutic with minerals  1 tablet Oral Daily     omeprazole  40 mg Oral Daily     predniSONE  5 mg Oral QAM     sodium bicarbonate  1,300 mg Oral BID     zinc sulfate  220 mg Oral Daily     heparin  5,000 Units Subcutaneous Q12H     lidocaine  1 patch Transdermal Q24H     dronabinol  5 mg Oral BID AC     insulin aspart  1-3 Units Subcutaneous TID AC     insulin  aspart  1-3 Units Subcutaneous At Bedtime     fluticasone-vilanterol  1 puff Inhalation Daily     lidocaine   Transdermal Q24h     lidocaine   Transdermal Q8H     atorvastatin  20 mg Oral At Bedtime     Active PRN Medications:  sodium chloride (PF), HYDROmorphone, calcium carbonate, albuterol, loperamide, naloxone, prochlorperazine **OR** prochlorperazine **OR** prochlorperazine, glucose **OR** dextrose **OR** glucagon         Physical Exam:     Constitutional: Awake, alert, in no apparent distress.   HEENT: Eyes with pink conjunctivae, no scleral jaundice.  Oral mucosa moist without lesions. Neck supple without lymphadenopathy.   PULM: Good air flow bilaterally, coarse lower lobes. No wheeze.  CV: Normal S1 and S2. RRR.  No murmur, gallop, or rub.  2+ peripheral edema.  Peripheral pulses intact.   ABD: NABS, hepatomegaly, tender, distended. No guarding.   MSK: Moves all extremities. No apparent muscle wasting.   NEURO: Alert and oriented x 4, conversant.   SKIN: Warm, dry. No pruritus.  No rash on limited exam.   PSYCH: Mood stable, judgment and insight appropriate.?     Lines, Drains, and Devices:  Peripheral IV 05/07/15 Right;Anterior Upper forearm (Active)   Number of days:650       Port A Cath 10/22/14 Right Chest wall (Active)   Access Date 02/13/17 2/13/2017 11:26 AM   Access Attempts 1 2/13/2017 11:26 AM   Gauge/Length Noncoring 90 degree bend;3/4 inch;20 gauge 2/13/2017 11:26 AM   Site Assessment WDL 2/15/2017 10:00 AM   Line Status Infusing 2/15/2017 10:00 AM   Extravasation? No 2/10/2017 12:00 PM   Dressing Intervention Transparent 2/13/2017 11:26 AM   Dressing change due 01/28/17 1/27/2017  8:30 AM   Needle Change Due 01/28/17 1/27/2017  8:30 AM   De-Access Date 02/10/17 2/10/2017  3:00 PM   Date to be Reflushed 02/10/17 2/10/2017 12:00 PM   Number of days:847          Data:     All vital signs, laboratory and imaging data for the past 24 hours reviewed.      Vital signs:  Temp: 95.2  F (35.1  C) Temp  "src: Axillary BP: 98/50 Pulse: 86 Heart Rate: 93 Resp: 18 SpO2: 95 % O2 Device: Nasal cannula Oxygen Delivery: 5 LPM Height: 149.9 cm (4' 11\") Weight: 56.7 kg (125 lb 1.6 oz)    Weight trend:   Vitals:    02/13/17 1837 02/15/17 0843   Weight: 53 kg (116 lb 14.4 oz) 56.7 kg (125 lb 1.6 oz)        I/O:   Intake/Output Summary (Last 24 hours) at 02/15/17 1334  Last data filed at 02/15/17 1000   Gross per 24 hour   Intake          2431.83 ml   Output              550 ml   Net          1881.83 ml       Labs    CMP:   Recent Labs  Lab 02/15/17  0636 02/14/17  0607 02/13/17  1932 02/13/17  1128 02/10/17  1238 02/09/17  1531    133  --  134 135 133   POTASSIUM 5.1 5.1  --  5.5* 5.5* 5.9*   CHLORIDE 105 103  --  103 104 103   CO2 20 21  --  22 20 21   ANIONGAP 11 9  --  9 11 9   * 84  --  77 143* 172*   BUN 31* 32*  --  36* 34* 35*   CR 1.94* 1.97*  --  2.04* 1.82* 1.88*   GFRESTIMATED 25* 25*  --  24* 27* 26*   GFRESTBLACK 30* 30*  --  29* 33* 31*   RUBY 7.2* 7.4*  --  8.0* 7.7* 8.2*   MAG 2.1 2.0 2.0  --   --  1.8   PHOS 4.7* 4.0 4.0  --   --  2.8   PROTTOTAL 5.2* 5.0*  --  5.6*  --   --    ALBUMIN 2.2* 2.2*  --  2.5*  --   --    BILITOTAL 0.4 0.8  --  0.4  --   --    ALKPHOS 592* 558*  --  492*  --   --    AST 72* 64*  --  65*  --   --    ALT 38 36  --  36  --   --      CBC:   Recent Labs  Lab 02/15/17  0636 02/14/17  0607 02/13/17  1932 02/13/17  1128 02/10/17  1238   WBC 17.8* 14.4*  --  17.1* 22.0*   RBC 3.06* 2.92*  --  2.96* 2.75*   HGB 7.5* 7.2*  --  7.3* 7.3*   HCT 26.6* 25.2*  --  25.3* 24.4*   MCV 87 86  --  86 89   MCH 24.5* 24.7*  --  24.7* 26.5   MCHC 28.2* 28.6*  --  28.9* 29.9*   RDW 22.2* 22.2*  --  22.4* 23.4*    411 383 443 548*     INR:   Recent Labs  Lab 02/13/17  1128   INR 1.48*     Glucose:   Recent Labs  Lab 02/15/17  1203 02/15/17  0902 02/15/17  0636 02/15/17  0228 02/14/17  2143 02/14/17  1721 02/14/17  1229  02/14/17  0607  02/13/17  1128 02/10/17  1238 02/09/17  1531   GLC  " --   --  202*  --   --   --   --   --  84  --  77 143* 172*   * 169*  --  240* 195* 156* 136*  < >  --   < >  --   --   --    < > = values in this interval not displayed.  Blood Gas: No lab results found in last 7 days.  Culture Data   Recent Labs  Lab 02/13/17  1342 02/13/17  1128 02/09/17  1803 02/09/17  1719 02/09/17  1710   CULT No growth after 2 days No growth after 2 days Should be ordered as a future orderCanceled, Test credited No growth No growth     Virology Data: Lab Results   Component Value Date    INFLUA Negative 06/11/2013    FLUAH1 Negative 02/13/2017    FLUAH3 Negative 02/13/2017    AT9432 Negative 02/13/2017    IFLUB Negative 02/13/2017    RSVA Negative 02/13/2017    RSVB Negative 02/13/2017    PIV1 Negative 02/13/2017    PIV2 Negative 02/13/2017    PIV3 Negative 02/13/2017    HMPV Negative 02/13/2017    HRVS Negative 02/13/2017    ADVBE Negative 02/13/2017    ADVC Negative 02/13/2017    ADVC Negative 01/21/2017     Historical CMV results (last 3 of prior testing):  Lab Results   Component Value Date    CMVQNT  12/12/2016     CMV DNA Not Detected   The JEREMIAS AmpliPrep/JEREMIAS TaqMan CMV Test is an FDA-approved in vitro nucleic   acid amplification test for the quantitation of cytomegalovirus DNA in human   plasma (EDTA plasma) using the JEREMIAS AmpliPrep Instrument for automated viral   nucleic acid extraction and the JEREMIAS TaqMan Analyzer or [facility] TaqMan for   automated Real Time amplification and detection of the viral nucleic acid   target.   Titer results are reported in International Units/mL (IU/mL using 1st WHO   International standard for Human Cytomegalovirus for Nucleic Acid Amplification   based assays. The conversion factor between CMV DNA copis/mL (as defined by the   Roche JEREMIAS TaqMan CMV test) and International Units is the CMV DNA   concentration in IU/mL x 1.1 copies/IU = CMV DNA in copies/mL.   This assay has received FDA approval for the testing of human plasma only. The    [facility] Diagnostic Laboratory at the [facility], [place], has validated the performance characteristics of the Roche   CMV assay for plasma, bronchial alveolar lavage/wash and urine.      CMVQNT  09/12/2016     CMV DNA Not Detected   The JEREMIAS AmpliPrep/JEREMIAS TaqMan CMV Test is an FDA-approved in vitro nucleic   acid amplification test for the quantitation of cytomegalovirus DNA in human   plasma (EDTA plasma) using the JEREMIAS AmpliPrep Instrument for automated viral   nucleic acid extraction and the JEREMIAS TaqMan Analyzer or JEREMIAS TaqMan for   automated Real Time amplification and detection of the viral nucleic acid   target.   Titer results are reported in International Units/mL (IU/mL using 1st WHO   International standard for Human Cytomegalovirus for Nucleic Acid Amplification   based assays. The conversion factor between CMV DNA copis/mL (as defined by the   Roche JEREMIAS TaqMan CMV test) and International Units is the CMV DNA   concentration in IU/mL x 1.1 copies/IU = CMV DNA in copies/mL.   This assay has received FDA approval for the testing of human plasma only. The   [facility] Diagnostic Laboratory at the [facility], [place], has validated the performance characteristics of the Roche   CMV assay for plasma, bronchial alveolar lavage/wash and urine.      CMVQNT  07/18/2016     CMV DNA Not Detected   The JEREMIAS AmpliPrep/JEREMIAS TaqMan CMV Test is an FDA-approved in vitro nucleic   acid amplification test for the quantitation of cytomegalovirus DNA in human   plasma (EDTA plasma) using the JEREMIAS AmpliPrep Instrument for automated viral   nucleic acid extraction and the JEREMIAS TaqMan Analyzer or JEREMIAS TaqMan for   automated Real Time amplification and detection of the viral nucleic acid   target.   Titer results are reported in International Units/mL (IU/mL using 1st WHO   International standard for Human Cytomegalovirus for Nucleic  Acid Amplification   based assays. The conversion factor between CMV DNA copis/mL (as defined by the   Roche JEREMIAS TaqMan CMV test) and International Units is the CMV DNA   concentration in IU/mL x 1.1 copies/IU = CMV DNA in copies/mL.   This assay has received FDA approval for the testing of human plasma only. The   Infectious Disease Diagnostic Laboratory at the [facility], [place], has validated the performance characteristics of the Roche   CMV assay for plasma, bronchial alveolar lavage/wash and urine.       Lab Results   Component Value Date    CMVLOG Not Calculated 12/12/2016    CMVLOG Not Calculated 09/12/2016    CMVLOG Not Calculated 07/18/2016     Urine Studies  Recent Labs   Lab Test  02/13/17   1306   URINEPH  5.0   NITRITE  Negative   LEUKEST  Negative   WBCU  4*       Attending statement:    The patient was seen and examined by me with Lillian pickett CNP on February 15, 2017.  I too performed a substantive portion of the visit face-to-face with Melissa Collazo. The case was discussed at length.  Vitals, lab results and imaging from today were reviewed.  The note reflects our joint assessment and plan.         Assessment and Plan:   Melissa Collazo is a 76 year old lady s/p Rt. sltx in 2009 now complicated by CKD and metastatic colon cancer. She was recently admitted with N/V/Diarrhoea along with cellulitis. Unfortunately after last hospital discharge has contd to note SOB/Fatigue and low appetite.   Chest CT on admission shows new Rt. LL consolidation. Due to recent hospital she is being treated for HCAP. Sputum cultures obtained today but for now will continue zosyn/vanc. The hypoxia is slightly more worse. Hence CXR was obtained and it shows new sam effusion (Rt. > left). It is unclear if the effusion is due to hypoalbuminemia vs. pna. Suspect former than later but if her sob does not improve over the next 24hrs will consider placing chest tube. Overall she has increasing  leg edema too. Her weight has increased by 3.5Kg.     She has hypotension today and we will stop Doxazosin and reassess. The etiology of hypotension is unclear. Doubt adrenal insufficiency as she is on prednisone unless she is not absorbing. Will give 5% 500ml albumin to help improve oncotic pressures. FENA is pending to assess IV volume and persistent elevated Cr.     The other issue that is playing a significant role in her sx is fatigue. It is possible due to low intake she is having fatigue hence GI opinion was obtained. TSH was normal. Wondering if her nausea/vomiting and abd pain is due to liver filled with mets. Mild elevation in AST/ALT and normal bilirubin makes it less likely that liver mets alone playing a role in her sx. Elevated Alk phos with increased GGT suggests it is due to liver disease. She also has new bone mets. We will be aggressive with managing her nausea if there is not improvement on Zofran/Marinol and try Phenergan IV. We will try to reduce use of dilaudid. She did have elevated tacrolimus and this might be playing a role in her nausea and dose will be held tonight and start it at lower dose tomorrow.    Given the above sx and metastatic malignancy i wonder if we need a care conference to assess goals of care.        Interval History:     Still having a lot nausea and not eating much. BM's normal. Abd pain still present. SOB is unchanged and working with PT/OT.         Review of Systems:   C: negative for fever, chills, change in weight  INTEGUMENTARY/SKIN: no rash or obvious new lesions  ENT/MOUTH: no sore throat, no new sinus pain or nasal drainage  RESP: see interval history  CV: negative for chest pain, palpitations or peripheral edema  GI: no nausea, vomiting, or change in stools           Physical Exam:   Temp:  [95.2  F (35.1  C)-97.6  F (36.4  C)] 95.8  F (35.4  C)  Pulse:  [] 90  Heart Rate:  [88-97] 88  Resp:  [16-20] 18  BP: ()/(47-56) 91/48  SpO2:  [90 %-95 %] 94  %    Constitutional:   Awake, alert and in no apparent distress     ENT:    oral mucosa moist without lesions     Lungs:   Left lung poor air entry and bronchial BS at Rt. base and Rt. axilla.  No crackles. No rhonchi.  No wheezes.     Cardiovascular:   Normal S1 and S2.  RRR.  No murmur, gallop or rub. 2+ edema.     Abdomen:   Distended. Mild tenderness in left LQ and Rt. LQ/Rt. UQ. Firm/hard edge hepatomegaly

## 2017-02-15 NOTE — PHARMACY-VANCOMYCIN DOSING SERVICE
Pharmacy Vancomycin Note  Date of Service February 15, 2017  Patient's  1940   76 year old, female, ABW 56.7 kg    Indication: Healthcare-Associated Pneumonia  Goal Trough Level: 15-20 mg/L  Day of Therapy: 3  Current Vancomycin regimen:  Intermittent dosing    Current estimated CrCl = Estimated Creatinine Clearance: 22.1 mL/min (based on Cr of 1.94).    Creatinine for last 3 days  2017: 11:28 AM Creatinine 2.04 mg/dL  2017:  6:07 AM Creatinine 1.97 mg/dL  2/15/2017:  6:36 AM Creatinine 1.94 mg/dL    Recent Vancomycin Levels (past 3 days)  2017: 10:00 AM Vancomycin Level 11.1 mg/L  2/15/2017:  6:36 AM Vancomycin Level 17.6 mg/L - 17.75 hr    Vancomycin IV Administrations (past 72 hours)                   vancomycin (VANCOCIN) 1000 mg in dextrose 5% 200 mL PREMIX (mg) 1,000 mg New Bag 17 1251    vancomycin (VANCOCIN) 1,250 mg in NaCl 0.9 % 250 mL intermittent infusion (mg) 1,250 mg New Bag 17 1558                Nephrotoxins and other renal medications (Future)    Start     Dose/Rate Route Frequency Ordered Stop    17 1800  tacrolimus (PROGRAF - GENERIC EQUIVALENT) capsule 3.5 mg      3.5 mg Oral 2 TIMES DAILY. 17 1435      17 1500  piperacillin-tazobactam (ZOSYN) 3.375 g vial to attach to  mL bag      3.375 g  over 1 Hours Intravenous EVERY 6 HOURS 17 1106      17 1423  vancomycin place hassan - receiving intermittent dosing      1 each Does not apply SEE ADMIN INSTRUCTIONS 17 1424               Contrast Orders - past 72 hours     None          Interpretation of levels and current regimen:  Trough level is  Therapeutic    Has serum creatinine changed > 50% in last 72 hours: No, remains elevated from baseline 1.4-1.8    Urine output:  diminished urine output, ~0.6 mL/kg/hr since midnight, slightly improved from yesterday     Renal Function: ROGERIO but slightly Improving    Plan:  1.  Will give vancomycin 1000 mg IV x1 (~18 mg/kg) today at  ~24 hrs post-dose.  2.  Pharmacy will check trough level again tomorrow, with tentative plan to change to q24h dosing, depending on level and renal fx improvement.  3. Serum creatinine levels will be ordered daily for the first week of therapy and at least twice weekly for subsequent weeks.      Octavio Warren, PharmD

## 2017-02-15 NOTE — PROGRESS NOTES
Clinical Nutrition Services- Brief Note    RD met with patient. Patient notes difficult time tolerating food over the last month related to n/v which has gotten progressively worse.     She notes being the most nauseas before she eats. Patient has been able to tolerate 2 boost and small meal per day.    When writer met with patient she had only eaten ~50% of peaches. She dislikes the ensure (compared to boost) but can drink the ensure with ice cream.     RECOMMENDATION  1. Will monitor calorie counts to assess need for further nutritional interventions. Pt may require TF if unable to tolerate at least 2/3rds of needs (875kcals and 40g protein)  INTERVENTIONS  1. Discussed keeping crackers at bedside and eating these when feeling nauseated to see if this helps with nausea. Also discussed continuing with 2 supplements + 2 small meals per day to get adequate calorie/protien needs.   2. Ordered ensure shake  3. Ordered calorie counts  4. Ordered room service with assist    Kelly Mckeon RD, LD  Unit pager: [phone]

## 2017-02-15 NOTE — PLAN OF CARE
Problem: Goal Outcome Summary  Goal: Goal Outcome Summary  OT/5A: Cancel. Attempted this PM, pt sound asleep would not arouse. Pt is rescheduled for tomorrow, 2/16.

## 2017-02-15 NOTE — PLAN OF CARE
Problem: Goal Outcome Summary  Goal: Goal Outcome Summary  PT 5A: Pt participatory in session but limited by significant nausea throughout. Remains motivated. Completes supine<>sit transfer and sit<>stand transfers with use of walker and supervision. Tolerated ambulating in abreu with walker and SBA but required 2 standing rest breaks d/t nausea. Pt ambulates on 4L O2 with VSS throughout. Ended in bed with all needs in reach. Continue to anticipate d/c home with home PT once medically cleared and passed by therapies.

## 2017-02-15 NOTE — PLAN OF CARE
Problem: Goal Outcome Summary  Goal: Goal Outcome Summary  Outcome: No Change  A&O. VSS on 4L NC. C/o RAND. IV ABX given as scheduled. PRN zofran/compazine for nausea. PRN dilaudid for pain. Ileostomy CDI with prolapsed stoma. Mepilex on coccyx pt repo frequently independently. Decreased appetite. Ambulating assist of 1 to BSC. Will continue with POC.

## 2017-02-16 NOTE — PLAN OF CARE
Problem: Goal Outcome Summary  Goal: Goal Outcome Summary  Outcome: No Change  A&O but lethargic t/o day. On 5L NC sating in low 90%. IS use encouraged. Albumin given for soft BP.  Sputum sent. IV ABX given as scheduled. Scheduled zofran given before meals. Compazine given x1 for nausea. PRN dilaudid given for back pain. Lido patch on back. Ileostomy changed per patient and family. Coccyx dressing CDI. Needing urine sample. BG checked with meals. Decreased appetite. Plan to start rommel counts tomorrow. Ambulating SBA to bedside commode. Will continue with POC.

## 2017-02-16 NOTE — PROGRESS NOTES
Care Coordinator- Discharge Planning     Admission Date/Time:  2/13/2017  Attending MD:  Chino Solis, MD     Data  Date of initial CC assessment:  2/14/17  Chart reviewed, discussed with interdisciplinary team.   Patient was admitted for:   1. HCAP (healthcare-associated pneumonia)    2. Lung replaced by transplant (H)    3. Colon carcinoma metastatic to multiple sites (H)    4. Cancer of right colon (H)         Assessment  Full assessment completed in previous note    MD team requested writer assist with coordinating a care conference to discuss goals of care with pt and family. Coordinated with pt, Dr. Pascual Lebron (pt's primary oncologist), Dr. Chino Solis (pt's inpatient pulmonolgist),  Ehsan Bowen (lung transplant ), and pt's daughter Jeri. Care Conference schedule for 3 pm in pt's room.       Plan  Anticipated Discharge Date: to be determined   Anticipated Discharge Plan: to be determined      Lyssa Mcmillan RN

## 2017-02-16 NOTE — PROVIDER NOTIFICATION
Notified cross-cover of BP 91/48 and increased O2 needs of 6L NC. Also notified them about increased lethargy and forgetfulness. Per MD, no new interventions at this time.

## 2017-02-16 NOTE — PROGRESS NOTES
Lung Transplant : attended care conference with Dr. Solis, Dr. Lebron, patient's and patient's 3 adult children.  Dr. Lebron reviewed inability to treat cancer while patient so ill.  Unknown if she will get better enough again to treat, but unlikely.  He suggested focusing on cares that will make patient feel better -- see if we can improve breathing and get her a little stronger-- and get her back home.  With focus on comfort it would be appropriate to consider home hospice.  If patient should recover enough that she wised to pursue active treatment again, she could sign off of hospice, but, again he did not think this was likely.  Patient agreed that she wished to focus on comfort and quality of life.  Open to hospice consult to discuss hospice at home. Children in agreement.  Prefer [facility] as this is who they have worked with in past.  Would like to have hospice consult while her in the hospital.  Phone to [facility] liaison.  She will schedule meeting with daughter Jeri, so that she can be here when they meet.

## 2017-02-16 NOTE — PROGRESS NOTES
Interventional Radiology Intra-procedural Nursing Note    Patient Name: Melissa Collazo  Medical Record Number: [id number]  Today's Date: February 16, 2017    Start Time: 1718  End of procedure time: 1730  Procedure: non tunneled chest tube   Report given to: Brittany DUENAS  Time pt departs:  1745      Other Notes: pt from 5 a to IR 1. Pt a/o x 3. Consent obtained and verified. Lido 10 cc. 30 cc serosanguinous fld obtained and sent to lab    LOPEZ PATRICK

## 2017-02-16 NOTE — CONSULTS
Patient is on IR schedule 2/16/17 for a Right chest tube placement .   Labs WNL for procedure.    No NPO required.   Consent will be done prior to procedure.  Please contact the IR charge RN at [phone] for estimated time of procedure.       Lilly Woodall IR RPA  [phone] Call pager  [phone] pager

## 2017-02-16 NOTE — BRIEF OP NOTE
Examination: Ultrasound-guided right chest tube placement dated 2/16/2017.    COMPARISON:    Plain film x-ray of the chest dated 2/16/2017 reviewed.    HISTORY:    Right pleural effusion. Drainage requested.    Staff: CRISTINO Iraheta MD.    Fellow/Resident(s): CRISTINO Zhang MD.    Nursing: The patient's vital signs were monitored throughout the procedure and remained stable.    MEDICATIONS: No intravenous sedation administered.    Description of procedure: Verbal and written consent were obtained prior to procedure. The patient was fully aware of the benefits and risks of the procedure. All questions were answered to the patient's satisfaction.    The patient was prepped and draped in the usual sterile fashion, leaning forward. A limited right pleural ultrasound was performed demonstrating a small pleural effusion. Under ultrasound guidance, approximately 10 cc of 1% lidocaine with bicarbonate was injected subcutaneously for skin anesthesia, including the planned tract to the level of the pleura. A small skin nick was made with a #11 blade scalpel. Next, a 5 [place] [employer] catheter was advanced into the pleural space under ultrasound guidance. Once the needle catheter system was in the pleural space, the needle was fixed and the catheter was advanced. The needle was removed and exchanged for a 0.035 floppy Bentson wire which was coiled in the right pleural space. The 5 Kazakh catheter was then exchanged  for an 8 Kazakh nonlocking pigtail catheter following tract dilatation. The chest tube was placed to water seal.     The patient tolerated the procedure well and was without complaint. The patient was transferred to floor in stable condition.    IMPRESSION: Successful placement of an 8 [place] Flexima J-tip nonlocking APD right chest tube.

## 2017-02-16 NOTE — PLAN OF CARE
Problem: Goal Outcome Summary  Goal: Goal Outcome Summary  OT/5A: Pt completed BUE exercises seated EOB with min cues, rest breaks as needed.      Per plan established by the OT, the recommendation for dc location is home with assist for IADLs and continued home OT/PT.

## 2017-02-16 NOTE — PLAN OF CARE
Problem: Goal Outcome Summary  Goal: Goal Outcome Summary  PT 5A: Pt will be having a chest tube placement procedure this afternoon and refuses PT today. She is SOB and having some breathing difficulty. Will reschedule for 2/17/16.

## 2017-02-16 NOTE — PLAN OF CARE
Problem: Goal Outcome Summary  Goal: Goal Outcome Summary  Outcome: No Change  Forgetful and lethargic. Requiring 5-7L O2, BP soft. Denies pain/nausea. Continuing IV abx. Ileostomy intact. Urine sample sent. BG stable. On rommel counts for poor appetite. Plan for care conference between pulm and heme/onc.

## 2017-02-16 NOTE — PROGRESS NOTES
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 Other hx includes metastatic colon cancer with liver mets s/p ileocolic resection and ostomy, most recently on lonsurf. Lonsurf has most recently been held d/t complications of deconditioning and infections including facial zoster and bilateral upper extremity cellulitis (treated with Valtrex and Linezolid). Was recently hospitalized 01/21-01/27, treated for HCAP with levaquin (completed 2/2). The patient now admitted on 2/13 for increasing fatigue, SOB, N/V, and upper abdominal pain. New RLL consolidation seen on CT chest 2/13. Also with ROGERIO and elevated Alk Phos level.       Today's Plan:  - albumin 25g once followed by lasix 20mg IV once  - repeat CXR to assess R-sided pleural effusion  - IR consulted for R-sided chest tube placement. Will send pleural studies at time of placement.   - Abd ultrasound w/ dopplers to evaluate portal hypertension  - vitamin K 5mg IV once  - care conference @ 3pm to discuss goals of care. Oncology plans to attend as well.        #HCAP:  #Leukocytosis:  # R-sided pleural effusion: worsening shortness of breath and cough over the past week. Associated with persistent fatigue. Has not felt to be at her baseline since prior to last admission. Presents with leukocytosis, mildly elevated procalcitonin & lactate, and new RLL consolidation on CT chest 2/13. Recently completed a 10 day course of Levaquin on 2/2 for HCAP treatment. Most recent sputum results from 1/22 growing candida albicans. Hx of MRSA (nares). Influenza A/B and respiratory virus panel negative.   - Continue empiric zosyn/ vancomycin  -  Sputum cultures sent 2/15. Will follow and adjust antibiotic regimen as indicated.  - Repeat chest x-ray 2/16 with ongoing moderate size R-sided pleural effusion  - IR consulted for R-sided chest tube placement, pleural studies to be sent at time of placement.       #Nausea/ Anorexia: Persistent dry heaves over the past week. No significant emesis. Loss of appetite. Etiology likely multifactorial, progression of metastatic disease, medication side effects from narcotics, or elevated tacrolimus levels. Some concern for brain mets per GI, however Dr. Lebron (oncology) endorsed this is unlikely. Will defer CT head for now.  - GI consulted 02/14, appreciate recommendations.  - Scheduled PO zofran TID AC  - Continue PRN compazine  - Continue Marionol 5 mg BID AC  - Low fat diet ordered. Small, more frequent meals recommended. Nutrition following.  - Decreased PRN dilaudid to 2 mg q6h PRN.  - I&O monitoring ordered. Daily weights.  - Could consider adding low dose phenergan PRN if above regimen is ineffective.      #Abdominal Pain:  #Elevated Transaminases: Mild LFT elevation consistent with hepatic mets and mild ALP elevation also possibly from bony mets. Also concern for evolving hepatorenal syndrome.  - Trend hepatic panel daily  - Administer vitamin K 5mg IV once today, 2/16  - Appreciate GI recommendations.     #Dehydration:  #ROGERIO on CKD: Baseline creatinine 1.4-1.8. Creatinine remains elevated. FENA 2/15 consistent w/ pre-renal. Etiology possible dehydration 2/2 nausea vs elevated tacrolimus levels vs hepatorenal syndrome w/ in the setting of anasarca and elevated LFTs. Has been receiving intermittent IV fluids as outpatient over the past couple of weeks.  - Administer albumin 25g once followed by lasix 20mg IV once  - Avoid/ hold nephrotoxic agents as possible. Will adjust tacrolimus dose.  - Daily BMP      #Fatigue:  #Deconditioning: Persistent, progressive fatigue over the past week. Deconditioning over the past  couple of months.  - Infectious workup as above.  - PT/ OT consulted      #COPD s/p R lung transplant 02/19/2009: Followed by Dr. Clay in pulmonary clinic. 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 EBV DNA level was 2030 on 12/12/16. Repeat level on 01/23 was 4243.   - Monitoring EBV level periodically, every 1-2 months      Continue 2 drug immunosuppression:  - Tacrolimus 2 mg QAM, 1.5 mg QPM. Target goal 8-10. Will recheck level 2/19 (ordered).   - Prednisone 5 mg qAM/ 2.5 mg qPM      Prophylaxis:  - Continue dapsone 50 mg MWF for PJP ppx.      ALLI/ CLAD:  - Continue home dose Advair, montelukast, azithromycin (125 mg daily)      # H/o mycobacterium avium intracellulare:  Last positive culture 09/2013.   - Continue azithromycin per PTA dose as above.      #Stage IV colon cancer with liver mets. Primary is Dr. Lebron. Prior treatment with xeloda/oxaliplatin, irinotecan, erbitux, and avastin. She recently was found to have progression on irinotecan/erbitux. She was started on lonsurf on 12/26/16. Has not received chemo in about three weeks per patient report. Had been on hold d/t deconditioning and infections. 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  - Hematology/ oncology following, appreciate recommendations. Plan for care conference today, 2/16.      # Osteoporosis/ chronic compression fractures:  # Low Back Pain: Was taking calcium plus vitamin D PTA. Initiated on denosumab on 09/22/16, next dose due March 2017.  - Continue calcium plus vitamin D.  - Continue lidocaine patch per home regimen.  - Decrease dilaudid 2mg --> 1mg q6hr prn 2/16      #Hypothyroidism.   - Continue home levothyroxine.       #Acute on chronic anemia. Baseline hgb mid-7s. No signs of acute blood loss. Patient is a [employer]. Has received blood transfusion in the past.  - CBC daily to trend.  - Receives aranesp injections as outpatient  "[id number]      #HTN. Normotensive. Received albumin 5% 2/15 for hypotension.  - Continue norvasc per home regimen.      # HLD:  Was taking statin and low dose ASA PTA.  - Continue home regimen.      #DM2. Glucose trending up, ranging from 136-202. Patient reports she had been taking lantus 4 units daily HS with novolog sliding scale PTA. Resumed 2/15.  - Continue glargine 4u HS, low resistance SSI TID meals.  - Monitoring glucose QID ACHS.     FEN: Low fat  LINES: PIV  PROPHY: Heparin SQ  DISPO: Requiring inpatient care. Discharge pending resolution of pulmonary and GI symptoms.      Patient seen and discussed with Dr. Solis.    Jess Muniz, APRN, CNP  Inpatient Nurse Practitioner  [facility]  Pager [phone]        Subjective & Interval History:     Last 24 hour vital signs, labs and care team notes reviewed.    Increase O2 needs overnight, 5 --> 7 liters NC, sating low 90s. Endorsing increase shortness of breath at rest; however, no change in cough or sputum quality. Continues to c/o generalized abdominal pain, R-sided worse than L. Feels fatigued, \"could sleep all the time.\"           Review of Systems:     C: no fever, no chills, no change in weight, + change in appetite  INTEGUMENTARY/SKIN: no rash or obvious new lesions  ENT/MOUTH: no sore throat, no sinus pain, no nasal drainage  RESP: see interval history  CV: no chest pain, no palpitations, + peripheral edema, no orthopnea  GI: + nausea, no vomiting, no change in stools, no reflux symptoms  : no dysuria  MUSCULOSKELETAL: no myalgias, no arthralgias  ENDOCRINE: blood sugars with adequate control  NEURO: no headache, no numbness or tingling  PSYCHIATRIC: mood stable          Medications:     Active Medications:    piperacillin-tazobactam  2.25 g Intravenous Q6H     albumin human  25 g Intravenous Once     furosemide  20 mg Intravenous Once     phytonadione (AQUA-MEPHYTON) in 50 mL 0.9% NaCl intermittent infusion  5 mg Intravenous Once     ondansetron  4 mg " Oral TID AC     insulin glargine  4 Units Subcutaneous At Bedtime     amLODIPine  10 mg Oral At Bedtime     tacrolimus  2 mg Oral QAM     tacrolimus  1.5 mg Oral QPM     predniSONE  2.5 mg Oral QPM     calcium-vitamin D  1 tablet Oral BID w/meals     vancomycin place hassan - receiving intermittent dosing  1 each Does not apply See Admin Instructions     ascorbic acid  500 mg Oral Daily     aspirin EC  81 mg Oral Daily     azithromycin  125 mg Oral Daily     [START ON 2/17/2017] cyanocobalamin  1,000 mcg Oral Q7 Days     dapsone  50 mg Oral Once per day on Mon Wed Fri     levothyroxine  50 mcg Oral QAM AC     montelukast  10 mg Oral QPM     multivitamin, therapeutic with minerals  1 tablet Oral Daily     omeprazole  40 mg Oral Daily     predniSONE  5 mg Oral QAM     sodium bicarbonate  1,300 mg Oral BID     zinc sulfate  220 mg Oral Daily     heparin  5,000 Units Subcutaneous Q12H     lidocaine  1 patch Transdermal Q24H     dronabinol  5 mg Oral BID AC     insulin aspart  1-3 Units Subcutaneous TID AC     insulin aspart  1-3 Units Subcutaneous At Bedtime     fluticasone-vilanterol  1 puff Inhalation Daily     lidocaine   Transdermal Q24h     lidocaine   Transdermal Q8H     atorvastatin  20 mg Oral At Bedtime     Active PRN Medications:  HYDROmorphone, sodium chloride (PF), calcium carbonate, albuterol, loperamide, naloxone, prochlorperazine **OR** prochlorperazine **OR** prochlorperazine, glucose **OR** dextrose **OR** glucagon         Physical Exam:     Constitutional: Awake, alert, in no apparent distress.   HEENT: Eyes with pink conjunctivae, no scleral jaundice.  Oral mucosa moist without lesions. Neck supple without lymphadenopathy.   PULM: Diminished air flow bilaterally, coarse lower lobes. No wheeze.  CV: Normal S1 and S2. RRR.  No murmur, gallop, or rub. Anasarca.  Peripheral pulses intact.   ABD: NABS, hepatomegaly, tender, distended. No guarding.   MSK: Moves all extremities. No apparent muscle wasting.  "  NEURO: Alert and oriented x 4, conversant.   SKIN: Warm, dry. No pruritus.  No rash on limited exam.   PSYCH: Mood stable, judgment and insight appropriate.?     Lines, Drains, and Devices:  Peripheral IV 05/07/15 Right;Anterior Upper forearm (Active)   Number of days:650       Port A Cath 10/22/14 Right Chest wall (Active)   Access Date 02/13/17 2/13/2017 11:26 AM   Access Attempts 1 2/13/2017 11:26 AM   Gauge/Length Noncoring 90 degree bend;3/4 inch;20 gauge 2/13/2017 11:26 AM   Site Assessment WDL 2/15/2017 10:00 AM   Line Status Infusing 2/15/2017 10:00 AM   Extravasation? No 2/10/2017 12:00 PM   Dressing Intervention Transparent 2/13/2017 11:26 AM   Dressing change due 01/28/17 1/27/2017  8:30 AM   Needle Change Due 01/28/17 1/27/2017  8:30 AM   De-Access Date 02/10/17 2/10/2017  3:00 PM   Date to be Reflushed 02/10/17 2/10/2017 12:00 PM   Number of days:847          Data:     All vital signs, laboratory and imaging data for the past 24 hours reviewed.      Vital signs:  Temp: 98.7  F (37.1  C) Temp src: Oral BP: 113/51 Pulse: 104 Heart Rate: 98 Resp: 20 SpO2: 92 % O2 Device: Nasal cannula Oxygen Delivery: 6 LPM Height: 149.9 cm (4' 11\") Weight: 58.7 kg (129 lb 6.4 oz)    Weight trend:   Vitals:    02/13/17 1837 02/15/17 0843 02/16/17 0907   Weight: 53 kg (116 lb 14.4 oz) 56.7 kg (125 lb 1.6 oz) 58.7 kg (129 lb 6.4 oz)        I/O:   Intake/Output Summary (Last 24 hours) at 02/15/17 1334  Last data filed at 02/15/17 1000   Gross per 24 hour   Intake          2431.83 ml   Output              550 ml   Net          1881.83 ml       Labs    CMP:     Recent Labs  Lab 02/16/17  0803 02/15/17  0636 02/14/17  0607 02/13/17 1932 02/13/17  1128    135 133  --  134   POTASSIUM 5.9* 5.1 5.1  --  5.5*   CHLORIDE 104 105 103  --  103   CO2 19* 20 21  --  22   ANIONGAP 11 11 9  --  9   * 202* 84  --  77   BUN 35* 31* 32*  --  36*   CR 2.53* 1.94* 1.97*  --  2.04*   GFRESTIMATED 18* 25* 25*  --  24* "   GFRESTBLACK 22* 30* 30*  --  29*   RUBY 7.8* 7.2* 7.4*  --  8.0*   MAG 2.0 2.1 2.0 2.0  --    PHOS 5.5* 4.7* 4.0 4.0  --    PROTTOTAL 5.4* 5.2* 5.0*  --  5.6*   ALBUMIN 2.6* 2.2* 2.2*  --  2.5*   BILITOTAL 0.5 0.4 0.8  --  0.4   ALKPHOS 595* 592* 558*  --  492*   * 72* 64*  --  65*   * 38 36  --  36     CBC:     Recent Labs  Lab 02/16/17  0803 02/15/17  0636 02/14/17  0607 02/13/17  1932 02/13/17  1128   WBC 22.9* 17.8* 14.4*  --  17.1*   RBC 3.11* 3.06* 2.92*  --  2.96*   HGB 7.6* 7.5* 7.2*  --  7.3*   HCT 27.0* 26.6* 25.2*  --  25.3*   MCV 87 87 86  --  86   MCH 24.4* 24.5* 24.7*  --  24.7*   MCHC 28.1* 28.2* 28.6*  --  28.9*   RDW 22.2* 22.2* 22.2*  --  22.4*    410 411 383 443     INR:     Recent Labs  Lab 02/13/17  1128   INR 1.48*     Glucose:   Recent Labs  Lab 02/16/17  1215 02/16/17  0826 02/16/17  0803 02/16/17  0157 02/15/17  2150 02/15/17  1722 02/15/17  1203  02/15/17  0636  02/14/17  0607  02/13/17  1128 02/10/17  1238 02/09/17  1531   GLC  --   --  122*  --   --   --   --   --  202*  --  84  --  77 143* 172*   BGM 98 124*  --  141* 166* 177* 182*  < >  --   < >  --   < >  --   --   --    < > = values in this interval not displayed.  Blood Gas: No lab results found in last 7 days.  Culture Data     Recent Labs  Lab 02/15/17  1051 02/13/17  1342 02/13/17  1128 02/09/17  1803 02/09/17  1719 02/09/17  1710   CULT Culture in progress No growth after 3 days No growth after 3 days Should be ordered as a future orderCanceled, Test credited No growth No growth     Virology Data:   Lab Results   Component Value Date    INFLUA Negative 06/11/2013    FLUAH1 Negative 02/13/2017    FLUAH3 Negative 02/13/2017    KG9634 Negative 02/13/2017    IFLUB Negative 02/13/2017    RSVA Negative 02/13/2017    RSVB Negative 02/13/2017    PIV1 Negative 02/13/2017    PIV2 Negative 02/13/2017    PIV3 Negative 02/13/2017    HMPV Negative 02/13/2017    HRVS Negative 02/13/2017    ADVBE Negative 02/13/2017     ADVC Negative 02/13/2017    ADVC Negative 01/21/2017     Historical CMV results (last 3 of prior testing):  Lab Results   Component Value Date    CMVQNT  12/12/2016     CMV DNA Not Detected   The JEREMIAS AmpliPrep/JEREMIAS TaqMan CMV Test is an FDA-approved in vitro nucleic   acid amplification test for the quantitation of cytomegalovirus DNA in human   plasma (EDTA plasma) using the JEREMIAS AmpliPrep Instrument for automated viral   nucleic acid extraction and the JEREMIAS TaqMan Analyzer or JEREMIAS TaqMan for   automated Real Time amplification and detection of the viral nucleic acid   target.   Titer results are reported in International Units/mL (IU/mL using 1st WHO   International standard for Human Cytomegalovirus for Nucleic Acid Amplification   based assays. The conversion factor between CMV DNA copis/mL (as defined by the   Roche JEREMIAS TaqMan CMV test) and International Units is the CMV DNA   concentration in IU/mL x 1.1 copies/IU = CMV DNA in copies/mL.   This assay has received FDA approval for the testing of human plasma only. The   Infectious Disease Diagnostic Laboratory at the [facility], [place], has validated the performance characteristics of the Roche   CMV assay for plasma, bronchial alveolar lavage/wash and urine.      CMVQNT  09/12/2016     CMV DNA Not Detected   The JEREMIAS AmpliPrep/JEREMIAS TaqMan CMV Test is an FDA-approved in vitro nucleic   acid amplification test for the quantitation of cytomegalovirus DNA in human   plasma (EDTA plasma) using the JEREMIAS AmpliPrep Instrument for automated viral   nucleic acid extraction and the JEREMIAS TaqMan Analyzer or JEREMIAS TaqMan for   automated Real Time amplification and detection of the viral nucleic acid   target.   Titer results are reported in International Units/mL (IU/mL using 1st WHO   International standard for Human Cytomegalovirus for Nucleic Acid Amplification   based assays. The conversion factor between CMV DNA copis/mL (as  defined by the   Roche JEREMIAS TaqMan CMV test) and International Units is the CMV DNA   concentration in IU/mL x 1.1 copies/IU = CMV DNA in copies/mL.   This assay has received FDA approval for the testing of human plasma only. The   Infectious Disease Diagnostic Laboratory at the [facility], [place], has validated the performance characteristics of the Roche   CMV assay for plasma, bronchial alveolar lavage/wash and urine.      CMVQNT  07/18/2016     CMV DNA Not Detected   The JEREMIAS AmpliPrep/JEREMIAS TaqMan CMV Test is an FDA-approved in vitro nucleic   acid amplification test for the quantitation of cytomegalovirus DNA in human   plasma (EDTA plasma) using the JEREMIAS AmpliPrep Instrument for automated viral   nucleic acid extraction and the JEREMIAS TaqMan Analyzer or [employer] TaqMan for   automated Real Time amplification and detection of the viral nucleic acid   target.   Titer results are reported in International Units/mL (IU/mL using 1st WHO   International standard for Human Cytomegalovirus for Nucleic Acid Amplification   based assays. The conversion factor between CMV DNA copis/mL (as defined by the   Roche JEREMIAS TaqMan CMV test) and International Units is the CMV DNA   concentration in IU/mL x 1.1 copies/IU = CMV DNA in copies/mL.   This assay has received FDA approval for the testing of human plasma only. The   Infectious Disease Diagnostic Laboratory at the [facility], [place], has validated the performance characteristics of the Roche   CMV assay for plasma, bronchial alveolar lavage/wash and urine.       Lab Results   Component Value Date    CMVLOG Not Calculated 12/12/2016    CMVLOG Not Calculated 09/12/2016    CMVLOG Not Calculated 07/18/2016     Urine Studies    Recent Labs   Lab Test  02/13/17   1306   URINEPH  5.0   NITRITE  Negative   LEUKEST  Negative   WBCU  4*       Attending statement:    The patient was seen and examined by me with Jess  Flavio TRACY on February 16, 2017.  I too performed a substantive portion of the visit face-to-face with Melissa Collazo. The case was discussed at length.  Vitals, lab results and imaging from today were reviewed.  The note reflects our joint assessment and plan.         Assessment and Plan:   Melissa Collazo is a 76 year old lady s/p Rt. sltx for COPD. She was admitted with Rt. LL pna on 2/13/2017, fatigue, lack of appetite, generalized weakness and significant nausea. Since her admit she was treated with anbx and IVF. Unfortunately she has contd to note SOB and some increased O2 requirement today. In addition she has developed progressive ROGERIO with FENA of <1% suggesting pre-renal etiology despite IVF. She continues to have abd pain. The abd pain is unchanged but appetite has improved with marinol. She continues to note fatigue/weakness. I wonder if she has signs of portal vein hypertension.   - Will proceed with chest tube placement on Rt. side to help improve her SOB. Also will check pleural fluid for cultures/gram stain.   - Will check Rt. UQ ultrasound for portal vein pressures.   - The other etiology for low FENA and ansarca (mild) is due to hypoalbuminemia. We will continue IV Albumin/lasix combn.    We had a family conference with Ehsan pearl (LICSW), I and Dr. Lebron (oncology). Her daughter and two sons were present. We discussed that she is currently not a chemotherapy candidate and that could change if her condn improves. We also discussed hospice option. The current goal is help improve her health as much as possible with a goal to d/c home and consider home hospice. Ms. Collazo will consider these and let us know. At this time she remains full code which will be addressed tomorrow.        Interval History:   Still has nausea though eating better. SOB is unchanged. Cough is present which is occasionally productive of sputum.           Review of Systems:   C: negative for fever, chills, change in  weight  INTEGUMENTARY/SKIN: no rash or obvious new lesions  ENT/MOUTH: no sore throat, no new sinus pain or nasal drainage  RESP: see interval history  CV: negative for chest pain, palpitations.  GI: no vomiting, or change in stools           Physical Exam:   Temp:  [96.6  F (35.9  C)-98.7  F (37.1  C)] 98.6  F (37  C)  Pulse:  [101-106] 104  Heart Rate:  [] 105  Resp:  [16-24] 24  BP: (105-129)/(44-56) 107/50  SpO2:  [90 %-95 %] 92 %    Constitutional:   Awake, sleepy and in no apparent distress     ENT:    oral mucosa moist without lesions     Lungs:   Rt base bronchial breath sounds.  No crackles. No rhonchi.  No wheezes.     Cardiovascular:   Normal S1 and S2.  RRR.  No murmur, gallop or rub. LE edema 3+.     Abdomen:   Rt. LQ tenderness, Hepatomegaly and prolapsed ileum with Ostomy.

## 2017-02-16 NOTE — PHARMACY-VANCOMYCIN DOSING SERVICE
Pharmacy Vancomycin Note  Date of Service 2017  Patient's  1940   76 year old, female, ABW 58.7 kg    Indication: Healthcare-Associated Pneumonia  Goal Trough Level: 15-20 mg/L  Day of Therapy: 4  Current Vancomycin regimen:  Intermittent dosing    Current estimated CrCl = Estimated Creatinine Clearance: 17.5 mL/min (based on Cr of 2.53).    Creatinine for last 3 days  2017:  6:07 AM Creatinine 1.97 mg/dL  2/15/2017:  6:36 AM Creatinine 1.94 mg/dL  2017:  8:03 AM Creatinine 2.53 mg/dL    Recent Vancomycin Levels (past 3 days)  2017: 10:00 AM Vancomycin Level 11.1 mg/L  2/15/2017:  6:36 AM Vancomycin Level 17.6 mg/L  2017:  2:14 PM Vancomycin Level 27.4 mg/L - 25.5 hrs post-dose    Vancomycin IV Administrations (past 72 hours)                   vancomycin (VANCOCIN) 1000 mg in dextrose 5% 200 mL PREMIX (mg) 1,000 mg New Bag 02/15/17 1238                Nephrotoxins and other renal medications (Future)    Start     Dose/Rate Route Frequency Ordered Stop    17 1800  tacrolimus (PROGRAF - GENERIC EQUIVALENT) capsule 1.5 mg      1.5 mg Oral EVERY EVENING 02/15/17 1510      17 1230  furosemide (LASIX) injection 20 mg      20 mg Intravenous ONCE 17 1148      17 1030  piperacillin-tazobactam (ZOSYN) 2.25 g vial to attach to  ml bag      2.25 g  over 30 Minutes Intravenous EVERY 6 HOURS 17 1022      17 0800  tacrolimus (PROGRAF - GENERIC EQUIVALENT) capsule 2 mg      2 mg Oral EVERY MORNING. 02/15/17 1509      17 1423  vancomycin place hassan - receiving intermittent dosing      1 each Does not apply SEE ADMIN INSTRUCTIONS 17 1424               Contrast Orders - past 72 hours     None          Interpretation of levels and current regimen:  Trough level is  Supratherapeutic, it appears drug accumulation is occurring.    Has serum creatinine changed > 50% in last 72 hours: yes, 1.94-->2.53 mg/dL in past 24 hrs    Urine output:   diminished urine output, 0.16 mL/kg/hr today    Renal Function: Worsening    Plan:  1.  Continue intermittent dosing. Will give no dose tonight 2/16.  2.  Pharmacy will check trough another level tomorrow to reassess drug clearance.    3. Serum creatinine levels will be ordered daily for the first week of therapy and at least twice weekly for subsequent weeks.      Octavio Warren, PharmD

## 2017-02-17 NOTE — PLAN OF CARE
Problem: Goal Outcome Summary  Goal: Goal Outcome Summary  Outcome: No Change  A&O but forgetful. BP soft t/o shift and tacky. C/o increased SOB on 6-7L NC. C/o back pain. Lidocaine patch in place. PRN dilaudid given as needed. Chest Tube placed in IR 30ml was aspirated and sent to lab. CT to water seal. Albumin given followed by 20mg IV lasix. IV ABX given as scheduled. Triggered sepsis protocol lactate= 0.8. NPO for ABD US either tonight or tomorrow. Scheduled zofran given for nausea. Care conference done at bedside. Ileostomy CDI. Voiding. Mepilex placed on coccyx to prevent breakdown. Family at bedside t/o day. Will continue with POC.

## 2017-02-17 NOTE — PHARMACY-VANCOMYCIN DOSING SERVICE
Pharmacy Vancomycin Note  Date of Service 2017  Patient's  1940   76 year old, female, ABW 58.7 kg    Indication: Healthcare-Associated Pneumonia  Goal Trough Level: 15-20 mg/L  Day of Therapy: 5  Current Vancomycin regimen:  Intermittent dosing    Current estimated CrCl = Estimated Creatinine Clearance: 13.9 mL/min (based on Cr of 3.18).    Creatinine for last 3 days  2/15/2017:  6:36 AM Creatinine 1.94 mg/dL  2017:  8:03 AM Creatinine 2.53 mg/dL  2017:  7:40 AM Creatinine 2.98 mg/dL;  7:40 AM Creatinine 3.18 mg/dL    Recent Vancomycin Levels (past 3 days)  2/15/2017:  6:36 AM Vancomycin Level 17.6 mg/L  2017:  2:14 PM Vancomycin Level 27.4 mg/L  2017: 12:02 PM Vancomycin Level 22.2 mg/L - 47.5 hrs post-dose      Nephrotoxins and other renal medications (Future)    Start     Dose/Rate Route Frequency Ordered Stop    17 2230  furosemide (LASIX) injection 20 mg      20 mg Intravenous EVERY 8 HOURS 17 2224      17 1800  tacrolimus (PROGRAF - GENERIC EQUIVALENT) capsule 1.5 mg      1.5 mg Oral EVERY EVENING 02/15/17 1510      17 0800  tacrolimus (PROGRAF - GENERIC EQUIVALENT) capsule 2 mg      2 mg Oral EVERY MORNING. 02/15/17 1509               Contrast Orders - past 72 hours     None          Interpretation of levels and current regimen:  Trough level is  Supratherapeutic    Has serum creatinine changed > 50% in last 72 hours: Yes, continues to rise.    Urine output:  diminished urine output, 0.26 mL/kg/hr today    Renal Function: Worsening    Plan:  1.  Will give no dose today, as the last 2 drug levels suggest she will remain therapeutic for at least the next 22 hours.  2.  Pharmacy will check trough level tomorrow morning and assess for additional dosing.   3. Serum creatinine levels will be ordered daily for the first week of therapy and at least twice weekly for subsequent weeks.      Octavio Warren, PharmD

## 2017-02-17 NOTE — PROVIDER NOTIFICATION
Attempted page to notify of critical lab- no call received. Been with patient for an hour awaiting MD call or face to face; charge RN also attempted contact. Contacted [phone] for alternative MD pager

## 2017-02-17 NOTE — PROVIDER NOTIFICATION
Pulm team paged to notify of increased oxygen demands- feeling still SOB, resp >24 even after increase to 8-9L Oxyplus and albuteral INH. STAT blood gas and port chest xray order. Requested transfer order to higher level awaiting order

## 2017-02-17 NOTE — PROGRESS NOTES
Calorie Counts    Intake Recorded For: 2/16 Kcals: 15 Protein: 0g    # Meals Recorded: 1 meal (25% 4 oz apple juice)    # Supplements Recorded: 0

## 2017-02-17 NOTE — PROGRESS NOTES
Lung Transplant : aware that patient transferred to ICU and is now DNR/DNI.  Supportive visit with daughter.  She reports change in goals since yesterday.  Patient likely going to die in hospital.  All Ok with that.  Family including children, grand children and great grand children have gathered.  Daughter requests Dr. Solis stop by briefly.  Contacted Dr. Solis.  He will stop by this afternoon.

## 2017-02-17 NOTE — PLAN OF CARE
Patient transferred safely to  accompanied also by float HENRIQUE Colby. Discussed with RT placing pt on bipap upon arrival d/t blood gas results. Daughter Jeri updated and accompanied transfer.

## 2017-02-17 NOTE — PROVIDER NOTIFICATION
Potassium 6.2. MD notified, kayexalate, calcium gluconate, and insulin/D50 ordered. No tele at this time.

## 2017-02-17 NOTE — PROGRESS NOTES
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 CT chest (2/13/17) showed new RLL infiltrate, extensive hepatomegaly with diffuse metastatic disease and bone metastasis. Labs showed chronic anemia, mild LFT elevation consistent with hepatic mets and mild ALP elevation also possible from bony mets. Currently her nausea is multifactorial, progression of metastatic disease with diffuse metastatic disease, medication side effects from narcotics, chemotherapy (Lonsurf), antibiotics, tacrolimus.  Had worsening respiratory status today and CXR showed R pleural effusion.      Recommendations    Supportive treatment with antiemetics, will schedule Zofran with PRN Compazine, Phenergan.  Agree with Marinol.    Minimize narcotics, polypharmacy.    Small, low fat, more frequent meals. Aspiration precautions.     Close monitoring of ileostomy output, match intake and output.     Antibiotics for pneumonia according to primary team.     Recommend palliative and hospice consultation.       The patient was discussed and plan agreed upon with GI staff, Dr. Marley, we will sign off, please call if any questions.     Denisse Zayas  [facility]  Gastroenterology Fellow  _______________________________________________________________    S: more SOB today, had chest tube placement yesterday, still c/o mild RUQ pain on coughing, vomiting better today.     O:  Blood pressure 114/46, pulse 104, temperature 98  F (36.7  C), temperature source Axillary, resp. rate 24, height 1.499 m (4' 11\"), weight 58.7 kg (129 lb 6.4 oz), SpO2 92 %, " not currently breastfeeding.    Gen: mild distress  CV: tachycardic,no murmurs.   Lungs: reduced breath sounds b/l lung fields.   Abd: soft, mild tenderness in RUQ      LABS:  BMP    Recent Labs  Lab 02/17/17  0740 02/17/17  0143 02/16/17  0803 02/15/17  0636    136 134 135   POTASSIUM 5.4* 6.2* 5.9* 5.1   CHLORIDE 103 106 104 105   RUBY 7.7* 7.4* 7.8* 7.2*   CO2 18* 17* 19* 20   BUN 37* 39* 35* 31*   CR 3.18* 2.98* 2.53* 1.94*   * 60* 122* 202*     CBC    Recent Labs  Lab 02/17/17  0740 02/16/17  0803 02/15/17  0636 02/14/17  0607   WBC 24.5* 22.9* 17.8* 14.4*   RBC 2.78* 3.11* 3.06* 2.92*   HGB 6.8* 7.6* 7.5* 7.2*   HCT 23.5* 27.0* 26.6* 25.2*   MCV 85 87 87 86   MCH 24.5* 24.4* 24.5* 24.7*   MCHC 28.9* 28.1* 28.2* 28.6*   RDW 21.8* 22.2* 22.2* 22.2*    332 410 411     INR    Recent Labs  Lab 02/13/17  1128   INR 1.48*     LFTs    Recent Labs  Lab 02/17/17  0740 02/17/17  0143 02/16/17  0803 02/15/17  0636 02/14/17  0607   ALKPHOS 445*  --  595* 592* 558*   *  --  435* 72* 64*   ALT 83*  --  102* 38 36   BILITOTAL 1.0  --  0.5 0.4 0.8   PROTTOTAL 5.6* 5.1* 5.4* 5.2* 5.0*   ALBUMIN 3.0*  --  2.6* 2.2* 2.2*      PANC    Recent Labs  Lab 02/13/17  1128   LIPASE 67*

## 2017-02-17 NOTE — PROVIDER NOTIFICATION
MD notified about urine output of 100ml over last 12 hours after receiving 60mg lasix. Bladder scanned for 43ml. Albumin currently infusing. No new orders at this time.

## 2017-02-17 NOTE — PLAN OF CARE
Problem: Goal Outcome Summary  Goal: Goal Outcome Summary  Outcome: Declining  Pt lethargic, arouses to voice. Oriented but forgetful. BPs soft, but stable. Requiring 5-7L O2, previously requiring 9-11L with movement in bed, now improving. K 6.2, kayexalate and calcium gluconate and D50/insulin given. 500ml albumin given. Started on scheduled lasix. Creatinine trending up. 100ml urine output since 1400 yesterday. Continuing IV abx. Hypoglycemic with BG 64, D50 given with improvement. Completed abd US. Chest tube to water seal, output bright red. Continuing rommel counts, pt with no appetite. Mepilex on coccyx. Pt shifts weight independently in bed.

## 2017-02-17 NOTE — PROGRESS NOTES
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 Other hx includes metastatic colon cancer with liver mets s/p ileocolic resection and ostomy, most recently on lonsurf. Lonsurf has most recently been held d/t complications of deconditioning and infections including facial zoster and bilateral upper extremity cellulitis (treated with Valtrex and Linezolid). Was recently hospitalized 01/21-01/27, treated for HCAP with levaquin (completed 2/2). The patient now admitted on 2/13 for increasing fatigue, SOB, N/V, and upper abdominal pain. New RLL consolidation seen on CT chest 2/13. Also with ROGERIO and elevated Alk Phos level.      Today's Plan:  - Cont IV lasix Q8hrs but poor UO hence will increase to 40mg.  - Will check CBC/BMP Qpm.  - Switched zosyn to Ceftaz. Stop IV Vanc.  - Started Micafungin.  - Check Stool for C. diff.  - Will change to comfort care if her SOB worsens during the night. Discussed with Dr. Santana and pt's family.   - Changed Code status to DNR/DNI.      #HCAP:  #Leukocytosis:  # R-sided pleural effusion: worsening shortness of breath and cough over the past week. Associated with persistent fatigue. Has not felt to be at her baseline since prior to last admission. Presents with leukocytosis, mildly elevated procalcitonin & lactate, and new RLL consolidation on CT chest 2/13. Recently completed a 10 day course of Levaquin on 2/2 for HCAP treatment. Most recent sputum results from 1/22 growing candida albicans. Hx of MRSA (nares). Influenza A/B and respiratory virus panel negative.   - Repeat chest x-ray 2/16 with ongoing moderate size R-sided pleural effusion and Chest  tube placed on 2/16/17.     #Acute hypoxic respiratory failure: Etiology is unclear. ?pulm edema vs. pna. CXR today in AM shows Effusion is resolved but still hypoxic.   - Sputum cultures sent 2/15 only growing Candida.  - Switch Zosyn to Ceftaz, Stop IV vanc. Also added IV Micafungin.  - Poor prognosis, family and pt aware, if no improvement over the next 24hrs will switch to comfort care.      #Nausea/ Anorexia: Persistent dry heaves over the past week. No significant emesis. Loss of appetite. Etiology likely multifactorial, progression of metastatic disease, medication side effects from narcotics, or elevated tacrolimus levels. Some concern for brain mets per GI, however Dr. Lebron (oncology) endorsed this is unlikely. Will defer CT head for now.  - GI consulted 02/14, appreciate recommendations.  - Scheduled PO zofran TID AC  - Continue PRN compazine  - Continue Marinol 5 mg BID AC  - Low fat diet ordered. Small, more frequent meals recommended. Nutrition following.  - I&O monitoring ordered. Daily weights.  - Could consider adding low dose phenergan PRN if above regimen is ineffective.      #Abdominal Pain:  #Elevated Transaminases: Mild LFT elevation consistent with hepatic mets and mild ALP elevation also possibly from bony mets. Also concern for evolving hepatorenal syndrome.  - Trend hepatic panel daily  - Administer vitamin K 5mg IV once today, 2/16  - Appreciate GI recommendations.     #Diarrhoea: Will check for C.diff. Most likely anbx associated.    #Dehydration:  #ROGERIO on CKD: Baseline creatinine 1.4-1.8. Creatinine remains elevated. FENA 2/15 consistent w/ pre-renal. Etiology possible dehydration 2/2 nausea vs elevated tacrolimus levels vs hepatorenal syndrome w/ in the setting of anasarca and elevated LFTs. Has been receiving intermittent IV fluids as outpatient over the past couple of weeks.  - Despite aggressive IVF she continues to have worsening Cr and poor UO  (has oliguric renal failure). Last  night had elevated K and required treatment. Will recheck today. If elevated might consider not treating it given poor prognosis.  - Avoid/ hold nephrotoxic agents as possible. Will adjust tacrolimus dose.  - Daily BMP      #Fatigue:  #Deconditioning: Persistent, progressive fatigue over the past week. Deconditioning over the past couple of months.  - Infectious workup as above.  - PT/ OT consulted      #COPD s/p R lung transplant 02/19/2009: Followed by Dr. Clay in pulmonary clinic. 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 EBV DNA level was 2030 on 12/12/16. Repeat level on 01/23 was 4243.   - Monitoring EBV level periodically, every 1-2 months      Continue 2 drug immunosuppression:  - Tacrolimus 2 mg QAM, 1.5 mg QPM. Target goal 8-10. Will recheck level 2/19 (ordered).   - Prednisone 5 mg qAM/ 2.5 mg qPM      Prophylaxis:  - Continue dapsone 50 mg MWF for PJP ppx.      ALLI/ CLAD:  - Continue home dose Advair, montelukast, azithromycin (125 mg daily)      # H/o mycobacterium avium intracellulare:  Last positive culture 09/2013.   - Continue azithromycin per PTA dose as above.      #Stage IV colon cancer with liver mets. Primary is Dr. Lebron. Prior treatment with xeloda/oxaliplatin, irinotecan, erbitux, and avastin. She recently was found to have progression on irinotecan/erbitux. She was started on lonsurf on 12/26/16. Has not received chemo in about three weeks per patient report. Had been on hold d/t deconditioning and infections. 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  - Hematology/oncology following, appreciate recommendations. Had care conference on 2/17.      # Osteoporosis/ chronic compression fractures:  # Low Back Pain: Was taking calcium plus vitamin D PTA. Initiated on denosumab on 09/22/16, next dose due March 2017.  - Continue calcium plus vitamin D.  - Continue lidocaine patch per home regimen.  -  Decrease dilaudid 2mg --> 1mg q6hr prn 2/16.      #Hypothyroidism.   - Continue home levothyroxine.       #Acute on chronic anemia. Baseline hgb mid-7s. No signs of acute blood loss. Patient is a [employer]. Has received blood transfusion in the past.  - CBC daily to trend.  - Receives aranesp injections as outpatient [id number]      #HTN. Normotensive. Received albumin 5% 2/15 for hypotension.  - Continue norvasc per home regimen.      # HLD:  Was taking statin and low dose ASA PTA.  - Continue home regimen.      #DM2. Glucose trending up, ranging from 136-202. Patient reports she had been taking lantus 4 units daily HS with novolog sliding scale PTA. Resumed 2/15.  - Continue glargine 4u HS, low resistance SSI TID meals.  - Monitoring glucose QID ACHS.     FEN: Low fat  LINES: PIV  PROPHY: Heparin SQ  DISPO: Most likely will change to comfort care either tonight or tomorrow.          Subjective & Interval History:     Last 24 hour vital signs, labs and care team notes reviewed.    Increase O2 needs overnight, 10 liters oxymizer, sating low 90s. Endorsing some shortness of breath at rest; however, no change in cough or sputum quality. Continues to c/o generalized abdominal pain and was bothered by low back pain overnight. Leg swelling unchanged.           Review of Systems:     C: no fever, no chills, no change in weight, + change in appetite  INTEGUMENTARY/SKIN: no rash or obvious new lesions  ENT/MOUTH: no sore throat, no sinus pain, no nasal drainage  RESP: see interval history  CV: no chest pain, no palpitations, + peripheral edema, no orthopnea  GI: + nausea, no vomiting, no change in stools, no reflux symptoms  : no dysuria  MUSCULOSKELETAL: no myalgias, no arthralgias  ENDOCRINE: blood sugars with adequate control  NEURO: no headache, no numbness or tingling  PSYCHIATRIC: mood stable          Medications:     Active Medications:    amLODIPine  5 mg Oral At Bedtime     micafungin  100 mg Intravenous  Q24H     cefTAZidime  1 g Intravenous Q24H     lidocaine  1-3 patch Transdermal Q24h    And     [START ON 2/18/2017] lidocaine   Transdermal Q24H    And     lidocaine   Transdermal Q8H     furosemide  20 mg Intravenous Q8H     ondansetron  4 mg Oral TID AC     insulin glargine  4 Units Subcutaneous At Bedtime     tacrolimus  2 mg Oral QAM     tacrolimus  1.5 mg Oral QPM     predniSONE  2.5 mg Oral QPM     calcium-vitamin D  1 tablet Oral BID w/meals     ascorbic acid  500 mg Oral Daily     aspirin EC  81 mg Oral Daily     azithromycin  125 mg Oral Daily     cyanocobalamin  1,000 mcg Oral Q7 Days     dapsone  50 mg Oral Once per day on Mon Wed Fri     levothyroxine  50 mcg Oral QAM AC     montelukast  10 mg Oral QPM     multivitamin, therapeutic with minerals  1 tablet Oral Daily     omeprazole  40 mg Oral Daily     predniSONE  5 mg Oral QAM     sodium bicarbonate  1,300 mg Oral BID     zinc sulfate  220 mg Oral Daily     heparin  5,000 Units Subcutaneous Q12H     lidocaine  1 patch Transdermal Q24H     dronabinol  5 mg Oral BID AC     insulin aspart  1-3 Units Subcutaneous TID AC     insulin aspart  1-3 Units Subcutaneous At Bedtime     fluticasone-vilanterol  1 puff Inhalation Daily     lidocaine   Transdermal Q24h     lidocaine   Transdermal Q8H     atorvastatin  20 mg Oral At Bedtime     Active PRN Medications:  fentaNYL, HYDROmorphone, sodium chloride (PF), calcium carbonate, albuterol, loperamide, naloxone, prochlorperazine **OR** prochlorperazine **OR** prochlorperazine, glucose **OR** dextrose **OR** glucagon         Physical Exam:     Constitutional: Awake, alert, in no apparent distress.   HEENT: Eyes with pink conjunctivae, no scleral jaundice.  Oral mucosa moist without lesions. Neck supple without lymphadenopathy.   PULM: Diminished air flow bilaterally, coarse lower lobes. No wheeze.  CV: Normal S1 and S2. RRR.  No murmur, gallop, or rub. Anasarca.  Peripheral pulses intact.   ABD: NABS, hepatomegaly,  "tender, distended. No guarding.   MSK: Moves all extremities. No apparent muscle wasting.   NEURO: Alert and oriented x 4, conversant.   SKIN: Warm, dry. No pruritus.  No rash on limited exam.   PSYCH: Mood stable, judgment and insight appropriate.?     Lines, Drains, and Devices:  Peripheral IV 05/07/15 Right;Anterior Upper forearm (Active)   Number of days:650       Port A Cath 10/22/14 Right Chest wall (Active)   Access Date 02/13/17 2/13/2017 11:26 AM   Access Attempts 1 2/13/2017 11:26 AM   Gauge/Length Noncoring 90 degree bend;3/4 inch;20 gauge 2/13/2017 11:26 AM   Site Assessment WDL 2/15/2017 10:00 AM   Line Status Infusing 2/15/2017 10:00 AM   Extravasation? No 2/10/2017 12:00 PM   Dressing Intervention Transparent 2/13/2017 11:26 AM   Dressing change due 01/28/17 1/27/2017  8:30 AM   Needle Change Due 01/28/17 1/27/2017  8:30 AM   De-Access Date 02/10/17 2/10/2017  3:00 PM   Date to be Reflushed 02/10/17 2/10/2017 12:00 PM   Number of days:847          Data:     All vital signs, laboratory and imaging data for the past 24 hours reviewed.      Vital signs:  Temp: 98.3  F (36.8  C) Temp src: Axillary BP: 93/60   Heart Rate: 106 Resp: 16 SpO2: 93 % O2 Device: Oxi Plus Oxygen Delivery: 10 LPM Height: 149.9 cm (4' 11\") Weight: 58.5 kg (129 lb)    Weight trend:   Vitals:    02/15/17 0843 02/16/17 0907 02/17/17 1200   Weight: 56.7 kg (125 lb 1.6 oz) 58.7 kg (129 lb 6.4 oz) 58.5 kg (129 lb)        Intake/Output Summary (Last 24 hours) at 02/17/17 1830  Last data filed at 02/17/17 1800   Gross per 24 hour   Intake              744 ml   Output             2070 ml   Net            -1326 ml         Labs    CMP:     Recent Labs  Lab 02/17/17  0740 02/17/17  0143 02/16/17  0803 02/15/17  0636 02/14/17  0607    136 134 135 133   POTASSIUM 5.4* 6.2* 5.9* 5.1 5.1   CHLORIDE 103 106 104 105 103   CO2 18* 17* 19* 20 21   ANIONGAP 13 13 11 11 9   * 60* 122* 202* 84   BUN 37* 39* 35* 31* 32*   CR 3.18* 2.98* 2.53* " 1.94* 1.97*   GFRESTIMATED 14* 15* 18* 25* 25*   GFRESTBLACK 17* 18* 22* 30* 30*   RUBY 7.7* 7.4* 7.8* 7.2* 7.4*   MAG 2.1  --  2.0 2.1 2.0   PHOS 6.2*  --  5.5* 4.7* 4.0   PROTTOTAL 5.6* 5.1* 5.4* 5.2* 5.0*   ALBUMIN 3.0*  --  2.6* 2.2* 2.2*   BILITOTAL 1.0  --  0.5 0.4 0.8   ALKPHOS 445*  --  595* 592* 558*   *  --  435* 72* 64*   ALT 83*  --  102* 38 36     CBC:     Recent Labs  Lab 02/17/17  0740 02/16/17  0803 02/15/17  0636 02/14/17  0607   WBC 24.5* 22.9* 17.8* 14.4*   RBC 2.78* 3.11* 3.06* 2.92*   HGB 6.8* 7.6* 7.5* 7.2*   HCT 23.5* 27.0* 26.6* 25.2*   MCV 85 87 87 86   MCH 24.5* 24.4* 24.5* 24.7*   MCHC 28.9* 28.1* 28.2* 28.6*   RDW 21.8* 22.2* 22.2* 22.2*    332 410 411     INR:     Recent Labs  Lab 02/13/17  1128   INR 1.48*     Glucose:   Recent Labs  Lab 02/17/17  1749 02/17/17  1032 02/17/17  0743 02/17/17  0740 02/17/17  0700 02/17/17  0636 02/17/17  0208 02/17/17  0143  02/16/17  0803  02/15/17  0636  02/14/17  0607  02/13/17  1128   GLC  --   --   --  137*  --   --   --  60*  --  122*  --  202*  --  84  --  77   BGM 73 92 307*  --  209* 93 70  --   < >  --   < >  --   < >  --   < >  --    < > = values in this interval not displayed.  Blood Gas:     Recent Labs  Lab 02/17/17  0835   O2PER 8L     Culture Data     Recent Labs  Lab 02/16/17  1725 02/15/17  1051 02/13/17  1342 02/13/17  1128   CULT Culture negative monitoring continues  Culture negative after 15 hours  Culture negative monitoring continues Light growth Candida albicans / dubliniensis Candida albicans and Candida dubliniensis are not routinely speciated Susceptibility testing not routinely doneLight growth Normal floraCulture in progress* No growth after 4 days No growth after 4 days     Virology Data:   Lab Results   Component Value Date    INFLUA Negative 06/11/2013    FLUAH1 Negative 02/13/2017    FLUAH3 Negative 02/13/2017    AD1934 Negative 02/13/2017    IFLUB Negative 02/13/2017    RSVA Negative 02/13/2017    RSVB  Negative 02/13/2017    PIV1 Negative 02/13/2017    PIV2 Negative 02/13/2017    PIV3 Negative 02/13/2017    HMPV Negative 02/13/2017    HRVS Negative 02/13/2017    ADVBE Negative 02/13/2017    ADVC Negative 02/13/2017    ADVC Negative 01/21/2017     Historical CMV results (last 3 of prior testing):  Lab Results   Component Value Date    CMVQNT  12/12/2016     CMV DNA Not Detected   The J[employer]AS AmpliPrep/JEREMIAS TaqMan CMV Test is an FDA-approved in vitro nucleic   acid amplification test for the quantitation of cytomegalovirus DNA in human   plasma (EDTA plasma) using the J[employer] AmpliPrep Instrument for automated viral   nucleic acid extraction and the J[employer]AS TaqMan Analyzer or JEREMIAS TaqMan for   automated Real Time amplification and detection of the viral nucleic acid   target.   Titer results are reported in International Units/mL (IU/mL using 1st WHO   International standard for Human Cytomegalovirus for Nucleic Acid Amplification   based assays. The conversion factor between CMV DNA copis/mL (as defined by the   Roche JEREMIAS TaqMan CMV test) and International Units is the CMV DNA   concentration in IU/mL x 1.1 copies/IU = CMV DNA in copies/mL.   This assay has received FDA approval for the testing of human plasma only. The   [facility]fect[facility] Disease Diagnostic Laboratory at the [facility], [place], has validated the performance characteristics of the Roche   CMV assay for plasma, bronchial alveolar lavage/wash and urine.      CMVQNT  09/12/2016     CMV DNA Not Detected   The JEREMIAS AmpliPrep/JEREMIAS TaqMan CMV Test is an FDA-approved in vitro nucleic   acid amplification test for the quantitation of cytomegalovirus DNA in human   plasma (EDTA plasma) using the JEREMIAS [employer] Instrument for automated viral   nucleic acid extraction and the [employer] TaqMan Analyzer or [employer] TaqMan for   automated Real Time amplification and detection of the viral nucleic acid   target.   Titer results are reported in  International Units/mL (IU/mL using 1st WHO   International standard for Human Cytomegalovirus for Nucleic Acid Amplification   based assays. The conversion factor between CMV DNA copis/mL (as defined by the   Roche JEREMIAS TaqMan CMV test) and International Units is the CMV DNA   concentration in IU/mL x 1.1 copies/IU = CMV DNA in copies/mL.   This assay has received FDA approval for the testing of human plasma only. The   [facility]fect[facility] Disease Diagnostic Laboratory at the [facility], [place], has validated the performance characteristics of the Roche   CMV assay for plasma, bronchial alveolar lavage/wash and urine.      CMVQNT  07/18/2016     CMV DNA Not Detected   The JEREMIAS AmpliPrep/JEREMIAS TaqMan CMV Test is an FDA-approved in vitro nucleic   acid amplification test for the quantitation of cytomegalovirus DNA in human   plasma (EDTA plasma) using the JEREMIAS AmpliPrep Instrument for automated viral   nucleic acid extraction and the JEREMIAS TaqMan Analyzer or JEREMIAS TaqMan for   automated Real Time amplification and detection of the viral nucleic acid   target.   Titer results are reported in International Units/mL (IU/mL using 1st WHO   International standard for Human Cytomegalovirus for Nucleic Acid Amplification   based assays. The conversion factor between CMV DNA copis/mL (as defined by the   Roche JEREMIAS TaqMan CMV test) and International Units is the CMV DNA   concentration in IU/mL x 1.1 copies/IU = CMV DNA in copies/mL.   This assay has received FDA approval for the testing of human plasma only. The   Infect[facility] Disease Diagnostic Laboratory at the [facility], [place], has validated the performance characteristics of the Roche   CMV assay for plasma, bronchial alveolar lavage/wash and urine.       Lab Results   Component Value Date    CMVLOG Not Calculated 12/12/2016    CMVLOG Not Calculated 09/12/2016    CMVLOG Not Calculated 07/18/2016     Urine  Studies    Recent Labs   Lab Test  02/13/17   1306   URINEPH  5.0   NITRITE  Negative   LEUKEST  Negative   WBCU  4*

## 2017-02-17 NOTE — PLAN OF CARE
Problem: Goal Outcome Summary  Goal: Goal Outcome Summary  PT-5A Pt transferred off the unit this am, not appropriate for PT.

## 2017-02-18 NOTE — PLAN OF CARE
Problem: Goal Outcome Summary  Goal: Goal Outcome Summary  Physical Therapy Discharge Summary     Reason for therapy discharge:    No further expectations of functional progress.  Planned transition to comfort cares     Progress towards therapy goal(s). See goals on Care Plan in [facility] electronic health record for goal details.  Goals not met.  Barriers to achieving goals:   No further expectation of progress due to planned transition to comfort cares. Has not met any of goals listed in chart     Therapy recommendation(s):    No further therapy is recommended.

## 2017-02-18 NOTE — PROGRESS NOTES
P: Melissa came down from 5A this AM on 8L oxiplus, alert and oriented. Hemodynamically stable. CT in placed to WS with scant output. Largely prolapsed rectum into ostomy pouch with watery stools (neg cdiff). Daughter, Jeri at bedside all day.     A/I: O2 increased to 10-15L and sats not above 92%. Quickly desats to 80% and not quick to rebound when O2 removed. Productive cough with tan sputum. Somewhat more disoriented at times this evening but at other times appropriate. C/o back pain, receiving fentanyl. Dr Solis painted a grim picture this evening and pt and daughter both agreed that DNR/DNI was appropriate at this time. Quiles placed per MD order. Ate virtually nothing today, no appetite despite marinol but also desats quickly when offered food. Much family visiting pt this evening at bedside.      P: Continue Pulmonary POC, recheck Hgb tonight (dilutional?) and K with concern for hyperkalemia again.

## 2017-02-18 NOTE — PLAN OF CARE
Problem: Goal Outcome Summary  Goal: Goal Outcome Summary  Occupational Therapy Discharge Summary     Reason for therapy discharge:    Discharged to No further expectation of progress due to planned transition to comfort cares. Has not met any of goals listed in chart     Progress towards therapy goal(s). See goals on Care Plan in [facility] electronic health record for goal details.  No further expectation of progress due to planned transition to comfort cares. Has not met any of goals listed in chart     Therapy recommendation(s):    No further therapy is recommended.

## 2017-02-18 NOTE — PLAN OF CARE
Problem: Goal Outcome Summary  Goal: Goal Outcome Summary  Outcome: Declining  D: Hx of metastatic colon cancer and R lung tx. Transferred to MICU 2/17 due to increasing O2 needs and code status changed to DNR/DNI.  I/A: Pt alert and oriented. C/o back pain and restless in bed. PRN fentanyl administered with better relief at 100mcg q1h. Pt now opens eyes to voice but not answering questions, moaning at times. Remains on 10L O2 via oximask. Sinus tach 110s with 2 runs of SVT lasting ~ 4 sec each. CLEMENTINA olson MD aware, continue current POC. Scant UO overnight via howard. CT to water seal, no output. Daughter Jeri at bedside overnight, wants to honor pts wishes of being comfortable.  P: Continue to treat pts pain. Possibly fully transition to comfort cares today.

## 2017-02-18 NOTE — PROGRESS NOTES
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 Other hx includes metastatic colon cancer with liver mets s/p ileocolic resection and ostomy, most recently on lonsurf. Lonsurf has most recently been held d/t complications of deconditioning and infections including facial zoster and bilateral upper extremity cellulitis (treated with Valtrex and Linezolid). Was recently hospitalized 01/21-01/27, treated for HCAP with levaquin (completed 2/2). The patient now admitted on 2/13 for increasing fatigue, SOB, N/V, and upper abdominal pain. New RLL consolidation seen on CT chest 2/13. Also with ROGERIO and elevated Alk Phos level.      Today's Plan:  - Will change code status to Comfort care.      #HCAP:  #Leukocytosis:  # R-sided pleural effusion: worsening shortness of breath and cough over the past week. Associated with persistent fatigue. Has not felt to be at her baseline since prior to last admission. Presents with leukocytosis, mildly elevated procalcitonin & lactate, and new RLL consolidation on CT chest 2/13. Recently completed a 10 day course of Levaquin on 2/2 for HCAP treatment. Most recent sputum results from 1/22 growing candida albicans. Hx of MRSA (nares). Influenza A/B and respiratory virus panel negative.   - Chest x-ray on 2/16 with ongoing moderate size R-sided pleural effusion and Chest tube placed on 2/16/17. Will leave this in place.    #Acute hypoxic respiratory failure: Etiology is unclear. ?pulm edema vs. pna. CXR today in AM shows Effusion is resolved but still hypoxic.   - Sputum cultures sent 2/15 only growing Candida.  - Switch Zosyn to Ceftaz, Stop IV vanc. Also  added IV Micafungin.  - Poor prognosis, family and pt aware. Due to lack of improvement we will stop IV anbx.      #Nausea/ Anorexia: Persistent dry heaves over the past week. No significant emesis. Loss of appetite. Etiology likely multifactorial, progression of metastatic disease, medication side effects from narcotics, or elevated tacrolimus levels. Some concern for brain mets per GI, however Dr. Lebron (oncology) endorsed this is unlikely. Will defer CT head for now.  - Will stop all meds and continue antiemetics.     #Abdominal Pain:  #Elevated Transaminases: Mild LFT elevation consistent with hepatic mets and mild ALP elevation also possibly from bony mets. Also concern for evolving hepatorenal syndrome.  - Trend hepatic panel daily  - Administer vitamin K 5mg IV once today, 2/16  - Appreciate GI recommendations.        #COPD s/p R lung transplant 02/19/2009: Followed by Dr. Clay in pulmonary clinic. 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 EBV DNA level was 2030 on 12/12/16. Repeat level on 01/23 was 4243.   - Will continue MMF and solumedrol for comfort.      #Stage IV colon cancer with liver mets. Primary is Dr. Lebron. Prior treatment with xeloda/oxaliplatin, irinotecan, erbitux, and avastin. She recently was found to have progression on irinotecan/erbitux. She was started on lonsurf on 12/26/16. Has not received chemo in about three weeks per patient report. Had been on hold d/t deconditioning and infections. 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  - Hematology/oncology following, appreciate recommendations. Had care conference on 2/17.            Subjective & Interval History:     Last 24 hour vital signs, labs and care team notes reviewed.    Unchanged O2 needs overnight, 10 liters oxymizer, sating low 90s. Endorsing some shortness of breath at rest hence was started on higher dose of narcs overnight;  however, no change in cough or sputum quality. Continues to c/o generalized abdominal pain and was bothered by low back pain overnight. Leg swelling unchanged.           Review of Systems:     Limited as she was sleepy.          Medications:     Active Medications:    methylPREDNISolone  10 mg Intravenous Q24H     mycophenolate  1,000 mg Intravenous BID IS     pantoprazole  40 mg Intravenous QAM AC     Active PRN Medications:  atropine, glycopyrrolate, glycopyrrolate, artificial saliva, hypromellose-dextran, mineral oil-hydrophilic petrolatum, promethazine, fentaNYL, HYDROmorphone, sodium chloride (PF), calcium carbonate, albuterol, loperamide, naloxone, prochlorperazine **OR** prochlorperazine **OR** prochlorperazine, glucose **OR** dextrose **OR** glucagon         Physical Exam:     Constitutional: Awake, alert, in no apparent distress.   HEENT: Eyes with pink conjunctivae, no scleral jaundice.  Oral mucosa moist without lesions. Neck supple without lymphadenopathy.   PULM: Diminished air flow bilaterally, coarse lower lobes. No wheeze.  CV: Normal S1 and S2. RRR.  No murmur, gallop, or rub. Anasarca.  Peripheral pulses intact.   ABD: NABS, hepatomegaly, tender, distended. No guarding.   MSK: Moves all extremities. No apparent muscle wasting.   SKIN: Warm, dry. No pruritus.  No rash on limited exam. ?     Lines, Drains, and Devices:  Peripheral IV 05/07/15 Right;Anterior Upper forearm (Active)   Number of days:650       Port A Cath 10/22/14 Right Chest wall (Active)   Access Date 02/13/17 2/13/2017 11:26 AM   Access Attempts 1 2/13/2017 11:26 AM   Gauge/Length Noncoring 90 degree bend;3/4 inch;20 gauge 2/13/2017 11:26 AM   Site Assessment WDL 2/15/2017 10:00 AM   Line Status Infusing 2/15/2017 10:00 AM   Extravasation? No 2/10/2017 12:00 PM   Dressing Intervention Transparent 2/13/2017 11:26 AM   Dressing change due 01/28/17 1/27/2017  8:30 AM   Needle Change Due 01/28/17 1/27/2017  8:30 AM   De-Access Date  "02/10/17 2/10/2017  3:00 PM   Date to be Reflushed 02/10/17 2/10/2017 12:00 PM   Number of days:847          Data:     All vital signs, laboratory and imaging data for the past 24 hours reviewed.      Vital signs:  Temp: 98.9  F (37.2  C) Temp src: Axillary BP: 112/52   Heart Rate: 107 Resp: 12 SpO2: 96 % O2 Device: Oxi Plus Oxygen Delivery: 10 LPM Height: 149.9 cm (4' 11\") Weight: 58.9 kg (129 lb 12.8 oz)    Weight trend:   Vitals:    02/16/17 0907 02/17/17 1200 02/18/17 0000   Weight: 58.7 kg (129 lb 6.4 oz) 58.5 kg (129 lb) 58.9 kg (129 lb 12.8 oz)        Intake/Output Summary (Last 24 hours) at 02/17/17 1830  Last data filed at 02/17/17 1800   Gross per 24 hour   Intake              744 ml   Output             2070 ml   Net            -1326 ml         Labs    CMP:     Recent Labs  Lab 02/18/17  0320 02/17/17  2140 02/17/17  1743 02/17/17  0740 02/17/17  0143 02/16/17  0803 02/15/17  0636     --  134 134 136 134 135   POTASSIUM 5.8* 5.6* 7.4* 5.4* 6.2* 5.9* 5.1   CHLORIDE 104  --  104 103 106 104 105   CO2 18*  --  19* 18* 17* 19* 20   ANIONGAP 13  --  10 13 13 11 11   GLC 44*  --  56* 137* 60* 122* 202*   BUN 45*  --  40* 37* 39* 35* 31*   CR 3.66*  --  3.21* 3.18* 2.98* 2.53* 1.94*   GFRESTIMATED 12*  --  14* 14* 15* 18* 25*   GFRESTBLACK 15*  --  17* 17* 18* 22* 30*   RUBY 7.9*  --  7.4* 7.7* 7.4* 7.8* 7.2*   MAG 2.0  --   --  2.1  --  2.0 2.1   PHOS 7.2*  --   --  6.2*  --  5.5* 4.7*   PROTTOTAL 5.6*  --   --  5.6* 5.1* 5.4* 5.2*   ALBUMIN 2.6*  --   --  3.0*  --  2.6* 2.2*   BILITOTAL 0.7  --   --  1.0  --  0.5 0.4   ALKPHOS 415*  --   --  445*  --  595* 592*   *  --   --  294*  --  435* 72*   ALT 81*  --   --  83*  --  102* 38     CBC:     Recent Labs  Lab 02/18/17  0320 02/17/17  0740 02/16/17  0803 02/15/17  0636   WBC 29.5* 24.5* 22.9* 17.8*   RBC 2.89* 2.78* 3.11* 3.06*   HGB 7.1* 6.8* 7.6* 7.5*   HCT 24.3* 23.5* 27.0* 26.6*   MCV 84 85 87 87   MCH 24.6* 24.5* 24.4* 24.5*   MCHC 29.2* 28.9* " 28.1* 28.2*   RDW 22.0* 21.8* 22.2* 22.2*    257 332 410     INR:     Recent Labs  Lab 02/13/17  1128   INR 1.48*     Glucose:   Recent Labs  Lab 02/18/17  1508 02/18/17  0741 02/18/17  0550 02/18/17  0440 02/18/17  0405 02/18/17  0320 02/17/17  1924  02/17/17  1743  02/17/17  0740  02/17/17  0143  02/16/17  0803  02/15/17  0636   GLC  --   --   --   --   --  44*  --   --  56*  --  137*  --  60*  --  122*  --  202*   * 75 110* 142* 48*  --  69*  < >  --   < >  --   < >  --   < >  --   < >  --    < > = values in this interval not displayed.  Blood Gas:     Recent Labs  Lab 02/17/17  0835   O2PER 8L     Culture Data     Recent Labs  Lab 02/16/17  1725 02/15/17  1051 02/13/17  1342 02/13/17  1128   CULT Culture negative monitoring continues  Culture negative after 15 hours  Culture negative monitoring continues Light growth Candida albicans / dubliniensis Candida albicans and Candida dubliniensis are not routinely speciated Susceptibility testing not routinely doneLight growth Normal ayse* No growth after 5 days No growth after 5 days     Virology Data:   Lab Results   Component Value Date    INFLUA Negative 06/11/2013    FLUAH1 Negative 02/13/2017    FLUAH3 Negative 02/13/2017    AU2871 Negative 02/13/2017    IFLUB Negative 02/13/2017    RSVA Negative 02/13/2017    RSVB Negative 02/13/2017    PIV1 Negative 02/13/2017    PIV2 Negative 02/13/2017    PIV3 Negative 02/13/2017    HMPV Negative 02/13/2017    HRVS Negative 02/13/2017    ADVBE Negative 02/13/2017    ADVC Negative 02/13/2017    ADVC Negative 01/21/2017     Historical CMV results (last 3 of prior testing):  Lab Results   Component Value Date    CMVQNT  12/12/2016     CMV DNA Not Detected   The JEREMIAS AmpliPrep/JEREMIAS TaqMan CMV Test is an FDA-approved in vitro nucleic   acid amplification test for the quantitation of cytomegalovirus DNA in human   plasma (EDTA plasma) using the JEREMIAS AmpliPrep Instrument for automated viral   nucleic acid  extraction and the JEREMIAS TaqMan Analyzer or JEREMIAS TaqMan for   automated Real Time amplification and detection of the viral nucleic acid   target.   Titer results are reported in International Units/mL (IU/mL using 1st WHO   International standard for Human Cytomegalovirus for Nucleic Acid Amplification   based assays. The conversion factor between CMV DNA copis/mL (as defined by the   Roche JEREMIAS TaqMan CMV test) and International Units is the CMV DNA   concentration in IU/mL x 1.1 copies/IU = CMV DNA in copies/mL.   This assay has received FDA approval for the testing of human plasma only. The   [facility] at the [facility], has validated the performance characteristics of the Roche   CMV assay for plasma, bronchial alveolar lavage/wash and urine.      CMVQNT  09/12/2016     CMV DNA Not Detected   The JEREMIAS AmpliPrep/JEREMIAS TaqMan CMV Test is an FDA-approved in vitro nucleic   acid amplification test for the quantitation of cytomegalovirus DNA in human   plasma (EDTA plasma) using the JEREMIAS AmpliPrep Instrument for automated viral   nucleic acid extraction and the JEREMIAS TaqMan Analyzer or JEREMIAS TaqMan for   automated Real Time amplification and detection of the viral nucleic acid   target.   Titer results are reported in International Units/mL (IU/mL using 1st WHO   International standard for Human Cytomegalovirus for Nucleic Acid Amplification   based assays. The conversion factor between CMV DNA copis/mL (as defined by the   Roche JEREMIAS TaqMan CMV test) and International Units is the CMV DNA   concentration in IU/mL x 1.1 copies/IU = CMV DNA in copies/mL.   This assay has received FDA approval for the testing of human plasma only. The   [facility] at the [facility], has validated the performance characteristics of the Roche   CMV assay for plasma, bronchial alveolar  lavage/wash and urine.      CMVQNT  07/18/2016     CMV DNA Not Detected   The JEREMIAS AmpliPrep/JEREMIAS TaqMan CMV Test is an FDA-approved in vitro nucleic   acid amplification test for the quantitation of cytomegalovirus DNA in human   plasma (EDTA plasma) using the JEREMIAS AmpliPrep Instrument for automated viral   nucleic acid extraction and the JEREMIAS TaqMan Analyzer or JEREMIAS TaqMan for   automated Real Time amplification and detection of the viral nucleic acid   target.   Titer results are reported in International Units/mL (IU/mL using 1st WHO   International standard for Human Cytomegalovirus for Nucleic Acid Amplification   based assays. The conversion factor between CMV DNA copis/mL (as defined by the   Roche JEREMIAS TaqMan CMV test) and International Units is the CMV DNA   concentration in IU/mL x 1.1 copies/IU = CMV DNA in copies/mL.   This assay has received FDA approval for the testing of human plasma only. The   Infectious Disease Diagnostic Laboratory at the [facility], [place], has validated the performance characteristics of the Roche   CMV assay for plasma, bronchial alveolar lavage/wash and urine.       Lab Results   Component Value Date    CMVLOG Not Calculated 12/12/2016    CMVLOG Not Calculated 09/12/2016    CMVLOG Not Calculated 07/18/2016     Urine Studies    Recent Labs   Lab Test  02/13/17   1306   URINEPH  5.0   NITRITE  Negative   LEUKEST  Negative   WBCU  4*

## 2017-02-18 NOTE — PROGRESS NOTES
Calorie Counts     Intakes recorded for: 2/17/17 Kcals: 0 Pro: 0g    # Meals recorded: 2 meals ordered from kitchen, no meal intakes recorded    # Supplements recorded: 0

## 2017-02-19 NOTE — PROGRESS NOTES
D/I: SHAWN called by bedside nurse, Christine, that this pt. Is on comfort cares and family would like to talk about hospice. SHAWN met with pt. (who is not awake) and son, cyn (who pt. Lives with) and dtr., Amy. In discussing different hospice options (SNF, residential care), family prefers home. Amy has had experience with hospice in the past and feels she understands it. Outlined hospice process and services for them. Answered questions as able. Family prefers [facility] as pt. Was receiving home care through them. After call the [facility] and speaking to Zeinab in intake, they do not serve [place] for hospice (just for home care). Family was agreeable to [facility] as 2nd choice for hospice (local hospital is [facility] operated and pt. In system there). SHAWN called [facility]: [phone] and spoke to Shakira. Made referral and request for in-hospital meeting today. Hospice staff, Zaida, able to meet with pt. And family today at 1300. Faxed requested info to Shakira (fax [phone]). Attempted to re-meet with family to update but they were not present. Informed nurse who will pass info onto family.  A: Family choosing home hospice at this time. Meeting with hospice at 1300 will lay out services available and other equipment and needs for pt. Once home.  P: SW is available today for needs as they arise. Pager #[phone]

## 2017-02-19 NOTE — PROGRESS NOTES
D/I: HENRIQUE Cerda from [facility] here and met with pt. To set up hospice services for pt. To go home. In order to get everything set up that is needed (ie, medical equipment including meds), plan is for pt. To go home tomorrow, Monday 2/20. SW set up stretcher transportation.   P: Plan is for pt. To return home on Monday, 2/20 and enter into [facility] services at that time. 1100 stretcher transport arranged. PCS for transport completed and signed and in pt.'s chart at nurse's station.  If d/c plans change &/or pt. Expires, please:  Cancel transport [phone]  [facility]: [phone]

## 2017-02-19 NOTE — PROGRESS NOTES
Calorie Counts     Intake recorded for: 2/18/17 Kcals: 0 Protein: 0g    # Meals recorded: no meal ordered from kitchen, no food intake recorded     # Supplements recorded: 0

## 2017-02-19 NOTE — PLAN OF CARE
Problem: Goal Outcome Summary  Goal: Goal Outcome Summary  Outcome: No Change  D: Pt transitioned to comfort cares 2/18  I/A: Pt sedated on 100mcg/hr of fentanyl, appears comfortable at rest. Became restless when repositioned per family request at beginning of shift, 100mcg bolus administered with relief. Remains on 10L oxi plus per family preference. Scant output from howard, ostomy, or CT.   P: Continue comfort cares, possibly tx to med/surg floor later today.

## 2017-02-19 NOTE — PROGRESS NOTES
Pt on comfort cares and comfortable on 100mcg/hr fentanyl. HR 110s, RR 10, O2: 100% on 10L Oxiplus facemask.     Contacted on-call SW to see if we could arrange home hospice for patient as her VS are not greatly changed and death does not seem particularly imminent.     Silva ESCOBAR arranged  hospice to see family at bedside at 1300 and discuss the idea and make arrangements. Will f/u then with SW and  hospice.       Update:  hospice does not serve [facility] [place]. [facility] and is able to meet at 1300 today instead.

## 2017-02-19 NOTE — PLAN OF CARE
Problem: Goal Outcome Summary  Goal: Goal Outcome Summary  Outcome: Declining  Pt transitioned to comfort care. Fentanyl gtt started at 100mcg/hr, pt appears comfortable. Continues on oximask, vital signs remain stable. No output from chest tube, howard or ostomy. Family at bedside, offered support and educated on plan of care.

## 2017-02-20 LAB — COPATH REPORT: NORMAL

## 2017-02-20 NOTE — DISCHARGE SUMMARY
[facility]  Cystic Fibrosis-Lung Transplant Discharge Summary  February 19, 2017      Melissa Collazo MRN# [id number]   Age: 76 year old YOB: 1940     Date of Admission:  2/13/2017  Date of Discharge:  February 19, 2017    Discharge Diagnosis:  1) Rt. LL pna  2) Metastatic colon cancer  3) Hyperkalemia  4) Acute renal failure.    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 Other hx includes metastatic colon cancer with liver mets s/p ileocolic resection and ostomy, most recently on lonsurf. Lonsurf has most recently been held d/t complications of deconditioning and infections including facial zoster and bilateral upper extremity cellulitis (treated with Valtrex and Linezolid). Was recently hospitalized 01/21-01/27, treated for HCAP with levaquin (completed 2/2). The patient now admitted on 2/13 for increasing fatigue, SOB, N/V, and upper abdominal pain. New RLL consolidation seen on CT chest 2/13. Also with ROGERIO and elevated Alk Phos level.    Upon admission was started on IVF but she contd to have poor UO and Cr contd to climb. She had low albumin and FENA. Elected to give her albumin with lasix due to overall anasarca but no improvement noted. W/u showed no portal hypertension, TSH was normal.     Rt. LL pna was treated with IV anbx but hypoxia contd to worsen and had effusion. This was treated with pigtail catheter placement. Despite the efffusion resolving the SOB/hypoxia persisted.    Due to progression of her underlying illness (malignancy) and lack of improvement in respiratory status we had family conference with Dr. Lebron and discussed hospice.    She was put on comfort care on 2/18/17. Today discussed with family and with hospice for possible home d/c  "on hospice tomorrow.    Unfortunately she passed away on 2/19/2017 at 8PM.     Greater than 30 minutes spent in discharge planning.         Procedures Performed:   Rt. pigtail catheter placement - 2/16/17.  CXR  Rt. UQ ultrasound to assess portal pressures.         History of Present Illness Day of Discharge:     Sleeping and not responsive on fentanyl gtts.      Review of Systems Day of Discharge:                     Unable due to somnolence/coma.          Medications:     Current Discharge Medication List      START taking these medications    Details   hyoscyamine (ANASPAZ/LEVSIN) 0.125 MG tablet Take 1-2 tablets (125-250 mcg) by mouth every 4 hours as needed for other (excessive secretions)  Qty: 10 tablet, Refills: 3    Associated Diagnoses: Colon carcinoma metastatic to multiple sites (H); Lung replaced by transplant (H)         CONTINUE these medications which have NOT CHANGED    Details   Blood Glucose Calibration (CRIS DOC CONTROL) NORMAL SOLN daily as needed      !! order for DME Equipment being ordered:   Hernia Belt     Nu form, BG- Cool comfort- medium,  4\"  Belt ring  3 1/4  Catalog # :  [id number]  Qty: 1 each, Refills: 11    Associated Diagnoses: Parastomal hernia without obstruction or gangrene; Intestinal stoma prolapse (H)      blood glucose monitoring (NO BRAND SPECIFIED) test strip TEST 4 TIMES DAILY.      insulin pen needle 32G X 4 MM USE AS DIRECTED FOR ADMINISTERING INSULIN AT HOME.      !! order for DME Lightweight Wheelchair with leg rests.  Qty: 1 Units, Refills: 0    Associated Diagnoses: Spinal stenosis      !! order for DME Please dispense 1 hair prosthesis.  Qty: 1 Units, Refills: 0    Associated Diagnoses: Cancer of right colon (H)      !! ORDER FOR DME Equipment being ordered: ileostomy supplies  Qty: 1 Month, Refills: 11    Associated Diagnoses: S/P ileostomy (H)       !! - Potential duplicate medications found. Please discuss with provider.      STOP taking these medications       " doxazosin (CARDURA) 2 MG tablet Comments:   Reason for Stopping:         tacrolimus (PROGRAF - GENERIC EQUIVALENT) 0.5 MG capsule Comments:   Reason for Stopping:         azithromycin (ZITHROMAX) 250 MG tablet Comments:   Reason for Stopping:         Prochlorperazine Maleate (COMPAZINE PO) Comments:   Reason for Stopping:         HYDROmorphone (DILAUDID) 2 MG tablet Comments:   Reason for Stopping:         lidocaine (LIDODERM) 5 % Patch Comments:   Reason for Stopping:         lidocaine-prilocaine (EMLA) cream Comments:   Reason for Stopping:         ondansetron (ZOFRAN) 8 MG tablet Comments:   Reason for Stopping:         dapsone 25 MG tablet Comments:   Reason for Stopping:         predniSONE (DELTASONE) 5 MG tablet Comments:   Reason for Stopping:         zinc sulfate (ZINCATE) 220 MG capsule Comments:   Reason for Stopping:         predniSONE (DELTASONE) 2.5 MG tablet Comments:   Reason for Stopping:         sodium bicarbonate 650 MG tablet Comments:   Reason for Stopping:         albuterol (PROAIR HFA, PROVENTIL HFA, VENTOLIN HFA) 108 (90 BASE) MCG/ACT inhaler Comments:   Reason for Stopping:         montelukast (SINGULAIR) 10 MG tablet Comments:   Reason for Stopping:         fluticasone-salmeterol (ADVAIR) 500-50 MCG/DOSE diskus inhaler Comments:   Reason for Stopping:         omeprazole (PRILOSEC) 40 MG capsule Comments:   Reason for Stopping:         amLODIPine (NORVASC) 5 MG tablet Comments:   Reason for Stopping:         atorvastatin (LIPITOR) 20 MG tablet Comments:   Reason for Stopping:         aspirin EC 81 MG tablet Comments:   Reason for Stopping:         cyanocobalamin (VITAMIN B-12 ER) 1000 MCG TBCR Comments:   Reason for Stopping:         levothyroxine (SYNTHROID, LEVOTHROID) 50 MCG tablet Comments:   Reason for Stopping:         butalbital-acetaminophen-caffeine (FIORICET, ESGIC) -40 MG per tablet Comments:   Reason for Stopping:         Magnesium Oxide 250 MG TABS Comments:   Reason for  Stopping:         loperamide (IMODIUM) 2 MG capsule Comments:   Reason for Stopping:         ascorbic acid 500 MG TABS Comments:   Reason for Stopping:         Calcium Carbonate-Vitamin D (CALCIUM 500 + D PO) Comments:   Reason for Stopping:         Multiple Vitamin (MULTI-VITAMIN) per tablet Comments:   Reason for Stopping:                    Follow-up:   None    Physical Exam:   Temp:  [98.1  F (36.7  C)] 98.1  F (36.7  C)  Heart Rate:  [] 103  Resp:  [7-16] 16  BP: (104)/(48) 104/48  SpO2:  [79 %-100 %] 79 %    Intake/Output Summary (Last 24 hours) at 02/19/17 2134  Last data filed at 02/19/17 1900   Gross per 24 hour   Intake           906.82 ml   Output               50 ml   Net           856.82 ml      Pt is unresponsive on fentanyl gtts. Appears comfortable.          Data:   All laboratory and imaging data reviewed.    CMP  Recent Labs  Lab 02/18/17  0320 02/17/17  2140 02/17/17  1743 02/17/17  0740 02/17/17  0143 02/16/17  0803 02/15/17  0636     --  134 134 136 134 135   POTASSIUM 5.8* 5.6* 7.4* 5.4* 6.2* 5.9* 5.1   CHLORIDE 104  --  104 103 106 104 105   CO2 18*  --  19* 18* 17* 19* 20   ANIONGAP 13  --  10 13 13 11 11   GLC 44*  --  56* 137* 60* 122* 202*   BUN 45*  --  40* 37* 39* 35* 31*   CR 3.66*  --  3.21* 3.18* 2.98* 2.53* 1.94*   GFRESTIMATED 12*  --  14* 14* 15* 18* 25*   GFRESTBLACK 15*  --  17* 17* 18* 22* 30*   RUBY 7.9*  --  7.4* 7.7* 7.4* 7.8* 7.2*   MAG 2.0  --   --  2.1  --  2.0 2.1   PHOS 7.2*  --   --  6.2*  --  5.5* 4.7*   PROTTOTAL 5.6*  --   --  5.6* 5.1* 5.4* 5.2*   ALBUMIN 2.6*  --   --  3.0*  --  2.6* 2.2*   BILITOTAL 0.7  --   --  1.0  --  0.5 0.4   ALKPHOS 415*  --   --  445*  --  595* 592*   *  --   --  294*  --  435* 72*   ALT 81*  --   --  83*  --  102* 38     CBC  Recent Labs  Lab 02/18/17  0320 02/17/17  0740 02/16/17  0803 02/15/17  0636   WBC 29.5* 24.5* 22.9* 17.8*   RBC 2.89* 2.78* 3.11* 3.06*   HGB 7.1* 6.8* 7.6* 7.5*   HCT 24.3* 23.5* 27.0* 26.6*   MCV  84 85 87 87   MCH 24.6* 24.5* 24.4* 24.5*   MCHC 29.2* 28.9* 28.1* 28.2*   RDW 22.0* 21.8* 22.2* 22.2*    257 332 410     INR  Recent Labs  Lab 02/13/17  1128   INR 1.48*          Cystic Fibrosis Culture  Specimen Description   Date Value Ref Range Status   02/17/2017 Feces  Final   02/16/2017 Pleural fluid Right  Final   02/16/2017 Pleural fluid Right  Final   02/16/2017 Pleural fluid Right  Final   02/16/2017 Pleural fluid Right  Final    Culture Micro   Date Value Ref Range Status   02/16/2017 Culture negative monitoring continues  Final   02/16/2017 Culture negative monitoring continues  Final   02/16/2017 Culture negative after 15 hours  Final

## 2017-02-20 NOTE — PROGRESS NOTES
MD DEATH PRONOUNCEMENT    Called to pronounce Melsisa Collazo dead.    Physical Exam: Unresponsive to noxious stimuli, Spontaneous respirations absent, Breath sounds absent, Carotid pulse absent, Heart sounds absent, Pupillary light reflex absent and Corneal blink reflex absent    Patient was pronounced dead at 8:00 PM, 2017.    Active Problems:    SOB (shortness of breath)       Please consider an autopsy if any of the following exist:  NO Unexpected or unexplained death during or following any dental, medical, or surgical diagnostic treatment procedures.   NO Death of mother at or up to seven days after delivery.     NO All  and pediatric deaths.     NO Death where the cause is sufficiently obscure to delay completion of the death certificate.   NO Deaths in which autopsy would confirm a suspected illness/condition that would affect surviving family members or recipients of transplanted organs.     The following deaths must be reported to the 's Office:  NO A death that may be due entirely or in part to any factors other than natural disease (recent surgery, recent trauma, suspected abuse/neglect).   NO A death that may be an accident, suicide, or homicide.     NO Any sudden, unexpected death in which there is no prior history of significant heart disease or any other condition associated with sudden death.   NO Any death which is apparently due to natural causes but in which the  does not have a personal physician familiar with the patient s medical history, social, or environmental situation or the circumstances of the terminal event.   NO Any death apparently due to Sudden Infant Death Syndrome.     NO Deaths that occur during, in association with, or as consequences of a diagnostic, therapeutic, or anesthetic procedure.   NO Any death in which a fracture of a major bone has occurred within the past (6) six months.   NO Any death in which the  was an inmate of a public  institution or was in the custody of Law Enforcement personnel.     Body disposition: Autopsy was discussed with family member:  Daughter in person.  Permission for autopsy was declined.    Thomas Caro MD

## 2017-02-21 LAB
BACTERIA SPEC CULT: NO GROWTH
Lab: NORMAL
MICRO REPORT STATUS: NORMAL
SPECIMEN SOURCE: NORMAL

## 2017-02-22 ENCOUNTER — POST MORTEM DOCUMENTATION (OUTPATIENT)
Dept: TRANSPLANT | Facility: CLINIC | Age: 77
End: 2017-02-22

## 2017-02-22 NOTE — PROGRESS NOTES
Received notification of patient's death from Dr. Solis, contact information is pager [phone].  Place of death was reported as [facility].  Graft status at the time of death was reported as Functioning.  Additional information: She was put on comfort care on 17. Plan was to send her home on hospice but passed away before discharged on  at 8 PM     TIS verification is: Pending  The Transplant Office has been notified that patient is .  The Post Mortem Encounter has been completed.  Notifications have been sent to the Care team.   Instructions have been sent to cancel pending appointments, discontinue pending orders, eliminate paper chart and send a sympathy card to the family.

## 2017-02-22 NOTE — ED NOTES
Nausea, vomiting, and shortness of breath x 1 week. Was discharged around 2/3 from the hospital for cellulitis and pneumonia. Hx of lung transplant, colon cancer.    no difficulties

## 2017-02-23 LAB
BACTERIA SPEC CULT: NORMAL
Lab: NORMAL
MICRO REPORT STATUS: NORMAL
SPECIMEN SOURCE: NORMAL

## 2017-03-16 LAB
FUNGUS SPEC CULT: NORMAL
Lab: NORMAL
MICRO REPORT STATUS: NORMAL
SPECIMEN SOURCE: NORMAL

## 2017-04-17 NOTE — PROGRESS NOTES
DARRELL VILLASENOR    Received notification of patient's death from Marbella Sauceda.  Place of death was reported as [facility].  Graft status at the time of death was reported as Functioning.  Additional information: Pt with progressing maliagnant disease and lack of improvement in respiratory status put on comfort care. After discussion with family, patient was going to discharge to on hospice. Pt passed away in hospital before dicharge.  TIS verification is: Complete

## 2023-02-18 NOTE — PROGRESS NOTES
Care Coordinator Progress Note     Admission Date/Time:  2/13/2017  Attending MD:  Chino Solis MD     Data  Chart reviewed, discussed with interdisciplinary team.   Patient was admitted for:    HCAP (healthcare-associated pneumonia)  Lung replaced by transplant (H)  Colon carcinoma metastatic to multiple sites (H).    Concerns with insurance coverage for discharge needs: None.  Current Living Situation: Patient lives alone.  Support System: Involved  Services Involved: Home Care  Transportation: Family or Friend will provide  Barriers to Discharge: Pt is not medically stable for discharge at this time      Assessment  Pt is a 76 year old female with PMH significant for metastatic colon cancer with liver metastases, s/p ileocolic resection and anastamosis, end-ileostomy and mucous fistula, complex lung infection, s/p right lung transplant (2009), type II diabetes and chronic kidney disease who was admitted for worsening fatigue, shortness of breath, abdominal pain, and nausea and vomiting. PT/OT consulted and are recommending pt discharge to home with home PT/OT. Per chart review, pt was receiving home care for RN, PT, and OT through [facility] prior to admission. Resumption orders placed. Pt is currently on IV antibiotics and supplemental oxygen. Per MD team, unsure of antibiotic plan at this time and pt is not on home oxygen at baseline. Will continue to follow plan of care and assist with discharge planning as needed.      Plan  Anticipated Discharge Date:  2-3 days  Anticipated Discharge Plan:  Pt will discharge to home with resumption of home care.    Lyssa Mcmillan RN         0 = understands/communicates without difficulty

## 2023-03-18 NOTE — PLAN OF CARE
[employer] Education Explained and given in writiting, All questions answered PIV removed without difficulty, Intact. Ambulated to exit with steady gait. Will follow up with PCP if necessary.       Problem: Goal Outcome Summary  Goal: Goal Outcome Summary  Outcome: No Change  1900-0659: HR tachy in the low 100s. Tachypneic, resps shallow. Sat-ting low 90s on RA. RAND. Albuterol inhaler given with partial relief. Triggered SIRS, LA was 1.8. C/o nausea; compazine given with relief. C/o arm pain, R>L; Dilaudid 4mg given at bedtime and 2mg x2 given during the night. Tums given for heartburn. Scleral hemorrhage noted in the left eye; per pt this sometimes happens for which she uses saline eye drops. BG were 124 and 100. No SSI needed. Scheduled HS Lantus given. Up with SBA. NS infusing at 100ml/hr. Ostomy with watery/loose brown output. Slept intermittently through the night. Cont POC.